# Patient Record
Sex: FEMALE | Race: BLACK OR AFRICAN AMERICAN | NOT HISPANIC OR LATINO | Employment: OTHER | URBAN - METROPOLITAN AREA
[De-identification: names, ages, dates, MRNs, and addresses within clinical notes are randomized per-mention and may not be internally consistent; named-entity substitution may affect disease eponyms.]

---

## 2020-02-04 ENCOUNTER — LAB VISIT (OUTPATIENT)
Dept: LAB | Facility: HOSPITAL | Age: 65
End: 2020-02-04
Attending: FAMILY MEDICINE
Payer: MEDICARE

## 2020-02-04 ENCOUNTER — OFFICE VISIT (OUTPATIENT)
Dept: FAMILY MEDICINE | Facility: CLINIC | Age: 65
End: 2020-02-04
Payer: MEDICARE

## 2020-02-04 VITALS
OXYGEN SATURATION: 95 % | SYSTOLIC BLOOD PRESSURE: 110 MMHG | HEIGHT: 56 IN | HEART RATE: 80 BPM | DIASTOLIC BLOOD PRESSURE: 66 MMHG | RESPIRATION RATE: 20 BRPM | BODY MASS INDEX: 27.47 KG/M2 | WEIGHT: 122.13 LBS | TEMPERATURE: 98 F

## 2020-02-04 DIAGNOSIS — I10 ESSENTIAL HYPERTENSION: Primary | ICD-10-CM

## 2020-02-04 DIAGNOSIS — Z13.220 ENCOUNTER FOR LIPID SCREENING FOR CARDIOVASCULAR DISEASE: ICD-10-CM

## 2020-02-04 DIAGNOSIS — Z76.89 ESTABLISHING CARE WITH NEW DOCTOR, ENCOUNTER FOR: ICD-10-CM

## 2020-02-04 DIAGNOSIS — Z12.4 PAP SMEAR FOR CERVICAL CANCER SCREENING: ICD-10-CM

## 2020-02-04 DIAGNOSIS — Z12.11 COLON CANCER SCREENING: ICD-10-CM

## 2020-02-04 DIAGNOSIS — Z11.59 NEED FOR HEPATITIS C SCREENING TEST: ICD-10-CM

## 2020-02-04 DIAGNOSIS — Z13.820 OSTEOPOROSIS SCREENING: ICD-10-CM

## 2020-02-04 DIAGNOSIS — Z13.6 ENCOUNTER FOR LIPID SCREENING FOR CARDIOVASCULAR DISEASE: ICD-10-CM

## 2020-02-04 DIAGNOSIS — N95.9 UNSPECIFIED MENOPAUSAL AND PERIMENOPAUSAL DISORDER: ICD-10-CM

## 2020-02-04 DIAGNOSIS — I10 ESSENTIAL HYPERTENSION: ICD-10-CM

## 2020-02-04 DIAGNOSIS — Z12.31 ENCOUNTER FOR SCREENING MAMMOGRAM FOR BREAST CANCER: ICD-10-CM

## 2020-02-04 DIAGNOSIS — J44.9 CHRONIC OBSTRUCTIVE PULMONARY DISEASE, UNSPECIFIED COPD TYPE: ICD-10-CM

## 2020-02-04 LAB
ALBUMIN SERPL BCP-MCNC: 4 G/DL (ref 3.5–5.2)
ALP SERPL-CCNC: 58 U/L (ref 55–135)
ALT SERPL W/O P-5'-P-CCNC: 15 U/L (ref 10–44)
ANION GAP SERPL CALC-SCNC: 7 MMOL/L (ref 8–16)
AST SERPL-CCNC: 18 U/L (ref 10–40)
BASOPHILS # BLD AUTO: 0.03 K/UL (ref 0–0.2)
BASOPHILS NFR BLD: 0.8 % (ref 0–1.9)
BILIRUB SERPL-MCNC: 0.3 MG/DL (ref 0.1–1)
BUN SERPL-MCNC: 9 MG/DL (ref 8–23)
CALCIUM SERPL-MCNC: 10.3 MG/DL (ref 8.7–10.5)
CHLORIDE SERPL-SCNC: 101 MMOL/L (ref 95–110)
CHOLEST SERPL-MCNC: 224 MG/DL (ref 120–199)
CHOLEST/HDLC SERPL: 3.9 {RATIO} (ref 2–5)
CO2 SERPL-SCNC: 33 MMOL/L (ref 23–29)
CREAT SERPL-MCNC: 1 MG/DL (ref 0.5–1.4)
DIFFERENTIAL METHOD: ABNORMAL
EOSINOPHIL # BLD AUTO: 0 K/UL (ref 0–0.5)
EOSINOPHIL NFR BLD: 0.8 % (ref 0–8)
ERYTHROCYTE [DISTWIDTH] IN BLOOD BY AUTOMATED COUNT: 13.1 % (ref 11.5–14.5)
EST. GFR  (AFRICAN AMERICAN): >60 ML/MIN/1.73 M^2
EST. GFR  (NON AFRICAN AMERICAN): 59.3 ML/MIN/1.73 M^2
GLUCOSE SERPL-MCNC: 76 MG/DL (ref 70–110)
HCT VFR BLD AUTO: 45.5 % (ref 37–48.5)
HDLC SERPL-MCNC: 58 MG/DL (ref 40–75)
HDLC SERPL: 25.9 % (ref 20–50)
HGB BLD-MCNC: 14.4 G/DL (ref 12–16)
IMM GRANULOCYTES # BLD AUTO: 0.02 K/UL (ref 0–0.04)
IMM GRANULOCYTES NFR BLD AUTO: 0.5 % (ref 0–0.5)
LDLC SERPL CALC-MCNC: 151.6 MG/DL (ref 63–159)
LYMPHOCYTES # BLD AUTO: 1.6 K/UL (ref 1–4.8)
LYMPHOCYTES NFR BLD: 42.7 % (ref 18–48)
MCH RBC QN AUTO: 30.2 PG (ref 27–31)
MCHC RBC AUTO-ENTMCNC: 31.6 G/DL (ref 32–36)
MCV RBC AUTO: 95 FL (ref 82–98)
MONOCYTES # BLD AUTO: 0.6 K/UL (ref 0.3–1)
MONOCYTES NFR BLD: 15.6 % (ref 4–15)
NEUTROPHILS # BLD AUTO: 1.4 K/UL (ref 1.8–7.7)
NEUTROPHILS NFR BLD: 39.6 % (ref 38–73)
NONHDLC SERPL-MCNC: 166 MG/DL
NRBC BLD-RTO: 0 /100 WBC
PLATELET # BLD AUTO: 268 K/UL (ref 150–350)
PMV BLD AUTO: 11.1 FL (ref 9.2–12.9)
POTASSIUM SERPL-SCNC: 4.3 MMOL/L (ref 3.5–5.1)
PROT SERPL-MCNC: 7.4 G/DL (ref 6–8.4)
RBC # BLD AUTO: 4.77 M/UL (ref 4–5.4)
SODIUM SERPL-SCNC: 141 MMOL/L (ref 136–145)
TRIGL SERPL-MCNC: 72 MG/DL (ref 30–150)
TSH SERPL DL<=0.005 MIU/L-ACNC: 1.44 UIU/ML (ref 0.4–4)
WBC # BLD AUTO: 3.65 K/UL (ref 3.9–12.7)

## 2020-02-04 PROCEDURE — 99387 INIT PM E/M NEW PAT 65+ YRS: CPT | Mod: S$GLB,,, | Performed by: FAMILY MEDICINE

## 2020-02-04 PROCEDURE — 3074F SYST BP LT 130 MM HG: CPT | Mod: CPTII,S$GLB,, | Performed by: FAMILY MEDICINE

## 2020-02-04 PROCEDURE — 3074F PR MOST RECENT SYSTOLIC BLOOD PRESSURE < 130 MM HG: ICD-10-PCS | Mod: CPTII,S$GLB,, | Performed by: FAMILY MEDICINE

## 2020-02-04 PROCEDURE — 99999 PR PBB SHADOW E&M-NEW PATIENT-LVL III: CPT | Mod: PBBFAC,,, | Performed by: FAMILY MEDICINE

## 2020-02-04 PROCEDURE — 80061 LIPID PANEL: CPT

## 2020-02-04 PROCEDURE — 86803 HEPATITIS C AB TEST: CPT

## 2020-02-04 PROCEDURE — 85025 COMPLETE CBC W/AUTO DIFF WBC: CPT

## 2020-02-04 PROCEDURE — 99387 PR PREVENTIVE VISIT,NEW,65 & OVER: ICD-10-PCS | Mod: S$GLB,,, | Performed by: FAMILY MEDICINE

## 2020-02-04 PROCEDURE — 3078F DIAST BP <80 MM HG: CPT | Mod: CPTII,S$GLB,, | Performed by: FAMILY MEDICINE

## 2020-02-04 PROCEDURE — 99999 PR PBB SHADOW E&M-NEW PATIENT-LVL III: ICD-10-PCS | Mod: PBBFAC,,, | Performed by: FAMILY MEDICINE

## 2020-02-04 PROCEDURE — 99499 UNLISTED E&M SERVICE: CPT | Mod: S$GLB,,, | Performed by: FAMILY MEDICINE

## 2020-02-04 PROCEDURE — 36415 COLL VENOUS BLD VENIPUNCTURE: CPT | Mod: PO

## 2020-02-04 PROCEDURE — 84443 ASSAY THYROID STIM HORMONE: CPT

## 2020-02-04 PROCEDURE — 3078F PR MOST RECENT DIASTOLIC BLOOD PRESSURE < 80 MM HG: ICD-10-PCS | Mod: CPTII,S$GLB,, | Performed by: FAMILY MEDICINE

## 2020-02-04 PROCEDURE — 80053 COMPREHEN METABOLIC PANEL: CPT

## 2020-02-04 PROCEDURE — 99499 RISK ADDL DX/OHS AUDIT: ICD-10-PCS | Mod: S$GLB,,, | Performed by: FAMILY MEDICINE

## 2020-02-04 RX ORDER — ALBUTEROL SULFATE 90 UG/1
AEROSOL, METERED RESPIRATORY (INHALATION)
COMMUNITY
Start: 2020-01-27 | End: 2020-03-04

## 2020-02-04 RX ORDER — POTASSIUM CHLORIDE 600 MG/1
TABLET, FILM COATED, EXTENDED RELEASE ORAL
COMMUNITY
Start: 2019-12-16

## 2020-02-04 RX ORDER — CYCLOBENZAPRINE HCL 10 MG
TABLET ORAL
COMMUNITY
Start: 2020-01-05 | End: 2020-08-03 | Stop reason: SDUPTHER

## 2020-02-04 RX ORDER — FLUTICASONE PROPIONATE AND SALMETEROL 250; 50 UG/1; UG/1
1 POWDER RESPIRATORY (INHALATION)
COMMUNITY
Start: 2014-10-17 | End: 2020-11-24

## 2020-02-04 RX ORDER — ALBUTEROL SULFATE 0.83 MG/ML
SOLUTION RESPIRATORY (INHALATION)
COMMUNITY
Start: 2020-01-26 | End: 2020-08-03 | Stop reason: SDUPTHER

## 2020-02-04 RX ORDER — FLUTICASONE PROPIONATE 220 UG/1
AEROSOL, METERED RESPIRATORY (INHALATION)
COMMUNITY
Start: 2019-12-27 | End: 2020-09-24 | Stop reason: SDUPTHER

## 2020-02-04 RX ORDER — PREDNISONE 20 MG/1
TABLET ORAL
Status: ON HOLD | COMMUNITY
Start: 2020-01-25 | End: 2020-11-17 | Stop reason: HOSPADM

## 2020-02-04 RX ORDER — LISINOPRIL 5 MG/1
TABLET ORAL
COMMUNITY
Start: 2019-11-18 | End: 2020-08-03 | Stop reason: SDUPTHER

## 2020-02-04 RX ORDER — LORATADINE 10 MG/1
10 TABLET ORAL
COMMUNITY
End: 2020-08-03 | Stop reason: SDUPTHER

## 2020-02-04 RX ORDER — ALBUTEROL SULFATE 90 UG/1
2 AEROSOL, METERED RESPIRATORY (INHALATION)
COMMUNITY
Start: 2014-10-17 | End: 2020-03-04

## 2020-02-05 LAB — HCV AB SERPL QL IA: NEGATIVE

## 2020-02-05 NOTE — PROGRESS NOTES
Subjective:       Patient ID: Hollie Ponce is a 64 y.o. female.    Chief Complaint: Annual Exam and Establish Care      HPI  64-year-old female presents to establish care.  Patient states she changed her insurance recently and her previous PCP does not take people's Health.  Patient states she is doing well overall.  Denies any issues at this time.  Denies any need for any refills.  Endorses smoking daily but states she has cut down.      Review of Systems   Constitutional: Negative.    HENT: Negative.    Respiratory: Negative.    Cardiovascular: Negative.    Gastrointestinal: Negative.    Endocrine: Negative.    Genitourinary: Negative.    Musculoskeletal: Negative.    Neurological: Negative.    Psychiatric/Behavioral: Negative.           Past Medical History:   Diagnosis Date    Asthma     COPD (chronic obstructive pulmonary disease)     Hypertension      Past Surgical History:   Procedure Laterality Date     SECTION      HERNIA REPAIR       Family History   Problem Relation Age of Onset    Cancer Mother         Bladder    Liver disease Father      Social History     Socioeconomic History    Marital status: Single     Spouse name: Not on file    Number of children: Not on file    Years of education: Not on file    Highest education level: Not on file   Occupational History    Not on file   Social Needs    Financial resource strain: Not on file    Food insecurity:     Worry: Not on file     Inability: Not on file    Transportation needs:     Medical: Not on file     Non-medical: Not on file   Tobacco Use    Smoking status: Current Every Day Smoker     Packs/day: 0.50     Years: 50.00     Pack years: 25.00     Types: Cigarettes    Smokeless tobacco: Never Used   Substance and Sexual Activity    Alcohol use: Never     Frequency: Never    Drug use: Never    Sexual activity: Not Currently   Lifestyle    Physical activity:     Days per week: Not on file     Minutes per session: Not on  "file    Stress: Not on file   Relationships    Social connections:     Talks on phone: Not on file     Gets together: Not on file     Attends Mosque service: Not on file     Active member of club or organization: Not on file     Attends meetings of clubs or organizations: Not on file     Relationship status: Not on file   Other Topics Concern    Not on file   Social History Narrative    Not on file       Current Outpatient Medications:     albuterol (PROVENTIL) 2.5 mg /3 mL (0.083 %) nebulizer solution, VVN Q 4 TO 6 H FOR SOB OR WHEEZING, Disp: , Rfl:     albuterol (PROVENTIL/VENTOLIN HFA) 90 mcg/actuation inhaler, INHALE 2 PUFFS INTO THE LUNGS EVERY 6 HOURS AS NEEDED FOR WHEEZING, Disp: , Rfl:     cyclobenzaprine (FLEXERIL) 10 MG tablet, , Disp: , Rfl:     FLOVENT  mcg/actuation inhaler, INHALE 1 PUFF PO ITL BID, Disp: , Rfl:     fluticasone-salmeterol diskus inhaler 250-50 mcg, Inhale 1 puff into the lungs., Disp: , Rfl:     lisinopril (PRINIVIL,ZESTRIL) 5 MG tablet, TK 1 T PO D, Disp: , Rfl:     potassium chloride (KLOR-CON) 8 MEQ TbSR, TK 1 T PO D, Disp: , Rfl:     predniSONE (DELTASONE) 20 MG tablet, TAKE 2 TABLETS BY MOUTH DAILY FOR COPD EXCERBATION AND REPEAT IF RECCURENT EXCERBATION, Disp: , Rfl:     albuterol (PROVENTIL/VENTOLIN HFA) 90 mcg/actuation inhaler, Inhale 2 puffs into the lungs., Disp: , Rfl:     hydrochlorothiazide (HYDRODIURIL) 25 MG tablet, Take 1 tablet (25 mg total) by mouth once daily., Disp: 30 tablet, Rfl: 6    loratadine (CLARITIN) 10 mg tablet, Take 10 mg by mouth., Disp: , Rfl:    Objective:      Vitals:    02/04/20 1027   BP: 110/66   BP Location: Right arm   Patient Position: Sitting   BP Method: Medium (Manual)   Pulse: 80   Resp: 20   Temp: 98.1 °F (36.7 °C)   TempSrc: Oral   SpO2: 95%   Weight: 55.4 kg (122 lb 2.2 oz)   Height: 4' 8" (1.422 m)       Physical Exam   Constitutional: She is oriented to person, place, and time. No distress.   HENT:   Head: " Normocephalic and atraumatic.   Eyes: Conjunctivae are normal.   Neck: Neck supple.   Cardiovascular: Normal rate, regular rhythm and normal heart sounds. Exam reveals no gallop and no friction rub.   No murmur heard.  Pulmonary/Chest: Effort normal and breath sounds normal. She has no wheezes. She has no rales.   Neurological: She is alert and oriented to person, place, and time.   Skin: Skin is warm and dry.   Psychiatric: She has a normal mood and affect. Her behavior is normal. Judgment and thought content normal.          Assessment:       1. Essential hypertension    2. Colon cancer screening    3. Encounter for screening mammogram for breast cancer    4. Need for hepatitis C screening test    5. Encounter for lipid screening for cardiovascular disease    6. Establishing care with new doctor, encounter for    7. Chronic obstructive pulmonary disease, unspecified COPD type    8. Osteoporosis screening    9. Unspecified menopausal and perimenopausal disorder     10. Pap smear for cervical cancer screening        Plan:       Essential hypertension  -     CBC auto differential; Future; Expected date: 02/04/2020  -     Comprehensive metabolic panel; Future; Expected date: 02/04/2020  -     TSH; Future; Expected date: 02/04/2020    Colon cancer screening  -     Fecal Immunochemical Test (iFOBT); Future; Expected date: 02/04/2020    Encounter for screening mammogram for breast cancer  -     Mammo Digital Screening Bilat w/ Bebeto; Future; Expected date: 02/04/2020    Need for hepatitis C screening test  -     Hepatitis C antibody; Future; Expected date: 02/04/2020    Encounter for lipid screening for cardiovascular disease  -     Lipid panel; Future; Expected date: 02/04/2020    Chronic obstructive pulmonary disease, unspecified COPD type  Cont meds    Osteoporosis screening  -     DXA Bone Density Spine And Hip; Future; Expected date: 02/04/2020    Pap smear for cervical cancer screening  -     Ambulatory  referral/consult to Gynecology; Future; Expected date: 02/11/2020      Follow up in about 6 months (around 8/4/2020).            Navdeep Marquez MD      Patient note was created using mobiliThink.  Any errors in syntax or even information may not have been identified and edited on initial review prior to signing this note.

## 2020-02-10 ENCOUNTER — TELEPHONE (OUTPATIENT)
Dept: FAMILY MEDICINE | Facility: CLINIC | Age: 65
End: 2020-02-10

## 2020-02-28 ENCOUNTER — OFFICE VISIT (OUTPATIENT)
Dept: OBSTETRICS AND GYNECOLOGY | Facility: CLINIC | Age: 65
End: 2020-02-28
Payer: MEDICARE

## 2020-02-28 VITALS
SYSTOLIC BLOOD PRESSURE: 137 MMHG | DIASTOLIC BLOOD PRESSURE: 73 MMHG | WEIGHT: 125.31 LBS | BODY MASS INDEX: 28.19 KG/M2 | HEIGHT: 56 IN

## 2020-02-28 DIAGNOSIS — Z01.419 WELL WOMAN EXAM WITH ROUTINE GYNECOLOGICAL EXAM: Primary | ICD-10-CM

## 2020-02-28 PROCEDURE — G0101 PR CA SCREEN;PELVIC/BREAST EXAM: ICD-10-PCS | Mod: S$GLB,,, | Performed by: OBSTETRICS & GYNECOLOGY

## 2020-02-28 PROCEDURE — 88175 CYTOPATH C/V AUTO FLUID REDO: CPT

## 2020-02-28 PROCEDURE — 99999 PR PBB SHADOW E&M-EST. PATIENT-LVL III: ICD-10-PCS | Mod: PBBFAC,,, | Performed by: OBSTETRICS & GYNECOLOGY

## 2020-02-28 PROCEDURE — 99999 PR PBB SHADOW E&M-EST. PATIENT-LVL III: CPT | Mod: PBBFAC,,, | Performed by: OBSTETRICS & GYNECOLOGY

## 2020-02-28 PROCEDURE — 87624 HPV HI-RISK TYP POOLED RSLT: CPT

## 2020-02-28 PROCEDURE — G0101 CA SCREEN;PELVIC/BREAST EXAM: HCPCS | Mod: S$GLB,,, | Performed by: OBSTETRICS & GYNECOLOGY

## 2020-02-28 RX ORDER — EZETIMIBE 10 MG/1
10 TABLET ORAL
COMMUNITY
Start: 2019-07-08 | End: 2020-08-03 | Stop reason: SDUPTHER

## 2020-02-28 NOTE — PATIENT INSTRUCTIONS
Prevention Guidelines, Women Ages 65 and Older  Screening tests and vaccines are an important part of managing your health. Health counseling is essential, too. Below are guidelines for these, for women ages 65 and older. Talk with your healthcare provider to make sure youre up to date on what you need.  Screening Who needs it How often   Type 2 diabetes or prediabetes All adults beginning at age 45 and adults without symptoms at any age who are overweight or obese and have 1 or more additional risk factors for diabetes At least every 3 years   Alcohol misuse All women in this age group At routine exams   Blood pressure All women in this age group Every 2 years if your blood pressure is less than 120/80 mm Hg; yearly if your systolic blood pressure is 120 to 139 mm Hg, or your diastolic blood pressure reading is 80 to 89 mm Hg   Breast cancer All women in this age group Yearly mammogram and clinical breast exam1   Cervical cancer Only women who had abnormal screening results before age 65 Talk with your healthcare provider   Chlamydia Women at increased risk for infection At routine exams   Colorectal cancer All women in this age group1 Flexible sigmoidoscopy every 5 years, or colonoscopy every 10 years, or double-contrast barium enema every 5 years; yearly fecal occult blood test or fecal immunochemical test; or a stool DNA test as often as your healthcare provider advises; talk with your healthcare provider about which tests are best for you   Depression All women in this age group At routine exams   Gonorrhea Sexually active women at increased risk for infection At routine exams   Hepatitis C Anyone at increased risk; 1 time for those born between 1945 and 1965 At routine exams   High cholesterol or triglycerides All women in this age group who are at risk for coronary artery disease At least every 5 years   HIV Women at increased risk for infection - talk with your healthcare provider At routine exams   Lung  cancer Adults age 55 to 80 who have smoked Yearly screening in smokers with 30 pack-year history of smoking or who quit within 15 years   Obesity All women in this age group At routine exams   Osteoporosis All women in this age group Bone density test at age 65, then follow-up as advised by your healthcare provider   Syphilis Women at increased risk for infection - talk with your healthcare provider At routine exams   Thyroid-Stimulating Hormone (TSH) All women in this age group Every 5 years   Tuberculosis Women at increased risk for infection - talk with your healthcare provider Ask your healthcare provider   Vision All women in this age group Every 1 to 2 years; if you have a chronic health condition, ask your healthcare provider if you need exams more often   Vaccine Who needs it How often   Chickenpox (varicella) All women in this age group who have no record of this infection or vaccine 2 doses; second dose should be given at least 4 weeks after the first dose   Hepatitis A Women at increased risk for infection - talk with your healthcare provider 2 doses given 6 months apart   Hepatitis B Women at increased risk for infection - talk with your healthcare provider 3 doses over 6 months; second dose should be given 1 month after the first dose; the third dose should be given at least 2 months after the second dose and at least 4 months after the first dose   Haemophilus influenza Type B (HIB) Women at increased risk for infection - talk with your healthcare provider 1 to 3 doses   Influenza (flu) All women in this age group Once a year   Pneumococcal conjugate vaccine (PCV13) and pneumococcal polysaccharide vaccine (PPSV23) All women in this age group 1 dose of each vaccine   Tetanus/diphtheria/pertussis (Td/Tdap) booster All women in this age group Td every 10 years, or a one-time dose of Tdap instead of a Td booster after age 18, then Td every 10 years   Zoster All women in this age group 1 dose   Counseling  Who needs it How often   Diet and exercise Women who are overweight or obese When diagnosed, and then at routine exams   Fall prevention (exercise and vitamin D supplements) All women in this age group At routine exams   Sexually transmitted infection prevention Women at increased risk for infection - talk with your healthcare provider At routine exams   Use of daily aspirin Women ages 55 and up in this age group who are at risk for cardiovascular health problems such as stroke When your risk is known   Use of tobacco and the health effects it can cause All women in this age group Every exam   1[employer]  Date Last Reviewed: 8/9/2015  © 4310-4259 [employer]. [address] [place] [address], [place] [address]. All rights reserved. This information is not intended as a substitute for professional medical care. Always follow your healthcare professional's instructions.

## 2020-02-28 NOTE — LETTER
February 28, 2020      Navdeep Marquez MD  [address] [place] [address]           [facility] - OB/ GYN  [address]  [facility] [address]  Phone: [phone]          Patient: Hollie Ponce   MR Number: [id number]   YOB: 1955   Date of Visit: 2/28/2020       Dear Dr. Navdeep Marquez:    Thank you for referring Hollie Ponce to me for evaluation. Attached you will find relevant portions of my assessment and plan of care.    If you have questions, please do not hesitate to call me. I look forward to following Hollie Ponce along with you.    Sincerely,    Scott Cruz MD    Enclosure  CC:  No Recipients    If you would like to receive this communication electronically, please contact [email or web address] or [phone] to request more information on [employer] access.    For providers and/or their staff who would like to refer a patient to [facility], please contact us through our one-stop-shop provider referral line, [facility], at [phone].    If you feel you have received this communication in error or would no longer like to receive these types of communications, please e-mail [email or web address]

## 2020-02-28 NOTE — PROGRESS NOTES
History & Physical  Gynecology      SUBJECTIVE:     Chief Complaint: Well Woman       History of Present Illness:  Annual Exam-Postmenopausal  Ms. Ponce is a 63 y/o female who presents for annual exam. The patient has no complaints today. The patient is not sexually active. GYN screening history: last pap: patient does not recall when last pap was and last mammogram: approximate date  and was normal. The patient is not taking hormone replacement therapy. Patient denies post-menopausal vaginal bleeding. The patient wears seatbelts: yes. The patient participates in regular exercise: no. Has the patient ever been transfused or tattooed?: yes. The patient reports that there is not domestic violence in her life.      Review of patient's allergies indicates:   Allergen Reactions    Flagyl [metronidazole hcl]      Hives      Sulfamethoxazole-trimethoprim Itching    Aspirin Nausea Only    Lipitor [atorvastatin]      Myalgia        Past Medical History:   Diagnosis Date    Asthma     COPD (chronic obstructive pulmonary disease)     Hypertension      Past Surgical History:   Procedure Laterality Date     SECTION      HERNIA REPAIR       OB History    None       Family History   Problem Relation Age of Onset    Cancer Mother         Bladder    Liver disease Father      Social History     Tobacco Use    Smoking status: Current Every Day Smoker     Packs/day: 0.50     Years: 50.00     Pack years: 25.00     Types: Cigarettes    Smokeless tobacco: Never Used   Substance Use Topics    Alcohol use: Never     Frequency: Never    Drug use: Never       Current Outpatient Medications   Medication Sig    albuterol (PROVENTIL) 2.5 mg /3 mL (0.083 %) nebulizer solution VVN Q 4 TO 6 H FOR SOB OR WHEEZING    albuterol (PROVENTIL/VENTOLIN HFA) 90 mcg/actuation inhaler INHALE 2 PUFFS INTO THE LUNGS EVERY 6 HOURS AS NEEDED FOR WHEEZING    albuterol (PROVENTIL/VENTOLIN HFA) 90 mcg/actuation inhaler Inhale 2 puffs  into the lungs.    cyclobenzaprine (FLEXERIL) 10 MG tablet     ezetimibe (ZETIA) 10 mg tablet Take 10 mg by mouth.    FLOVENT  mcg/actuation inhaler INHALE 1 PUFF PO ITL BID    fluticasone-salmeterol diskus inhaler 250-50 mcg Inhale 1 puff into the lungs.    hydrochlorothiazide (HYDRODIURIL) 25 MG tablet Take 1 tablet (25 mg total) by mouth once daily.    lisinopril (PRINIVIL,ZESTRIL) 5 MG tablet TK 1 T PO D    loratadine (CLARITIN) 10 mg tablet Take 10 mg by mouth.    miscellaneous medical supply (470V TWO WAY VALVE) Misc Disp nebulizer and tubing ,medicine reservoir,and mask  So as to use  Every 4-6 hrs for sob/wheeze    potassium chloride (KLOR-CON) 8 MEQ TbSR TK 1 T PO D    predniSONE (DELTASONE) 20 MG tablet TAKE 2 TABLETS BY MOUTH DAILY FOR COPD EXCERBATION AND REPEAT IF RECCURENT EXCERBATION     No current facility-administered medications for this visit.          Review of Systems:  Review of Systems   Constitutional: Negative for chills and fever.   Respiratory: Negative for cough and wheezing.    Cardiovascular: Negative for chest pain and palpitations.   Gastrointestinal: Negative for abdominal pain, nausea and vomiting.   Genitourinary: Negative for dysuria, frequency, hematuria, pelvic pain, vaginal bleeding, vaginal discharge and vaginal pain.        OBJECTIVE:     Physical Exam:  Physical Exam   Constitutional: She is oriented to person, place, and time. She appears well-developed and well-nourished.   Cardiovascular: Normal rate.   Pulmonary/Chest: Effort normal. No respiratory distress. No breast swelling, tenderness, discharge or bleeding. Breasts are symmetrical.   Abdominal: Soft. She exhibits no distension. There is no tenderness.   Genitourinary: Uterus normal. No breast swelling, tenderness, discharge or bleeding.   Genitourinary Comments: Vaginal atrophy present. Vaginal dryness   Neurological: She is alert and oriented to person, place, and time.   Skin: Skin is warm and  dry.   Psychiatric: She has a normal mood and affect.   Nursing note and vitals reviewed.    ASSESSMENT:       ICD-10-CM ICD-9-CM    1. Well woman exam with routine gynecological exam Z01.419 V72.31 Ambulatory referral/consult to Gynecology      Liquid-Based Pap Smear, Screening      HPV High Risk Genotypes, PCR      Plan:      Hollie was seen today for well woman.    Diagnoses and all orders for this visit:    Well woman exam with routine gynecological exam  -     Ambulatory referral/consult to Gynecology  -     Liquid-Based Pap Smear, Screening  -     HPV High Risk Genotypes, PCR  - Mammogram and DXA ordered; number given  - FOBT pending collection      Orders Placed This Encounter   Procedures    HPV High Risk Genotypes, PCR       Follow up in about 1 year (around 2/28/2021) for WWE.    Counseling time: 15 minutes    Scott Cruz

## 2020-03-01 ENCOUNTER — LAB VISIT (OUTPATIENT)
Dept: LAB | Facility: HOSPITAL | Age: 65
End: 2020-03-01
Attending: FAMILY MEDICINE
Payer: MEDICARE

## 2020-03-01 DIAGNOSIS — Z12.11 COLON CANCER SCREENING: ICD-10-CM

## 2020-03-01 PROCEDURE — 82274 ASSAY TEST FOR BLOOD FECAL: CPT

## 2020-03-03 NOTE — TELEPHONE ENCOUNTER
Pharmacy is asking if you can replace the meds with PROAIR INHALER, that is what her insurance covers    Last Office Visit Info:   The patient's last visit with Navdeep Marquez MD was on 2/4/2020.    The patient's last visit in current department was on 2/4/2020.        Last CBC Results:   Lab Results   Component Value Date    WBC 3.65 (L) 02/04/2020    HGB 14.4 02/04/2020    HCT 45.5 02/04/2020     02/04/2020       Last CMP Results  Lab Results   Component Value Date     02/04/2020    K 4.3 02/04/2020     02/04/2020    CO2 33 (H) 02/04/2020    BUN 9 02/04/2020    CREATININE 1.0 02/04/2020    CALCIUM 10.3 02/04/2020    ALBUMIN 4.0 02/04/2020    AST 18 02/04/2020    ALT 15 02/04/2020       Last Lipids  Lab Results   Component Value Date    CHOL 224 (H) 02/04/2020    TRIG 72 02/04/2020    HDL 58 02/04/2020    LDLCALC 151.6 02/04/2020       Last A1C  No results found for: HGBA1C    Last TSH  Lab Results   Component Value Date    TSH 1.442 02/04/2020         Current Med Refills  Medication List with Changes/Refills   Current Medications    ALBUTEROL (PROVENTIL) 2.5 MG /3 ML (0.083 %) NEBULIZER SOLUTION    VVN Q 4 TO 6 H FOR SOB OR WHEEZING       Start Date: 1/26/2020 End Date: --    ALBUTEROL (PROVENTIL/VENTOLIN HFA) 90 MCG/ACTUATION INHALER    INHALE 2 PUFFS INTO THE LUNGS EVERY 6 HOURS AS NEEDED FOR WHEEZING       Start Date: 1/27/2020 End Date: --    ALBUTEROL (PROVENTIL/VENTOLIN HFA) 90 MCG/ACTUATION INHALER    Inhale 2 puffs into the lungs.       Start Date: 10/17/2014End Date: --    CYCLOBENZAPRINE (FLEXERIL) 10 MG TABLET           Start Date: 1/5/2020  End Date: --    EZETIMIBE (ZETIA) 10 MG TABLET    Take 10 mg by mouth.       Start Date: 7/8/2019  End Date: --    FLOVENT  MCG/ACTUATION INHALER    INHALE 1 PUFF PO ITL BID       Start Date: 12/27/2019End Date: --    FLUTICASONE-SALMETEROL DISKUS INHALER 250-50 MCG    Inhale 1 puff into the lungs.       Start Date: 10/17/2014End Date:  --    HYDROCHLOROTHIAZIDE (HYDRODIURIL) 25 MG TABLET    Take 1 tablet (25 mg total) by mouth once daily.       Start Date: 12/14/2012End Date: --    LISINOPRIL (PRINIVIL,ZESTRIL) 5 MG TABLET    TK 1 T PO D       Start Date: 11/18/2019End Date: --    LORATADINE (CLARITIN) 10 MG TABLET    Take 10 mg by mouth.       Start Date: --        End Date: --    MISCELLANEOUS MEDICAL SUPPLY (470V TWO WAY VALVE) MISC    Disp nebulizer and tubing ,medicine reservoir,and mask  So as to use  Every 4-6 hrs for sob/wheeze       Start Date: 10/23/2017End Date: --    POTASSIUM CHLORIDE (KLOR-CON) 8 MEQ TBSR    TK 1 T PO D       Start Date: 12/16/2019End Date: --    PREDNISONE (DELTASONE) 20 MG TABLET    TAKE 2 TABLETS BY MOUTH DAILY FOR COPD EXCERBATION AND REPEAT IF RECCURENT EXCERBATION       Start Date: 1/25/2020 End Date: --       Order(s) placed per written order guidelines:     Please advise.

## 2020-03-04 RX ORDER — ALBUTEROL SULFATE 90 UG/1
AEROSOL, METERED RESPIRATORY (INHALATION)
Status: CANCELLED | OUTPATIENT
Start: 2020-03-04

## 2020-03-04 RX ORDER — ALBUTEROL SULFATE 90 UG/1
2 AEROSOL, METERED RESPIRATORY (INHALATION) EVERY 6 HOURS PRN
Qty: 18 G | Refills: 2 | Status: SHIPPED | OUTPATIENT
Start: 2020-03-04 | End: 2020-08-03 | Stop reason: SDUPTHER

## 2020-03-06 LAB
HPV HR 12 DNA SPEC QL NAA+PROBE: NEGATIVE
HPV16 AG SPEC QL: NEGATIVE
HPV18 DNA SPEC QL NAA+PROBE: NEGATIVE

## 2020-03-11 LAB — HEMOCCULT STL QL IA: NEGATIVE

## 2020-03-27 LAB
FINAL PATHOLOGIC DIAGNOSIS: NORMAL
Lab: NORMAL

## 2020-08-03 DIAGNOSIS — I10 HTN (HYPERTENSION): ICD-10-CM

## 2020-08-03 NOTE — TELEPHONE ENCOUNTER
----- Message from Tomy Perdomo sent at 8/3/2020  2:23 PM CDT -----  Regarding: refill  Type: RX Refill Request    Who Called: Hollie     Refill or New Rx:refill     RX Name and Strength:albuterol (PROVENTIL) 2.5 mg /3 mL (0.083 %) nebulizer solution, lisinopril (PRINIVIL,ZESTRIL) 5 MG tablet, hydrochlorothiazide (HYDRODIURIL) 25 MG tablet, cyclobenzaprine (FLEXERIL) 10 MG tablet, albuterol (PROAIR HFA) 90 mcg/actuation inhaler,FLOVENT  mcg/actuation inhaler, loratadine (CLARITIN) 10 mg tablet      Is this a 30 day or 90 day Rx:30 day     Preferred Pharmacy with phone number:[id number] - [place] - [address] [facility] AT [address]      Would the patient rather a call back or a response via [facility]? Call back    Best Call Back Number:[phone]

## 2020-08-06 RX ORDER — LORATADINE 10 MG/1
10 TABLET ORAL DAILY PRN
Qty: 90 TABLET | Refills: 1 | Status: SHIPPED | OUTPATIENT
Start: 2020-08-06 | End: 2021-08-06

## 2020-08-06 RX ORDER — EZETIMIBE 10 MG/1
10 TABLET ORAL DAILY
Qty: 90 TABLET | Refills: 1 | Status: SHIPPED | OUTPATIENT
Start: 2020-08-06 | End: 2021-08-04

## 2020-08-06 RX ORDER — ALBUTEROL SULFATE 90 UG/1
2 AEROSOL, METERED RESPIRATORY (INHALATION) EVERY 6 HOURS PRN
Qty: 18 G | Refills: 2 | Status: SHIPPED | OUTPATIENT
Start: 2020-08-06 | End: 2020-09-24 | Stop reason: SDUPTHER

## 2020-08-06 RX ORDER — HYDROCHLOROTHIAZIDE 25 MG/1
25 TABLET ORAL DAILY
Qty: 90 TABLET | Refills: 1 | Status: SHIPPED | OUTPATIENT
Start: 2020-08-06 | End: 2021-02-22

## 2020-08-06 RX ORDER — ALBUTEROL SULFATE 0.83 MG/ML
SOLUTION RESPIRATORY (INHALATION)
Qty: 1 BOX | Refills: 2 | Status: SHIPPED | OUTPATIENT
Start: 2020-08-06 | End: 2020-12-30

## 2020-08-06 RX ORDER — LISINOPRIL 5 MG/1
TABLET ORAL
Qty: 90 TABLET | Refills: 1 | Status: SHIPPED | OUTPATIENT
Start: 2020-08-06 | End: 2021-02-22

## 2020-08-06 RX ORDER — CYCLOBENZAPRINE HCL 10 MG
10 TABLET ORAL NIGHTLY PRN
Qty: 90 TABLET | Refills: 1 | Status: SHIPPED | OUTPATIENT
Start: 2020-08-06 | End: 2021-11-01

## 2020-09-24 RX ORDER — FLUTICASONE PROPIONATE 220 UG/1
AEROSOL, METERED RESPIRATORY (INHALATION)
Qty: 12 G | Refills: 0 | Status: SHIPPED | OUTPATIENT
Start: 2020-09-24 | End: 2020-11-20 | Stop reason: SDUPTHER

## 2020-09-24 RX ORDER — ALBUTEROL SULFATE 90 UG/1
2 AEROSOL, METERED RESPIRATORY (INHALATION) EVERY 6 HOURS PRN
Qty: 18 G | Refills: 2 | Status: SHIPPED | OUTPATIENT
Start: 2020-09-24 | End: 2020-11-24 | Stop reason: SDUPTHER

## 2020-09-24 NOTE — TELEPHONE ENCOUNTER
----- Message from Marietta Jordan sent at 9/23/2020  3:31 PM CDT -----  Regarding: Refill Request  Please refill the medication(s) listed below :    Medication # 1 :FLOVENT  mcg/actuation inhaler    Medication # 2 :albuterol (PROAIR HFA) 90 mcg/actuation inhaler    Please call the patient when the prescription is sent to pharmacy :[phone]    Can the clinic reply in [facility] :No     Preferred Pharmacy : [id number] - [place] - [address] [facility] [address] [phone] (Phone)  [phone] (Fax)

## 2020-10-05 ENCOUNTER — PATIENT MESSAGE (OUTPATIENT)
Dept: ADMINISTRATIVE | Facility: HOSPITAL | Age: 65
End: 2020-10-05

## 2020-11-16 ENCOUNTER — HOSPITAL ENCOUNTER (INPATIENT)
Facility: HOSPITAL | Age: 65
LOS: 1 days | Discharge: HOME | DRG: 281 | End: 2020-11-17
Attending: EMERGENCY MEDICINE | Admitting: INTERNAL MEDICINE
Payer: MEDICARE

## 2020-11-16 DIAGNOSIS — R07.9 CHEST PAIN: ICD-10-CM

## 2020-11-16 DIAGNOSIS — R79.89 ELEVATED TROPONIN: ICD-10-CM

## 2020-11-16 DIAGNOSIS — I21.4 NSTEMI (NON-ST ELEVATED MYOCARDIAL INFARCTION): ICD-10-CM

## 2020-11-16 DIAGNOSIS — R06.2 WHEEZING: ICD-10-CM

## 2020-11-16 DIAGNOSIS — R05.9 COUGH: ICD-10-CM

## 2020-11-16 DIAGNOSIS — R09.02 HYPOXIA: Primary | ICD-10-CM

## 2020-11-16 DIAGNOSIS — R06.02 SHORTNESS OF BREATH: ICD-10-CM

## 2020-11-16 DIAGNOSIS — J44.1 COPD EXACERBATION: ICD-10-CM

## 2020-11-16 PROBLEM — F17.210 CIGARETTE SMOKER ONE HALF PACK A DAY OR LESS: Status: ACTIVE | Noted: 2020-11-16

## 2020-11-16 LAB
ALBUMIN SERPL BCP-MCNC: 4 G/DL (ref 3.5–5.2)
ALP SERPL-CCNC: 60 U/L (ref 55–135)
ALT SERPL W/O P-5'-P-CCNC: 15 U/L (ref 10–44)
ANION GAP SERPL CALC-SCNC: 11 MMOL/L (ref 8–16)
AORTIC ROOT ANNULUS: 2.37 CM
AORTIC VALVE CUSP SEPERATION: 1.55 CM
AST SERPL-CCNC: 18 U/L (ref 10–40)
AV INDEX (PROSTH): 0.86
AV MEAN GRADIENT: 4 MMHG
AV PEAK GRADIENT: 7 MMHG
AV VALVE AREA: 2.06 CM2
AV VELOCITY RATIO: 0.89
B-HCG UR QL: NEGATIVE
BASOPHILS # BLD AUTO: 0.03 K/UL (ref 0–0.2)
BASOPHILS NFR BLD: 0.4 % (ref 0–1.9)
BILIRUB SERPL-MCNC: 0.2 MG/DL (ref 0.1–1)
BNP SERPL-MCNC: 42 PG/ML (ref 0–99)
BSA FOR ECHO PROCEDURE: 1.53 M2
BUN SERPL-MCNC: 13 MG/DL (ref 8–23)
CALCIUM SERPL-MCNC: 8.9 MG/DL (ref 8.7–10.5)
CHLORIDE SERPL-SCNC: 100 MMOL/L (ref 95–110)
CO2 SERPL-SCNC: 27 MMOL/L (ref 23–29)
CREAT SERPL-MCNC: 1.1 MG/DL (ref 0.5–1.4)
CTP QC/QA: YES
CV ECHO LV RWT: 0.66 CM
D DIMER PPP IA.FEU-MCNC: 0.42 MG/L FEU
DIFFERENTIAL METHOD: ABNORMAL
DOP CALC AO PEAK VEL: 1.3 M/S
DOP CALC AO VTI: 29.32 CM
DOP CALC LVOT AREA: 2.4 CM2
DOP CALC LVOT DIAMETER: 1.75 CM
DOP CALC LVOT PEAK VEL: 1.16 M/S
DOP CALC LVOT STROKE VOLUME: 60.34 CM3
DOP CALCLVOT PEAK VEL VTI: 25.1 CM
E WAVE DECELERATION TIME: 205.4 MSEC
E/A RATIO: 1.44
ECHO LV POSTERIOR WALL: 1.2 CM (ref 0.6–1.1)
EOSINOPHIL # BLD AUTO: 0.1 K/UL (ref 0–0.5)
EOSINOPHIL NFR BLD: 0.8 % (ref 0–8)
ERYTHROCYTE [DISTWIDTH] IN BLOOD BY AUTOMATED COUNT: 13.2 % (ref 11.5–14.5)
EST. GFR  (AFRICAN AMERICAN): >60 ML/MIN/1.73 M^2
EST. GFR  (NON AFRICAN AMERICAN): 52 ML/MIN/1.73 M^2
FRACTIONAL SHORTENING: 29 % (ref 28–44)
GLUCOSE SERPL-MCNC: 126 MG/DL (ref 70–110)
HCT VFR BLD AUTO: 39.7 % (ref 37–48.5)
HGB BLD-MCNC: 13.2 G/DL (ref 12–16)
IMM GRANULOCYTES # BLD AUTO: 0.04 K/UL (ref 0–0.04)
IMM GRANULOCYTES NFR BLD AUTO: 0.5 % (ref 0–0.5)
INTERVENTRICULAR SEPTUM: 1.19 CM (ref 0.6–1.1)
IVRT: 128.45 MSEC
LA MAJOR: 4.51 CM
LA MINOR: 3.99 CM
LA WIDTH: 3.65 CM
LEFT ATRIUM SIZE: 2.67 CM
LEFT ATRIUM VOLUME INDEX: 23.7 ML/M2
LEFT ATRIUM VOLUME: 35.07 CM3
LEFT INTERNAL DIMENSION IN SYSTOLE: 2.57 CM (ref 2.1–4)
LEFT VENTRICLE DIASTOLIC VOLUME INDEX: 37.46 ML/M2
LEFT VENTRICLE DIASTOLIC VOLUME: 55.55 ML
LEFT VENTRICLE MASS INDEX: 96 G/M2
LEFT VENTRICLE SYSTOLIC VOLUME INDEX: 16.2 ML/M2
LEFT VENTRICLE SYSTOLIC VOLUME: 23.97 ML
LEFT VENTRICULAR INTERNAL DIMENSION IN DIASTOLE: 3.63 CM (ref 3.5–6)
LEFT VENTRICULAR MASS: 142.32 G
LYMPHOCYTES # BLD AUTO: 4.2 K/UL (ref 1–4.8)
LYMPHOCYTES NFR BLD: 55 % (ref 18–48)
MCH RBC QN AUTO: 29.9 PG (ref 27–31)
MCHC RBC AUTO-ENTMCNC: 33.2 G/DL (ref 32–36)
MCV RBC AUTO: 90 FL (ref 82–98)
MONOCYTES # BLD AUTO: 0.8 K/UL (ref 0.3–1)
MONOCYTES NFR BLD: 11 % (ref 4–15)
MV PEAK A VEL: 0.59 M/S
MV PEAK E VEL: 0.85 M/S
MV STENOSIS PRESSURE HALF TIME: 59.57 MS
MV VALVE AREA P 1/2 METHOD: 3.69 CM2
NEUTROPHILS # BLD AUTO: 2.5 K/UL (ref 1.8–7.7)
NEUTROPHILS NFR BLD: 32.3 % (ref 38–73)
NRBC BLD-RTO: 0 /100 WBC
PISA TR MAX VEL: 3.39 M/S
PLATELET # BLD AUTO: 263 K/UL (ref 150–350)
PMV BLD AUTO: 10.7 FL (ref 9.2–12.9)
POCT GLUCOSE: 154 MG/DL (ref 70–110)
POTASSIUM SERPL-SCNC: 3.6 MMOL/L (ref 3.5–5.1)
PROT SERPL-MCNC: 6.8 G/DL (ref 6–8.4)
PULM VEIN S/D RATIO: 1.22
PV PEAK D VEL: 0.64 M/S
PV PEAK S VEL: 0.78 M/S
PV PEAK VELOCITY: 0.86 CM/S
RA MAJOR: 4.5 CM
RA PRESSURE: 3 MMHG
RA WIDTH: 3.86 CM
RBC # BLD AUTO: 4.41 M/UL (ref 4–5.4)
RIGHT VENTRICULAR END-DIASTOLIC DIMENSION: 3.34 CM
SARS-COV-2 RDRP RESP QL NAA+PROBE: NEGATIVE
SODIUM SERPL-SCNC: 138 MMOL/L (ref 136–145)
STJ: 2.5 CM
TR MAX PG: 46 MMHG
TRICUSPID ANNULAR PLANE SYSTOLIC EXCURSION: 1.97 CM
TROPONIN I SERPL DL<=0.01 NG/ML-MCNC: 0.03 NG/ML (ref 0–0.03)
TROPONIN I SERPL DL<=0.01 NG/ML-MCNC: 0.74 NG/ML (ref 0–0.03)
TROPONIN I SERPL DL<=0.01 NG/ML-MCNC: 1.09 NG/ML (ref 0–0.03)
TROPONIN I SERPL DL<=0.01 NG/ML-MCNC: 1.75 NG/ML (ref 0–0.03)
TV REST PULMONARY ARTERY PRESSURE: 49 MMHG
WBC # BLD AUTO: 7.67 K/UL (ref 3.9–12.7)

## 2020-11-16 PROCEDURE — U0002 COVID-19 LAB TEST NON-CDC: HCPCS | Performed by: EMERGENCY MEDICINE

## 2020-11-16 PROCEDURE — 96374 THER/PROPH/DIAG INJ IV PUSH: CPT

## 2020-11-16 PROCEDURE — 96372 THER/PROPH/DIAG INJ SC/IM: CPT | Mod: 59

## 2020-11-16 PROCEDURE — 94640 AIRWAY INHALATION TREATMENT: CPT

## 2020-11-16 PROCEDURE — 25000003 PHARM REV CODE 250: Performed by: INTERNAL MEDICINE

## 2020-11-16 PROCEDURE — 93458 L HRT ARTERY/VENTRICLE ANGIO: CPT | Performed by: INTERNAL MEDICINE

## 2020-11-16 PROCEDURE — 93458 L HRT ARTERY/VENTRICLE ANGIO: CPT | Mod: 26,,, | Performed by: INTERNAL MEDICINE

## 2020-11-16 PROCEDURE — 27000221 HC OXYGEN, UP TO 24 HOURS

## 2020-11-16 PROCEDURE — 63600175 PHARM REV CODE 636 W HCPCS: Performed by: INTERNAL MEDICINE

## 2020-11-16 PROCEDURE — 85025 COMPLETE CBC W/AUTO DIFF WBC: CPT

## 2020-11-16 PROCEDURE — 25000003 PHARM REV CODE 250: Performed by: EMERGENCY MEDICINE

## 2020-11-16 PROCEDURE — 83880 ASSAY OF NATRIURETIC PEPTIDE: CPT

## 2020-11-16 PROCEDURE — 99152 MOD SED SAME PHYS/QHP 5/>YRS: CPT | Performed by: INTERNAL MEDICINE

## 2020-11-16 PROCEDURE — C1894 INTRO/SHEATH, NON-LASER: HCPCS | Performed by: INTERNAL MEDICINE

## 2020-11-16 PROCEDURE — 93010 EKG 12-LEAD: ICD-10-PCS | Mod: ,,, | Performed by: INTERNAL MEDICINE

## 2020-11-16 PROCEDURE — 36415 COLL VENOUS BLD VENIPUNCTURE: CPT

## 2020-11-16 PROCEDURE — 96375 TX/PRO/DX INJ NEW DRUG ADDON: CPT

## 2020-11-16 PROCEDURE — 11000001 HC ACUTE MED/SURG PRIVATE ROOM

## 2020-11-16 PROCEDURE — 93458 PR CATH PLACE/CORON ANGIO, IMG SUPER/INTERP,W LEFT HEART VENTRICULOGRAPHY: ICD-10-PCS | Mod: 26,,, | Performed by: INTERNAL MEDICINE

## 2020-11-16 PROCEDURE — 99223 PR INITIAL HOSPITAL CARE,LEVL III: ICD-10-PCS | Mod: 25,,, | Performed by: INTERNAL MEDICINE

## 2020-11-16 PROCEDURE — 81025 URINE PREGNANCY TEST: CPT

## 2020-11-16 PROCEDURE — 80053 COMPREHEN METABOLIC PANEL: CPT

## 2020-11-16 PROCEDURE — 85379 FIBRIN DEGRADATION QUANT: CPT

## 2020-11-16 PROCEDURE — 99285 EMERGENCY DEPT VISIT HI MDM: CPT | Mod: 25

## 2020-11-16 PROCEDURE — 93010 ELECTROCARDIOGRAM REPORT: CPT | Mod: ,,, | Performed by: INTERNAL MEDICINE

## 2020-11-16 PROCEDURE — 84484 ASSAY OF TROPONIN QUANT: CPT

## 2020-11-16 PROCEDURE — C1887 CATHETER, GUIDING: HCPCS | Performed by: INTERNAL MEDICINE

## 2020-11-16 PROCEDURE — 93005 ELECTROCARDIOGRAM TRACING: CPT

## 2020-11-16 PROCEDURE — 84484 ASSAY OF TROPONIN QUANT: CPT | Mod: 91

## 2020-11-16 PROCEDURE — 99152 PR MOD CONSCIOUS SEDATION, SAME PHYS, 5+ YRS, FIRST 15 MIN: ICD-10-PCS | Mod: ,,, | Performed by: INTERNAL MEDICINE

## 2020-11-16 PROCEDURE — 99152 MOD SED SAME PHYS/QHP 5/>YRS: CPT | Mod: ,,, | Performed by: INTERNAL MEDICINE

## 2020-11-16 PROCEDURE — 99223 1ST HOSP IP/OBS HIGH 75: CPT | Mod: 25,,, | Performed by: INTERNAL MEDICINE

## 2020-11-16 PROCEDURE — C1769 GUIDE WIRE: HCPCS | Performed by: INTERNAL MEDICINE

## 2020-11-16 PROCEDURE — 25500020 PHARM REV CODE 255: Performed by: INTERNAL MEDICINE

## 2020-11-16 PROCEDURE — 25000242 PHARM REV CODE 250 ALT 637 W/ HCPCS: Performed by: EMERGENCY MEDICINE

## 2020-11-16 PROCEDURE — 63600175 PHARM REV CODE 636 W HCPCS: Performed by: EMERGENCY MEDICINE

## 2020-11-16 PROCEDURE — 25000242 PHARM REV CODE 250 ALT 637 W/ HCPCS: Performed by: INTERNAL MEDICINE

## 2020-11-16 PROCEDURE — 94644 CONT INHLJ TX 1ST HOUR: CPT

## 2020-11-16 RX ORDER — CLOPIDOGREL 300 MG/1
300 TABLET, FILM COATED ORAL
Status: COMPLETED | OUTPATIENT
Start: 2020-11-16 | End: 2020-11-16

## 2020-11-16 RX ORDER — CYCLOBENZAPRINE HCL 10 MG
10 TABLET ORAL NIGHTLY PRN
Status: DISCONTINUED | OUTPATIENT
Start: 2020-11-16 | End: 2020-11-17 | Stop reason: HOSPADM

## 2020-11-16 RX ORDER — OXYCODONE HYDROCHLORIDE 5 MG/1
5 TABLET ORAL EVERY 4 HOURS PRN
Status: DISCONTINUED | OUTPATIENT
Start: 2020-11-16 | End: 2020-11-17 | Stop reason: HOSPADM

## 2020-11-16 RX ORDER — KETOROLAC TROMETHAMINE 30 MG/ML
15 INJECTION, SOLUTION INTRAMUSCULAR; INTRAVENOUS
Status: COMPLETED | OUTPATIENT
Start: 2020-11-16 | End: 2020-11-16

## 2020-11-16 RX ORDER — FENTANYL CITRATE 50 UG/ML
INJECTION, SOLUTION INTRAMUSCULAR; INTRAVENOUS
Status: DISCONTINUED | OUTPATIENT
Start: 2020-11-16 | End: 2020-11-16 | Stop reason: HOSPADM

## 2020-11-16 RX ORDER — IBUPROFEN 200 MG
24 TABLET ORAL
Status: DISCONTINUED | OUTPATIENT
Start: 2020-11-16 | End: 2020-11-17 | Stop reason: HOSPADM

## 2020-11-16 RX ORDER — IPRATROPIUM BROMIDE 0.5 MG/2.5ML
1.5 SOLUTION RESPIRATORY (INHALATION)
Status: COMPLETED | OUTPATIENT
Start: 2020-11-16 | End: 2020-11-16

## 2020-11-16 RX ORDER — ACETAMINOPHEN 325 MG/1
650 TABLET ORAL EVERY 4 HOURS PRN
Status: DISCONTINUED | OUTPATIENT
Start: 2020-11-16 | End: 2020-11-17 | Stop reason: HOSPADM

## 2020-11-16 RX ORDER — EZETIMIBE 10 MG/1
10 TABLET ORAL DAILY
Status: DISCONTINUED | OUTPATIENT
Start: 2020-11-16 | End: 2020-11-17 | Stop reason: HOSPADM

## 2020-11-16 RX ORDER — TALC
6 POWDER (GRAM) TOPICAL NIGHTLY PRN
Status: DISCONTINUED | OUTPATIENT
Start: 2020-11-16 | End: 2020-11-17 | Stop reason: HOSPADM

## 2020-11-16 RX ORDER — MIDAZOLAM HYDROCHLORIDE 1 MG/ML
INJECTION, SOLUTION INTRAMUSCULAR; INTRAVENOUS
Status: DISCONTINUED | OUTPATIENT
Start: 2020-11-16 | End: 2020-11-16 | Stop reason: HOSPADM

## 2020-11-16 RX ORDER — LIDOCAINE HYDROCHLORIDE 10 MG/ML
INJECTION, SOLUTION EPIDURAL; INFILTRATION; INTRACAUDAL; PERINEURAL
Status: DISCONTINUED | OUTPATIENT
Start: 2020-11-16 | End: 2020-11-16 | Stop reason: HOSPADM

## 2020-11-16 RX ORDER — NITROGLYCERIN 20 MG/100ML
INJECTION INTRAVENOUS
Status: DISCONTINUED | OUTPATIENT
Start: 2020-11-16 | End: 2020-11-16 | Stop reason: HOSPADM

## 2020-11-16 RX ORDER — MORPHINE SULFATE 4 MG/ML
4 INJECTION, SOLUTION INTRAMUSCULAR; INTRAVENOUS EVERY 4 HOURS PRN
Status: DISCONTINUED | OUTPATIENT
Start: 2020-11-16 | End: 2020-11-17 | Stop reason: HOSPADM

## 2020-11-16 RX ORDER — CLOPIDOGREL BISULFATE 75 MG/1
75 TABLET ORAL DAILY
Status: DISCONTINUED | OUTPATIENT
Start: 2020-11-17 | End: 2020-11-16

## 2020-11-16 RX ORDER — IPRATROPIUM BROMIDE AND ALBUTEROL SULFATE 2.5; .5 MG/3ML; MG/3ML
3 SOLUTION RESPIRATORY (INHALATION) EVERY 4 HOURS PRN
Status: DISCONTINUED | OUTPATIENT
Start: 2020-11-16 | End: 2020-11-17 | Stop reason: HOSPADM

## 2020-11-16 RX ORDER — IPRATROPIUM BROMIDE AND ALBUTEROL SULFATE 2.5; .5 MG/3ML; MG/3ML
3 SOLUTION RESPIRATORY (INHALATION)
Status: DISCONTINUED | OUTPATIENT
Start: 2020-11-16 | End: 2020-11-16

## 2020-11-16 RX ORDER — AMOXICILLIN 250 MG
1 CAPSULE ORAL 2 TIMES DAILY PRN
Status: DISCONTINUED | OUTPATIENT
Start: 2020-11-16 | End: 2020-11-17 | Stop reason: HOSPADM

## 2020-11-16 RX ORDER — FLUTICASONE FUROATE AND VILANTEROL 100; 25 UG/1; UG/1
1 POWDER RESPIRATORY (INHALATION) DAILY
Status: DISCONTINUED | OUTPATIENT
Start: 2020-11-16 | End: 2020-11-17 | Stop reason: HOSPADM

## 2020-11-16 RX ORDER — ACETAMINOPHEN 325 MG/1
650 TABLET ORAL EVERY 4 HOURS PRN
Status: DISCONTINUED | OUTPATIENT
Start: 2020-11-16 | End: 2020-11-16

## 2020-11-16 RX ORDER — VERAPAMIL HYDROCHLORIDE 2.5 MG/ML
INJECTION, SOLUTION INTRAVENOUS
Status: DISCONTINUED | OUTPATIENT
Start: 2020-11-16 | End: 2020-11-16 | Stop reason: HOSPADM

## 2020-11-16 RX ORDER — ONDANSETRON 2 MG/ML
4 INJECTION INTRAMUSCULAR; INTRAVENOUS EVERY 8 HOURS PRN
Status: DISCONTINUED | OUTPATIENT
Start: 2020-11-16 | End: 2020-11-17 | Stop reason: HOSPADM

## 2020-11-16 RX ORDER — GLUCAGON 1 MG
1 KIT INJECTION
Status: DISCONTINUED | OUTPATIENT
Start: 2020-11-16 | End: 2020-11-17 | Stop reason: HOSPADM

## 2020-11-16 RX ORDER — HYDROCHLOROTHIAZIDE 25 MG/1
25 TABLET ORAL DAILY
Status: DISCONTINUED | OUTPATIENT
Start: 2020-11-16 | End: 2020-11-16

## 2020-11-16 RX ORDER — IPRATROPIUM BROMIDE AND ALBUTEROL SULFATE 2.5; .5 MG/3ML; MG/3ML
3 SOLUTION RESPIRATORY (INHALATION)
Status: COMPLETED | OUTPATIENT
Start: 2020-11-16 | End: 2020-11-16

## 2020-11-16 RX ORDER — PROMETHAZINE HYDROCHLORIDE AND CODEINE PHOSPHATE 6.25; 1 MG/5ML; MG/5ML
10 SOLUTION ORAL
Status: COMPLETED | OUTPATIENT
Start: 2020-11-16 | End: 2020-11-16

## 2020-11-16 RX ORDER — ENOXAPARIN SODIUM 100 MG/ML
1 INJECTION SUBCUTANEOUS
Status: COMPLETED | OUTPATIENT
Start: 2020-11-16 | End: 2020-11-16

## 2020-11-16 RX ORDER — PREDNISONE 20 MG/1
40 TABLET ORAL DAILY
Status: DISCONTINUED | OUTPATIENT
Start: 2020-11-16 | End: 2020-11-17 | Stop reason: HOSPADM

## 2020-11-16 RX ORDER — ALBUTEROL SULFATE 2.5 MG/.5ML
10 SOLUTION RESPIRATORY (INHALATION)
Status: COMPLETED | OUTPATIENT
Start: 2020-11-16 | End: 2020-11-16

## 2020-11-16 RX ORDER — OXYCODONE HYDROCHLORIDE 5 MG/1
5 TABLET ORAL EVERY 6 HOURS PRN
Status: DISCONTINUED | OUTPATIENT
Start: 2020-11-16 | End: 2020-11-17 | Stop reason: HOSPADM

## 2020-11-16 RX ORDER — SODIUM CHLORIDE 0.9 % (FLUSH) 0.9 %
10 SYRINGE (ML) INJECTION
Status: DISCONTINUED | OUTPATIENT
Start: 2020-11-16 | End: 2020-11-17 | Stop reason: HOSPADM

## 2020-11-16 RX ORDER — IPRATROPIUM BROMIDE AND ALBUTEROL SULFATE 2.5; .5 MG/3ML; MG/3ML
3 SOLUTION RESPIRATORY (INHALATION) EVERY 6 HOURS
Status: DISCONTINUED | OUTPATIENT
Start: 2020-11-16 | End: 2020-11-17 | Stop reason: HOSPADM

## 2020-11-16 RX ORDER — ONDANSETRON 8 MG/1
8 TABLET, ORALLY DISINTEGRATING ORAL EVERY 8 HOURS PRN
Status: DISCONTINUED | OUTPATIENT
Start: 2020-11-16 | End: 2020-11-17 | Stop reason: HOSPADM

## 2020-11-16 RX ORDER — HEPARIN SODIUM 1000 [USP'U]/ML
INJECTION, SOLUTION INTRAVENOUS; SUBCUTANEOUS
Status: DISCONTINUED | OUTPATIENT
Start: 2020-11-16 | End: 2020-11-16 | Stop reason: HOSPADM

## 2020-11-16 RX ORDER — GUAIFENESIN 100 MG/5ML
200 SOLUTION ORAL EVERY 4 HOURS PRN
Status: DISCONTINUED | OUTPATIENT
Start: 2020-11-16 | End: 2020-11-17 | Stop reason: HOSPADM

## 2020-11-16 RX ORDER — ENOXAPARIN SODIUM 100 MG/ML
1 INJECTION SUBCUTANEOUS
Status: DISCONTINUED | OUTPATIENT
Start: 2020-11-16 | End: 2020-11-16

## 2020-11-16 RX ORDER — CLOPIDOGREL BISULFATE 75 MG/1
75 TABLET ORAL DAILY
Status: DISCONTINUED | OUTPATIENT
Start: 2020-11-16 | End: 2020-11-17 | Stop reason: HOSPADM

## 2020-11-16 RX ORDER — METHYLPREDNISOLONE SOD SUCC 125 MG
125 VIAL (EA) INJECTION
Status: COMPLETED | OUTPATIENT
Start: 2020-11-16 | End: 2020-11-16

## 2020-11-16 RX ORDER — CETIRIZINE HYDROCHLORIDE 10 MG/1
10 TABLET ORAL NIGHTLY
Status: DISCONTINUED | OUTPATIENT
Start: 2020-11-16 | End: 2020-11-17 | Stop reason: HOSPADM

## 2020-11-16 RX ORDER — IBUPROFEN 200 MG
16 TABLET ORAL
Status: DISCONTINUED | OUTPATIENT
Start: 2020-11-16 | End: 2020-11-17 | Stop reason: HOSPADM

## 2020-11-16 RX ORDER — NAPROXEN SODIUM 220 MG/1
81 TABLET, FILM COATED ORAL DAILY
Status: DISCONTINUED | OUTPATIENT
Start: 2020-11-16 | End: 2020-11-16

## 2020-11-16 RX ORDER — LISINOPRIL 5 MG/1
5 TABLET ORAL DAILY
Status: DISCONTINUED | OUTPATIENT
Start: 2020-11-16 | End: 2020-11-16

## 2020-11-16 RX ADMIN — AZITHROMYCIN 500 MG: 500 INJECTION, POWDER, LYOPHILIZED, FOR SOLUTION INTRAVENOUS at 06:11

## 2020-11-16 RX ADMIN — METHYLPREDNISOLONE SODIUM SUCCINATE 125 MG: 125 INJECTION, POWDER, FOR SOLUTION INTRAMUSCULAR; INTRAVENOUS at 12:11

## 2020-11-16 RX ADMIN — ASPIRIN 81 MG: 81 TABLET, CHEWABLE ORAL at 10:11

## 2020-11-16 RX ADMIN — CETIRIZINE HYDROCHLORIDE 10 MG: 10 TABLET, FILM COATED ORAL at 09:11

## 2020-11-16 RX ADMIN — CLOPIDOGREL 75 MG: 75 TABLET, FILM COATED ORAL at 10:11

## 2020-11-16 RX ADMIN — IPRATROPIUM BROMIDE AND ALBUTEROL SULFATE 3 ML: .5; 3 SOLUTION RESPIRATORY (INHALATION) at 08:11

## 2020-11-16 RX ADMIN — KETOROLAC TROMETHAMINE 15 MG: 30 INJECTION, SOLUTION INTRAMUSCULAR; INTRAVENOUS at 01:11

## 2020-11-16 RX ADMIN — FLUTICASONE FUROATE AND VILANTEROL TRIFENATATE 1 PUFF: 100; 25 POWDER RESPIRATORY (INHALATION) at 12:11

## 2020-11-16 RX ADMIN — ALBUTEROL SULFATE 10 MG: 2.5 SOLUTION RESPIRATORY (INHALATION) at 02:11

## 2020-11-16 RX ADMIN — CLOPIDOGREL BISULFATE 300 MG: 300 TABLET, FILM COATED ORAL at 03:11

## 2020-11-16 RX ADMIN — IPRATROPIUM BROMIDE AND ALBUTEROL SULFATE 3 ML: .5; 3 SOLUTION RESPIRATORY (INHALATION) at 12:11

## 2020-11-16 RX ADMIN — IPRATROPIUM BROMIDE AND ALBUTEROL SULFATE 3 ML: .5; 3 SOLUTION RESPIRATORY (INHALATION) at 07:11

## 2020-11-16 RX ADMIN — ENOXAPARIN SODIUM 60 MG: 60 INJECTION SUBCUTANEOUS at 03:11

## 2020-11-16 RX ADMIN — GUAIFENESIN 200 MG: 200 SOLUTION ORAL at 08:11

## 2020-11-16 RX ADMIN — IPRATROPIUM BROMIDE 1.5 MG: 0.5 SOLUTION RESPIRATORY (INHALATION) at 02:11

## 2020-11-16 RX ADMIN — PROMETHAZINE HYDROCHLORIDE AND CODEINE PHOSPHATE 10 ML: 10; 6.25 SOLUTION ORAL at 12:11

## 2020-11-16 RX ADMIN — GUAIFENESIN 200 MG: 200 SOLUTION ORAL at 01:11

## 2020-11-16 RX ADMIN — SODIUM CHLORIDE 500 ML: 0.9 INJECTION, SOLUTION INTRAVENOUS at 05:11

## 2020-11-16 NOTE — ASSESSMENT & PLAN NOTE
Her BP is actually running low currently SBP  range  Holding her home HCTZ and Lisinopril pending possible dye load from [facility]

## 2020-11-16 NOTE — ASSESSMENT & PLAN NOTE
Duonebs q6 hours and q4 prn  Solumedrol 125mg iv x 1 in the ED  Started on Prednisone 40mg po qday   Breo inhaler  Clinically she is much improved now, and reports feeling significantly better

## 2020-11-16 NOTE — ED PROVIDER NOTES
"Encounter Date: 2020       History     Chief Complaint   Patient presents with    Shortness of Breath     Pt arrived via EMS with reports of SOB/Coughing spell after "Cough syrup went down wrong pipe."  Hx of COPD"     66 yo female with COPD, chronic bronchitis, asthma, HTN, presents via [facility] with shortness of breath and cough. Patient has felt chest "congestion" and shortness of breath for the last 2-3 days. She has been using Albuterol nebulizer PRN (last earlier today), Albuterol MDI PRN, and Flovent MDI BID without relief. Tonight she took some Cheritussin and "it went down the wrong pipe" causing coughing fit and severe shortness of breath, so patient called EMS. EMS found patient with pulse ox 85% and started her on a duoneb tx which improved sat to 100%. No chest pain, fevers, sweats. No COVID-19 exposures.     PMH: HTN, COPD/asthma/chronic bronchitis  PSH: csection, hernia repair         Review of patient's allergies indicates:   Allergen Reactions    Flagyl [metronidazole hcl]      Hives      Sulfamethoxazole-trimethoprim Itching    Aspirin Nausea Only    Lipitor [atorvastatin]      Myalgia      Past Medical History:   Diagnosis Date    Asthma     COPD (chronic obstructive pulmonary disease)     Hypertension      Past Surgical History:   Procedure Laterality Date     SECTION      HERNIA REPAIR       Family History   Problem Relation Age of Onset    Cancer Mother         Bladder    Liver disease Father      Social History     Tobacco Use    Smoking status: Current Every Day Smoker     Packs/day: 0.50     Years: 50.00     Pack years: 25.00     Types: Cigarettes    Smokeless tobacco: Never Used   Substance Use Topics    Alcohol use: Never     Frequency: Never    Drug use: Never     Review of Systems   Constitutional: Negative for appetite change and fever.   HENT: Negative for rhinorrhea and sore throat.    Eyes: Negative for visual disturbance.   Respiratory: Positive " for cough and shortness of breath.    Cardiovascular: Negative for chest pain and leg swelling.   Gastrointestinal: Negative for abdominal pain and nausea.   Genitourinary: Negative for dysuria.   Musculoskeletal: Negative for back pain and neck stiffness.   Skin: Negative for rash.   Neurological: Negative for syncope.       Physical Exam     Initial Vitals [11/16/20 0006]   BP Pulse Resp Temp SpO2   (!) 158/87 107 20 99.1 °F (37.3 °C) 100 %      MAP       --         Physical Exam    Nursing note and vitals reviewed.  Constitutional: She appears well-developed and well-nourished. She is not diaphoretic.   Awake, alert female who appears stated age. Frequent dry cough.   HENT:   Head: Normocephalic and atraumatic.   Eyes: EOM are normal. Pupils are equal, round, and reactive to light.   Neck: Normal range of motion. Neck supple.   Cardiovascular: Normal rate, regular rhythm and intact distal pulses.   No murmur heard.  Pulmonary/Chest: She is in respiratory distress (mild). She has wheezes (with coughing). She has no rhonchi. She has no rales.   Abdominal: Soft. There is no abdominal tenderness.   Musculoskeletal: Normal range of motion. No tenderness or edema.   Neurological: She is alert and oriented to person, place, and time. She has normal strength.   Moving all extremities.   Skin: Skin is warm and dry. No erythema. No pallor.   Psychiatric: Her behavior is normal.         ED Course   Procedures  Labs Reviewed   COMPREHENSIVE METABOLIC PANEL - Abnormal; Notable for the following components:       Result Value    Glucose 126 (*)     eGFR if non  52 (*)     All other components within normal limits   CBC W/ AUTO DIFFERENTIAL - Abnormal; Notable for the following components:    Gran % 32.3 (*)     Lymph % 55.0 (*)     All other components within normal limits   TROPONIN I - Abnormal; Notable for the following components:    Troponin I 0.031 (*)     All other components within normal limits    TROPONIN I - Abnormal; Notable for the following components:    Troponin I 0.740 (*)     All other components within normal limits   B-TYPE NATRIURETIC PEPTIDE   D DIMER, QUANTITATIVE   TROPONIN I   SARS-COV-2 RDRP GENE     EKG Readings: (Independently Interpreted)   00:14: Sinus tach, . Normal axis. No ectopy. No STEMI.        Imaging Results          X-Ray Chest AP Portable (Final result)  Result time 11/16/20 01:02:18    Final result by Maricarmen Carrion MD (11/16/20 01:02:18)                 Impression:      No acute intrathoracic abnormality detected.      Electronically signed by: Maricarmen Carrion  Date:    11/16/2020  Time:    01:02             Narrative:    EXAMINATION:  AP PORTABLE CHEST    CLINICAL HISTORY:  Cough    TECHNIQUE:  AP portable chest radiograph was submitted.    COMPARISON:  03/09/2012    FINDINGS:  AP portable chest radiograph demonstrates a cardiac silhouette within normal limits.  There is no focal consolidation, pneumothorax, or pleural effusion.                              X-Rays:   Independently Interpreted Readings:   Other Readings:  CXR NAD    Medical Decision Making:   History:   Old Medical Records: I decided to obtain old medical records.  Old Records Summarized: records from previous admission(s).  Initial Assessment:   65 y.o. female with COPD here with shortness of breath and cough.  Differential Diagnosis:   Ddx includes COPD exacerbation, aspiration, ACS, CHF, COVID-19, PNA, other.  Independently Interpreted Test(s):   I have ordered and independently interpreted X-rays - see prior notes.  I have ordered and independently interpreted EKG Reading(s) - see prior notes  Clinical Tests:   Lab Tests: Ordered and Reviewed  Radiological Study: Ordered and Reviewed  Medical Tests: Ordered and Reviewed  ED Management:  EKG no STEMI.     CXR NAD.    COVID-19 negative.    Patient had duoneb PTA. Here she had solumedrol 125mg IV x 1, duonebs x 3, and phenergan/codeine 10mL PO.  She continued to wheeze and have hypoxia (sats 89%) so she then had continuous albuterol 10mg / ipratropium 1.5mg inhaled over an hour.    Labs: CBC, CMP reassuring. COVID-19 negative. BNP normal.     Troponin 0.031, increased to 0.740 on repeat. Patient has never had angiogram. She reports stress test or echocardiogram ?4 years ago which she thinks was normal. Due to increase in troponin with shortness of breath and hypoxia, I have tx'ed patient as NSTEMI with plavix 300mg PO, lovenox 1mg/kg SC. Patient reports allergy to aspirin so this was deferred.    I did check ddimer due to hypoxia, tachycardia, elevated troponin, and this was negative, reassuring for no PE.     Due to NSTEMI and hypoxia/COPD exacerbation, patient requires admission for telemetry monitoring, serial troponins, echocardiogram, cardiology evaluation, and repeat steroid and neb treatments as warranted. I discussed this with patient and daughter and they understand diagnosis and plan. I discussed patient with Dr. Emanuel Vick, nocturnist for hospital medicine, who has written admission orders.  Other:   I have discussed this case with another health care provider.                             Clinical Impression:       ICD-10-CM ICD-9-CM   1. Hypoxia  R09.02 799.02   2. Shortness of breath  R06.02 786.05   3. Cough  R05 786.2   4. COPD exacerbation  J44.1 491.21   5. NSTEMI (non-ST elevated myocardial infarction)  I21.4 410.70   6. Elevated troponin  R77.8 790.6   7. Chest pain  R07.9 786.50                          ED Disposition Condition    Admit                             No Bhatt MD  11/16/20 0551
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
## 2020-11-16 NOTE — PLAN OF CARE
Problem: Adult Inpatient Plan of Care  Goal: Plan of Care Review  Outcome: Ongoing, Progressing  Goal: Optimal Comfort and Wellbeing  Outcome: Ongoing, Progressing     Problem: Adult Inpatient Plan of Care  Goal: Optimal Comfort and Wellbeing  Outcome: Ongoing, Progressing     Problem: Infection  Goal: Infection Symptom Resolution  Outcome: Met   pt aaox4 able to walk to bathroom independently. Right radial band removed. VS w/i normal limits. Gauze applied, small amount of blood noted on gauze reinforced with coban. Oxygen 2L intermittently. Prn cough med given once. Call light w/i reach.

## 2020-11-16 NOTE — ASSESSMENT & PLAN NOTE
Patient intolerant to aspirin 325, however can take aspirin 81 mg daily as per the patient.  Aspirin, Plavix.  Discussed various options with the patient's.  After detailed discussion decided to proceed with coronary angiogram.  Patient understands the importance of dual antiplatelet therapy after PCI.    Risks, benefits and alternatives of the catheterization procedure were discussed with the patient.The risks of coronary angiography include but are not limited to: bleeding, infection, death, heart attack, arrhythmia, kidney injury or failure, potential need for dialysis, allergic reactions, stroke, need for emergency surgery, hematoma, pseudoaneurysm etc.  Should stenting be indicated, the patient has agreed to dual anti-platelet therapy for 1-consecutive year with a drug-eluting stent and a minimum of 1-month with the use of a bare metal stent. Additionally, pt is aware that non-compliance is likely to result in stent clotting with heart attack, heart failure, and/or death  The risks of moderate sedation include hypotension, respiratory depression, arrhythmias, bronchospasm, and death. Informed consent was obtained and the  patient is agreeable to proceed with the procedure. Consent was placed on the chart.
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
## 2020-11-16 NOTE — SUBJECTIVE & OBJECTIVE
Past Medical History:   Diagnosis Date    Asthma     COPD (chronic obstructive pulmonary disease)     Hypertension        Past Surgical History:   Procedure Laterality Date     SECTION      HERNIA REPAIR         Review of patient's allergies indicates:   Allergen Reactions    Flagyl [metronidazole hcl]      Hives      Sulfamethoxazole-trimethoprim Itching    Aspirin Nausea Only    Lipitor [atorvastatin]      Myalgia        No current facility-administered medications on file prior to encounter.      Current Outpatient Medications on File Prior to Encounter   Medication Sig    albuterol (PROAIR HFA) 90 mcg/actuation inhaler Inhale 2 puffs into the lungs every 6 (six) hours as needed for Wheezing. Rescue    albuterol (PROVENTIL) 2.5 mg /3 mL (0.083 %) nebulizer solution VVN Q 4 TO 6 H FOR SOB OR WHEEZING    cyclobenzaprine (FLEXERIL) 10 MG tablet Take 1 tablet (10 mg total) by mouth nightly as needed for Muscle spasms.    ezetimibe (ZETIA) 10 mg tablet Take 1 tablet (10 mg total) by mouth once daily.    FLOVENT  mcg/actuation inhaler 0INHALE 1 PUFF PO ITL BID    fluticasone-salmeterol diskus inhaler 250-50 mcg Inhale 1 puff into the lungs.    hydroCHLOROthiazide (HYDRODIURIL) 25 MG tablet Take 1 tablet (25 mg total) by mouth once daily.    lisinopriL (PRINIVIL,ZESTRIL) 5 MG tablet TK 1 T PO D    loratadine (CLARITIN) 10 mg tablet Take 1 tablet (10 mg total) by mouth daily as needed for Allergies.    miscellaneous medical supply (470V TWO WAY VALVE) Misc Disp nebulizer and tubing ,medicine reservoir,and mask  So as to use  Every 4-6 hrs for sob/wheeze    potassium chloride (KLOR-CON) 8 MEQ TbSR TK 1 T PO D    predniSONE (DELTASONE) 20 MG tablet TAKE 2 TABLETS BY MOUTH DAILY FOR COPD EXCERBATION AND REPEAT IF RECCURENT EXCERBATION     Family History     Problem Relation (Age of Onset)    Cancer Mother    Liver disease Father        Tobacco Use    Smoking status: Current Every Day  Smoker     Packs/day: 0.50     Years: 50.00     Pack years: 25.00     Types: Cigarettes    Smokeless tobacco: Never Used   Substance and Sexual Activity    Alcohol use: Never     Frequency: Never    Drug use: Never    Sexual activity: Not Currently     Review of Systems   Constitution: Negative.   HENT: Negative.    Eyes: Negative.    Cardiovascular: Positive for dyspnea on exertion.   Respiratory: Positive for shortness of breath.    Endocrine: Negative.    Hematologic/Lymphatic: Negative.    Skin: Negative.    Musculoskeletal: Negative.    Gastrointestinal: Negative.    Genitourinary: Negative.    Neurological: Negative.    Psychiatric/Behavioral: Negative.    Allergic/Immunologic: Negative.      Objective:     Vital Signs (Most Recent):  Temp: 98 °F (36.7 °C) (11/16/20 0720)  Pulse: 91 (11/16/20 0757)  Resp: 18 (11/16/20 0757)  BP: (!) 113/55 (11/16/20 0720)  SpO2: 96 % (11/16/20 0757) Vital Signs (24h Range):  Temp:  [98 °F (36.7 °C)-99.1 °F (37.3 °C)] 98 °F (36.7 °C)  Pulse:  [] 91  Resp:  [15-46] 18  SpO2:  [88 %-100 %] 96 %  BP: ()/(54-87) 113/55     Weight: 56.7 kg (125 lb)  Body mass index is 28.02 kg/m².    SpO2: 96 %  O2 Device (Oxygen Therapy): room air      Intake/Output Summary (Last 24 hours) at 11/16/2020 0938  Last data filed at 11/16/2020 0602  Gross per 24 hour   Intake 500 ml   Output --   Net 500 ml       Lines/Drains/Airways     Peripheral Intravenous Line                 Peripheral IV - Single Lumen 11/16/20 0000 20 G Left Antecubital less than 1 day                Physical Exam   Constitutional: She is oriented to person, place, and time. She appears well-developed and well-nourished.   HENT:   Head: Normocephalic.   Eyes: Pupils are equal, round, and reactive to light.   Neck: Normal range of motion. Neck supple.   Cardiovascular: Normal rate and regular rhythm.   Pulmonary/Chest: Effort normal and breath sounds normal.   Abdominal: Soft. Normal appearance and bowel sounds  are normal. There is no abdominal tenderness.   Musculoskeletal: Normal range of motion.   Neurological: She is alert and oriented to person, place, and time.   Skin: Skin is warm.   Psychiatric: She has a normal mood and affect.       Significant Labs:   BMP:   Recent Labs   Lab 11/16/20  0014   *      K 3.6      CO2 27   BUN 13   CREATININE 1.1   CALCIUM 8.9   , CMP   Recent Labs   Lab 11/16/20  0014      K 3.6      CO2 27   *   BUN 13   CREATININE 1.1   CALCIUM 8.9   PROT 6.8   ALBUMIN 4.0   BILITOT 0.2   ALKPHOS 60   AST 18   ALT 15   ANIONGAP 11   ESTGFRAFRICA >60   EGFRNONAA 52*   , CBC   Recent Labs   Lab 11/16/20  0014   WBC 7.67   HGB 13.2   HCT 39.7      , INR No results for input(s): INR, PROTIME in the last 48 hours., Lipid Panel No results for input(s): CHOL, HDL, LDLCALC, TRIG, CHOLHDL in the last 48 hours., Troponin   Recent Labs   Lab 11/16/20  0014 11/16/20  0210 11/16/20  0603   TROPONINI 0.031* 0.740* 1.086*    and All pertinent lab results from the last 24 hours have been reviewed.    Significant Imaging: Echocardiogram: Transthoracic echo (TTE) complete (Cupid Only): No results found for this or any previous visit.
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
## 2020-11-16 NOTE — CARE UPDATE
Reviewed H and P by my colleague and agree with plan. Patient presenting with SOB. Found to have both COPD exacerbation and non-stemi.  Cards consulted. Full dose lovenox.

## 2020-11-16 NOTE — Clinical Note
22 ml injected throughout the case. 40 mL total wasted during the case. 62 mL total used in the case.

## 2020-11-16 NOTE — Clinical Note
The catheter is repositioned ostium   right coronary artery. Angiography performed of the right coronary arteries in multiple views.

## 2020-11-16 NOTE — ASSESSMENT & PLAN NOTE
Trop is elevated (see below)   -May be demand ischemia from COPD exacerbation    -but her risk is high with HTN, cigarettes and HLD  Allergic to ASA, so not administered   Given Plavix 300mg po x 1   -On Plavix 75mg po qday thereafter  Lovenox 1mg/Kg q12, first dose in ED   -Lovenox 60mg sq q12  Consult Cardiology  SL NTG prn CP  No BB currently due to low BB and COPD issues  Allergies to statins - so using Zetia alone  Serial CE's, next at 6am  Check Echo with CFD

## 2020-11-16 NOTE — Clinical Note
The catheter is inserted and hemodynamics were recorded in the left ventricle. Hemodynamics performed.

## 2020-11-16 NOTE — PLAN OF CARE
"LUIS FELIPE spoke with patient's daughter, Ramesh to complete discharge planning assessment. Prior to beginning the discharge planning assessment, Ramesh verified name and date of birth. LUIS FELIPE educated patient on the discharge process and explained that discharge planning begins at admission. LUIS FELIPE reviewed , "Help at home", "Managing your health", and "Your preferences".  Ramesh stated that patient lives with her and she is her help at home. Ramesh stated that patient needs another nebulizer. Ramesh stated that patient purchased her own and it's not working properly. Ramesh stated that patient was given a prescription to get one but it was taking too long so she bought her own.     Navdeep Marquez MD      [id number] - [place], [place] - [address] AT [facility] [address]  [address]  [place] [address]  Phone: [phone] Fax: [phone]    Extended Emergency Contact Information  Primary Emergency Contact: NELA RAMESH  Mobile Phone: [phone]  Relation: Daughter   needed? No       11/16/20 1539   Discharge Assessment   Assessment Type Discharge Planning Assessment   Assessment information obtained from? Other  (Ramesh (Daughter))   Prior to hospitilization cognitive status: Alert/Oriented   Prior to hospitalization functional status: Independent   Current cognitive status: Alert/Oriented   Current Functional Status: Independent   Facility Arrived From: Home   Lives With child(ciera), adult  (Ramesh (Daughter))   Able to Return to Prior Arrangements yes   Is patient able to care for self after discharge? Yes   Patient's perception of discharge disposition home or selfcare   Readmission Within the Last 30 Days no previous admission in last 30 days   Patient currently being followed by outpatient case management? No   Equipment Currently Used at Home nebulizer  (Needs a new one)   Do you have any problems affording any of your prescribed medications? No   Is the " patient taking medications as prescribed? yes   Does the patient have transportation home? Yes   Transportation Anticipated family or friend will provide   Dialysis Name and Scheduled days N/A   Does the patient receive services at the [facility]mad[facility] Clinic? No   Discharge Plan A Home   Discharge Plan B Home   DME Needed Upon Discharge  nebulizer   Patient/Family in Agreement with Plan yes

## 2020-11-16 NOTE — SUBJECTIVE & OBJECTIVE
Past Medical History:   Diagnosis Date    Asthma     COPD (chronic obstructive pulmonary disease)     Hypertension        Past Surgical History:   Procedure Laterality Date     SECTION      HERNIA REPAIR         Review of patient's allergies indicates:   Allergen Reactions    Flagyl [metronidazole hcl]      Hives      Sulfamethoxazole-trimethoprim Itching    Aspirin Nausea Only    Lipitor [atorvastatin]      Myalgia        No current facility-administered medications on file prior to encounter.      Current Outpatient Medications on File Prior to Encounter   Medication Sig    albuterol (PROAIR HFA) 90 mcg/actuation inhaler Inhale 2 puffs into the lungs every 6 (six) hours as needed for Wheezing. Rescue    albuterol (PROVENTIL) 2.5 mg /3 mL (0.083 %) nebulizer solution VVN Q 4 TO 6 H FOR SOB OR WHEEZING    cyclobenzaprine (FLEXERIL) 10 MG tablet Take 1 tablet (10 mg total) by mouth nightly as needed for Muscle spasms.    ezetimibe (ZETIA) 10 mg tablet Take 1 tablet (10 mg total) by mouth once daily.    FLOVENT  mcg/actuation inhaler 0INHALE 1 PUFF PO ITL BID    fluticasone-salmeterol diskus inhaler 250-50 mcg Inhale 1 puff into the lungs.    hydroCHLOROthiazide (HYDRODIURIL) 25 MG tablet Take 1 tablet (25 mg total) by mouth once daily.    lisinopriL (PRINIVIL,ZESTRIL) 5 MG tablet TK 1 T PO D    loratadine (CLARITIN) 10 mg tablet Take 1 tablet (10 mg total) by mouth daily as needed for Allergies.    miscellaneous medical supply (470V TWO WAY VALVE) Misc Disp nebulizer and tubing ,medicine reservoir,and mask  So as to use  Every 4-6 hrs for sob/wheeze    potassium chloride (KLOR-CON) 8 MEQ TbSR TK 1 T PO D    predniSONE (DELTASONE) 20 MG tablet TAKE 2 TABLETS BY MOUTH DAILY FOR COPD EXCERBATION AND REPEAT IF RECCURENT EXCERBATION     Family History     Problem Relation (Age of Onset)    Cancer Mother    Liver disease Father        Tobacco Use    Smoking status: Current Every Day  Smoker     Packs/day: 0.50     Years: 50.00     Pack years: 25.00     Types: Cigarettes    Smokeless tobacco: Never Used   Substance and Sexual Activity    Alcohol use: Never     Frequency: Never    Drug use: Never    Sexual activity: Not Currently     Review of Systems   Constitutional: Positive for activity change, appetite change and fatigue. Negative for chills and fever.   HENT: Negative for congestion.    Eyes: Negative for photophobia.   Respiratory: Positive for cough, chest tightness and shortness of breath.    Cardiovascular: Positive for chest pain.   Gastrointestinal: Negative for abdominal pain, constipation, diarrhea, nausea and vomiting.   Endocrine: Negative for heat intolerance.   Genitourinary: Negative for dysuria.   Musculoskeletal: Negative for gait problem.   Allergic/Immunologic: Negative for immunocompromised state.   Neurological: Negative for dizziness and weakness.   Hematological: Does not bruise/bleed easily.   Psychiatric/Behavioral: Negative for confusion. The patient is not nervous/anxious.      Objective:     Vital Signs (Most Recent):  Temp: 99.1 °F (37.3 °C) (11/16/20 0006)  Pulse: 94 (11/16/20 0332)  Resp: 20 (11/16/20 0332)  BP: (!) 100/59 (11/16/20 0332)  SpO2: (!) 92 % (11/16/20 0332) Vital Signs (24h Range):  Temp:  [99.1 °F (37.3 °C)] 99.1 °F (37.3 °C)  Pulse:  [] 94  Resp:  [15-46] 20  SpO2:  [88 %-100 %] 92 %  BP: ()/(54-87) 100/59     Weight: 56.7 kg (125 lb)  Body mass index is 28.02 kg/m².    Physical Exam  Vitals signs and nursing note reviewed.   Constitutional:       General: She is not in acute distress.     Appearance: She is well-developed. She is not ill-appearing, toxic-appearing or diaphoretic.   HENT:      Head: Normocephalic and atraumatic.      Mouth/Throat:      Pharynx: No oropharyngeal exudate.   Eyes:      General: No scleral icterus.        Right eye: No discharge.         Left eye: No discharge.      Conjunctiva/sclera: Conjunctivae  normal.      Pupils: Pupils are equal, round, and reactive to light.   Neck:      Musculoskeletal: Normal range of motion and neck supple.      Thyroid: No thyromegaly.      Vascular: No JVD.      Trachea: No tracheal deviation.   Cardiovascular:      Rate and Rhythm: Normal rate and regular rhythm.      Heart sounds: Normal heart sounds. No murmur. No friction rub. No gallop.    Pulmonary:      Effort: Pulmonary effort is normal. No respiratory distress.      Breath sounds: Normal breath sounds. No stridor. No decreased breath sounds, wheezing, rhonchi or rales.      Comments: She is speaking in full sentences with no accessory muscle use.  She has a moment of wheezing with cough.  Chest:      Chest wall: No tenderness.   Abdominal:      General: Bowel sounds are normal. There is no distension.      Palpations: Abdomen is soft. There is no mass.      Tenderness: There is no abdominal tenderness. There is no guarding or rebound.   Genitourinary:     Comments: No harmon in place  Musculoskeletal: Normal range of motion.         General: No tenderness.   Lymphadenopathy:      Cervical: No cervical adenopathy.      Comments: No lower extremity edema   Skin:     General: Skin is warm and dry.      Coloration: Skin is not pale.      Findings: No erythema or rash.   Neurological:      Mental Status: She is alert and oriented to person, place, and time.      GCS: GCS eye subscore is 4. GCS verbal subscore is 5. GCS motor subscore is 6.      Cranial Nerves: No cranial nerve deficit.      Motor: No abnormal muscle tone.      Coordination: Coordination normal.   Psychiatric:         Attention and Perception: Attention and perception normal.         Behavior: Behavior normal.         Thought Content: Thought content normal.         Cognition and Memory: Cognition and memory normal.         Judgment: Judgment normal.           CRANIAL NERVES     CN III, IV, VI   Pupils are equal, round, and reactive to light.       Significant  Labs:   Recent Results (from the past 24 hour(s))   Comprehensive metabolic panel    Collection Time: 11/16/20 12:14 AM   Result Value Ref Range    Sodium 138 136 - 145 mmol/L    Potassium 3.6 3.5 - 5.1 mmol/L    Chloride 100 95 - 110 mmol/L    CO2 27 23 - 29 mmol/L    Glucose 126 (H) 70 - 110 mg/dL    BUN 13 8 - 23 mg/dL    Creatinine 1.1 0.5 - 1.4 mg/dL    Calcium 8.9 8.7 - 10.5 mg/dL    Total Protein 6.8 6.0 - 8.4 g/dL    Albumin 4.0 3.5 - 5.2 g/dL    Total Bilirubin 0.2 0.1 - 1.0 mg/dL    Alkaline Phosphatase 60 55 - 135 U/L    AST 18 10 - 40 U/L    ALT 15 10 - 44 U/L    Anion Gap 11 8 - 16 mmol/L    eGFR if African American >60 >60 mL/min/1.73 m^2    eGFR if non African American 52 (A) >60 mL/min/1.73 m^2   CBC auto differential    Collection Time: 11/16/20 12:14 AM   Result Value Ref Range    WBC 7.67 3.90 - 12.70 K/uL    RBC 4.41 4.00 - 5.40 M/uL    Hemoglobin 13.2 12.0 - 16.0 g/dL    Hematocrit 39.7 37.0 - 48.5 %    MCV 90 82 - 98 fL    MCH 29.9 27.0 - 31.0 pg    MCHC 33.2 32.0 - 36.0 g/dL    RDW 13.2 11.5 - 14.5 %    Platelets 263 150 - 350 K/uL    MPV 10.7 9.2 - 12.9 fL    Immature Granulocytes 0.5 0.0 - 0.5 %    Gran # (ANC) 2.5 1.8 - 7.7 K/uL    Immature Grans (Abs) 0.04 0.00 - 0.04 K/uL    Lymph # 4.2 1.0 - 4.8 K/uL    Mono # 0.8 0.3 - 1.0 K/uL    Eos # 0.1 0.0 - 0.5 K/uL    Baso # 0.03 0.00 - 0.20 K/uL    nRBC 0 0 /100 WBC    Gran % 32.3 (L) 38.0 - 73.0 %    Lymph % 55.0 (H) 18.0 - 48.0 %    Mono % 11.0 4.0 - 15.0 %    Eosinophil % 0.8 0.0 - 8.0 %    Basophil % 0.4 0.0 - 1.9 %    Differential Method Automated    Brain natriuretic peptide    Collection Time: 11/16/20 12:14 AM   Result Value Ref Range    BNP 42 0 - 99 pg/mL   Troponin I    Collection Time: 11/16/20 12:14 AM   Result Value Ref Range    Troponin I 0.031 (H) 0.000 - 0.026 ng/mL   POCT COVID-19 Rapid Screening    Collection Time: 11/16/20  1:03 AM   Result Value Ref Range    POC Rapid COVID Negative Negative     Acceptable Yes     Troponin I    Collection Time: 11/16/20  2:10 AM   Result Value Ref Range    Troponin I 0.740 (H) 0.000 - 0.026 ng/mL   D dimer, quantitative    Collection Time: 11/16/20  3:25 AM   Result Value Ref Range    D-Dimer 0.42 <0.50 mg/L FEU     Significant Imaging:   AP PORTABLE CHEST  FINDINGS:  AP portable chest radiograph demonstrates a cardiac silhouette within normal limits.  There is no focal consolidation, pneumothorax, or pleural effusion.     Impression:   No acute intrathoracic abnormality detected.      Electronically signed by: Maricarmen Carrion  Date:                                            11/16/2020  Time:                                           01:02 16-NOV-2020 00:14:46 EKG    Sinus tachycardia  Vent. rate 107 BPM  MT interval 118 ms  QRS duration 78 ms  QT/QTc 324/432 ms  No acute ST ischemic changes  Cannot rule out Anterior infarct ,age undetermined  Abnormal ECG  When compared with ECG of 04-JAN-2011 10:15,  Significant changes have occurred

## 2020-11-16 NOTE — Clinical Note
The catheter is inserted ostium   left main. Angiography performed of the left coronary arteries in multiple views.

## 2020-11-17 VITALS
SYSTOLIC BLOOD PRESSURE: 136 MMHG | DIASTOLIC BLOOD PRESSURE: 79 MMHG | RESPIRATION RATE: 18 BRPM | WEIGHT: 131 LBS | TEMPERATURE: 98 F | BODY MASS INDEX: 29.47 KG/M2 | OXYGEN SATURATION: 98 % | HEIGHT: 56 IN | HEART RATE: 77 BPM

## 2020-11-17 LAB
ALBUMIN SERPL BCP-MCNC: 3.5 G/DL (ref 3.5–5.2)
ALP SERPL-CCNC: 42 U/L (ref 55–135)
ALT SERPL W/O P-5'-P-CCNC: 20 U/L (ref 10–44)
ANION GAP SERPL CALC-SCNC: 8 MMOL/L (ref 8–16)
AST SERPL-CCNC: 47 U/L (ref 10–40)
BASOPHILS # BLD AUTO: 0.02 K/UL (ref 0–0.2)
BASOPHILS NFR BLD: 0.2 % (ref 0–1.9)
BILIRUB SERPL-MCNC: 0.3 MG/DL (ref 0.1–1)
BUN SERPL-MCNC: 11 MG/DL (ref 8–23)
CALCIUM SERPL-MCNC: 8.7 MG/DL (ref 8.7–10.5)
CHLORIDE SERPL-SCNC: 103 MMOL/L (ref 95–110)
CO2 SERPL-SCNC: 28 MMOL/L (ref 23–29)
CREAT SERPL-MCNC: 0.8 MG/DL (ref 0.5–1.4)
DIFFERENTIAL METHOD: ABNORMAL
EOSINOPHIL # BLD AUTO: 0 K/UL (ref 0–0.5)
EOSINOPHIL NFR BLD: 0 % (ref 0–8)
ERYTHROCYTE [DISTWIDTH] IN BLOOD BY AUTOMATED COUNT: 13.3 % (ref 11.5–14.5)
EST. GFR  (AFRICAN AMERICAN): >60 ML/MIN/1.73 M^2
EST. GFR  (NON AFRICAN AMERICAN): >60 ML/MIN/1.73 M^2
GLUCOSE SERPL-MCNC: 106 MG/DL (ref 70–110)
HCT VFR BLD AUTO: 34.6 % (ref 37–48.5)
HGB BLD-MCNC: 11.5 G/DL (ref 12–16)
IMM GRANULOCYTES # BLD AUTO: 0.05 K/UL (ref 0–0.04)
IMM GRANULOCYTES NFR BLD AUTO: 0.5 % (ref 0–0.5)
LYMPHOCYTES # BLD AUTO: 2.1 K/UL (ref 1–4.8)
LYMPHOCYTES NFR BLD: 19 % (ref 18–48)
MAGNESIUM SERPL-MCNC: 1.8 MG/DL (ref 1.6–2.6)
MCH RBC QN AUTO: 29.5 PG (ref 27–31)
MCHC RBC AUTO-ENTMCNC: 33.2 G/DL (ref 32–36)
MCV RBC AUTO: 89 FL (ref 82–98)
MONOCYTES # BLD AUTO: 1.1 K/UL (ref 0.3–1)
MONOCYTES NFR BLD: 9.6 % (ref 4–15)
NEUTROPHILS # BLD AUTO: 7.7 K/UL (ref 1.8–7.7)
NEUTROPHILS NFR BLD: 70.7 % (ref 38–73)
NRBC BLD-RTO: 0 /100 WBC
PHOSPHATE SERPL-MCNC: 2.3 MG/DL (ref 2.7–4.5)
PLATELET # BLD AUTO: 243 K/UL (ref 150–350)
PMV BLD AUTO: 10.7 FL (ref 9.2–12.9)
POCT GLUCOSE: 140 MG/DL (ref 70–110)
POTASSIUM SERPL-SCNC: 3.5 MMOL/L (ref 3.5–5.1)
PROT SERPL-MCNC: 5.9 G/DL (ref 6–8.4)
RBC # BLD AUTO: 3.9 M/UL (ref 4–5.4)
SODIUM SERPL-SCNC: 139 MMOL/L (ref 136–145)
WBC # BLD AUTO: 10.93 K/UL (ref 3.9–12.7)

## 2020-11-17 PROCEDURE — 84100 ASSAY OF PHOSPHORUS: CPT

## 2020-11-17 PROCEDURE — 36415 COLL VENOUS BLD VENIPUNCTURE: CPT

## 2020-11-17 PROCEDURE — 94640 AIRWAY INHALATION TREATMENT: CPT

## 2020-11-17 PROCEDURE — 94761 N-INVAS EAR/PLS OXIMETRY MLT: CPT

## 2020-11-17 PROCEDURE — 83735 ASSAY OF MAGNESIUM: CPT

## 2020-11-17 PROCEDURE — 99233 PR SUBSEQUENT HOSPITAL CARE,LEVL III: ICD-10-PCS | Mod: ,,, | Performed by: INTERNAL MEDICINE

## 2020-11-17 PROCEDURE — 85025 COMPLETE CBC W/AUTO DIFF WBC: CPT

## 2020-11-17 PROCEDURE — 25000003 PHARM REV CODE 250: Performed by: INTERNAL MEDICINE

## 2020-11-17 PROCEDURE — 80053 COMPREHEN METABOLIC PANEL: CPT

## 2020-11-17 PROCEDURE — 99233 SBSQ HOSP IP/OBS HIGH 50: CPT | Mod: ,,, | Performed by: INTERNAL MEDICINE

## 2020-11-17 PROCEDURE — 25000242 PHARM REV CODE 250 ALT 637 W/ HCPCS: Performed by: INTERNAL MEDICINE

## 2020-11-17 PROCEDURE — 63600175 PHARM REV CODE 636 W HCPCS: Performed by: INTERNAL MEDICINE

## 2020-11-17 RX ORDER — CLOPIDOGREL BISULFATE 75 MG/1
75 TABLET ORAL DAILY
Qty: 30 TABLET | Refills: 5 | Status: SHIPPED | OUTPATIENT
Start: 2020-11-17 | End: 2021-05-17 | Stop reason: SDUPTHER

## 2020-11-17 RX ORDER — SODIUM CHLORIDE 9 MG/ML
INJECTION, SOLUTION INTRAVENOUS CONTINUOUS
Status: DISCONTINUED | OUTPATIENT
Start: 2020-11-17 | End: 2020-11-17 | Stop reason: HOSPADM

## 2020-11-17 RX ORDER — AZITHROMYCIN 250 MG/1
250 TABLET, FILM COATED ORAL DAILY
Qty: 3 TABLET | Refills: 0 | Status: SHIPPED | OUTPATIENT
Start: 2020-11-17 | End: 2020-11-20

## 2020-11-17 RX ORDER — DIPHENHYDRAMINE HCL 50 MG
50 CAPSULE ORAL ONCE
Status: DISCONTINUED | OUTPATIENT
Start: 2020-11-17 | End: 2020-11-17 | Stop reason: HOSPADM

## 2020-11-17 RX ORDER — PREDNISONE 20 MG/1
TABLET ORAL
Qty: 9 TABLET | Refills: 0 | Status: SHIPPED | OUTPATIENT
Start: 2020-11-17 | End: 2021-04-12

## 2020-11-17 RX ORDER — CARVEDILOL 3.12 MG/1
3.12 TABLET ORAL 2 TIMES DAILY WITH MEALS
Qty: 60 TABLET | Refills: 5 | Status: SHIPPED | OUTPATIENT
Start: 2020-11-17 | End: 2021-05-17 | Stop reason: SDUPTHER

## 2020-11-17 RX ADMIN — IPRATROPIUM BROMIDE AND ALBUTEROL SULFATE 3 ML: .5; 3 SOLUTION RESPIRATORY (INHALATION) at 07:11

## 2020-11-17 RX ADMIN — FLUTICASONE FUROATE AND VILANTEROL TRIFENATATE 1 PUFF: 100; 25 POWDER RESPIRATORY (INHALATION) at 08:11

## 2020-11-17 RX ADMIN — EZETIMIBE 10 MG: 10 TABLET ORAL at 07:11

## 2020-11-17 RX ADMIN — CLOPIDOGREL 75 MG: 75 TABLET, FILM COATED ORAL at 07:11

## 2020-11-17 RX ADMIN — IPRATROPIUM BROMIDE AND ALBUTEROL SULFATE 3 ML: .5; 3 SOLUTION RESPIRATORY (INHALATION) at 12:11

## 2020-11-17 RX ADMIN — AZITHROMYCIN 500 MG: 500 INJECTION, POWDER, LYOPHILIZED, FOR SOLUTION INTRAVENOUS at 06:11

## 2020-11-17 RX ADMIN — PREDNISONE 40 MG: 20 TABLET ORAL at 07:11

## 2020-11-17 NOTE — PROGRESS NOTES
AVS given and discussed with pt, including new prescriptions, home medication and f/u appts. Discussed cath insertion site home care, including keep it clean dry and intact. Pt verbalized all understanding. IV x 2 removed, tips intact. Awaiting family .

## 2020-11-17 NOTE — HOSPITAL COURSE
Patient admitted to the hospital on 11/16/20 with a CC of SOB. She likely was having a COPD exacerbation.  She was found to have a non-stemi and cards was consulted. Patient went for LHC on 11/16 that showed non-obstructive CAD. Med management only. Patient clinically improved and will be discharged to home today. Activity as tolerated. Diet- low NA. Follow up with PCP and Dr. Vernon in one week.

## 2020-11-17 NOTE — PROGRESS NOTES
[facility]  Cardiology  Progress Note    Patient Name: Hollie Ponce  MRN: [id number]  Admission Date: 11/16/2020  Hospital Length of Stay: 1 days  Code Status: Prior   Attending Physician: No att. providers found   Primary Care Physician: Navdeep Marquez MD  Expected Discharge Date: 11/17/2020  Principal Problem:NSTEMI (non-ST elevated myocardial infarction)    Subjective:       Interval History:  Doing fine.  No complications from cardiac catheterization.  Smoking cessation advised.      Review of Systems   Constitution: Negative.   HENT: Negative.    Eyes: Negative.    Cardiovascular: Negative.    Respiratory: Negative.    Endocrine: Negative.    Hematologic/Lymphatic: Negative.    Skin: Negative.    Musculoskeletal: Negative.    Gastrointestinal: Negative.    Genitourinary: Negative.    Neurological: Negative.    Psychiatric/Behavioral: Negative.    Allergic/Immunologic: Negative.      Objective:     Vital Signs (Most Recent):  Temp: 97.6 °F (36.4 °C) (11/17/20 0729)  Pulse: 77 (11/17/20 0805)  Resp: 18 (11/17/20 0805)  BP: 136/79 (11/17/20 0729)  SpO2: 98 % (11/17/20 0805) Vital Signs (24h Range):  Temp:  [97.6 °F (36.4 °C)-98.1 °F (36.7 °C)] 97.6 °F (36.4 °C)  Pulse:  [72-87] 77  Resp:  [16-18] 18  SpO2:  [97 %-100 %] 98 %  BP: ()/(53-79) 136/79     Weight: 59.4 kg (131 lb)  Body mass index is 29.39 kg/m².     SpO2: 98 %  O2 Device (Oxygen Therapy): room air      Intake/Output Summary (Last 24 hours) at 11/17/2020 1442  Last data filed at 11/17/2020 0829  Gross per 24 hour   Intake 480 ml   Output --   Net 480 ml       Lines/Drains/Airways     Peripheral Intravenous Line                 Peripheral IV - Single Lumen 11/16/20 0000 20 G Left Antecubital 1 day         Peripheral IV - Single Lumen 11/16/20 1056 20 G Anterior;Left Hand 1 day                Physical Exam   Constitutional: She is oriented to person, place, and time. She appears well-developed and well-nourished.   HENT:    Head: Normocephalic.   Eyes: Pupils are equal, round, and reactive to light.   Neck: Normal range of motion. Neck supple.   Cardiovascular: Normal rate and regular rhythm.   Pulmonary/Chest: Effort normal and breath sounds normal.   Abdominal: Soft. Normal appearance and bowel sounds are normal. There is no abdominal tenderness.   Musculoskeletal: Normal range of motion.   Neurological: She is alert and oriented to person, place, and time.   Skin: Skin is warm.   Psychiatric: She has a normal mood and affect.       Significant Labs:   BMP:   Recent Labs   Lab 11/16/20 0014 11/17/20  0431   * 106    139   K 3.6 3.5    103   CO2 27 28   BUN 13 11   CREATININE 1.1 0.8   CALCIUM 8.9 8.7   MG  --  1.8   , CMP   Recent Labs   Lab 11/16/20 0014 11/17/20  0431    139   K 3.6 3.5    103   CO2 27 28   * 106   BUN 13 11   CREATININE 1.1 0.8   CALCIUM 8.9 8.7   PROT 6.8 5.9*   ALBUMIN 4.0 3.5   BILITOT 0.2 0.3   ALKPHOS 60 42*   AST 18 47*   ALT 15 20   ANIONGAP 11 8   ESTGFRAFRICA >60 >60   EGFRNONAA 52* >60   , CBC   Recent Labs   Lab 11/16/20 0014 11/17/20  0431   WBC 7.67 10.93   HGB 13.2 11.5*   HCT 39.7 34.6*    243   , INR No results for input(s): INR, PROTIME in the last 48 hours., Lipid Panel No results for input(s): CHOL, HDL, LDLCALC, TRIG, CHOLHDL in the last 48 hours., Troponin   Recent Labs   Lab 11/16/20  0210 11/16/20  0603 11/16/20  1312   TROPONINI 0.740* 1.086* 1.746*    and All pertinent lab results from the last 24 hours have been reviewed.    Significant Imaging: Echocardiogram:   Transthoracic echo (TTE) complete (Cupid Only):   Results for orders placed or performed during the hospital encounter of 11/16/20   Echo Color Flow Doppler? Yes; Bubble Contrast? No   Result Value Ref Range    BSA 1.53 m2    LA WIDTH 3.65 cm    AORTIC VALVE CUSP SEPERATION 1.55 cm    PV PEAK VELOCITY 0.86 cm/s    LVIDd 3.63 3.5 - 6.0 cm    IVS 1.19 (A) 0.6 - 1.1 cm    Posterior  Wall 1.20 (A) 0.6 - 1.1 cm    Ao root annulus 2.37 cm    LVIDs 2.57 2.1 - 4.0 cm    FS 29 28 - 44 %    LA volume 35.07 cm3    STJ 2.50 cm    LV mass 142.32 g    LA size 2.67 cm    RVDD 3.34 cm    TAPSE 1.97 cm    Left Ventricle Relative Wall Thickness 0.66 cm    AV mean gradient 4 mmHg    AV valve area 2.06 cm2    AV Velocity Ratio 0.89     AV index (prosthetic) 0.86     MV valve area p 1/2 method 3.69 cm2    E/A ratio 1.44     E wave decelartion time 205.40 msec    IVRT 128.45 msec    Pulm vein S/D ratio 1.22     LVOT diameter 1.75 cm    LVOT area 2.4 cm2    LVOT peak filemon 1.16 m/s    LVOT peak VTI 25.10 cm    Ao peak filemon 1.30 m/s    Ao VTI 29.32 cm    LVOT stroke volume 60.34 cm3    AV peak gradient 7 mmHg    MV Peak E Filemon 0.85 m/s    TR Max Filemon 3.39 m/s    MV stenosis pressure 1/2 time 59.57 ms    MV Peak A Filemon 0.59 m/s    PV Peak S Filemon 0.78 m/s    PV Peak D Filemon 0.64 m/s    LV Systolic Volume 23.97 mL    LV Systolic Volume Index 16.2 mL/m2    LV Diastolic Volume 55.55 mL    LV Diastolic Volume Index 37.46 mL/m2    LA Volume Index 23.7 mL/m2    LV Mass Index 96 g/m2    RA Major Axis 4.50 cm    Left Atrium Minor Axis 3.99 cm    Left Atrium Major Axis 4.51 cm    Triscuspid Valve Regurgitation Peak Gradient 46 mmHg    RA Width 3.86 cm    Right Atrial Pressure (from IVC) 3 mmHg    TV rest pulmonary artery pressure 49 mmHg    Narrative    · The left ventricle is normal in size with normal systolic function. The   estimated ejection fraction is 55%.  · There is mild left ventricular concentric hypertrophy.  · Normal left ventricular diastolic function.  · Normal right ventricular size with normal right ventricular systolic   function.  · Mild left atrial enlargement.  · Normal central venous pressure (3 mmHg).  · The estimated PA systolic pressure is 49 mmHg.  · There is pulmonary hypertension.        Assessment and Plan:     Brief HPI:     * NSTEMI (non-ST elevated myocardial infarction)  Patient intolerant to aspirin  325, however can take aspirin 81 mg daily as per the patient.  Aspirin, Plavix.  Discussed various options with the patient's.  After detailed discussion decided to proceed with coronary angiogram.  Patient understands the importance of dual antiplatelet therapy after PCI.    Risks, benefits and alternatives of the catheterization procedure were discussed with the patient.The risks of coronary angiography include but are not limited to: bleeding, infection, death, heart attack, arrhythmia, kidney injury or failure, potential need for dialysis, allergic reactions, stroke, need for emergency surgery, hematoma, pseudoaneurysm etc.  Should stenting be indicated, the patient has agreed to dual anti-platelet therapy for 1-consecutive year with a drug-eluting stent and a minimum of 1-month with the use of a bare metal stent. Additionally, pt is aware that non-compliance is likely to result in stent clotting with heart attack, heart failure, and/or death  The risks of moderate sedation include hypotension, respiratory depression, arrhythmias, bronchospasm, and death. Informed consent was obtained and the  patient is agreeable to proceed with the procedure. Consent was placed on the chart.      11/17:  Nonobstructive coronary artery disease on angiogram yesterday.  No significant culprit lesion found.  Smoking cessation advised.  Risk factor modification.  Follow-up in Cardiology Clinic.      Cigarette smoker one half pack a day or less  Smoking cessation highly emphasized.    COPD exacerbation  Management per primary    Essential hypertension  Will titrate antihypertensives as needed for appropriate blood pressure control        VTE Risk Mitigation (From admission, onward)    None          Shabnam Vernon MD  Cardiology  [facility]

## 2020-11-17 NOTE — NURSING
pt awake, alert, and oriented x4.   able to make needs known. pt is ambulatory.   cardiac monitor #[id number] applied to pt. melissa   checks performed q4 hour. remained free   from falls and injuries this shift. no c/o   pain/distress/sob this shift. hourly rounds   made throughout shift. will continue to   monitor.

## 2020-11-17 NOTE — SUBJECTIVE & OBJECTIVE
Interval History:  Doing fine.  No complications from cardiac catheterization.  Smoking cessation advised.      Review of Systems   Constitution: Negative.   HENT: Negative.    Eyes: Negative.    Cardiovascular: Negative.    Respiratory: Negative.    Endocrine: Negative.    Hematologic/Lymphatic: Negative.    Skin: Negative.    Musculoskeletal: Negative.    Gastrointestinal: Negative.    Genitourinary: Negative.    Neurological: Negative.    Psychiatric/Behavioral: Negative.    Allergic/Immunologic: Negative.      Objective:     Vital Signs (Most Recent):  Temp: 97.6 °F (36.4 °C) (11/17/20 0729)  Pulse: 77 (11/17/20 0805)  Resp: 18 (11/17/20 0805)  BP: 136/79 (11/17/20 0729)  SpO2: 98 % (11/17/20 0805) Vital Signs (24h Range):  Temp:  [97.6 °F (36.4 °C)-98.1 °F (36.7 °C)] 97.6 °F (36.4 °C)  Pulse:  [72-87] 77  Resp:  [16-18] 18  SpO2:  [97 %-100 %] 98 %  BP: ()/(53-79) 136/79     Weight: 59.4 kg (131 lb)  Body mass index is 29.39 kg/m².     SpO2: 98 %  O2 Device (Oxygen Therapy): room air      Intake/Output Summary (Last 24 hours) at 11/17/2020 1442  Last data filed at 11/17/2020 0829  Gross per 24 hour   Intake 480 ml   Output --   Net 480 ml       Lines/Drains/Airways     Peripheral Intravenous Line                 Peripheral IV - Single Lumen 11/16/20 0000 20 G Left Antecubital 1 day         Peripheral IV - Single Lumen 11/16/20 1056 20 G Anterior;Left Hand 1 day                Physical Exam   Constitutional: She is oriented to person, place, and time. She appears well-developed and well-nourished.   HENT:   Head: Normocephalic.   Eyes: Pupils are equal, round, and reactive to light.   Neck: Normal range of motion. Neck supple.   Cardiovascular: Normal rate and regular rhythm.   Pulmonary/Chest: Effort normal and breath sounds normal.   Abdominal: Soft. Normal appearance and bowel sounds are normal. There is no abdominal tenderness.   Musculoskeletal: Normal range of motion.   Neurological: She is alert and  oriented to person, place, and time.   Skin: Skin is warm.   Psychiatric: She has a normal mood and affect.       Significant Labs:   BMP:   Recent Labs   Lab 11/16/20  0014 11/17/20  0431   * 106    139   K 3.6 3.5    103   CO2 27 28   BUN 13 11   CREATININE 1.1 0.8   CALCIUM 8.9 8.7   MG  --  1.8   , CMP   Recent Labs   Lab 11/16/20  0014 11/17/20  0431    139   K 3.6 3.5    103   CO2 27 28   * 106   BUN 13 11   CREATININE 1.1 0.8   CALCIUM 8.9 8.7   PROT 6.8 5.9*   ALBUMIN 4.0 3.5   BILITOT 0.2 0.3   ALKPHOS 60 42*   AST 18 47*   ALT 15 20   ANIONGAP 11 8   ESTGFRAFRICA >60 >60   EGFRNONAA 52* >60   , CBC   Recent Labs   Lab 11/16/20  0014 11/17/20  0431   WBC 7.67 10.93   HGB 13.2 11.5*   HCT 39.7 34.6*    243   , INR No results for input(s): INR, PROTIME in the last 48 hours., Lipid Panel No results for input(s): CHOL, HDL, LDLCALC, TRIG, CHOLHDL in the last 48 hours., Troponin   Recent Labs   Lab 11/16/20  0210 11/16/20  0603 11/16/20  1312   TROPONINI 0.740* 1.086* 1.746*    and All pertinent lab results from the last 24 hours have been reviewed.    Significant Imaging: Echocardiogram:   Transthoracic echo (TTE) complete (Cupid Only):   Results for orders placed or performed during the hospital encounter of 11/16/20   Echo Color Flow Doppler? Yes; Bubble Contrast? No   Result Value Ref Range    BSA 1.53 m2    LA WIDTH 3.65 cm    AORTIC VALVE CUSP SEPERATION 1.55 cm    PV PEAK VELOCITY 0.86 cm/s    LVIDd 3.63 3.5 - 6.0 cm    IVS 1.19 (A) 0.6 - 1.1 cm    Posterior Wall 1.20 (A) 0.6 - 1.1 cm    Ao root annulus 2.37 cm    LVIDs 2.57 2.1 - 4.0 cm    FS 29 28 - 44 %    LA volume 35.07 cm3    STJ 2.50 cm    LV mass 142.32 g    LA size 2.67 cm    RVDD 3.34 cm    TAPSE 1.97 cm    Left Ventricle Relative Wall Thickness 0.66 cm    AV mean gradient 4 mmHg    AV valve area 2.06 cm2    AV Velocity Ratio 0.89     AV index (prosthetic) 0.86     MV valve area p 1/2 method 3.69 cm2     E/A ratio 1.44     E wave decelartion time 205.40 msec    IVRT 128.45 msec    Pulm vein S/D ratio 1.22     LVOT diameter 1.75 cm    LVOT area 2.4 cm2    LVOT peak filemon 1.16 m/s    LVOT peak VTI 25.10 cm    Ao peak filemon 1.30 m/s    Ao VTI 29.32 cm    LVOT stroke volume 60.34 cm3    AV peak gradient 7 mmHg    MV Peak E Filemon 0.85 m/s    TR Max Filemon 3.39 m/s    MV stenosis pressure 1/2 time 59.57 ms    MV Peak A Filemon 0.59 m/s    PV Peak S Filemon 0.78 m/s    PV Peak D Filemon 0.64 m/s    LV Systolic Volume 23.97 mL    LV Systolic Volume Index 16.2 mL/m2    LV Diastolic Volume 55.55 mL    LV Diastolic Volume Index 37.46 mL/m2    LA Volume Index 23.7 mL/m2    LV Mass Index 96 g/m2    RA Major Axis 4.50 cm    Left Atrium Minor Axis 3.99 cm    Left Atrium Major Axis 4.51 cm    Triscuspid Valve Regurgitation Peak Gradient 46 mmHg    RA Width 3.86 cm    Right Atrial Pressure (from IVC) 3 mmHg    TV rest pulmonary artery pressure 49 mmHg    Narrative    · The left ventricle is normal in size with normal systolic function. The   estimated ejection fraction is 55%.  · There is mild left ventricular concentric hypertrophy.  · Normal left ventricular diastolic function.  · Normal right ventricular size with normal right ventricular systolic   function.  · Mild left atrial enlargement.  · Normal central venous pressure (3 mmHg).  · The estimated PA systolic pressure is 49 mmHg.  · There is pulmonary hypertension.

## 2020-11-17 NOTE — ASSESSMENT & PLAN NOTE
Her BP is actually running low currently SBP  range  Holding her home HCTZ and Lisinopril pending possible dye load from [facility]

## 2020-11-17 NOTE — ASSESSMENT & PLAN NOTE
Trop is elevated (see below)   -May be demand ischemia from COPD exacerbation    -but her risk is high with HTN, cigarettes and HLD  Allergic to ASA, so not administered   Given Plavix 300mg po x 1   -On Plavix 75mg po qday thereafter  Lovenox 1mg/Kg q12, first dose in ED   -Lovenox 60mg sq q12  Consult Cardiology  SL NTG prn CP  No BB currently due to low BB and COPD issues  Allergies to statins - so using Zetia alone  Serial CE's, next at 6am  Check Echo with CFD  Discussed with cards. Non-obstructing CAD. Med management

## 2020-11-17 NOTE — PLAN OF CARE
"   11/17/20 0948   Final Note   Assessment Type Final Discharge Note   Anticipated Discharge Disposition Home   What phone number can be called within the next 1-3 days to see how you are doing after discharge? [phone]   Hospital Follow Up  Appt(s) scheduled? Yes   Discharge plans and expectations educations in teach back method with documentation complete? Yes   Right Care Referral Info   Post Acute Recommendation No Care   Post-Acute Status   Post-Acute Authorization Other  (Home)   Other Status No Post-Acute Service Needs   Discharge Delays None known at this time     EDUCATION:  Pt provided with educational information on Heart Attack. I  Information included:  signs and symptoms to look for at discharge. SW instructed pt to call the doctor if experiencing symptoms that may indicate a medical emergency: CALL 911 if signs and symptoms worsen. SW asked pt to provide SW with two signs and symptoms recently discussed.  Pt stated, "chest pain and SOB". Reminded pt things she will be responsible for to manage her healthcare at home: getting Rx filled, attending follow up appointments, and taking medication as prescribed were discussed.   Teach back method used.  All questions answered.  Patient verbalized understanding of all information.       SW discussed appointments. Pt verbalized understanding.     Via secure chart, LUIS FELIPE, notified pt's nurse, Alicia, all CM needs have been met.   "

## 2020-11-17 NOTE — NURSING
Pre op orders were released. Pt not scheduled for procedure. Procedure already performed today. Will follow up with cardiology in the morning.

## 2020-11-17 NOTE — ASSESSMENT & PLAN NOTE
Duonebs q6 hours and q4 prn  Solumedrol 125mg iv x 1 in the ED  Started on Prednisone 40mg po qday   Breo inhaler  Clinically she is much improved now, and reports feeling significantly better    Steroid taper. Doxy for home. Will check any 02 requirements

## 2020-11-17 NOTE — ASSESSMENT & PLAN NOTE
Patient intolerant to aspirin 325, however can take aspirin 81 mg daily as per the patient.  Aspirin, Plavix.  Discussed various options with the patient's.  After detailed discussion decided to proceed with coronary angiogram.  Patient understands the importance of dual antiplatelet therapy after PCI.    Risks, benefits and alternatives of the catheterization procedure were discussed with the patient.The risks of coronary angiography include but are not limited to: bleeding, infection, death, heart attack, arrhythmia, kidney injury or failure, potential need for dialysis, allergic reactions, stroke, need for emergency surgery, hematoma, pseudoaneurysm etc.  Should stenting be indicated, the patient has agreed to dual anti-platelet therapy for 1-consecutive year with a drug-eluting stent and a minimum of 1-month with the use of a bare metal stent. Additionally, pt is aware that non-compliance is likely to result in stent clotting with heart attack, heart failure, and/or death  The risks of moderate sedation include hypotension, respiratory depression, arrhythmias, bronchospasm, and death. Informed consent was obtained and the  patient is agreeable to proceed with the procedure. Consent was placed on the chart.      11/17:  Nonobstructive coronary artery disease on angiogram yesterday.  No significant culprit lesion found.  Smoking cessation advised.  Risk factor modification.  Follow-up in Cardiology Clinic.

## 2020-11-17 NOTE — PROGRESS NOTES
WRITTEN HEALTHCARE DISCHARGE INFORMATION     Things that YOU are RESPONSIBLE for to Manage Your Care At Home:    1. Getting your prescriptions filled.  2. Taking you medications as directed. DO NOT MISS ANY DOSES!  3. Going to your follow-up doctor appointments. This is important because it allows the doctor to monitor your progress and to determine if any changes need to be made to your treatment plan.    If you are unable to make your follow up appointments, please call the number listed and reschedule this appointment.     ____________HELP AT HOME____________________    Experiencing any SIGNS or SYMPTOMS: YOU CAN    Schedule a same day appopintment with your Primary Care Doctor or  you can call [facility] On Call Nurse Care Line for 24/7 assistance at [phone]    If you are experience any signs or symptoms that have become severe, Call 911 and come to your nearest Emergency Room.    Thank you for choosing [facility] and allowing us to care for you.   From your care management team: Leslye GUERRERO [facility] [phone]    You should receive a call from [facility] Discharge Department within 48-72 hours to help manage your care after discharge. Please try to make sure that you answer your phone for this important phone call.  Follow-up Information     Navdeep Marquez MD On 11/24/2020.    Specialty: Family Medicine  Why: Outpatient Services, PCP, follow-up appointment @ 1:20pm.   Contact information:  [address] [place] [address]  [phone]             Shabnam Vernon MD On 11/30/2020.    Specialties: Cardiology, INTERVENTIONAL CARDIOLOGY  Why: Outpatient Services, Cardiology, follow-up appointment @ 3:00pm.   Contact information:  [address]  [address]  [facility] [address]  [phone]
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
## 2020-11-17 NOTE — PROGRESS NOTES
TN taught Symptoms and Problems for Coronary Angiogram home care with pt and  with teach back:  1. Chest Pain, 2.SOB . TN placed education sheet in [employer] Packet..     Help at home will be from daughter, Ramesh, assisting in pt's recovery..     Patient's preference [facility] on [address].    TN taught patient about things HE is responsible for when discharged TO HELP WITH HIS RECOVERY:  Particularly on how to Manage HIS Care At Home:  1. Getting his prescriptions filled.  2. Taking his medications as directed. DO NOT MISS ANY DOSES!  3. Going to his follow-up doctor appointments.   .  Carey Conner RN, BSN, STN CCM

## 2020-11-17 NOTE — SUBJECTIVE & OBJECTIVE
Interval History: doing well. No SOB     Review of Systems   Constitutional: Negative for activity change and appetite change.   HENT: Negative for congestion.    Respiratory: Negative for chest tightness and shortness of breath.    Cardiovascular: Negative for chest pain.   Gastrointestinal: Negative for abdominal pain.   Genitourinary: Negative for difficulty urinating.   Musculoskeletal: Negative for arthralgias.   Neurological: Negative for dizziness.   Psychiatric/Behavioral: Negative for agitation and behavioral problems.     Objective:     Vital Signs (Most Recent):  Temp: 97.6 °F (36.4 °C) (11/17/20 0729)  Pulse: 77 (11/17/20 0805)  Resp: 18 (11/17/20 0805)  BP: 136/79 (11/17/20 0729)  SpO2: 98 % (11/17/20 0805) Vital Signs (24h Range):  Temp:  [97.6 °F (36.4 °C)-98.2 °F (36.8 °C)] 97.6 °F (36.4 °C)  Pulse:  [72-89] 77  Resp:  [16-18] 18  SpO2:  [97 %-100 %] 98 %  BP: ()/(53-79) 136/79     Weight: 59.4 kg (131 lb)  Body mass index is 29.39 kg/m².    Intake/Output Summary (Last 24 hours) at 11/17/2020 0820  Last data filed at 11/16/2020 1712  Gross per 24 hour   Intake 480 ml   Output --   Net 480 ml      Physical Exam  Vitals signs and nursing note reviewed.   Constitutional:       General: She is not in acute distress.     Appearance: Normal appearance. She is normal weight. She is not ill-appearing or toxic-appearing.   HENT:      Head: Normocephalic and atraumatic.   Pulmonary:      Effort: Pulmonary effort is normal. No respiratory distress.   Neurological:      Mental Status: She is alert and oriented to person, place, and time.   Psychiatric:         Mood and Affect: Mood normal.         Behavior: Behavior normal.         Thought Content: Thought content normal.         Significant Labs:   BMP:   Recent Labs   Lab 11/17/20 0431         K 3.5      CO2 28   BUN 11   CREATININE 0.8   CALCIUM 8.7   MG 1.8     CBC:   Recent Labs   Lab 11/16/20  0014 11/17/20 0431   WBC 7.67 10.93    HGB 13.2 11.5*   HCT 39.7 34.6*    243       Significant Imaging:

## 2020-11-18 ENCOUNTER — PATIENT OUTREACH (OUTPATIENT)
Dept: ADMINISTRATIVE | Facility: CLINIC | Age: 65
End: 2020-11-18

## 2020-11-18 ENCOUNTER — TELEPHONE (OUTPATIENT)
Dept: FAMILY MEDICINE | Facility: CLINIC | Age: 65
End: 2020-11-18

## 2020-11-18 DIAGNOSIS — J44.9 CHRONIC OBSTRUCTIVE PULMONARY DISEASE, UNSPECIFIED COPD TYPE: Primary | ICD-10-CM

## 2020-11-18 NOTE — TELEPHONE ENCOUNTER
Please forward this important TCC information to your provider in order to maximize the post discharge care delivery of this patient.     C3 nurse spoke with Hollie Ponce for a TCC post hospital discharge follow up call. The patient has a scheduled HOSFU appointment with Navdeep Marquez MD on 11/24 @ 120pm.     Daughter and patient have questions regarding blood pressure parameters for blood pressure medications.   Also, requesting order for nebulizer machine.     Respectfully,   Consuelo Tse RN   [facility]       Need orders for nebulizer sent to N and need parameters for blood pressure medications

## 2020-11-18 NOTE — PATIENT INSTRUCTIONS
Discharge Instructions for Cardiac Catheterization  Cardiac catheterization is a procedure to look for blocked areas in the blood vessels that send blood to the heart. A thin, flexible tube (catheter) is put in a blood vessel in your groin or arm. The healthcare provider injects contrast fluid into your blood, which then flows to your heart. X-rays pictures are taken of your heart. Your provider will review the results with you. Be sure to ask any questions you have before you leave. This sheet will help you take care of yourself at home.  Home care  · Only do light and easy activities for the next 2 to 3 days. Ask for help with chores and errands while you recover. Have someone drive you to your appointments.  · Don't lift anything heavy for a while. Your healthcare team will tell you when it's safe to lift again.  · Ask your healthcare team when you can expect to return to work. Unless your job involves lifting, you may be able to return to your normal activities within a couple of days.  · Take your medicines as directed. Don't skip doses.  · Drink 6 to 8 glasses of water a day. This is to help flush the contrast dye out of your body. Call your healthcare team if your urine has any change in color.  · Take your temperature each day for 7 days. If you feel cold and clammy or start sweating, take your temperature right away and call your healthcare team.  · Check your incisions every day for signs of infection. These include redness, swelling, and drainage. It is normal to have a small bruise or bump where the catheter was inserted. A bruise that is getting larger is not normal and should be reported to your healthcare team. If you see blood forming in the incision, call your healthcare team. Go to the emergency department if you have uncontrolled bleeding from the artery site. This is especially true if you take medicines that make it difficult for your blood to clot. Examples are aspirin, clopidogrel, and  warfarin.  · Eat a healthy diet. Make sure it is low in fat, salt, and cholesterol. Ask your healthcare team for diet information.  · Stop smoking. Enroll in a stop-smoking program or ask your healthcare team for help. Stop-smoking programs can be life saving.  · Exercise as your healthcare team tells you to. Your healthcare team may recommend you start a cardiac rehabilitation program. Cardiac rehab is an exercise program in which trained healthcare staff watch your progress and stress on your heart while you exercise. Ask your team how to enroll.  · Don't swim or take baths until your healthcare team says its OK. You can shower the day after the procedure. Keep the site clean and dry. This keeps the incision from getting wet and infected until the skin and artery can heal.  Follow-up care  · Make a follow-up appointment as advised by our staff. It's common to have a follow-up appointment 2 to 4 weeks after an angioplasty or coronary stent procedure.  · Make a yearly appointment, too. This is to make sure you are still doing well and not having any new symptoms.  · Don't wait for a follow-up appointment if your medicines aren't working or you are having heart-related symptoms.  When to seek medical care  Call your healthcare provider right away if you have any of the following:  · Chest pain  · Constant or increasing pain or numbness in your leg  · Fever of 100.4°F (38.0°C) or higher, or as directed by your healthcare provider  · Symptoms of infection. These include redness, swelling, drainage, or warmth at the incision site.  · Shortness of breath  · A leg that feels cold or appears blue  · Bleeding, bruising, or a lot of swelling where the catheter was inserted  · Blood in your urine  · Black or tarry stools  · Any unusual bleeding   Date Last Reviewed: 10/1/2016  © 6875-5238 [employer]. [address] [place] [address], [place] [address]. All rights reserved. This information is not intended as a  substitute for professional medical care. Always follow your healthcare professional's instructions.

## 2020-11-18 NOTE — TELEPHONE ENCOUNTER
Asking for nebulizer order sent to [facility].   Blood pressure parameters for blood pressure medications.   Also, should she be taking Aspirin 81 mg daily along with Plavix.   Contact info given for Cardiology, Dr Vernon for aspirin question.   Message sent to PCP regarding nebulizer order and Blood pressure medication advice.

## 2020-11-19 NOTE — TELEPHONE ENCOUNTER
----- Message from Halina Rosario sent at 11/19/2020 11:01 AM CST -----  Regarding: Daughter [phone]  .Type: Patient Call Back    Who called: Daughter     What is the request in detail:Daughter called back in regards to pt blood pressure parameters for blood pressure medications and a follow up on the order for nebulizer machine.  Also daughter would like to know is there a certain level of pressure they don't have to give pt the medication     Can the clinic reply by [facility]? Call back     Would the patient rather a call back or a response via [facility]? Call back     Best call back number: [phone]

## 2020-11-20 ENCOUNTER — TELEPHONE (OUTPATIENT)
Dept: CARDIOLOGY | Facility: CLINIC | Age: 65
End: 2020-11-20

## 2020-11-20 RX ORDER — FLUTICASONE PROPIONATE 220 UG/1
AEROSOL, METERED RESPIRATORY (INHALATION)
Qty: 12 G | Refills: 0 | Status: SHIPPED | OUTPATIENT
Start: 2020-11-20 | End: 2021-04-12

## 2020-11-20 NOTE — TELEPHONE ENCOUNTER
----- Message from Violeta Martinez sent at 11/20/2020  2:00 PM CST -----  Regarding: pt  Type: Patient Call Back    Who called:pt's daughter soila [phone]    What is the request in detail:requesting to discuss with nurse medications for blood pressure and baby aspirin. Second call. No one called her back yet. Please call     Can the clinic reply by [facility]?    Would the patient rather a call back or a response via [facility]?call    Best call back number:[phone] (home) [phone] (work)      Additional Information:

## 2020-11-20 NOTE — TELEPHONE ENCOUNTER
Last Office Visit Info:   The patient's last visit with Navdeep Marquez MD was on 2/4/2020.    The patient's last visit in current department was on Visit date not found.        Last CBC Results:   Lab Results   Component Value Date    WBC 10.93 11/17/2020    HGB 11.5 (L) 11/17/2020    HCT 34.6 (L) 11/17/2020     11/17/2020       Last CMP Results  Lab Results   Component Value Date     11/17/2020    K 3.5 11/17/2020     11/17/2020    CO2 28 11/17/2020    BUN 11 11/17/2020    CREATININE 0.8 11/17/2020    CALCIUM 8.7 11/17/2020    ALBUMIN 3.5 11/17/2020    AST 47 (H) 11/17/2020    ALT 20 11/17/2020       Last Lipids  Lab Results   Component Value Date    CHOL 224 (H) 02/04/2020    TRIG 72 02/04/2020    HDL 58 02/04/2020    LDLCALC 151.6 02/04/2020       Last A1C  No results found for: HGBA1C    Last TSH  Lab Results   Component Value Date    TSH 1.442 02/04/2020         Current Med Refills  Medication List with Changes/Refills   Current Medications    ALBUTEROL (PROAIR HFA) 90 MCG/ACTUATION INHALER    Inhale 2 puffs into the lungs every 6 (six) hours as needed for Wheezing. Rescue       Start Date: 9/24/2020 End Date: --    ALBUTEROL (PROVENTIL) 2.5 MG /3 ML (0.083 %) NEBULIZER SOLUTION    VVN Q 4 TO 6 H FOR SOB OR WHEEZING       Start Date: 8/6/2020  End Date: --    AZITHROMYCIN (Z-MEENA) 250 MG TABLET    Take 1 tablet (250 mg total) by mouth once daily. for 3 days       Start Date: 11/17/2020End Date: 11/20/2020    CARVEDILOL (COREG) 3.125 MG TABLET    Take 1 tablet (3.125 mg total) by mouth 2 (two) times daily with meals.       Start Date: 11/17/2020End Date: 11/17/2021    CLOPIDOGREL (PLAVIX) 75 MG TABLET    Take 1 tablet (75 mg total) by mouth once daily.       Start Date: 11/17/2020End Date: 11/17/2021    CYCLOBENZAPRINE (FLEXERIL) 10 MG TABLET    Take 1 tablet (10 mg total) by mouth nightly as needed for Muscle spasms.       Start Date: 8/6/2020  End Date: --    EZETIMIBE (ZETIA) 10 MG TABLET     Take 1 tablet (10 mg total) by mouth once daily.       Start Date: 8/6/2020  End Date: --    FLOVENT  MCG/ACTUATION INHALER    0INHALE 1 PUFF PO ITL BID       Start Date: 9/24/2020 End Date: --    FLUTICASONE-SALMETEROL DISKUS INHALER 250-50 MCG    Inhale 1 puff into the lungs.       Start Date: 10/17/2014End Date: --    HYDROCHLOROTHIAZIDE (HYDRODIURIL) 25 MG TABLET    Take 1 tablet (25 mg total) by mouth once daily.       Start Date: 8/6/2020  End Date: --    LISINOPRIL (PRINIVIL,ZESTRIL) 5 MG TABLET    TK 1 T PO D       Start Date: 8/6/2020  End Date: --    LORATADINE (CLARITIN) 10 MG TABLET    Take 1 tablet (10 mg total) by mouth daily as needed for Allergies.       Start Date: 8/6/2020  End Date: --    MISCELLANEOUS MEDICAL SUPPLY (470V TWO WAY VALVE) MISC    Disp nebulizer and tubing ,medicine reservoir,and mask  So as to use  Every 4-6 hrs for sob/wheeze       Start Date: 10/23/2017End Date: --    POTASSIUM CHLORIDE (KLOR-CON) 8 MEQ TBSR    TK 1 T PO D       Start Date: 12/16/2019End Date: --    PREDNISONE (DELTASONE) 20 MG TABLET    2 tablet po daily for 3 days   1 tablet po daily for 3 days       Start Date: 11/17/2020End Date: --       Order(s) placed per written order guidelines:     Please advise.

## 2020-11-20 NOTE — TELEPHONE ENCOUNTER
Patient daughter stated that she was not sure if she should be giving her mother her BP medication. Her BP while on the phone was 140/84. Explained would send Dr. Vernon a message. She also wanted to know if she should be on a 81 mg ASA and explained that in his note it said she should be. Also asked if her mother got the stent or not. Explained would add that to the message to Dr. Vernon. She stated understanding.

## 2020-11-20 NOTE — TELEPHONE ENCOUNTER
Informed pt's daughter that orders were signed and sent to [facility]. Verbalizes understanding.

## 2020-11-24 ENCOUNTER — OFFICE VISIT (OUTPATIENT)
Dept: FAMILY MEDICINE | Facility: CLINIC | Age: 65
End: 2020-11-24
Payer: MEDICARE

## 2020-11-24 VITALS
BODY MASS INDEX: 28.93 KG/M2 | DIASTOLIC BLOOD PRESSURE: 70 MMHG | SYSTOLIC BLOOD PRESSURE: 138 MMHG | OXYGEN SATURATION: 97 % | WEIGHT: 129 LBS | HEART RATE: 77 BPM | TEMPERATURE: 98 F

## 2020-11-24 DIAGNOSIS — Z71.6 ENCOUNTER FOR SMOKING CESSATION COUNSELING: ICD-10-CM

## 2020-11-24 DIAGNOSIS — J44.1 COPD EXACERBATION: Primary | ICD-10-CM

## 2020-11-24 DIAGNOSIS — I10 ESSENTIAL HYPERTENSION: ICD-10-CM

## 2020-11-24 DIAGNOSIS — I21.4 NSTEMI (NON-ST ELEVATED MYOCARDIAL INFARCTION): ICD-10-CM

## 2020-11-24 PROCEDURE — 1101F PT FALLS ASSESS-DOCD LE1/YR: CPT | Mod: CPTII,S$GLB,, | Performed by: FAMILY MEDICINE

## 2020-11-24 PROCEDURE — 99999 PR PBB SHADOW E&M-EST. PATIENT-LVL IV: CPT | Mod: PBBFAC,,, | Performed by: FAMILY MEDICINE

## 2020-11-24 PROCEDURE — 3288F FALL RISK ASSESSMENT DOCD: CPT | Mod: CPTII,S$GLB,, | Performed by: FAMILY MEDICINE

## 2020-11-24 PROCEDURE — 99495 TRANSJ CARE MGMT MOD F2F 14D: CPT | Mod: S$GLB,,, | Performed by: FAMILY MEDICINE

## 2020-11-24 PROCEDURE — 1126F AMNT PAIN NOTED NONE PRSNT: CPT | Mod: S$GLB,,, | Performed by: FAMILY MEDICINE

## 2020-11-24 PROCEDURE — 3008F BODY MASS INDEX DOCD: CPT | Mod: CPTII,S$GLB,, | Performed by: FAMILY MEDICINE

## 2020-11-24 PROCEDURE — 3288F PR FALLS RISK ASSESSMENT DOCUMENTED: ICD-10-PCS | Mod: CPTII,S$GLB,, | Performed by: FAMILY MEDICINE

## 2020-11-24 PROCEDURE — 3008F PR BODY MASS INDEX (BMI) DOCUMENTED: ICD-10-PCS | Mod: CPTII,S$GLB,, | Performed by: FAMILY MEDICINE

## 2020-11-24 PROCEDURE — 99495 TCM SERVICES (MODERATE COMPLEXITY): ICD-10-PCS | Mod: S$GLB,,, | Performed by: FAMILY MEDICINE

## 2020-11-24 PROCEDURE — 99999 PR PBB SHADOW E&M-EST. PATIENT-LVL IV: ICD-10-PCS | Mod: PBBFAC,,, | Performed by: FAMILY MEDICINE

## 2020-11-24 PROCEDURE — 1126F PR PAIN SEVERITY QUANTIFIED, NO PAIN PRESENT: ICD-10-PCS | Mod: S$GLB,,, | Performed by: FAMILY MEDICINE

## 2020-11-24 PROCEDURE — 1101F PR PT FALLS ASSESS DOC 0-1 FALLS W/OUT INJ PAST YR: ICD-10-PCS | Mod: CPTII,S$GLB,, | Performed by: FAMILY MEDICINE

## 2020-11-24 RX ORDER — FLUTICASONE PROPIONATE AND SALMETEROL XINAFOATE 230; 21 UG/1; UG/1
2 AEROSOL, METERED RESPIRATORY (INHALATION) 2 TIMES DAILY
Qty: 12 G | Refills: 11 | Status: SHIPPED | OUTPATIENT
Start: 2020-11-24 | End: 2021-11-09 | Stop reason: SDUPTHER

## 2020-11-24 RX ORDER — MONTELUKAST SODIUM 10 MG/1
10 TABLET ORAL NIGHTLY
Qty: 90 TABLET | Refills: 0 | Status: SHIPPED | OUTPATIENT
Start: 2020-11-24 | End: 2021-02-18

## 2020-11-24 RX ORDER — ALBUTEROL SULFATE 90 UG/1
2 AEROSOL, METERED RESPIRATORY (INHALATION) EVERY 6 HOURS PRN
Qty: 18 G | Refills: 2 | Status: SHIPPED | OUTPATIENT
Start: 2020-11-24 | End: 2021-02-18

## 2020-11-24 RX ORDER — PRAVASTATIN SODIUM 20 MG/1
20 TABLET ORAL NIGHTLY
Qty: 90 TABLET | Refills: 3 | Status: SHIPPED | OUTPATIENT
Start: 2020-11-24 | End: 2021-04-21

## 2020-11-25 ENCOUNTER — TELEPHONE (OUTPATIENT)
Dept: CARDIOLOGY | Facility: CLINIC | Age: 65
End: 2020-11-25

## 2020-11-25 ENCOUNTER — PATIENT OUTREACH (OUTPATIENT)
Dept: ADMINISTRATIVE | Facility: HOSPITAL | Age: 65
End: 2020-11-25

## 2020-11-25 NOTE — TELEPHONE ENCOUNTER
----- Message from Shabnam Vernon MD sent at 11/24/2020  4:18 PM CST -----  Regarding: RE: BP meds  No stent was placed.  Please take BP meds as prescribed unless bp < 100 mm systolic. Will titrate further in clinic.  Thanks  ----- Message -----  From: Twila Canela RN  Sent: 11/20/2020   2:47 PM CST  To: Shabnam Vernon MD  Subject: BP meds                                          This patients daughter called and stated they were not giving her mom her BP meds in the hospital because it would go to low sometimes. She is wanting to know if there is a range for her to give the BP med. Her BP today was 140/84. Also she wanted to know if a stent was placed in her mom during the heart cath. Please advise. Thanks.   Twila

## 2020-11-28 ENCOUNTER — PATIENT OUTREACH (OUTPATIENT)
Dept: ADMINISTRATIVE | Facility: OTHER | Age: 65
End: 2020-11-28

## 2020-11-29 NOTE — PROGRESS NOTES
Health Maintenance Due   Topic Date Due    TETANUS VACCINE  05/26/1972    High Dose Statin  05/26/1975    DEXA SCAN  05/26/1994    Shingles Vaccine (1 of 2) 05/26/2004    Mammogram  03/09/2014    Pneumococcal Vaccine (65+ Low/Medium Risk) (1 of 2 - PCV13) 05/26/2019    Influenza Vaccine (1) 08/01/2020     Updates were requested from care everywhere.  Chart was reviewed for overdue Proactive [facility] Encounters ([facility]) topics (CRS, Breast Cancer Screening, Eye exam)  Health Maintenance has been updated.  LINKS immunization registry triggered.  Immunizations were reconciled.

## 2020-11-30 ENCOUNTER — OFFICE VISIT (OUTPATIENT)
Dept: CARDIOLOGY | Facility: CLINIC | Age: 65
End: 2020-11-30
Payer: MEDICARE

## 2020-11-30 VITALS
BODY MASS INDEX: 30.61 KG/M2 | DIASTOLIC BLOOD PRESSURE: 100 MMHG | OXYGEN SATURATION: 97 % | WEIGHT: 132.25 LBS | HEIGHT: 55 IN | HEART RATE: 71 BPM | SYSTOLIC BLOOD PRESSURE: 184 MMHG

## 2020-11-30 DIAGNOSIS — F17.210 CIGARETTE SMOKER ONE HALF PACK A DAY OR LESS: ICD-10-CM

## 2020-11-30 DIAGNOSIS — I10 HYPERTENSION, UNSPECIFIED TYPE: ICD-10-CM

## 2020-11-30 DIAGNOSIS — J44.1 COPD EXACERBATION: ICD-10-CM

## 2020-11-30 DIAGNOSIS — R09.89 CAROTID BRUIT, UNSPECIFIED LATERALITY: ICD-10-CM

## 2020-11-30 DIAGNOSIS — F17.200 SMOKING: ICD-10-CM

## 2020-11-30 DIAGNOSIS — I10 ESSENTIAL HYPERTENSION: ICD-10-CM

## 2020-11-30 DIAGNOSIS — I21.4 NSTEMI (NON-ST ELEVATED MYOCARDIAL INFARCTION): ICD-10-CM

## 2020-11-30 DIAGNOSIS — I73.9 CLAUDICATION: Primary | ICD-10-CM

## 2020-11-30 DIAGNOSIS — I27.20 PULMONARY HTN: ICD-10-CM

## 2020-11-30 DIAGNOSIS — R06.09 DYSPNEA ON EXERTION: ICD-10-CM

## 2020-11-30 PROCEDURE — 1126F AMNT PAIN NOTED NONE PRSNT: CPT | Mod: S$GLB,,, | Performed by: INTERNAL MEDICINE

## 2020-11-30 PROCEDURE — 3080F PR MOST RECENT DIASTOLIC BLOOD PRESSURE >= 90 MM HG: ICD-10-PCS | Mod: CPTII,S$GLB,, | Performed by: INTERNAL MEDICINE

## 2020-11-30 PROCEDURE — 99999 PR PBB SHADOW E&M-EST. PATIENT-LVL V: ICD-10-PCS | Mod: PBBFAC,,, | Performed by: INTERNAL MEDICINE

## 2020-11-30 PROCEDURE — 1159F PR MEDICATION LIST DOCUMENTED IN MEDICAL RECORD: ICD-10-PCS | Mod: S$GLB,,, | Performed by: INTERNAL MEDICINE

## 2020-11-30 PROCEDURE — 3008F BODY MASS INDEX DOCD: CPT | Mod: CPTII,S$GLB,, | Performed by: INTERNAL MEDICINE

## 2020-11-30 PROCEDURE — 3008F PR BODY MASS INDEX (BMI) DOCUMENTED: ICD-10-PCS | Mod: CPTII,S$GLB,, | Performed by: INTERNAL MEDICINE

## 2020-11-30 PROCEDURE — 1159F MED LIST DOCD IN RCRD: CPT | Mod: S$GLB,,, | Performed by: INTERNAL MEDICINE

## 2020-11-30 PROCEDURE — 3288F FALL RISK ASSESSMENT DOCD: CPT | Mod: CPTII,S$GLB,, | Performed by: INTERNAL MEDICINE

## 2020-11-30 PROCEDURE — 99215 PR OFFICE/OUTPT VISIT, EST, LEVL V, 40-54 MIN: ICD-10-PCS | Mod: S$GLB,,, | Performed by: INTERNAL MEDICINE

## 2020-11-30 PROCEDURE — 3077F PR MOST RECENT SYSTOLIC BLOOD PRESSURE >= 140 MM HG: ICD-10-PCS | Mod: CPTII,S$GLB,, | Performed by: INTERNAL MEDICINE

## 2020-11-30 PROCEDURE — 1126F PR PAIN SEVERITY QUANTIFIED, NO PAIN PRESENT: ICD-10-PCS | Mod: S$GLB,,, | Performed by: INTERNAL MEDICINE

## 2020-11-30 PROCEDURE — 99999 PR PBB SHADOW E&M-EST. PATIENT-LVL V: CPT | Mod: PBBFAC,,, | Performed by: INTERNAL MEDICINE

## 2020-11-30 PROCEDURE — 3080F DIAST BP >= 90 MM HG: CPT | Mod: CPTII,S$GLB,, | Performed by: INTERNAL MEDICINE

## 2020-11-30 PROCEDURE — 99215 OFFICE O/P EST HI 40 MIN: CPT | Mod: S$GLB,,, | Performed by: INTERNAL MEDICINE

## 2020-11-30 PROCEDURE — 3077F SYST BP >= 140 MM HG: CPT | Mod: CPTII,S$GLB,, | Performed by: INTERNAL MEDICINE

## 2020-11-30 PROCEDURE — 1101F PR PT FALLS ASSESS DOC 0-1 FALLS W/OUT INJ PAST YR: ICD-10-PCS | Mod: CPTII,S$GLB,, | Performed by: INTERNAL MEDICINE

## 2020-11-30 PROCEDURE — 3288F PR FALLS RISK ASSESSMENT DOCUMENTED: ICD-10-PCS | Mod: CPTII,S$GLB,, | Performed by: INTERNAL MEDICINE

## 2020-11-30 PROCEDURE — 1101F PT FALLS ASSESS-DOCD LE1/YR: CPT | Mod: CPTII,S$GLB,, | Performed by: INTERNAL MEDICINE

## 2020-11-30 NOTE — PROGRESS NOTES
CARDIOVASCULAR CONSULTATION    REASON FOR CONSULT:   Hollie Ponce is a 65 y.o. female who presents for follow-up after recent hospitalization.      HISTORY OF PRESENT ILLNESS:     Patient is a pleasant 65-year-old lady with a past medical history significant for hypertension, COPD.  Here for follow-up after recent hospitalization for non-STEMI.  Coronary angiogram had revealed nonobstructive coronary artery disease.  Patient states that since her heart attack she has stopped smoking.  Complains of right leg claudication.  Dyspnea on exertion.  Blood pressure elevated in clinic today, but has not been taking hydrochlorothiazide.  States at home the blood pressure stays around 130-140 mm of mercury.    · The left ventricle is normal in size with normal systolic function. The estimated ejection fraction is 55%.  · There is mild left ventricular concentric hypertrophy.  · Normal left ventricular diastolic function.  · Normal right ventricular size with normal right ventricular systolic function.  · Mild left atrial enlargement.  · Normal central venous pressure (3 mmHg).  · The estimated PA systolic pressure is 49 mmHg.  · There is pulmonary hypertension.      · The pre-procedure left ventricular end diastolic pressure was 11.  · The estimated blood loss was <50 mL.  · There was non-obstructive coronary artery disease..  · No significant culprit lesion detected to explain her non-STEMI.     Left main:  No significant stenosis     Lad:  Luminal irregularities.  10-20% midstenosis     Circumflex:  OM 30-40% stenosis.  Luminal irregularities     RCA:  Mid 30 to 40% stenosis.     Access:  Right radial artery access     Assessment plan     Maximize medical management     Risk factor reductions         PAST MEDICAL HISTORY:     Past Medical History:   Diagnosis Date    Asthma     COPD (chronic obstructive pulmonary disease)     Hypertension        PAST SURGICAL HISTORY:     Past Surgical History:   Procedure  Laterality Date     SECTION      HERNIA REPAIR      LEFT HEART CATHETERIZATION Left 2020    Procedure: Left heart cath;  Surgeon: Shabnam Vernon MD;  Location: [facility] CATH LAB;  Service: Cardiology;  Laterality: Left;       ALLERGIES AND MEDICATION:     Review of patient's allergies indicates:   Allergen Reactions    Flagyl [metronidazole hcl]      Hives      Sulfamethoxazole-trimethoprim Itching    Aspirin Nausea Only    Lipitor [atorvastatin]      Myalgia         Medication List          Accurate as of 2020  3:11 PM. If you have any questions, ask your nurse or doctor.            CONTINUE taking these medications    470V TWO WAY VALVE Misc  Generic drug: miscellaneous medical supply     * albuterol 2.5 mg /3 mL (0.083 %) nebulizer solution  Commonly known as: PROVENTIL  VVN Q 4 TO 6 H FOR SOB OR WHEEZING     * albuterol 90 mcg/actuation inhaler  Commonly known as: PROAIR HFA  Inhale 2 puffs into the lungs every 6 (six) hours as needed for Wheezing. Rescue     carvediloL 3.125 MG tablet  Commonly known as: COREG  Take 1 tablet (3.125 mg total) by mouth 2 (two) times daily with meals.     clopidogreL 75 mg tablet  Commonly known as: PLAVIX  Take 1 tablet (75 mg total) by mouth once daily.     cyclobenzaprine 10 MG tablet  Commonly known as: FLEXERIL  Take 1 tablet (10 mg total) by mouth nightly as needed for Muscle spasms.     ezetimibe 10 mg tablet  Commonly known as: ZETIA  Take 1 tablet (10 mg total) by mouth once daily.     FLOVENT  mcg/actuation inhaler  Generic drug: fluticasone propionate  0INHALE 1 PUFF PO ITL BID     fluticasone-salmeterol 230-21 mcg/dose 230-21 mcg/actuation Hfaa inhaler  Commonly known as: ADVAIR HFA  Inhale 2 puffs into the lungs 2 (two) times daily. Controller     hydroCHLOROthiazide 25 MG tablet  Commonly known as: HYDRODIURIL  Take 1 tablet (25 mg total) by mouth once daily.     lisinopriL 5 MG tablet  Commonly known as: PRINIVIL,ZESTRIL  TK 1 T  PO D     loratadine 10 mg tablet  Commonly known as: CLARITIN  Take 1 tablet (10 mg total) by mouth daily as needed for Allergies.     montelukast 10 mg tablet  Commonly known as: SINGULAIR  Take 1 tablet (10 mg total) by mouth every evening.     potassium chloride 8 MEQ Tbsr  Commonly known as: KLOR-CON     pravastatin 20 MG tablet  Commonly known as: PRAVACHOL  Take 1 tablet (20 mg total) by mouth every evening.     predniSONE 20 MG tablet  Commonly known as: DELTASONE  2 tablet po daily for 3 days   1 tablet po daily for 3 days         * This list has 2 medication(s) that are the same as other medications prescribed for you. Read the directions carefully, and ask your doctor or other care provider to review them with you.                SOCIAL HISTORY:     Social History     Socioeconomic History    Marital status: Single     Spouse name: Not on file    Number of children: Not on file    Years of education: Not on file    Highest education level: Not on file   Occupational History    Not on file   Social Needs    Financial resource strain: Not on file    Food insecurity     Worry: Not on file     Inability: Not on file    Transportation needs     Medical: Not on file     Non-medical: Not on file   Tobacco Use    Smoking status: Current Every Day Smoker     Packs/day: 0.50     Years: 50.00     Pack years: 25.00     Types: Cigarettes    Smokeless tobacco: Never Used   Substance and Sexual Activity    Alcohol use: Never     Frequency: Never    Drug use: Never    Sexual activity: Not Currently   Lifestyle    Physical activity     Days per week: Not on file     Minutes per session: Not on file    Stress: Not on file   Relationships    Social connections     Talks on phone: Not on file     Gets together: Not on file     Attends Gnosticist service: Not on file     Active member of club or organization: Not on file     Attends meetings of clubs or organizations: Not on file     Relationship status: Not on  "file   Other Topics Concern    Not on file   Social History Narrative    Not on file       FAMILY HISTORY:     Family History   Problem Relation Age of Onset    Cancer Mother         Bladder    Liver disease Father        REVIEW OF SYSTEMS:   Review of Systems   Constitution: Negative.   HENT: Negative.    Eyes: Negative.    Cardiovascular: Negative.    Respiratory: Negative.    Endocrine: Negative.    Hematologic/Lymphatic: Negative.    Skin: Negative.    Musculoskeletal: Negative.    Gastrointestinal: Negative.    Genitourinary: Negative.    Neurological: Negative.    Psychiatric/Behavioral: Negative.    Allergic/Immunologic: Negative.        A 10 point review of systems was performed and all the pertinent positives have been mentioned. Rest of review of systems was negative.        PHYSICAL EXAM:     Vitals:    11/30/20 1506   BP: (!) 184/100   Pulse: 71    Body mass index is 30.74 kg/m².  Weight: 60 kg (132 lb 4.4 oz)   Height: 4' 7" (139.7 cm)     Physical Exam   Constitutional: She is oriented to person, place, and time. She appears well-developed and well-nourished.   HENT:   Head: Normocephalic.   Eyes: Pupils are equal, round, and reactive to light.   Neck: Normal range of motion. Neck supple. Carotid bruit is present.   Cardiovascular: Normal rate and regular rhythm.   Pulmonary/Chest: Effort normal and breath sounds normal.   Abdominal: Soft. Normal appearance and bowel sounds are normal. There is no abdominal tenderness.   Musculoskeletal: Normal range of motion.   Neurological: She is alert and oriented to person, place, and time.   Skin: Skin is warm.   Psychiatric: She has a normal mood and affect.         DATA:     Laboratory:  CBC:  Recent Labs   Lab 02/04/20  1116 11/16/20  0014 11/17/20  0431   WBC 3.65 L 7.67 10.93   Hemoglobin 14.4 13.2 11.5 L   Hematocrit 45.5 39.7 34.6 L   Platelets 268 263 243       CHEMISTRIES:  Recent Labs   Lab 02/04/20  1116 11/16/20  0014 11/17/20  0431   Glucose 76 " 126 H 106   Sodium 141 138 139   Potassium 4.3 3.6 3.5   BUN 9 13 11   Creatinine 1.0 1.1 0.8   eGFR if African American >60.0 >60 >60   eGFR if non  59.3 A 52 A >60   Calcium 10.3 8.9 8.7   Magnesium  --   --  1.8       CARDIAC BIOMARKERS:  Recent Labs   Lab 11/16/20  0210 11/16/20  0603 11/16/20  1312   Troponin I 0.740 H 1.086 H 1.746 H       COAGS:        LIPIDS/LFTS:  Recent Labs   Lab 02/04/20  1116 11/16/20  0014 11/17/20  0431   Cholesterol 224 H  --   --    Triglycerides 72  --   --    HDL 58  --   --    LDL Cholesterol 151.6  --   --    Non-HDL Cholesterol 166  --   --    AST 18 18 47 H   ALT 15 15 20       No results found for: HGBA1C    TSH  Recent Labs   Lab 02/04/20  1116   TSH 1.442       The ASCVD Risk score (Ryann SMITH Jr., et al., 2013) failed to calculate for the following reasons:    The patient has a prior MI or stroke diagnosis             ASSESSMENT AND PLAN     Patient Active Problem List   Diagnosis    Essential hypertension    COPD exacerbation    NSTEMI (non-ST elevated myocardial infarction)    Cigarette smoker one half pack a day or less       Patient here for follow-up after recent hospitalization.  Has quit smoking.  Angina free currently.  Continue aggressive risk factor therapy for nonobstructive CAD.  Is tolerating the pravastatin.  Continue aspirin    COPD:  Management per primary    Pulmonary hypertension:  Likely secondary to COPD.    Claudication:  Check ABIs, vascular ultrasound, carotid, screening abdominal ultrasound.    Elevated blood pressure in clinic today.  Has not been taking hydrochlorothiazide.  States that home is well controlled.  Also states that she has worked up today and anxious.  I have asked her to maintain a blood pressure log at home and bring it with her next time she comes sees me in the clinic.          Thank you very much for involving me in the care of your patient.  Please do not hesitate to contact me if there are any  questions.      Shabnam Vernon MD, FAC, [facility]  Interventional Cardiologist, [facility].           This note was dictated with the help of speech recognition software.  There might be un-intended errors and/or substitutions.

## 2020-12-04 NOTE — PROGRESS NOTES
Family and/or Caretaker present at visit?  No.  Diagnostic tests reviewed/disposition: I have reviewed all completed as well as pending diagnostic tests at the time of discharge.  Disease/illness education: At Discharge  Home health/community services discussion/referrals: Patient does not have home health established from hospital visit.  They do not need home health.  If needed, we will set up home health for the patient.   Establishment or re-establishment of referral orders for community resources: No other necessary community resources.   Discussion with other health care providers: No discussion with other health care providers necessary.     Subjective:       Patient ID: Hollie Ponce is a 65 y.o. female.    Chief Complaint: Hospital Follow Up      HPI  65-year-old female presents for hospital follow-up.  Patient was admitted for shortness of breath.  Patient had elevated troponins.  Patient then had a left heart catheterization which did not show blockage.  CTA showed emphysema.  Patient has is history of smoking and was diagnosed with COPD.  States she has not followed up with pulmonology.      Review of Systems   Constitutional: Negative.    HENT: Negative.    Respiratory: Positive for cough and shortness of breath.    Cardiovascular: Negative.    Gastrointestinal: Negative.    Endocrine: Negative.    Genitourinary: Negative.    Musculoskeletal: Negative.    Neurological: Negative.    Psychiatric/Behavioral: Negative.           Past Medical History:   Diagnosis Date    Asthma     COPD (chronic obstructive pulmonary disease)     Hypertension      Past Surgical History:   Procedure Laterality Date     SECTION      HERNIA REPAIR      LEFT HEART CATHETERIZATION Left 2020    Procedure: Left heart cath;  Surgeon: Shabnam Vernon MD;  Location[facility] CATH LAB;  Service: Cardiology;  Laterality: Left;     Family History   Problem Relation Age of Onset    Cancer Mother         Bladder     Liver disease Father      Social History     Socioeconomic History    Marital status: Single     Spouse name: Not on file    Number of children: Not on file    Years of education: Not on file    Highest education level: Not on file   Occupational History    Not on file   Social Needs    Financial resource strain: Not on file    Food insecurity     Worry: Not on file     Inability: Not on file    Transportation needs     Medical: Not on file     Non-medical: Not on file   Tobacco Use    Smoking status: Current Every Day Smoker     Packs/day: 0.50     Years: 50.00     Pack years: 25.00     Types: Cigarettes    Smokeless tobacco: Never Used   Substance and Sexual Activity    Alcohol use: Never     Frequency: Never    Drug use: Never    Sexual activity: Not Currently   Lifestyle    Physical activity     Days per week: Not on file     Minutes per session: Not on file    Stress: Not on file   Relationships    Social connections     Talks on phone: Not on file     Gets together: Not on file     Attends Druze service: Not on file     Active member of club or organization: Not on file     Attends meetings of clubs or organizations: Not on file     Relationship status: Not on file   Other Topics Concern    Not on file   Social History Narrative    Not on file       Current Outpatient Medications:     albuterol (PROAIR HFA) 90 mcg/actuation inhaler, Inhale 2 puffs into the lungs every 6 (six) hours as needed for Wheezing. Rescue, Disp: 18 g, Rfl: 2    albuterol (PROVENTIL) 2.5 mg /3 mL (0.083 %) nebulizer solution, VVN Q 4 TO 6 H FOR SOB OR WHEEZING, Disp: 1 Box, Rfl: 2    carvediloL (COREG) 3.125 MG tablet, Take 1 tablet (3.125 mg total) by mouth 2 (two) times daily with meals., Disp: 60 tablet, Rfl: 5    clopidogreL (PLAVIX) 75 mg tablet, Take 1 tablet (75 mg total) by mouth once daily., Disp: 30 tablet, Rfl: 5    cyclobenzaprine (FLEXERIL) 10 MG tablet, Take 1 tablet (10 mg total) by mouth nightly  as needed for Muscle spasms., Disp: 90 tablet, Rfl: 1    ezetimibe (ZETIA) 10 mg tablet, Take 1 tablet (10 mg total) by mouth once daily., Disp: 90 tablet, Rfl: 1    FLOVENT  mcg/actuation inhaler, 0INHALE 1 PUFF PO ITL BID, Disp: 12 g, Rfl: 0    hydroCHLOROthiazide (HYDRODIURIL) 25 MG tablet, Take 1 tablet (25 mg total) by mouth once daily., Disp: 90 tablet, Rfl: 1    lisinopriL (PRINIVIL,ZESTRIL) 5 MG tablet, TK 1 T PO D, Disp: 90 tablet, Rfl: 1    loratadine (CLARITIN) 10 mg tablet, Take 1 tablet (10 mg total) by mouth daily as needed for Allergies., Disp: 90 tablet, Rfl: 1    miscellaneous medical supply (470V TWO WAY VALVE) Misc, Disp nebulizer and tubing ,medicine reservoir,and mask  So as to use  Every 4-6 hrs for sob/wheeze, Disp: , Rfl:     potassium chloride (KLOR-CON) 8 MEQ TbSR, TK 1 T PO D, Disp: , Rfl:     predniSONE (DELTASONE) 20 MG tablet, 2 tablet po daily for 3 days  1 tablet po daily for 3 days, Disp: 9 tablet, Rfl: 0    fluticasone-salmeterol 230-21 mcg/dose (ADVAIR HFA) 230-21 mcg/actuation HFAA inhaler, Inhale 2 puffs into the lungs 2 (two) times daily. Controller, Disp: 12 g, Rfl: 11    montelukast (SINGULAIR) 10 mg tablet, Take 1 tablet (10 mg total) by mouth every evening., Disp: 90 tablet, Rfl: 0    pravastatin (PRAVACHOL) 20 MG tablet, Take 1 tablet (20 mg total) by mouth every evening., Disp: 90 tablet, Rfl: 3   Objective:      Vitals:    11/24/20 1342   BP: 138/70   BP Location: Right arm   Patient Position: Sitting   BP Method: Medium (Manual)   Pulse: 77   Temp: 97.9 °F (36.6 °C)   TempSrc: Temporal   SpO2: 97%   Weight: 58.5 kg (128 lb 15.5 oz)       Physical Exam  Constitutional:       General: She is not in acute distress.  HENT:      Head: Normocephalic and atraumatic.   Eyes:      Conjunctiva/sclera: Conjunctivae normal.   Neck:      Musculoskeletal: Neck supple.   Cardiovascular:      Rate and Rhythm: Normal rate and regular rhythm.      Heart sounds: Normal  heart sounds. No murmur. No friction rub. No gallop.    Pulmonary:      Effort: Pulmonary effort is normal.      Breath sounds: Examination of the right-middle field reveals decreased breath sounds. Examination of the left-middle field reveals decreased breath sounds. Examination of the right-lower field reveals decreased breath sounds. Examination of the left-lower field reveals decreased breath sounds. Decreased breath sounds present. No wheezing or rales.   Skin:     General: Skin is warm and dry.   Neurological:      Mental Status: She is alert and oriented to person, place, and time.   Psychiatric:         Behavior: Behavior normal.         Thought Content: Thought content normal.         Judgment: Judgment normal.            Assessment:       1. COPD exacerbation    2. NSTEMI (non-ST elevated myocardial infarction)    3. Essential hypertension    4. Encounter for smoking cessation counseling        Plan:       COPD exacerbation  - Referred to pulm. Will try advair. Given singulair as well. Advised pt to f/u w/ pulm for additional recs.   -     fluticasone-salmeterol 230-21 mcg/dose (ADVAIR HFA) 230-21 mcg/actuation HFAA inhaler; Inhale 2 puffs into the lungs 2 (two) times daily. Controller  Dispense: 12 g; Refill: 11  -     Ambulatory referral/consult to Pulmonology; Future; Expected date: 12/01/2020  -     albuterol (PROAIR HFA) 90 mcg/actuation inhaler; Inhale 2 puffs into the lungs every 6 (six) hours as needed for Wheezing. Rescue  Dispense: 18 g; Refill: 2  -     montelukast (SINGULAIR) 10 mg tablet; Take 1 tablet (10 mg total) by mouth every evening.  Dispense: 90 tablet; Refill: 0    NSTEMI (non-ST elevated myocardial infarction)  - Has a f/u w/ cards.  -     pravastatin (PRAVACHOL) 20 MG tablet; Take 1 tablet (20 mg total) by mouth every evening.  Dispense: 90 tablet; Refill: 3    Essential hypertension  -     pravastatin (PRAVACHOL) 20 MG tablet; Take 1 tablet (20 mg total) by mouth every evening.   Dispense: 90 tablet; Refill: 3    Encounter for smoking cessation counseling  -     Ambulatory referral/consult to Smoking Cessation Program; Future; Expected date: 12/01/2020                Patient note was created using [employer].  Any errors in syntax or even information may not have been identified and edited on initial review prior to signing this note.

## 2020-12-08 ENCOUNTER — OFFICE VISIT (OUTPATIENT)
Dept: FAMILY MEDICINE | Facility: CLINIC | Age: 65
End: 2020-12-08
Payer: MEDICARE

## 2020-12-08 VITALS
HEIGHT: 55 IN | HEART RATE: 72 BPM | WEIGHT: 130.31 LBS | DIASTOLIC BLOOD PRESSURE: 60 MMHG | RESPIRATION RATE: 16 BRPM | OXYGEN SATURATION: 97 % | TEMPERATURE: 98 F | SYSTOLIC BLOOD PRESSURE: 100 MMHG | BODY MASS INDEX: 30.16 KG/M2

## 2020-12-08 DIAGNOSIS — Z23 NEED FOR PNEUMOCOCCAL VACCINATION: ICD-10-CM

## 2020-12-08 DIAGNOSIS — J44.9 CHRONIC OBSTRUCTIVE PULMONARY DISEASE, UNSPECIFIED COPD TYPE: Primary | ICD-10-CM

## 2020-12-08 PROBLEM — I25.2 HISTORY OF NON-ST ELEVATION MYOCARDIAL INFARCTION (NSTEMI): Status: ACTIVE | Noted: 2020-11-16

## 2020-12-08 PROCEDURE — 3008F BODY MASS INDEX DOCD: CPT | Mod: CPTII,S$GLB,, | Performed by: PHYSICIAN ASSISTANT

## 2020-12-08 PROCEDURE — 3078F DIAST BP <80 MM HG: CPT | Mod: CPTII,S$GLB,, | Performed by: PHYSICIAN ASSISTANT

## 2020-12-08 PROCEDURE — 99999 PR PBB SHADOW E&M-EST. PATIENT-LVL V: CPT | Mod: PBBFAC,,, | Performed by: PHYSICIAN ASSISTANT

## 2020-12-08 PROCEDURE — 1101F PT FALLS ASSESS-DOCD LE1/YR: CPT | Mod: CPTII,S$GLB,, | Performed by: PHYSICIAN ASSISTANT

## 2020-12-08 PROCEDURE — 99213 OFFICE O/P EST LOW 20 MIN: CPT | Mod: 25,S$GLB,, | Performed by: PHYSICIAN ASSISTANT

## 2020-12-08 PROCEDURE — 99213 PR OFFICE/OUTPT VISIT, EST, LEVL III, 20-29 MIN: ICD-10-PCS | Mod: 25,S$GLB,, | Performed by: PHYSICIAN ASSISTANT

## 2020-12-08 PROCEDURE — 3074F PR MOST RECENT SYSTOLIC BLOOD PRESSURE < 130 MM HG: ICD-10-PCS | Mod: CPTII,S$GLB,, | Performed by: PHYSICIAN ASSISTANT

## 2020-12-08 PROCEDURE — G0009 PNEUMOCOCCAL CONJUGATE VACCINE 13-VALENT LESS THAN 5YO & GREATER THAN: ICD-10-PCS | Mod: S$GLB,,, | Performed by: PHYSICIAN ASSISTANT

## 2020-12-08 PROCEDURE — 1101F PR PT FALLS ASSESS DOC 0-1 FALLS W/OUT INJ PAST YR: ICD-10-PCS | Mod: CPTII,S$GLB,, | Performed by: PHYSICIAN ASSISTANT

## 2020-12-08 PROCEDURE — 1126F PR PAIN SEVERITY QUANTIFIED, NO PAIN PRESENT: ICD-10-PCS | Mod: S$GLB,,, | Performed by: PHYSICIAN ASSISTANT

## 2020-12-08 PROCEDURE — 3074F SYST BP LT 130 MM HG: CPT | Mod: CPTII,S$GLB,, | Performed by: PHYSICIAN ASSISTANT

## 2020-12-08 PROCEDURE — 90670 PNEUMOCOCCAL CONJUGATE VACCINE 13-VALENT LESS THAN 5YO & GREATER THAN: ICD-10-PCS | Mod: S$GLB,,, | Performed by: PHYSICIAN ASSISTANT

## 2020-12-08 PROCEDURE — G0009 ADMIN PNEUMOCOCCAL VACCINE: HCPCS | Mod: S$GLB,,, | Performed by: PHYSICIAN ASSISTANT

## 2020-12-08 PROCEDURE — 3288F PR FALLS RISK ASSESSMENT DOCUMENTED: ICD-10-PCS | Mod: CPTII,S$GLB,, | Performed by: PHYSICIAN ASSISTANT

## 2020-12-08 PROCEDURE — 3078F PR MOST RECENT DIASTOLIC BLOOD PRESSURE < 80 MM HG: ICD-10-PCS | Mod: CPTII,S$GLB,, | Performed by: PHYSICIAN ASSISTANT

## 2020-12-08 PROCEDURE — 90670 PCV13 VACCINE IM: CPT | Mod: S$GLB,,, | Performed by: PHYSICIAN ASSISTANT

## 2020-12-08 PROCEDURE — 3288F FALL RISK ASSESSMENT DOCD: CPT | Mod: CPTII,S$GLB,, | Performed by: PHYSICIAN ASSISTANT

## 2020-12-08 PROCEDURE — 1159F MED LIST DOCD IN RCRD: CPT | Mod: S$GLB,,, | Performed by: PHYSICIAN ASSISTANT

## 2020-12-08 PROCEDURE — 3008F PR BODY MASS INDEX (BMI) DOCUMENTED: ICD-10-PCS | Mod: CPTII,S$GLB,, | Performed by: PHYSICIAN ASSISTANT

## 2020-12-08 PROCEDURE — 1159F PR MEDICATION LIST DOCUMENTED IN MEDICAL RECORD: ICD-10-PCS | Mod: S$GLB,,, | Performed by: PHYSICIAN ASSISTANT

## 2020-12-08 PROCEDURE — 1126F AMNT PAIN NOTED NONE PRSNT: CPT | Mod: S$GLB,,, | Performed by: PHYSICIAN ASSISTANT

## 2020-12-08 PROCEDURE — 99999 PR PBB SHADOW E&M-EST. PATIENT-LVL V: ICD-10-PCS | Mod: PBBFAC,,, | Performed by: PHYSICIAN ASSISTANT

## 2020-12-08 NOTE — PROGRESS NOTES
Subjective:       Patient ID: Hollie Ponce is a 65 y.o. female.    Chief Complaint: COPD (follow up )    HPI: 66 yo female presents for COPD follow-up.  She has been taking Advair twice daily as directed.  She states her breathing is stable.  No side effects with the medication.  She does complain of a postnasal drip and hoarse voice.  She states that she has been resting her mouth out after using the Advair.  She states she stopped her Claritin and thinks this could be the cause of her symptoms.  She does take singular.  She has follow-up with pulmonology scheduled for December 16th.    Review of Systems   Constitutional: Negative for fever.   Respiratory: Negative for cough and shortness of breath.    Cardiovascular: Negative for chest pain.   Neurological: Negative for weakness.         Objective:      Physical Exam  Constitutional:       Appearance: Normal appearance.   Cardiovascular:      Rate and Rhythm: Normal rate and regular rhythm.   Pulmonary:      Effort: Pulmonary effort is normal. No respiratory distress.      Breath sounds: Normal breath sounds. No wheezing.   Neurological:      General: No focal deficit present.      Mental Status: She is alert and oriented to person, place, and time.   Psychiatric:         Mood and Affect: Mood normal.         Behavior: Behavior normal.         Assessment:       1. Chronic obstructive pulmonary disease, unspecified COPD type    2. Need for pneumococcal vaccination        Plan:         Hollie was seen today for copd.    Diagnoses and all orders for this visit:    Chronic obstructive pulmonary disease, unspecified COPD type  Continue medications, follow-up with  Pulmonology next week    Need for pneumococcal vaccination  -     Pneumococcal Conjugate Vaccine (13 Valent) (IM)

## 2020-12-08 NOTE — PROGRESS NOTES
Pt received pneumococcal vaccine. Tolerated well. Pt instructed to wait 15mins to be assessed for adverse reactions. verbalized understanding.

## 2020-12-14 ENCOUNTER — CLINICAL SUPPORT (OUTPATIENT)
Dept: SMOKING CESSATION | Facility: CLINIC | Age: 65
End: 2020-12-14
Payer: COMMERCIAL

## 2020-12-14 DIAGNOSIS — F17.200 NICOTINE DEPENDENCE: Primary | ICD-10-CM

## 2020-12-14 PROCEDURE — 99999 PR PBB SHADOW E&M-EST. PATIENT-LVL I: CPT | Mod: PBBFAC,,,

## 2020-12-14 PROCEDURE — 99999 PR PBB SHADOW E&M-EST. PATIENT-LVL I: ICD-10-PCS | Mod: PBBFAC,,,

## 2020-12-14 PROCEDURE — 99404 PREV MED CNSL INDIV APPRX 60: CPT | Mod: S$GLB,,,

## 2020-12-14 PROCEDURE — 99404 PR PREVENT COUNSEL,INDIV,60 MIN: ICD-10-PCS | Mod: S$GLB,,,

## 2020-12-14 RX ORDER — DM/P-EPHED/ACETAMINOPH/DOXYLAM 30-7.5/3
2 LIQUID (ML) ORAL
Qty: 108 LOZENGE | Refills: 0 | Status: SHIPPED | OUTPATIENT
Start: 2020-12-14 | End: 2021-11-09 | Stop reason: SDUPTHER

## 2020-12-14 NOTE — PROGRESS NOTES
Patient will be participating in biweekly tobacco cessation meetings . She currently smokes <1 cigarettes per day.  Pt started on rate reduction and wait time of 15 min prior to smoking. Pt's exhaled carbon monoxide level was 2  ppm as per Smokerlyzer. (non- smoker = 0-5 ppm.) Patient maintains that she has quit she just take 2 puffs off a cigarette on some days when she is stress and then throws the cigarette away.  We discussed using nicotine lozenge and she is to use them when she would give in to a crave and take two puffs off a cigarette. .

## 2020-12-16 ENCOUNTER — OFFICE VISIT (OUTPATIENT)
Dept: PULMONOLOGY | Facility: CLINIC | Age: 65
End: 2020-12-16
Payer: MEDICARE

## 2020-12-16 VITALS
SYSTOLIC BLOOD PRESSURE: 140 MMHG | HEIGHT: 55 IN | HEART RATE: 76 BPM | DIASTOLIC BLOOD PRESSURE: 78 MMHG | OXYGEN SATURATION: 91 % | WEIGHT: 131.63 LBS | BODY MASS INDEX: 30.46 KG/M2

## 2020-12-16 DIAGNOSIS — F17.210 CIGARETTE SMOKER ONE HALF PACK A DAY OR LESS: ICD-10-CM

## 2020-12-16 DIAGNOSIS — Z12.2 ENCOUNTER FOR SCREENING FOR MALIGNANT NEOPLASM OF RESPIRATORY ORGANS: ICD-10-CM

## 2020-12-16 DIAGNOSIS — R06.09 DYSPNEA ON EXERTION: Primary | ICD-10-CM

## 2020-12-16 DIAGNOSIS — J44.1 COPD EXACERBATION: ICD-10-CM

## 2020-12-16 DIAGNOSIS — R06.02 SHORTNESS OF BREATH: ICD-10-CM

## 2020-12-16 DIAGNOSIS — Z87.891 PERSONAL HISTORY OF NICOTINE DEPENDENCE: ICD-10-CM

## 2020-12-16 PROCEDURE — 1159F PR MEDICATION LIST DOCUMENTED IN MEDICAL RECORD: ICD-10-PCS | Mod: S$GLB,,, | Performed by: NURSE PRACTITIONER

## 2020-12-16 PROCEDURE — 3008F BODY MASS INDEX DOCD: CPT | Mod: CPTII,S$GLB,, | Performed by: NURSE PRACTITIONER

## 2020-12-16 PROCEDURE — 3288F PR FALLS RISK ASSESSMENT DOCUMENTED: ICD-10-PCS | Mod: CPTII,S$GLB,, | Performed by: NURSE PRACTITIONER

## 2020-12-16 PROCEDURE — 3077F PR MOST RECENT SYSTOLIC BLOOD PRESSURE >= 140 MM HG: ICD-10-PCS | Mod: CPTII,S$GLB,, | Performed by: NURSE PRACTITIONER

## 2020-12-16 PROCEDURE — 3288F FALL RISK ASSESSMENT DOCD: CPT | Mod: CPTII,S$GLB,, | Performed by: NURSE PRACTITIONER

## 2020-12-16 PROCEDURE — 1126F AMNT PAIN NOTED NONE PRSNT: CPT | Mod: S$GLB,,, | Performed by: NURSE PRACTITIONER

## 2020-12-16 PROCEDURE — 99999 PR PBB SHADOW E&M-EST. PATIENT-LVL V: ICD-10-PCS | Mod: PBBFAC,,, | Performed by: NURSE PRACTITIONER

## 2020-12-16 PROCEDURE — 99204 PR OFFICE/OUTPT VISIT, NEW, LEVL IV, 45-59 MIN: ICD-10-PCS | Mod: S$GLB,,, | Performed by: NURSE PRACTITIONER

## 2020-12-16 PROCEDURE — 3078F PR MOST RECENT DIASTOLIC BLOOD PRESSURE < 80 MM HG: ICD-10-PCS | Mod: CPTII,S$GLB,, | Performed by: NURSE PRACTITIONER

## 2020-12-16 PROCEDURE — 99999 PR PBB SHADOW E&M-EST. PATIENT-LVL V: CPT | Mod: PBBFAC,,, | Performed by: NURSE PRACTITIONER

## 2020-12-16 PROCEDURE — 1159F MED LIST DOCD IN RCRD: CPT | Mod: S$GLB,,, | Performed by: NURSE PRACTITIONER

## 2020-12-16 PROCEDURE — 3008F PR BODY MASS INDEX (BMI) DOCUMENTED: ICD-10-PCS | Mod: CPTII,S$GLB,, | Performed by: NURSE PRACTITIONER

## 2020-12-16 PROCEDURE — 1126F PR PAIN SEVERITY QUANTIFIED, NO PAIN PRESENT: ICD-10-PCS | Mod: S$GLB,,, | Performed by: NURSE PRACTITIONER

## 2020-12-16 PROCEDURE — 3078F DIAST BP <80 MM HG: CPT | Mod: CPTII,S$GLB,, | Performed by: NURSE PRACTITIONER

## 2020-12-16 PROCEDURE — 99204 OFFICE O/P NEW MOD 45 MIN: CPT | Mod: S$GLB,,, | Performed by: NURSE PRACTITIONER

## 2020-12-16 PROCEDURE — 1101F PR PT FALLS ASSESS DOC 0-1 FALLS W/OUT INJ PAST YR: ICD-10-PCS | Mod: CPTII,S$GLB,, | Performed by: NURSE PRACTITIONER

## 2020-12-16 PROCEDURE — 1101F PT FALLS ASSESS-DOCD LE1/YR: CPT | Mod: CPTII,S$GLB,, | Performed by: NURSE PRACTITIONER

## 2020-12-16 PROCEDURE — 3077F SYST BP >= 140 MM HG: CPT | Mod: CPTII,S$GLB,, | Performed by: NURSE PRACTITIONER

## 2020-12-16 NOTE — PROGRESS NOTES
Subjective:       Patient ID: Hollie Ponce is a 65 y.o. female.    Chief Complaint:  SOB and cough, NSTEMI   HPI:   Hollie Ponce is a 65 y.o. female who presents with evaluation for a hospital follow up.  She was admitted with cough and shortness of breath and was also found to have a NSTEMI. Had been smoking half a pack daily for many years.  Has been hoarse for the last week and a half.   Albuterol use is down since she was in the hospital.   Uses nebs PRN once weekly or so  Had minimal admissions for pulm complaints.  Had a severe case of flu in 2017.   Bronchitis as a child, had whooping cough in the past.     Review of Systems   Constitutional: Negative for chills, activity change, fatigue and night sweats.   HENT: Negative for postnasal drip, rhinorrhea, trouble swallowing and congestion.    Eyes: Negative for itching.   Respiratory: Positive for cough (improved) and dyspnea on extertion. Negative for hemoptysis, sputum production, choking, chest tightness, shortness of breath, wheezing and use of rescue inhaler.    Cardiovascular: Negative for chest pain and palpitations.   Genitourinary: Negative for difficulty urinating.   Endocrine: Negative for cold intolerance and heat intolerance.    Musculoskeletal: Negative for arthralgias.   Skin: Negative for rash.   Gastrointestinal: Negative for nausea, vomiting and acid reflux.   Neurological: Negative for dizziness and light-headedness.   Hematological: Negative for adenopathy.   Psychiatric/Behavioral: Negative for sleep disturbance.         Social History     Tobacco Use    Smoking status: Former Smoker     Packs/day: 0.50     Years: 50.00     Pack years: 25.00     Types: Cigarettes     Quit date: 2020     Years since quittin.0    Smokeless tobacco: Never Used   Substance Use Topics    Alcohol use: Never     Frequency: Never       Review of patient's allergies indicates:   Allergen Reactions    Flagyl [metronidazole hcl]      Hives       Sulfamethoxazole-trimethoprim Itching    Aspirin Nausea Only    Lipitor [atorvastatin]      Myalgia      Past Medical History:   Diagnosis Date    Asthma     COPD (chronic obstructive pulmonary disease)     Hypertension      Past Surgical History:   Procedure Laterality Date     SECTION      HERNIA REPAIR      LEFT HEART CATHETERIZATION Left 2020    Procedure: Left heart cath;  Surgeon: Shabnam Vernon MD;  Location: [facility] CATH LAB;  Service: Cardiology;  Laterality: Left;     Current Outpatient Medications on File Prior to Visit   Medication Sig    albuterol (PROAIR HFA) 90 mcg/actuation inhaler Inhale 2 puffs into the lungs every 6 (six) hours as needed for Wheezing. Rescue    albuterol (PROVENTIL) 2.5 mg /3 mL (0.083 %) nebulizer solution VVN Q 4 TO 6 H FOR SOB OR WHEEZING    carvediloL (COREG) 3.125 MG tablet Take 1 tablet (3.125 mg total) by mouth 2 (two) times daily with meals.    clopidogreL (PLAVIX) 75 mg tablet Take 1 tablet (75 mg total) by mouth once daily.    cyclobenzaprine (FLEXERIL) 10 MG tablet Take 1 tablet (10 mg total) by mouth nightly as needed for Muscle spasms.    ezetimibe (ZETIA) 10 mg tablet Take 1 tablet (10 mg total) by mouth once daily.    fluticasone-salmeterol 230-21 mcg/dose (ADVAIR HFA) 230-21 mcg/actuation HFAA inhaler Inhale 2 puffs into the lungs 2 (two) times daily. Controller    hydroCHLOROthiazide (HYDRODIURIL) 25 MG tablet Take 1 tablet (25 mg total) by mouth once daily.    lisinopriL (PRINIVIL,ZESTRIL) 5 MG tablet TK 1 T PO D    loratadine (CLARITIN) 10 mg tablet Take 1 tablet (10 mg total) by mouth daily as needed for Allergies.    miscellaneous medical supply (470V TWO WAY VALVE) Misc Disp nebulizer and tubing ,medicine reservoir,and mask  So as to use  Every 4-6 hrs for sob/wheeze    montelukast (SINGULAIR) 10 mg tablet Take 1 tablet (10 mg total) by mouth every evening.    nicotine polacrilex 2 MG Lozg Take 1 lozenge (2 mg total) by mouth  as needed. Use 1-2 per hour in place of a cigarette. Limit to 6 a day.    potassium chloride (KLOR-CON) 8 MEQ TbSR TK 1 T PO D    pravastatin (PRAVACHOL) 20 MG tablet Take 1 tablet (20 mg total) by mouth every evening.    predniSONE (DELTASONE) 20 MG tablet 2 tablet po daily for 3 days   1 tablet po daily for 3 days    FLOVENT  mcg/actuation inhaler 0INHALE 1 PUFF PO ITL BID (Patient not taking: Reported on 12/16/2020)     No current facility-administered medications on file prior to visit.        Objective:      Vitals:    12/16/20 1313   BP: (!) 140/78   Pulse: 76     Physical Exam   Constitutional: She is oriented to person, place, and time. She appears well-developed and well-nourished. No distress.   HENT:   Head: Normocephalic.   Neck: Normal range of motion. Neck supple.   Cardiovascular: Normal rate and regular rhythm.   No murmur heard.  Pulmonary/Chest: Normal expansion, symmetric chest wall expansion and effort normal. No respiratory distress. She has decreased breath sounds. She has no wheezes. She has no rhonchi. She has no rales.   Abdominal: Soft. She exhibits no distension. There is no hepatosplenomegaly. There is no abdominal tenderness.   Musculoskeletal: Normal range of motion.         General: No edema.   Lymphadenopathy:     She has no cervical adenopathy.   Neurological: She is alert and oriented to person, place, and time. Gait normal.   Skin: Skin is warm and dry. No cyanosis. Nails show no clubbing.   Psychiatric: She has a normal mood and affect.   Vitals reviewed.    Personal Diagnostic Review    Imaging personally reviewed with patient CXR 11/16/20            Assessment:     Problem List Items Addressed This Visit        Pulmonary    COPD exacerbation    Overview     Continue Advair  Continue PRN GEN  Await PFTs and walk  Discussed LDCT at length,  shared decision making occurred.           Current Assessment & Plan     Monitor.  Await studies            Other    Cigarette  smoker one half pack a day or less    Overview     Long discussion regarding need for smoking cessation  Continue working with smoking cessation program         Current Assessment & Plan     Monitor.           Other Visit Diagnoses     Dyspnea on exertion    -  Primary    Relevant Orders    Spirometry with/without bronchodilator    Lung Volumes & DLCO    Six Minute Walk Test to qualify for Home Oxygen    Encounter for screening for malignant neoplasm of respiratory organs        Relevant Orders    CT Chest Lung Screening Low Dose    Personal history of nicotine dependence         Relevant Orders    CT Chest Lung Screening Low Dose    Shortness of breath        Relevant Orders    COVID-19 Routine Screening

## 2020-12-16 NOTE — LETTER
December 22, 2020      Navdeep Marquez MD  [address] [place] [address]           [facility]  [address]  [facility] [address]  Phone: [phone]          Patient: Hollie Ponce   MR Number: [id number]   YOB: 1955   Date of Visit: 12/16/2020       Dear Dr. Navdeep Marquez:    Thank you for referring Hollie Ponce to me for evaluation. Attached you will find relevant portions of my assessment and plan of care.    If you have questions, please do not hesitate to call me. I look forward to following Hollie Ponce along with you.    Sincerely,    Louann Mcqueen, DNP    Enclosure  CC:  No Recipients    If you would like to receive this communication electronically, please contact [email or web address] or [phone] to request more information on [employer] access.    For providers and/or their staff who would like to refer a patient to [facility], please contact us through our one-stop-shop provider referral line, [facility] , at [phone].    If you feel you have received this communication in error or would no longer like to receive these types of communications, please e-mail [email or web address]

## 2020-12-16 NOTE — PATIENT INSTRUCTIONS
Keep using your Advair as you have, brush teeth and gargle after each use    Use you albuterol as needed for cough, wheezing or shortenss of breath    Keep taking Singulair    We will get breathing tests on you, I will contact you with the results    We will get a baseline screening CT scan of your lungs

## 2020-12-30 RX ORDER — ALBUTEROL SULFATE 0.83 MG/ML
SOLUTION RESPIRATORY (INHALATION)
Qty: 75 ML | Refills: 0 | Status: SHIPPED | OUTPATIENT
Start: 2020-12-30 | End: 2021-02-11

## 2021-01-04 ENCOUNTER — PATIENT MESSAGE (OUTPATIENT)
Dept: ADMINISTRATIVE | Facility: HOSPITAL | Age: 66
End: 2021-01-04

## 2021-01-05 ENCOUNTER — CLINICAL SUPPORT (OUTPATIENT)
Dept: SMOKING CESSATION | Facility: CLINIC | Age: 66
End: 2021-01-05
Payer: COMMERCIAL

## 2021-01-05 DIAGNOSIS — F17.200 NICOTINE DEPENDENCE: Primary | ICD-10-CM

## 2021-01-05 PROCEDURE — 99402 PR PREVENT COUNSEL,INDIV,30 MIN: ICD-10-PCS | Mod: S$GLB,,,

## 2021-01-05 PROCEDURE — 99402 PREV MED CNSL INDIV APPRX 30: CPT | Mod: S$GLB,,,

## 2021-01-05 PROCEDURE — 99999 PR PBB SHADOW E&M-EST. PATIENT-LVL I: ICD-10-PCS | Mod: PBBFAC,,,

## 2021-01-05 PROCEDURE — 99999 PR PBB SHADOW E&M-EST. PATIENT-LVL I: CPT | Mod: PBBFAC,,,

## 2021-01-06 ENCOUNTER — HOSPITAL ENCOUNTER (OUTPATIENT)
Dept: CARDIOLOGY | Facility: HOSPITAL | Age: 66
Discharge: HOME | End: 2021-01-06
Attending: INTERNAL MEDICINE
Payer: MEDICARE

## 2021-01-06 DIAGNOSIS — R09.89 CAROTID BRUIT, UNSPECIFIED LATERALITY: ICD-10-CM

## 2021-01-06 DIAGNOSIS — F17.200 SMOKING: ICD-10-CM

## 2021-01-06 DIAGNOSIS — I73.9 CLAUDICATION: ICD-10-CM

## 2021-01-06 LAB
ABDOMINAL IMA AP: 1.6 CM
ABDOMINAL IMA ED VEL: 0 CM/S
ABDOMINAL IMA PS VEL: 51 CM/S
ABDOMINAL IMA TRANS: 1.7 CM
ABDOMINAL INFRARENAL AORTA AP: 1.9 CM
ABDOMINAL INFRARENAL AORTA ED VEL: 0 CM/S
ABDOMINAL INFRARENAL AORTA PS VEL: 41 CM/S
ABDOMINAL INFRARENAL AORTA TRANS: 1.7 CM
ABDOMINAL JUXTARENAL AORTA AP: 1.9 CM
ABDOMINAL JUXTARENAL AORTA ED VEL: 0 CM/S
ABDOMINAL JUXTARENAL AORTA PS VEL: 46 CM/S
ABDOMINAL JUXTARENAL AORTA TRANS: 1.7 CM
ABDOMINAL LT COM ILIAC AP: 0.8 CM
ABDOMINAL LT COM ILIAC TRANS: 0.9 CM
ABDOMINAL LT COM ILIAC VEL: 122 CM/S
ABDOMINAL LT COM ILLIAC ED VEL: 0 CM/S
ABDOMINAL RT COM ILIAC AP: 0.7 CM
ABDOMINAL RT COM ILIAC TRANS: 0.8 CM
ABDOMINAL RT COM ILIAC VEL: 111 CM/S
ABDOMINAL RT COM ILLIAC ED VEL: 0 CM/S
ABDOMINAL SUPRARENAL AORTA AP: 2.2 CM
ABDOMINAL SUPRARENAL AORTA ED VEL: 17 CM/S
ABDOMINAL SUPRARENAL AORTA PS VEL: 84 CM/S
ABDOMINAL SUPRARENAL AORTA TRANS: 2.2 CM
IMMEDIATE ARM BP: 131 MMHG
IMMEDIATE LEFT ABI: 0.59
IMMEDIATE LEFT TIBIAL: 77 MMHG
IMMEDIATE RIGHT ABI: 0.53
IMMEDIATE RIGHT TIBIAL: 69 MMHG
LEFT ABI: 0.93
LEFT ANT TIBIAL SYS PSV: 52 CM/S
LEFT ARM BP: 127 MMHG
LEFT CBA DIAS: 30 CM/S
LEFT CBA SYS: 85 CM/S
LEFT CCA DIST DIAS: 30 CM/S
LEFT CCA DIST SYS: 93 CM/S
LEFT CCA MID DIAS: 30 CM/S
LEFT CCA MID SYS: 89 CM/S
LEFT CCA PROX DIAS: 35 CM/S
LEFT CCA PROX SYS: 110 CM/S
LEFT CFA PSV: 166 CM/S
LEFT DORSALIS PEDIS: 122 MMHG
LEFT ECA DIAS: 11 CM/S
LEFT ECA SYS: 71 CM/S
LEFT EXTERNAL ILIAC PSV: 125 CM/S
LEFT ICA DIST DIAS: 18 CM/S
LEFT ICA DIST SYS: 61 CM/S
LEFT ICA MID DIAS: 47 CM/S
LEFT ICA MID SYS: 122 CM/S
LEFT ICA PROX DIAS: 23 CM/S
LEFT ICA PROX SYS: 78 CM/S
LEFT PERONEAL SYS PSV: 43 CM/S
LEFT POPLITEAL PSV: 73 CM/S
LEFT POST TIBIAL SYS PSV: 23 CM/S
LEFT POSTERIOR TIBIAL: 119 MMHG
LEFT PROFUNDA SYS PSV: 107 CM/S
LEFT SUPER FEMORAL DIST SYS PSV: 90 CM/S
LEFT SUPER FEMORAL MID SYS PSV: 99 CM/S
LEFT SUPER FEMORAL OSTIAL SYS PSV: 286 CM/S
LEFT SUPER FEMORAL PROX SYS PSV: 188 CM/S
LEFT TBI: 0.47
LEFT TIB/PER TRUNK SYS PSV: 80 CM/S
LEFT TOE PRESSURE: 61 MMHG
LEFT VERTEBRAL DIAS: 16 CM/S
LEFT VERTEBRAL SYS: 56 CM/S
OHS CV CAROTID RIGHT ICA EDV HIGHEST: 41
OHS CV CAROTID ULTRASOUND LEFT ICA/CCA RATIO: 1.31
OHS CV CAROTID ULTRASOUND RIGHT ICA/CCA RATIO: 0.96
OHS CV LEFT COMMON ILIAC ARTERY PSV: 122 CM/S
OHS CV LEFT LOWER EXTREMITY ABI (NO CALC): 0.93
OHS CV PV CAROTID LEFT HIGHEST CCA: 110
OHS CV PV CAROTID LEFT HIGHEST ICA: 122
OHS CV PV CAROTID RIGHT HIGHEST CCA: 117
OHS CV PV CAROTID RIGHT HIGHEST ICA: 112
OHS CV RIGHT ABI LOWER EXTREMITY (NO CALC): 0.92
OHS CV US CAROTID LEFT HIGHEST EDV: 47
OHS CV US RIGHT COMMON ILIAC PSV: 111 CM/S
RIGHT ABI: 0.92
RIGHT ANT TIBIAL SYS PSV: 65 CM/S
RIGHT ARM BP: 131 MMHG
RIGHT CBA DIAS: 26 CM/S
RIGHT CBA SYS: 94 CM/S
RIGHT CCA DIST DIAS: 35 CM/S
RIGHT CCA DIST SYS: 117 CM/S
RIGHT CCA MID DIAS: 25 CM/S
RIGHT CCA MID SYS: 111 CM/S
RIGHT CCA PROX DIAS: 25 CM/S
RIGHT CCA PROX SYS: 108 CM/S
RIGHT CFA PSV: 187 CM/S
RIGHT DORSALIS PEDIS: 120 MMHG
RIGHT ECA DIAS: 21 CM/S
RIGHT ECA SYS: 112 CM/S
RIGHT EXTERNAL ILLIAC PSV: 158 CM/S
RIGHT ICA DIST DIAS: 36 CM/S
RIGHT ICA DIST SYS: 99 CM/S
RIGHT ICA MID DIAS: 41 CM/S
RIGHT ICA MID SYS: 112 CM/S
RIGHT ICA PROX DIAS: 19 CM/S
RIGHT ICA PROX SYS: 78 CM/S
RIGHT PERONEAL SYS PSV: 47 CM/S
RIGHT POPLITEAL PSV: 83 CM/S
RIGHT POST TIBIAL SYS PSV: 25 CM/S
RIGHT POSTERIOR TIBIAL: 110 MMHG
RIGHT PROFUNDA SYS PSV: 109 CM/S
RIGHT SUPER FEMORAL DIST SYS PSV: 168 CM/S
RIGHT SUPER FEMORAL MID SYS PSV: 247 CM/S
RIGHT SUPER FEMORAL OSTIAL SYS PSV: 138 CM/S
RIGHT SUPER FEMORAL PROX SYS PSV: 161 CM/S
RIGHT TBI: 0.43
RIGHT TIB/PER TRUNK SYS PSV: 82 CM/S
RIGHT TOE PRESSURE: 56 MMHG
RIGHT VERTEBRAL DIAS: 30 CM/S
RIGHT VERTEBRAL SYS: 112 CM/S
TOE RAISES: 75

## 2021-01-06 PROCEDURE — 93978 VASCULAR STUDY: CPT | Mod: 26,,, | Performed by: INTERNAL MEDICINE

## 2021-01-06 PROCEDURE — 93978 CV US ABDOMINAL AORTA EVALUATION (CUPID ONLY): ICD-10-PCS | Mod: 26,,, | Performed by: INTERNAL MEDICINE

## 2021-01-06 PROCEDURE — 93880 EXTRACRANIAL BILAT STUDY: CPT

## 2021-01-06 PROCEDURE — 93924 LWR XTR VASC STDY BILAT: CPT | Mod: 26,,, | Performed by: INTERNAL MEDICINE

## 2021-01-06 PROCEDURE — 93924 ANKLE BRACHIAL INDICES (ABI): ICD-10-PCS | Mod: 26,,, | Performed by: INTERNAL MEDICINE

## 2021-01-06 PROCEDURE — 93925 LOWER EXTREMITY STUDY: CPT | Mod: 26,,, | Performed by: INTERNAL MEDICINE

## 2021-01-06 PROCEDURE — 93880 EXTRACRANIAL BILAT STUDY: CPT | Mod: 26,,, | Performed by: INTERNAL MEDICINE

## 2021-01-06 PROCEDURE — 93978 VASCULAR STUDY: CPT

## 2021-01-06 PROCEDURE — 93880 CV US DOPPLER CAROTID (CUPID ONLY): ICD-10-PCS | Mod: 26,,, | Performed by: INTERNAL MEDICINE

## 2021-01-06 PROCEDURE — 93925 LOWER EXTREMITY STUDY: CPT

## 2021-01-06 PROCEDURE — 93925 CV US DOPPLER ARTERIAL LEGS BILATERAL (CUPID ONLY): ICD-10-PCS | Mod: 26,,, | Performed by: INTERNAL MEDICINE

## 2021-01-06 PROCEDURE — 93924 LWR XTR VASC STDY BILAT: CPT

## 2021-01-22 ENCOUNTER — PATIENT OUTREACH (OUTPATIENT)
Dept: ADMINISTRATIVE | Facility: OTHER | Age: 66
End: 2021-01-22

## 2021-01-25 ENCOUNTER — OFFICE VISIT (OUTPATIENT)
Dept: CARDIOLOGY | Facility: CLINIC | Age: 66
End: 2021-01-25
Payer: MEDICARE

## 2021-01-25 VITALS
HEIGHT: 55 IN | SYSTOLIC BLOOD PRESSURE: 148 MMHG | HEART RATE: 77 BPM | OXYGEN SATURATION: 98 % | WEIGHT: 133.63 LBS | BODY MASS INDEX: 30.92 KG/M2 | DIASTOLIC BLOOD PRESSURE: 82 MMHG

## 2021-01-25 DIAGNOSIS — I73.9 PAD (PERIPHERAL ARTERY DISEASE): ICD-10-CM

## 2021-01-25 DIAGNOSIS — I10 ESSENTIAL HYPERTENSION: Primary | ICD-10-CM

## 2021-01-25 DIAGNOSIS — E78.5 DYSLIPIDEMIA: ICD-10-CM

## 2021-01-25 DIAGNOSIS — I25.2 HISTORY OF NON-ST ELEVATION MYOCARDIAL INFARCTION (NSTEMI): ICD-10-CM

## 2021-01-25 DIAGNOSIS — J44.1 COPD EXACERBATION: ICD-10-CM

## 2021-01-25 PROCEDURE — 3288F PR FALLS RISK ASSESSMENT DOCUMENTED: ICD-10-PCS | Mod: CPTII,S$GLB,, | Performed by: INTERNAL MEDICINE

## 2021-01-25 PROCEDURE — 1159F MED LIST DOCD IN RCRD: CPT | Mod: S$GLB,,, | Performed by: INTERNAL MEDICINE

## 2021-01-25 PROCEDURE — 1126F PR PAIN SEVERITY QUANTIFIED, NO PAIN PRESENT: ICD-10-PCS | Mod: S$GLB,,, | Performed by: INTERNAL MEDICINE

## 2021-01-25 PROCEDURE — 99214 PR OFFICE/OUTPT VISIT, EST, LEVL IV, 30-39 MIN: ICD-10-PCS | Mod: S$GLB,,, | Performed by: INTERNAL MEDICINE

## 2021-01-25 PROCEDURE — 99499 RISK ADDL DX/OHS AUDIT: ICD-10-PCS | Mod: S$PBB,,, | Performed by: INTERNAL MEDICINE

## 2021-01-25 PROCEDURE — 1126F AMNT PAIN NOTED NONE PRSNT: CPT | Mod: S$GLB,,, | Performed by: INTERNAL MEDICINE

## 2021-01-25 PROCEDURE — 3077F PR MOST RECENT SYSTOLIC BLOOD PRESSURE >= 140 MM HG: ICD-10-PCS | Mod: CPTII,S$GLB,, | Performed by: INTERNAL MEDICINE

## 2021-01-25 PROCEDURE — 3008F BODY MASS INDEX DOCD: CPT | Mod: CPTII,S$GLB,, | Performed by: INTERNAL MEDICINE

## 2021-01-25 PROCEDURE — 99999 PR PBB SHADOW E&M-EST. PATIENT-LVL IV: ICD-10-PCS | Mod: PBBFAC,,, | Performed by: INTERNAL MEDICINE

## 2021-01-25 PROCEDURE — 3077F SYST BP >= 140 MM HG: CPT | Mod: CPTII,S$GLB,, | Performed by: INTERNAL MEDICINE

## 2021-01-25 PROCEDURE — 1159F PR MEDICATION LIST DOCUMENTED IN MEDICAL RECORD: ICD-10-PCS | Mod: S$GLB,,, | Performed by: INTERNAL MEDICINE

## 2021-01-25 PROCEDURE — 99499 UNLISTED E&M SERVICE: CPT | Mod: S$PBB,,, | Performed by: INTERNAL MEDICINE

## 2021-01-25 PROCEDURE — 3079F PR MOST RECENT DIASTOLIC BLOOD PRESSURE 80-89 MM HG: ICD-10-PCS | Mod: CPTII,S$GLB,, | Performed by: INTERNAL MEDICINE

## 2021-01-25 PROCEDURE — 3008F PR BODY MASS INDEX (BMI) DOCUMENTED: ICD-10-PCS | Mod: CPTII,S$GLB,, | Performed by: INTERNAL MEDICINE

## 2021-01-25 PROCEDURE — 1101F PR PT FALLS ASSESS DOC 0-1 FALLS W/OUT INJ PAST YR: ICD-10-PCS | Mod: CPTII,S$GLB,, | Performed by: INTERNAL MEDICINE

## 2021-01-25 PROCEDURE — 3079F DIAST BP 80-89 MM HG: CPT | Mod: CPTII,S$GLB,, | Performed by: INTERNAL MEDICINE

## 2021-01-25 PROCEDURE — 1101F PT FALLS ASSESS-DOCD LE1/YR: CPT | Mod: CPTII,S$GLB,, | Performed by: INTERNAL MEDICINE

## 2021-01-25 PROCEDURE — 99999 PR PBB SHADOW E&M-EST. PATIENT-LVL IV: CPT | Mod: PBBFAC,,, | Performed by: INTERNAL MEDICINE

## 2021-01-25 PROCEDURE — 3288F FALL RISK ASSESSMENT DOCD: CPT | Mod: CPTII,S$GLB,, | Performed by: INTERNAL MEDICINE

## 2021-01-25 PROCEDURE — 99214 OFFICE O/P EST MOD 30 MIN: CPT | Mod: S$GLB,,, | Performed by: INTERNAL MEDICINE

## 2021-01-25 RX ORDER — CILOSTAZOL 50 MG/1
50 TABLET ORAL 2 TIMES DAILY
Qty: 60 TABLET | Refills: 11 | Status: SHIPPED | OUTPATIENT
Start: 2021-01-25 | End: 2021-11-12

## 2021-01-29 ENCOUNTER — PATIENT MESSAGE (OUTPATIENT)
Dept: ADMINISTRATIVE | Facility: HOSPITAL | Age: 66
End: 2021-01-29

## 2021-02-18 DIAGNOSIS — J44.1 COPD EXACERBATION: ICD-10-CM

## 2021-02-18 RX ORDER — MONTELUKAST SODIUM 10 MG/1
TABLET ORAL
Qty: 90 TABLET | Refills: 0 | Status: SHIPPED | OUTPATIENT
Start: 2021-02-18 | End: 2021-05-19

## 2021-02-18 RX ORDER — ALBUTEROL SULFATE 90 UG/1
AEROSOL, METERED RESPIRATORY (INHALATION)
Qty: 8.5 G | Refills: 2 | Status: SHIPPED | OUTPATIENT
Start: 2021-02-18 | End: 2021-04-23

## 2021-02-26 ENCOUNTER — IMMUNIZATION (OUTPATIENT)
Dept: PHARMACY | Facility: CLINIC | Age: 66
End: 2021-02-26
Payer: MEDICARE

## 2021-02-26 DIAGNOSIS — Z23 NEED FOR VACCINATION: Primary | ICD-10-CM

## 2021-03-18 ENCOUNTER — PATIENT OUTREACH (OUTPATIENT)
Dept: ADMINISTRATIVE | Facility: OTHER | Age: 66
End: 2021-03-18

## 2021-03-18 DIAGNOSIS — Z12.11 COLON CANCER SCREENING: Primary | ICD-10-CM

## 2021-03-25 ENCOUNTER — OFFICE VISIT (OUTPATIENT)
Dept: CARDIOLOGY | Facility: CLINIC | Age: 66
End: 2021-03-25
Payer: MEDICARE

## 2021-03-25 VITALS
BODY MASS INDEX: 30.97 KG/M2 | HEART RATE: 77 BPM | OXYGEN SATURATION: 98 % | WEIGHT: 133.81 LBS | SYSTOLIC BLOOD PRESSURE: 146 MMHG | HEIGHT: 55 IN | DIASTOLIC BLOOD PRESSURE: 74 MMHG

## 2021-03-25 DIAGNOSIS — I25.2 HISTORY OF NON-ST ELEVATION MYOCARDIAL INFARCTION (NSTEMI): ICD-10-CM

## 2021-03-25 DIAGNOSIS — J44.1 COPD EXACERBATION: ICD-10-CM

## 2021-03-25 DIAGNOSIS — I10 ESSENTIAL HYPERTENSION: Primary | ICD-10-CM

## 2021-03-25 DIAGNOSIS — F17.210 CIGARETTE SMOKER ONE HALF PACK A DAY OR LESS: ICD-10-CM

## 2021-03-25 DIAGNOSIS — I73.9 PAD (PERIPHERAL ARTERY DISEASE): ICD-10-CM

## 2021-03-25 PROCEDURE — 99214 PR OFFICE/OUTPT VISIT, EST, LEVL IV, 30-39 MIN: ICD-10-PCS | Mod: S$GLB,,, | Performed by: INTERNAL MEDICINE

## 2021-03-25 PROCEDURE — 1101F PT FALLS ASSESS-DOCD LE1/YR: CPT | Mod: CPTII,S$GLB,, | Performed by: INTERNAL MEDICINE

## 2021-03-25 PROCEDURE — 3078F DIAST BP <80 MM HG: CPT | Mod: CPTII,S$GLB,, | Performed by: INTERNAL MEDICINE

## 2021-03-25 PROCEDURE — 3078F PR MOST RECENT DIASTOLIC BLOOD PRESSURE < 80 MM HG: ICD-10-PCS | Mod: CPTII,S$GLB,, | Performed by: INTERNAL MEDICINE

## 2021-03-25 PROCEDURE — 3288F PR FALLS RISK ASSESSMENT DOCUMENTED: ICD-10-PCS | Mod: CPTII,S$GLB,, | Performed by: INTERNAL MEDICINE

## 2021-03-25 PROCEDURE — 3077F SYST BP >= 140 MM HG: CPT | Mod: CPTII,S$GLB,, | Performed by: INTERNAL MEDICINE

## 2021-03-25 PROCEDURE — 3288F FALL RISK ASSESSMENT DOCD: CPT | Mod: CPTII,S$GLB,, | Performed by: INTERNAL MEDICINE

## 2021-03-25 PROCEDURE — 1101F PR PT FALLS ASSESS DOC 0-1 FALLS W/OUT INJ PAST YR: ICD-10-PCS | Mod: CPTII,S$GLB,, | Performed by: INTERNAL MEDICINE

## 2021-03-25 PROCEDURE — 3008F BODY MASS INDEX DOCD: CPT | Mod: CPTII,S$GLB,, | Performed by: INTERNAL MEDICINE

## 2021-03-25 PROCEDURE — 99214 OFFICE O/P EST MOD 30 MIN: CPT | Mod: S$GLB,,, | Performed by: INTERNAL MEDICINE

## 2021-03-25 PROCEDURE — 1126F AMNT PAIN NOTED NONE PRSNT: CPT | Mod: S$GLB,,, | Performed by: INTERNAL MEDICINE

## 2021-03-25 PROCEDURE — 1159F PR MEDICATION LIST DOCUMENTED IN MEDICAL RECORD: ICD-10-PCS | Mod: S$GLB,,, | Performed by: INTERNAL MEDICINE

## 2021-03-25 PROCEDURE — 99999 PR PBB SHADOW E&M-EST. PATIENT-LVL IV: ICD-10-PCS | Mod: PBBFAC,,, | Performed by: INTERNAL MEDICINE

## 2021-03-25 PROCEDURE — 1159F MED LIST DOCD IN RCRD: CPT | Mod: S$GLB,,, | Performed by: INTERNAL MEDICINE

## 2021-03-25 PROCEDURE — 1126F PR PAIN SEVERITY QUANTIFIED, NO PAIN PRESENT: ICD-10-PCS | Mod: S$GLB,,, | Performed by: INTERNAL MEDICINE

## 2021-03-25 PROCEDURE — 99999 PR PBB SHADOW E&M-EST. PATIENT-LVL IV: CPT | Mod: PBBFAC,,, | Performed by: INTERNAL MEDICINE

## 2021-03-25 PROCEDURE — 3008F PR BODY MASS INDEX (BMI) DOCUMENTED: ICD-10-PCS | Mod: CPTII,S$GLB,, | Performed by: INTERNAL MEDICINE

## 2021-03-25 PROCEDURE — 3077F PR MOST RECENT SYSTOLIC BLOOD PRESSURE >= 140 MM HG: ICD-10-PCS | Mod: CPTII,S$GLB,, | Performed by: INTERNAL MEDICINE

## 2021-03-26 ENCOUNTER — IMMUNIZATION (OUTPATIENT)
Dept: PHARMACY | Facility: CLINIC | Age: 66
End: 2021-03-26
Payer: MEDICARE

## 2021-03-26 DIAGNOSIS — Z23 NEED FOR VACCINATION: Primary | ICD-10-CM

## 2021-04-01 ENCOUNTER — PATIENT OUTREACH (OUTPATIENT)
Dept: ADMINISTRATIVE | Facility: HOSPITAL | Age: 66
End: 2021-04-01

## 2021-04-01 DIAGNOSIS — Z12.31 ENCOUNTER FOR SCREENING MAMMOGRAM FOR MALIGNANT NEOPLASM OF BREAST: Primary | ICD-10-CM

## 2021-04-05 ENCOUNTER — PATIENT MESSAGE (OUTPATIENT)
Dept: ADMINISTRATIVE | Facility: HOSPITAL | Age: 66
End: 2021-04-05

## 2021-04-07 ENCOUNTER — PATIENT OUTREACH (OUTPATIENT)
Dept: ADMINISTRATIVE | Facility: HOSPITAL | Age: 66
End: 2021-04-07

## 2021-04-08 ENCOUNTER — TELEPHONE (OUTPATIENT)
Dept: PULMONOLOGY | Facility: CLINIC | Age: 66
End: 2021-04-08

## 2021-04-08 DIAGNOSIS — J44.9 CHRONIC OBSTRUCTIVE PULMONARY DISEASE, UNSPECIFIED COPD TYPE: Primary | ICD-10-CM

## 2021-04-09 ENCOUNTER — LAB VISIT (OUTPATIENT)
Dept: INTERNAL MEDICINE | Facility: CLINIC | Age: 66
End: 2021-04-09
Payer: MEDICARE

## 2021-04-09 DIAGNOSIS — R06.02 SHORTNESS OF BREATH: ICD-10-CM

## 2021-04-09 PROCEDURE — U0003 INFECTIOUS AGENT DETECTION BY NUCLEIC ACID (DNA OR RNA); SEVERE ACUTE RESPIRATORY SYNDROME CORONAVIRUS 2 (SARS-COV-2) (CORONAVIRUS DISEASE [COVID-19]), AMPLIFIED PROBE TECHNIQUE, MAKING USE OF HIGH THROUGHPUT TECHNOLOGIES AS DESCRIBED BY CMS-2020-01-R: HCPCS | Performed by: NURSE PRACTITIONER

## 2021-04-09 PROCEDURE — U0005 INFEC AGEN DETEC AMPLI PROBE: HCPCS | Performed by: NURSE PRACTITIONER

## 2021-04-10 LAB — SARS-COV-2 RNA RESP QL NAA+PROBE: NOT DETECTED

## 2021-04-12 ENCOUNTER — HOSPITAL ENCOUNTER (OUTPATIENT)
Dept: PULMONOLOGY | Facility: CLINIC | Age: 66
Discharge: HOME | End: 2021-04-12
Payer: MEDICARE

## 2021-04-12 ENCOUNTER — HOSPITAL ENCOUNTER (OUTPATIENT)
Dept: RADIOLOGY | Facility: HOSPITAL | Age: 66
Discharge: HOME | End: 2021-04-12
Attending: FAMILY MEDICINE
Payer: MEDICARE

## 2021-04-12 ENCOUNTER — OFFICE VISIT (OUTPATIENT)
Dept: PULMONOLOGY | Facility: CLINIC | Age: 66
End: 2021-04-12
Payer: MEDICARE

## 2021-04-12 VITALS
SYSTOLIC BLOOD PRESSURE: 157 MMHG | OXYGEN SATURATION: 98 % | WEIGHT: 132.94 LBS | WEIGHT: 133 LBS | HEART RATE: 71 BPM | DIASTOLIC BLOOD PRESSURE: 77 MMHG | BODY MASS INDEX: 29.9 KG/M2 | HEIGHT: 55 IN | BODY MASS INDEX: 30.78 KG/M2 | HEIGHT: 56 IN

## 2021-04-12 VITALS — BODY MASS INDEX: 29.15 KG/M2 | WEIGHT: 130 LBS

## 2021-04-12 DIAGNOSIS — R06.09 DYSPNEA ON EXERTION: ICD-10-CM

## 2021-04-12 DIAGNOSIS — Z12.31 ENCOUNTER FOR SCREENING MAMMOGRAM FOR MALIGNANT NEOPLASM OF BREAST: ICD-10-CM

## 2021-04-12 DIAGNOSIS — J44.9 CHRONIC OBSTRUCTIVE PULMONARY DISEASE, UNSPECIFIED COPD TYPE: ICD-10-CM

## 2021-04-12 DIAGNOSIS — J43.2 CENTRILOBULAR EMPHYSEMA: ICD-10-CM

## 2021-04-12 DIAGNOSIS — Z12.2 ENCOUNTER FOR SCREENING FOR MALIGNANT NEOPLASM OF RESPIRATORY ORGANS: ICD-10-CM

## 2021-04-12 PROCEDURE — 77067 SCR MAMMO BI INCL CAD: CPT | Mod: 26,,, | Performed by: RADIOLOGY

## 2021-04-12 PROCEDURE — 99213 PR OFFICE/OUTPT VISIT, EST, LEVL III, 20-29 MIN: ICD-10-PCS | Mod: S$GLB,,, | Performed by: NURSE PRACTITIONER

## 2021-04-12 PROCEDURE — 77067 MAMMO DIGITAL SCREENING BILAT WITH TOMO: ICD-10-PCS | Mod: 26,,, | Performed by: RADIOLOGY

## 2021-04-12 PROCEDURE — 94618 PULMONARY STRESS TESTING: ICD-10-PCS | Mod: S$GLB,,, | Performed by: INTERNAL MEDICINE

## 2021-04-12 PROCEDURE — 94060 PR EVAL OF BRONCHOSPASM: ICD-10-PCS | Mod: S$GLB,,, | Performed by: INTERNAL MEDICINE

## 2021-04-12 PROCEDURE — 94727 GAS DIL/WSHOT DETER LNG VOL: CPT | Mod: S$GLB,,, | Performed by: INTERNAL MEDICINE

## 2021-04-12 PROCEDURE — 99999 PR PBB SHADOW E&M-EST. PATIENT-LVL IV: ICD-10-PCS | Mod: PBBFAC,,, | Performed by: NURSE PRACTITIONER

## 2021-04-12 PROCEDURE — 94618 PULMONARY STRESS TESTING: CPT | Mod: S$GLB,,, | Performed by: INTERNAL MEDICINE

## 2021-04-12 PROCEDURE — 99999 PR PBB SHADOW E&M-EST. PATIENT-LVL IV: CPT | Mod: PBBFAC,,, | Performed by: NURSE PRACTITIONER

## 2021-04-12 PROCEDURE — 77063 BREAST TOMOSYNTHESIS BI: CPT | Mod: 26,,, | Performed by: RADIOLOGY

## 2021-04-12 PROCEDURE — 1159F PR MEDICATION LIST DOCUMENTED IN MEDICAL RECORD: ICD-10-PCS | Mod: S$GLB,,, | Performed by: NURSE PRACTITIONER

## 2021-04-12 PROCEDURE — 94727 PR PULM FUNCTION TEST BY GAS: ICD-10-PCS | Mod: S$GLB,,, | Performed by: INTERNAL MEDICINE

## 2021-04-12 PROCEDURE — 3008F BODY MASS INDEX DOCD: CPT | Mod: CPTII,S$GLB,, | Performed by: NURSE PRACTITIONER

## 2021-04-12 PROCEDURE — 99213 OFFICE O/P EST LOW 20 MIN: CPT | Mod: S$GLB,,, | Performed by: NURSE PRACTITIONER

## 2021-04-12 PROCEDURE — 3008F PR BODY MASS INDEX (BMI) DOCUMENTED: ICD-10-PCS | Mod: CPTII,S$GLB,, | Performed by: NURSE PRACTITIONER

## 2021-04-12 PROCEDURE — 3288F FALL RISK ASSESSMENT DOCD: CPT | Mod: CPTII,S$GLB,, | Performed by: NURSE PRACTITIONER

## 2021-04-12 PROCEDURE — 1126F AMNT PAIN NOTED NONE PRSNT: CPT | Mod: S$GLB,,, | Performed by: NURSE PRACTITIONER

## 2021-04-12 PROCEDURE — 94729 PR C02/MEMBANE DIFFUSE CAPACITY: ICD-10-PCS | Mod: S$GLB,,, | Performed by: INTERNAL MEDICINE

## 2021-04-12 PROCEDURE — 1159F MED LIST DOCD IN RCRD: CPT | Mod: S$GLB,,, | Performed by: NURSE PRACTITIONER

## 2021-04-12 PROCEDURE — 3288F PR FALLS RISK ASSESSMENT DOCUMENTED: ICD-10-PCS | Mod: CPTII,S$GLB,, | Performed by: NURSE PRACTITIONER

## 2021-04-12 PROCEDURE — 94060 EVALUATION OF WHEEZING: CPT | Mod: S$GLB,,, | Performed by: INTERNAL MEDICINE

## 2021-04-12 PROCEDURE — 1101F PT FALLS ASSESS-DOCD LE1/YR: CPT | Mod: CPTII,S$GLB,, | Performed by: NURSE PRACTITIONER

## 2021-04-12 PROCEDURE — 94729 DIFFUSING CAPACITY: CPT | Mod: S$GLB,,, | Performed by: INTERNAL MEDICINE

## 2021-04-12 PROCEDURE — 77063 MAMMO DIGITAL SCREENING BILAT WITH TOMO: ICD-10-PCS | Mod: 26,,, | Performed by: RADIOLOGY

## 2021-04-12 PROCEDURE — 1101F PR PT FALLS ASSESS DOC 0-1 FALLS W/OUT INJ PAST YR: ICD-10-PCS | Mod: CPTII,S$GLB,, | Performed by: NURSE PRACTITIONER

## 2021-04-12 PROCEDURE — 77067 SCR MAMMO BI INCL CAD: CPT | Mod: TC

## 2021-04-12 PROCEDURE — 1126F PR PAIN SEVERITY QUANTIFIED, NO PAIN PRESENT: ICD-10-PCS | Mod: S$GLB,,, | Performed by: NURSE PRACTITIONER

## 2021-04-12 RX ORDER — IPRATROPIUM BROMIDE AND ALBUTEROL SULFATE 2.5; .5 MG/3ML; MG/3ML
3 SOLUTION RESPIRATORY (INHALATION) EVERY 6 HOURS PRN
Qty: 1 BOX | Refills: 5 | Status: SHIPPED | OUTPATIENT
Start: 2021-04-12 | End: 2021-12-07 | Stop reason: SDUPTHER

## 2021-04-21 ENCOUNTER — HOSPITAL ENCOUNTER (OUTPATIENT)
Dept: RADIOLOGY | Facility: HOSPITAL | Age: 66
Discharge: HOME | End: 2021-04-21
Attending: NURSE PRACTITIONER
Payer: MEDICARE

## 2021-04-21 ENCOUNTER — OFFICE VISIT (OUTPATIENT)
Dept: FAMILY MEDICINE | Facility: CLINIC | Age: 66
End: 2021-04-21
Payer: MEDICARE

## 2021-04-21 VITALS
HEART RATE: 77 BPM | OXYGEN SATURATION: 96 % | HEIGHT: 56 IN | WEIGHT: 134.25 LBS | SYSTOLIC BLOOD PRESSURE: 138 MMHG | BODY MASS INDEX: 30.2 KG/M2 | DIASTOLIC BLOOD PRESSURE: 82 MMHG

## 2021-04-21 DIAGNOSIS — J44.1 COPD EXACERBATION: ICD-10-CM

## 2021-04-21 DIAGNOSIS — I10 ESSENTIAL HYPERTENSION: ICD-10-CM

## 2021-04-21 DIAGNOSIS — Z87.891 PERSONAL HISTORY OF NICOTINE DEPENDENCE: ICD-10-CM

## 2021-04-21 DIAGNOSIS — Z72.0 TOBACCO USE: ICD-10-CM

## 2021-04-21 DIAGNOSIS — Z12.2 ENCOUNTER FOR SCREENING FOR MALIGNANT NEOPLASM OF RESPIRATORY ORGANS: ICD-10-CM

## 2021-04-21 DIAGNOSIS — Z00.00 ANNUAL PHYSICAL EXAM: Primary | ICD-10-CM

## 2021-04-21 DIAGNOSIS — I73.9 PAD (PERIPHERAL ARTERY DISEASE): ICD-10-CM

## 2021-04-21 DIAGNOSIS — Z12.11 COLON CANCER SCREENING: ICD-10-CM

## 2021-04-21 DIAGNOSIS — I25.2 HISTORY OF NON-ST ELEVATION MYOCARDIAL INFARCTION (NSTEMI): ICD-10-CM

## 2021-04-21 PROCEDURE — 99999 PR PBB SHADOW E&M-EST. PATIENT-LVL III: ICD-10-PCS | Mod: PBBFAC,,, | Performed by: FAMILY MEDICINE

## 2021-04-21 PROCEDURE — 71271 CT CHEST LUNG SCREENING LOW DOSE: ICD-10-PCS | Mod: 26,,, | Performed by: RADIOLOGY

## 2021-04-21 PROCEDURE — 71271 CT THORAX LUNG CANCER SCR C-: CPT | Mod: TC

## 2021-04-21 PROCEDURE — 1101F PR PT FALLS ASSESS DOC 0-1 FALLS W/OUT INJ PAST YR: ICD-10-PCS | Mod: CPTII,S$GLB,, | Performed by: FAMILY MEDICINE

## 2021-04-21 PROCEDURE — 1101F PT FALLS ASSESS-DOCD LE1/YR: CPT | Mod: CPTII,S$GLB,, | Performed by: FAMILY MEDICINE

## 2021-04-21 PROCEDURE — 3288F PR FALLS RISK ASSESSMENT DOCUMENTED: ICD-10-PCS | Mod: CPTII,S$GLB,, | Performed by: FAMILY MEDICINE

## 2021-04-21 PROCEDURE — 3288F FALL RISK ASSESSMENT DOCD: CPT | Mod: CPTII,S$GLB,, | Performed by: FAMILY MEDICINE

## 2021-04-21 PROCEDURE — 99999 PR PBB SHADOW E&M-EST. PATIENT-LVL III: CPT | Mod: PBBFAC,,, | Performed by: FAMILY MEDICINE

## 2021-04-21 PROCEDURE — 99397 PER PM REEVAL EST PAT 65+ YR: CPT | Mod: S$GLB,,, | Performed by: FAMILY MEDICINE

## 2021-04-21 PROCEDURE — 1126F PR PAIN SEVERITY QUANTIFIED, NO PAIN PRESENT: ICD-10-PCS | Mod: S$GLB,,, | Performed by: FAMILY MEDICINE

## 2021-04-21 PROCEDURE — 1126F AMNT PAIN NOTED NONE PRSNT: CPT | Mod: S$GLB,,, | Performed by: FAMILY MEDICINE

## 2021-04-21 PROCEDURE — 3008F BODY MASS INDEX DOCD: CPT | Mod: CPTII,S$GLB,, | Performed by: FAMILY MEDICINE

## 2021-04-21 PROCEDURE — 99397 PR PREVENTIVE VISIT,EST,65 & OVER: ICD-10-PCS | Mod: S$GLB,,, | Performed by: FAMILY MEDICINE

## 2021-04-21 PROCEDURE — 71271 CT THORAX LUNG CANCER SCR C-: CPT | Mod: 26,,, | Performed by: RADIOLOGY

## 2021-04-21 PROCEDURE — 99499 UNLISTED E&M SERVICE: CPT | Mod: S$PBB,,, | Performed by: FAMILY MEDICINE

## 2021-04-21 PROCEDURE — 99499 RISK ADDL DX/OHS AUDIT: ICD-10-PCS | Mod: S$PBB,,, | Performed by: FAMILY MEDICINE

## 2021-04-21 PROCEDURE — 3008F PR BODY MASS INDEX (BMI) DOCUMENTED: ICD-10-PCS | Mod: CPTII,S$GLB,, | Performed by: FAMILY MEDICINE

## 2021-04-21 RX ORDER — ROSUVASTATIN CALCIUM 20 MG/1
20 TABLET, COATED ORAL NIGHTLY
Qty: 90 TABLET | Refills: 3 | Status: SHIPPED | OUTPATIENT
Start: 2021-04-21 | End: 2021-11-09 | Stop reason: SDUPTHER

## 2021-04-23 PROBLEM — J43.2 CENTRILOBULAR EMPHYSEMA: Status: ACTIVE | Noted: 2020-02-04

## 2021-04-23 RX ORDER — TIOTROPIUM BROMIDE 18 UG/1
18 CAPSULE ORAL; RESPIRATORY (INHALATION) DAILY
Qty: 30 CAPSULE | Refills: 11 | Status: SHIPPED | OUTPATIENT
Start: 2021-04-23 | End: 2021-11-09 | Stop reason: SDUPTHER

## 2021-05-03 ENCOUNTER — LAB VISIT (OUTPATIENT)
Dept: LAB | Facility: HOSPITAL | Age: 66
End: 2021-05-03
Attending: FAMILY MEDICINE
Payer: MEDICARE

## 2021-05-03 DIAGNOSIS — Z12.11 COLON CANCER SCREENING: ICD-10-CM

## 2021-05-03 PROCEDURE — 82274 ASSAY TEST FOR BLOOD FECAL: CPT | Performed by: FAMILY MEDICINE

## 2021-05-13 LAB — HEMOCCULT STL QL IA: NEGATIVE

## 2021-05-13 RX ORDER — CLOPIDOGREL BISULFATE 75 MG/1
75 TABLET ORAL DAILY
Qty: 30 TABLET | Refills: 5 | OUTPATIENT
Start: 2021-05-13 | End: 2022-05-13

## 2021-05-13 RX ORDER — CARVEDILOL 3.12 MG/1
3.12 TABLET ORAL 2 TIMES DAILY WITH MEALS
Qty: 60 TABLET | Refills: 5 | OUTPATIENT
Start: 2021-05-13 | End: 2022-05-13

## 2021-05-14 ENCOUNTER — PATIENT MESSAGE (OUTPATIENT)
Dept: FAMILY MEDICINE | Facility: CLINIC | Age: 66
End: 2021-05-14

## 2021-05-17 ENCOUNTER — PATIENT MESSAGE (OUTPATIENT)
Dept: FAMILY MEDICINE | Facility: CLINIC | Age: 66
End: 2021-05-17

## 2021-05-17 DIAGNOSIS — I73.9 PAD (PERIPHERAL ARTERY DISEASE): Primary | ICD-10-CM

## 2021-05-17 DIAGNOSIS — I10 ESSENTIAL HYPERTENSION: ICD-10-CM

## 2021-05-17 RX ORDER — CLOPIDOGREL BISULFATE 75 MG/1
75 TABLET ORAL DAILY
Qty: 90 TABLET | Refills: 1 | Status: CANCELLED | OUTPATIENT
Start: 2021-05-17 | End: 2022-05-17

## 2021-05-17 RX ORDER — CARVEDILOL 3.12 MG/1
3.12 TABLET ORAL 2 TIMES DAILY WITH MEALS
Qty: 180 TABLET | Refills: 1 | Status: CANCELLED | OUTPATIENT
Start: 2021-05-17 | End: 2022-05-17

## 2021-05-18 LAB — NONINV COLON CA DNA+OCC BLD SCRN STL QL: NEGATIVE

## 2021-05-19 ENCOUNTER — TELEPHONE (OUTPATIENT)
Dept: FAMILY MEDICINE | Facility: CLINIC | Age: 66
End: 2021-05-19

## 2021-05-19 RX ORDER — CLOPIDOGREL BISULFATE 75 MG/1
75 TABLET ORAL DAILY
Qty: 30 TABLET | Refills: 5 | Status: SHIPPED | OUTPATIENT
Start: 2021-05-19 | End: 2021-11-09 | Stop reason: SDUPTHER

## 2021-05-19 RX ORDER — CARVEDILOL 3.12 MG/1
3.12 TABLET ORAL 2 TIMES DAILY WITH MEALS
Qty: 60 TABLET | Refills: 5 | Status: SHIPPED | OUTPATIENT
Start: 2021-05-19 | End: 2021-11-09 | Stop reason: SDUPTHER

## 2021-06-17 ENCOUNTER — OFFICE VISIT (OUTPATIENT)
Dept: CARDIOLOGY | Facility: CLINIC | Age: 66
End: 2021-06-17
Payer: MEDICARE

## 2021-06-17 VITALS
HEIGHT: 56 IN | SYSTOLIC BLOOD PRESSURE: 139 MMHG | WEIGHT: 134 LBS | OXYGEN SATURATION: 94 % | DIASTOLIC BLOOD PRESSURE: 67 MMHG | BODY MASS INDEX: 30.14 KG/M2 | HEART RATE: 76 BPM

## 2021-06-17 DIAGNOSIS — F17.210 CIGARETTE SMOKER ONE HALF PACK A DAY OR LESS: ICD-10-CM

## 2021-06-17 DIAGNOSIS — I73.9 PAD (PERIPHERAL ARTERY DISEASE): Primary | ICD-10-CM

## 2021-06-17 DIAGNOSIS — I10 ESSENTIAL HYPERTENSION: ICD-10-CM

## 2021-06-17 PROCEDURE — 99214 OFFICE O/P EST MOD 30 MIN: CPT | Mod: S$GLB,,, | Performed by: INTERNAL MEDICINE

## 2021-06-17 PROCEDURE — 99999 PR PBB SHADOW E&M-EST. PATIENT-LVL IV: CPT | Mod: PBBFAC,,, | Performed by: INTERNAL MEDICINE

## 2021-06-17 PROCEDURE — 3288F FALL RISK ASSESSMENT DOCD: CPT | Mod: CPTII,S$GLB,, | Performed by: INTERNAL MEDICINE

## 2021-06-17 PROCEDURE — 1126F PR PAIN SEVERITY QUANTIFIED, NO PAIN PRESENT: ICD-10-PCS | Mod: S$GLB,,, | Performed by: INTERNAL MEDICINE

## 2021-06-17 PROCEDURE — 99214 PR OFFICE/OUTPT VISIT, EST, LEVL IV, 30-39 MIN: ICD-10-PCS | Mod: S$GLB,,, | Performed by: INTERNAL MEDICINE

## 2021-06-17 PROCEDURE — 1101F PT FALLS ASSESS-DOCD LE1/YR: CPT | Mod: CPTII,S$GLB,, | Performed by: INTERNAL MEDICINE

## 2021-06-17 PROCEDURE — 1126F AMNT PAIN NOTED NONE PRSNT: CPT | Mod: S$GLB,,, | Performed by: INTERNAL MEDICINE

## 2021-06-17 PROCEDURE — 3288F PR FALLS RISK ASSESSMENT DOCUMENTED: ICD-10-PCS | Mod: CPTII,S$GLB,, | Performed by: INTERNAL MEDICINE

## 2021-06-17 PROCEDURE — 1159F PR MEDICATION LIST DOCUMENTED IN MEDICAL RECORD: ICD-10-PCS | Mod: S$GLB,,, | Performed by: INTERNAL MEDICINE

## 2021-06-17 PROCEDURE — 1159F MED LIST DOCD IN RCRD: CPT | Mod: S$GLB,,, | Performed by: INTERNAL MEDICINE

## 2021-06-17 PROCEDURE — 99999 PR PBB SHADOW E&M-EST. PATIENT-LVL IV: ICD-10-PCS | Mod: PBBFAC,,, | Performed by: INTERNAL MEDICINE

## 2021-06-17 PROCEDURE — 3008F BODY MASS INDEX DOCD: CPT | Mod: CPTII,S$GLB,, | Performed by: INTERNAL MEDICINE

## 2021-06-17 PROCEDURE — 1101F PR PT FALLS ASSESS DOC 0-1 FALLS W/OUT INJ PAST YR: ICD-10-PCS | Mod: CPTII,S$GLB,, | Performed by: INTERNAL MEDICINE

## 2021-06-17 PROCEDURE — 3008F PR BODY MASS INDEX (BMI) DOCUMENTED: ICD-10-PCS | Mod: CPTII,S$GLB,, | Performed by: INTERNAL MEDICINE

## 2021-07-20 ENCOUNTER — CLINICAL SUPPORT (OUTPATIENT)
Dept: SMOKING CESSATION | Facility: CLINIC | Age: 66
End: 2021-07-20
Payer: COMMERCIAL

## 2021-07-20 DIAGNOSIS — F17.200 NICOTINE DEPENDENCE: Primary | ICD-10-CM

## 2021-07-20 PROCEDURE — 99407 PR TOBACCO USE CESSATION INTENSIVE >10 MINUTES: ICD-10-PCS | Mod: S$GLB,,,

## 2021-07-20 PROCEDURE — 99407 BEHAV CHNG SMOKING > 10 MIN: CPT | Mod: S$GLB,,,

## 2021-11-09 ENCOUNTER — OFFICE VISIT (OUTPATIENT)
Dept: FAMILY MEDICINE | Facility: CLINIC | Age: 66
End: 2021-11-09
Payer: MEDICARE

## 2021-11-09 VITALS
TEMPERATURE: 98 F | HEIGHT: 56 IN | OXYGEN SATURATION: 98 % | RESPIRATION RATE: 16 BRPM | HEART RATE: 91 BPM | WEIGHT: 130.06 LBS | DIASTOLIC BLOOD PRESSURE: 70 MMHG | BODY MASS INDEX: 29.26 KG/M2 | SYSTOLIC BLOOD PRESSURE: 112 MMHG

## 2021-11-09 DIAGNOSIS — J43.2 CENTRILOBULAR EMPHYSEMA: Primary | ICD-10-CM

## 2021-11-09 DIAGNOSIS — I25.2 HISTORY OF NON-ST ELEVATION MYOCARDIAL INFARCTION (NSTEMI): ICD-10-CM

## 2021-11-09 DIAGNOSIS — R73.03 PREDIABETES: ICD-10-CM

## 2021-11-09 DIAGNOSIS — I73.9 PAD (PERIPHERAL ARTERY DISEASE): ICD-10-CM

## 2021-11-09 DIAGNOSIS — I10 HTN (HYPERTENSION): ICD-10-CM

## 2021-11-09 DIAGNOSIS — I10 ESSENTIAL HYPERTENSION: ICD-10-CM

## 2021-11-09 DIAGNOSIS — M54.2 CERVICAL PAIN: ICD-10-CM

## 2021-11-09 DIAGNOSIS — F17.200 NICOTINE DEPENDENCE: ICD-10-CM

## 2021-11-09 DIAGNOSIS — J44.1 COPD EXACERBATION: ICD-10-CM

## 2021-11-09 DIAGNOSIS — R32 URINARY INCONTINENCE, UNSPECIFIED TYPE: ICD-10-CM

## 2021-11-09 PROCEDURE — 99215 OFFICE O/P EST HI 40 MIN: CPT | Mod: HCNC,S$GLB,, | Performed by: FAMILY MEDICINE

## 2021-11-09 PROCEDURE — 99999 PR PBB SHADOW E&M-EST. PATIENT-LVL IV: CPT | Mod: PBBFAC,HCNC,, | Performed by: FAMILY MEDICINE

## 2021-11-09 PROCEDURE — 4010F ACE/ARB THERAPY RXD/TAKEN: CPT | Mod: HCNC,CPTII,S$GLB, | Performed by: FAMILY MEDICINE

## 2021-11-09 PROCEDURE — 3288F FALL RISK ASSESSMENT DOCD: CPT | Mod: HCNC,CPTII,S$GLB, | Performed by: FAMILY MEDICINE

## 2021-11-09 PROCEDURE — 4010F PR ACE/ARB THEARPY RXD/TAKEN: ICD-10-PCS | Mod: HCNC,CPTII,S$GLB, | Performed by: FAMILY MEDICINE

## 2021-11-09 PROCEDURE — 1126F PR PAIN SEVERITY QUANTIFIED, NO PAIN PRESENT: ICD-10-PCS | Mod: HCNC,CPTII,S$GLB, | Performed by: FAMILY MEDICINE

## 2021-11-09 PROCEDURE — 1101F PR PT FALLS ASSESS DOC 0-1 FALLS W/OUT INJ PAST YR: ICD-10-PCS | Mod: HCNC,CPTII,S$GLB, | Performed by: FAMILY MEDICINE

## 2021-11-09 PROCEDURE — 3008F BODY MASS INDEX DOCD: CPT | Mod: HCNC,CPTII,S$GLB, | Performed by: FAMILY MEDICINE

## 2021-11-09 PROCEDURE — 1159F MED LIST DOCD IN RCRD: CPT | Mod: HCNC,CPTII,S$GLB, | Performed by: FAMILY MEDICINE

## 2021-11-09 PROCEDURE — 3008F PR BODY MASS INDEX (BMI) DOCUMENTED: ICD-10-PCS | Mod: HCNC,CPTII,S$GLB, | Performed by: FAMILY MEDICINE

## 2021-11-09 PROCEDURE — 3078F PR MOST RECENT DIASTOLIC BLOOD PRESSURE < 80 MM HG: ICD-10-PCS | Mod: HCNC,CPTII,S$GLB, | Performed by: FAMILY MEDICINE

## 2021-11-09 PROCEDURE — 3044F PR MOST RECENT HEMOGLOBIN A1C LEVEL <7.0%: ICD-10-PCS | Mod: HCNC,CPTII,S$GLB, | Performed by: FAMILY MEDICINE

## 2021-11-09 PROCEDURE — 3074F PR MOST RECENT SYSTOLIC BLOOD PRESSURE < 130 MM HG: ICD-10-PCS | Mod: HCNC,CPTII,S$GLB, | Performed by: FAMILY MEDICINE

## 2021-11-09 PROCEDURE — 3288F PR FALLS RISK ASSESSMENT DOCUMENTED: ICD-10-PCS | Mod: HCNC,CPTII,S$GLB, | Performed by: FAMILY MEDICINE

## 2021-11-09 PROCEDURE — 1126F AMNT PAIN NOTED NONE PRSNT: CPT | Mod: HCNC,CPTII,S$GLB, | Performed by: FAMILY MEDICINE

## 2021-11-09 PROCEDURE — 1159F PR MEDICATION LIST DOCUMENTED IN MEDICAL RECORD: ICD-10-PCS | Mod: HCNC,CPTII,S$GLB, | Performed by: FAMILY MEDICINE

## 2021-11-09 PROCEDURE — 99999 PR PBB SHADOW E&M-EST. PATIENT-LVL IV: ICD-10-PCS | Mod: PBBFAC,HCNC,, | Performed by: FAMILY MEDICINE

## 2021-11-09 PROCEDURE — 3044F HG A1C LEVEL LT 7.0%: CPT | Mod: HCNC,CPTII,S$GLB, | Performed by: FAMILY MEDICINE

## 2021-11-09 PROCEDURE — 99215 PR OFFICE/OUTPT VISIT, EST, LEVL V, 40-54 MIN: ICD-10-PCS | Mod: HCNC,S$GLB,, | Performed by: FAMILY MEDICINE

## 2021-11-09 PROCEDURE — 3074F SYST BP LT 130 MM HG: CPT | Mod: HCNC,CPTII,S$GLB, | Performed by: FAMILY MEDICINE

## 2021-11-09 PROCEDURE — 3078F DIAST BP <80 MM HG: CPT | Mod: HCNC,CPTII,S$GLB, | Performed by: FAMILY MEDICINE

## 2021-11-09 PROCEDURE — 1101F PT FALLS ASSESS-DOCD LE1/YR: CPT | Mod: HCNC,CPTII,S$GLB, | Performed by: FAMILY MEDICINE

## 2021-11-09 RX ORDER — CLOPIDOGREL BISULFATE 75 MG/1
75 TABLET ORAL DAILY
Qty: 30 TABLET | Refills: 5 | Status: SHIPPED | OUTPATIENT
Start: 2021-11-09 | End: 2021-11-12

## 2021-11-09 RX ORDER — LORATADINE 10 MG/1
TABLET ORAL
Qty: 90 TABLET | Refills: 1 | Status: SHIPPED | OUTPATIENT
Start: 2021-11-09 | End: 2022-02-09

## 2021-11-09 RX ORDER — POTASSIUM CHLORIDE 600 MG/1
TABLET, FILM COATED, EXTENDED RELEASE ORAL
Status: CANCELLED | OUTPATIENT
Start: 2021-11-09

## 2021-11-09 RX ORDER — CLOPIDOGREL BISULFATE 75 MG/1
75 TABLET ORAL DAILY
Qty: 30 TABLET | Refills: 5 | Status: CANCELLED | OUTPATIENT
Start: 2021-11-09 | End: 2022-11-09

## 2021-11-09 RX ORDER — ALBUTEROL SULFATE 90 UG/1
AEROSOL, METERED RESPIRATORY (INHALATION)
Qty: 8.5 G | Refills: 2 | Status: SHIPPED | OUTPATIENT
Start: 2021-11-09 | End: 2021-12-07

## 2021-11-09 RX ORDER — DM/P-EPHED/ACETAMINOPH/DOXYLAM 30-7.5/3
2 LIQUID (ML) ORAL
Qty: 108 LOZENGE | Refills: 0 | Status: SHIPPED | OUTPATIENT
Start: 2021-11-09

## 2021-11-09 RX ORDER — LISINOPRIL 5 MG/1
5 TABLET ORAL DAILY
Qty: 90 TABLET | Refills: 1 | Status: SHIPPED | OUTPATIENT
Start: 2021-11-09 | End: 2022-04-25

## 2021-11-09 RX ORDER — ROSUVASTATIN CALCIUM 20 MG/1
20 TABLET, COATED ORAL NIGHTLY
Qty: 90 TABLET | Refills: 3 | Status: SHIPPED | OUTPATIENT
Start: 2021-11-09 | End: 2022-04-19

## 2021-11-09 RX ORDER — METHYLPREDNISOLONE 4 MG/1
TABLET ORAL
Qty: 1 EACH | Refills: 0 | Status: SHIPPED | OUTPATIENT
Start: 2021-11-09 | End: 2021-11-30

## 2021-11-09 RX ORDER — AZITHROMYCIN 250 MG/1
TABLET, FILM COATED ORAL
Qty: 6 TABLET | Refills: 0 | Status: SHIPPED | OUTPATIENT
Start: 2021-11-09 | End: 2021-11-14

## 2021-11-09 RX ORDER — MONTELUKAST SODIUM 10 MG/1
10 TABLET ORAL NIGHTLY
Qty: 90 TABLET | Refills: 1 | Status: SHIPPED | OUTPATIENT
Start: 2021-11-09 | End: 2021-11-12

## 2021-11-09 RX ORDER — CILOSTAZOL 50 MG/1
50 TABLET ORAL 2 TIMES DAILY
Qty: 60 TABLET | Refills: 11 | Status: CANCELLED | OUTPATIENT
Start: 2021-11-09 | End: 2022-11-09

## 2021-11-09 RX ORDER — HYDROCHLOROTHIAZIDE 25 MG/1
25 TABLET ORAL DAILY
Qty: 90 TABLET | Refills: 1 | Status: SHIPPED | OUTPATIENT
Start: 2021-11-09 | End: 2022-05-04

## 2021-11-09 RX ORDER — CARVEDILOL 3.12 MG/1
3.12 TABLET ORAL 2 TIMES DAILY WITH MEALS
Qty: 180 TABLET | Refills: 1 | Status: SHIPPED | OUTPATIENT
Start: 2021-11-09 | End: 2021-11-12

## 2021-11-09 RX ORDER — FLUTICASONE PROPIONATE AND SALMETEROL XINAFOATE 230; 21 UG/1; UG/1
2 AEROSOL, METERED RESPIRATORY (INHALATION) 2 TIMES DAILY
Qty: 12 G | Refills: 11 | Status: SHIPPED | OUTPATIENT
Start: 2021-11-09 | End: 2021-11-16

## 2021-11-09 RX ORDER — CYCLOBENZAPRINE HCL 10 MG
TABLET ORAL
Qty: 90 TABLET | Refills: 1 | Status: SHIPPED | OUTPATIENT
Start: 2021-11-09 | End: 2022-11-23

## 2021-11-09 RX ORDER — EZETIMIBE 10 MG/1
10 TABLET ORAL DAILY
Qty: 90 TABLET | Refills: 1 | Status: SHIPPED | OUTPATIENT
Start: 2021-11-09 | End: 2022-05-17

## 2021-11-09 RX ORDER — TIOTROPIUM BROMIDE 18 UG/1
18 CAPSULE ORAL; RESPIRATORY (INHALATION) DAILY
Qty: 90 CAPSULE | Refills: 3 | Status: SHIPPED | OUTPATIENT
Start: 2021-11-09 | End: 2022-08-12 | Stop reason: SDUPTHER

## 2021-11-11 DIAGNOSIS — I73.9 PAD (PERIPHERAL ARTERY DISEASE): ICD-10-CM

## 2021-11-11 DIAGNOSIS — I10 ESSENTIAL HYPERTENSION: ICD-10-CM

## 2021-11-11 DIAGNOSIS — I25.2 HISTORY OF NON-ST ELEVATION MYOCARDIAL INFARCTION (NSTEMI): ICD-10-CM

## 2021-11-12 RX ORDER — CLOPIDOGREL BISULFATE 75 MG/1
TABLET ORAL
Qty: 30 TABLET | Refills: 5 | Status: SHIPPED | OUTPATIENT
Start: 2021-11-12 | End: 2022-05-17

## 2021-11-12 RX ORDER — CARVEDILOL 3.12 MG/1
TABLET ORAL
Qty: 60 TABLET | Refills: 5 | Status: SHIPPED | OUTPATIENT
Start: 2021-11-12 | End: 2022-05-17

## 2021-11-23 ENCOUNTER — LAB VISIT (OUTPATIENT)
Dept: LAB | Facility: HOSPITAL | Age: 66
End: 2021-11-23
Attending: FAMILY MEDICINE
Payer: MEDICARE

## 2021-11-23 DIAGNOSIS — R73.03 PREDIABETES: ICD-10-CM

## 2021-11-23 DIAGNOSIS — R32 URINARY INCONTINENCE, UNSPECIFIED TYPE: ICD-10-CM

## 2021-11-23 DIAGNOSIS — I73.9 PAD (PERIPHERAL ARTERY DISEASE): ICD-10-CM

## 2021-11-23 DIAGNOSIS — I25.2 HISTORY OF NON-ST ELEVATION MYOCARDIAL INFARCTION (NSTEMI): ICD-10-CM

## 2021-11-23 LAB
ALBUMIN SERPL BCP-MCNC: 3.8 G/DL (ref 3.5–5.2)
ALP SERPL-CCNC: 50 U/L (ref 55–135)
ALT SERPL W/O P-5'-P-CCNC: 22 U/L (ref 10–44)
ANION GAP SERPL CALC-SCNC: 7 MMOL/L (ref 8–16)
AST SERPL-CCNC: 18 U/L (ref 10–40)
BILIRUB SERPL-MCNC: 0.4 MG/DL (ref 0.1–1)
BUN SERPL-MCNC: 11 MG/DL (ref 8–23)
CALCIUM SERPL-MCNC: 10.1 MG/DL (ref 8.7–10.5)
CHLORIDE SERPL-SCNC: 101 MMOL/L (ref 95–110)
CHOLEST SERPL-MCNC: 122 MG/DL (ref 120–199)
CHOLEST/HDLC SERPL: 2.3 {RATIO} (ref 2–5)
CO2 SERPL-SCNC: 32 MMOL/L (ref 23–29)
CREAT SERPL-MCNC: 1 MG/DL (ref 0.5–1.4)
EST. GFR  (AFRICAN AMERICAN): >60 ML/MIN/1.73 M^2
EST. GFR  (NON AFRICAN AMERICAN): 58.4 ML/MIN/1.73 M^2
ESTIMATED AVG GLUCOSE: 126 MG/DL (ref 68–131)
GLUCOSE SERPL-MCNC: 96 MG/DL (ref 70–110)
HBA1C MFR BLD: 6 % (ref 4–5.6)
HDLC SERPL-MCNC: 54 MG/DL (ref 40–75)
HDLC SERPL: 44.3 % (ref 20–50)
LDLC SERPL CALC-MCNC: 58.8 MG/DL (ref 63–159)
NONHDLC SERPL-MCNC: 68 MG/DL
POTASSIUM SERPL-SCNC: 4.3 MMOL/L (ref 3.5–5.1)
PROT SERPL-MCNC: 6.3 G/DL (ref 6–8.4)
SODIUM SERPL-SCNC: 140 MMOL/L (ref 136–145)
TRIGL SERPL-MCNC: 46 MG/DL (ref 30–150)

## 2021-11-23 PROCEDURE — 80061 LIPID PANEL: CPT | Mod: HCNC | Performed by: FAMILY MEDICINE

## 2021-11-23 PROCEDURE — 83036 HEMOGLOBIN GLYCOSYLATED A1C: CPT | Mod: HCNC | Performed by: FAMILY MEDICINE

## 2021-11-23 PROCEDURE — 36415 COLL VENOUS BLD VENIPUNCTURE: CPT | Mod: HCNC,PO | Performed by: FAMILY MEDICINE

## 2021-11-23 PROCEDURE — 80053 COMPREHEN METABOLIC PANEL: CPT | Mod: HCNC | Performed by: FAMILY MEDICINE

## 2021-12-07 ENCOUNTER — OFFICE VISIT (OUTPATIENT)
Dept: UROLOGY | Facility: CLINIC | Age: 66
End: 2021-12-07
Payer: MEDICARE

## 2021-12-07 VITALS — BODY MASS INDEX: 29.26 KG/M2 | HEIGHT: 56 IN | WEIGHT: 130.06 LBS

## 2021-12-07 DIAGNOSIS — R32 URINARY INCONTINENCE, UNSPECIFIED TYPE: Primary | ICD-10-CM

## 2021-12-07 DIAGNOSIS — R31.29 MICROSCOPIC HEMATURIA: ICD-10-CM

## 2021-12-07 DIAGNOSIS — R39.15 URINARY URGENCY: ICD-10-CM

## 2021-12-07 LAB
BACTERIA #/AREA URNS HPF: NORMAL /HPF
BILIRUB SERPL-MCNC: NEGATIVE MG/DL
BLOOD URINE, POC: 50
COLOR, POC UA: YELLOW
GLUCOSE UR QL STRIP: NORMAL
KETONES UR QL STRIP: NEGATIVE
LEUKOCYTE ESTERASE URINE, POC: NEGATIVE
MICROSCOPIC COMMENT: NORMAL
NITRITE, POC UA: NEGATIVE
PH, POC UA: 5
POC RESIDUAL URINE VOLUME: 49 ML (ref 0–100)
PROTEIN, POC: NEGATIVE
RBC #/AREA URNS HPF: 0 /HPF (ref 0–4)
SPECIFIC GRAVITY, POC UA: 1
UROBILINOGEN, POC UA: NORMAL
WBC #/AREA URNS HPF: 2 /HPF (ref 0–5)

## 2021-12-07 PROCEDURE — 99214 OFFICE O/P EST MOD 30 MIN: CPT | Mod: PBBFAC | Performed by: NURSE PRACTITIONER

## 2021-12-07 PROCEDURE — 51798 PR MEAS,POST-VOID RES,US,NON-IMAGING: ICD-10-PCS | Mod: HCNC,S$GLB,, | Performed by: NURSE PRACTITIONER

## 2021-12-07 PROCEDURE — 51798 US URINE CAPACITY MEASURE: CPT | Mod: HCNC,S$GLB,, | Performed by: NURSE PRACTITIONER

## 2021-12-07 PROCEDURE — 99204 PR OFFICE/OUTPT VISIT, NEW, LEVL IV, 45-59 MIN: ICD-10-PCS | Mod: HCNC,S$GLB,, | Performed by: NURSE PRACTITIONER

## 2021-12-07 PROCEDURE — 4010F PR ACE/ARB THEARPY RXD/TAKEN: ICD-10-PCS | Mod: HCNC,CPTII,S$GLB, | Performed by: NURSE PRACTITIONER

## 2021-12-07 PROCEDURE — 99204 OFFICE O/P NEW MOD 45 MIN: CPT | Mod: HCNC,S$GLB,, | Performed by: NURSE PRACTITIONER

## 2021-12-07 PROCEDURE — 99999 PR PBB SHADOW E&M-EST. PATIENT-LVL IV: ICD-10-PCS | Mod: PBBFAC,,, | Performed by: NURSE PRACTITIONER

## 2021-12-07 PROCEDURE — 81000 URINALYSIS NONAUTO W/SCOPE: CPT | Performed by: NURSE PRACTITIONER

## 2021-12-07 PROCEDURE — 99999 PR PBB SHADOW E&M-EST. PATIENT-LVL IV: CPT | Mod: PBBFAC,,, | Performed by: NURSE PRACTITIONER

## 2021-12-07 PROCEDURE — 99499 UNLISTED E&M SERVICE: CPT | Mod: HCNC,S$GLB,, | Performed by: NURSE PRACTITIONER

## 2021-12-07 PROCEDURE — 87086 URINE CULTURE/COLONY COUNT: CPT | Performed by: NURSE PRACTITIONER

## 2021-12-07 PROCEDURE — 99499 RISK ADDL DX/OHS AUDIT: ICD-10-PCS | Mod: HCNC,S$GLB,, | Performed by: NURSE PRACTITIONER

## 2021-12-07 PROCEDURE — 81001 PR  URINALYSIS, AUTO, W/SCOPE: ICD-10-PCS | Mod: HCNC,S$GLB,, | Performed by: NURSE PRACTITIONER

## 2021-12-07 PROCEDURE — 81001 URINALYSIS AUTO W/SCOPE: CPT | Mod: HCNC,S$GLB,, | Performed by: NURSE PRACTITIONER

## 2021-12-07 PROCEDURE — 4010F ACE/ARB THERAPY RXD/TAKEN: CPT | Mod: HCNC,CPTII,S$GLB, | Performed by: NURSE PRACTITIONER

## 2021-12-08 RX ORDER — IPRATROPIUM BROMIDE AND ALBUTEROL SULFATE 2.5; .5 MG/3ML; MG/3ML
3 SOLUTION RESPIRATORY (INHALATION) EVERY 6 HOURS PRN
Qty: 1 EACH | Refills: 5 | Status: SHIPPED | OUTPATIENT
Start: 2021-12-08 | End: 2023-01-25 | Stop reason: SDUPTHER

## 2021-12-09 LAB — BACTERIA UR CULT: NO GROWTH

## 2021-12-14 ENCOUNTER — OFFICE VISIT (OUTPATIENT)
Dept: PULMONOLOGY | Facility: CLINIC | Age: 66
End: 2021-12-14
Payer: MEDICARE

## 2021-12-14 VITALS
WEIGHT: 129.19 LBS | TEMPERATURE: 97 F | BODY MASS INDEX: 29.06 KG/M2 | HEIGHT: 56 IN | DIASTOLIC BLOOD PRESSURE: 73 MMHG | SYSTOLIC BLOOD PRESSURE: 141 MMHG | HEART RATE: 78 BPM | OXYGEN SATURATION: 95 %

## 2021-12-14 DIAGNOSIS — F17.210 CIGARETTE SMOKER ONE HALF PACK A DAY OR LESS: ICD-10-CM

## 2021-12-14 DIAGNOSIS — Z71.89 GOALS OF CARE, COUNSELING/DISCUSSION: ICD-10-CM

## 2021-12-14 DIAGNOSIS — J43.2 CENTRILOBULAR EMPHYSEMA: ICD-10-CM

## 2021-12-14 DIAGNOSIS — I27.20 PULMONARY HTN: ICD-10-CM

## 2021-12-14 PROCEDURE — 99214 OFFICE O/P EST MOD 30 MIN: CPT | Mod: HCNC,S$GLB,, | Performed by: INTERNAL MEDICINE

## 2021-12-14 PROCEDURE — 99999 PR PBB SHADOW E&M-EST. PATIENT-LVL IV: ICD-10-PCS | Mod: PBBFAC,,, | Performed by: INTERNAL MEDICINE

## 2021-12-14 PROCEDURE — 4010F PR ACE/ARB THEARPY RXD/TAKEN: ICD-10-PCS | Mod: HCNC,CPTII,S$GLB, | Performed by: INTERNAL MEDICINE

## 2021-12-14 PROCEDURE — 99999 PR PBB SHADOW E&M-EST. PATIENT-LVL IV: CPT | Mod: PBBFAC,,, | Performed by: INTERNAL MEDICINE

## 2021-12-14 PROCEDURE — 4010F ACE/ARB THERAPY RXD/TAKEN: CPT | Mod: HCNC,CPTII,S$GLB, | Performed by: INTERNAL MEDICINE

## 2021-12-14 PROCEDURE — 99214 PR OFFICE/OUTPT VISIT, EST, LEVL IV, 30-39 MIN: ICD-10-PCS | Mod: HCNC,S$GLB,, | Performed by: INTERNAL MEDICINE

## 2021-12-14 PROCEDURE — 99214 OFFICE O/P EST MOD 30 MIN: CPT | Mod: PBBFAC | Performed by: INTERNAL MEDICINE

## 2022-01-03 ENCOUNTER — CLINICAL SUPPORT (OUTPATIENT)
Dept: SMOKING CESSATION | Facility: CLINIC | Age: 67
End: 2022-01-03
Payer: COMMERCIAL

## 2022-01-03 DIAGNOSIS — F17.200 NICOTINE DEPENDENCE: Primary | ICD-10-CM

## 2022-01-03 PROCEDURE — 99407 BEHAV CHNG SMOKING > 10 MIN: CPT | Mod: S$GLB,,,

## 2022-01-03 PROCEDURE — 99407 PR TOBACCO USE CESSATION INTENSIVE >10 MINUTES: ICD-10-PCS | Mod: S$GLB,,,

## 2022-01-03 NOTE — PROGRESS NOTES
Spoke with patient today in regard to smoking cessation progress for 12 month telephone follow up, she states not tobacco free. Patient states not interested in returning to the program at this time.  Patient states she will contact us at a later time to schedule an appointment. Informed patient of benefit period, future follow up, and contact information if any further help or support is needed. Will complete smart form for 12 month follow up and resolve Quit attempt #1.

## 2022-01-30 DIAGNOSIS — J43.2 CENTRILOBULAR EMPHYSEMA: ICD-10-CM

## 2022-01-30 NOTE — TELEPHONE ENCOUNTER
No new care gaps identified.  Powered by [employer]. Reference number: [id number].   1/30/2022 3:11:21 AM CST

## 2022-02-09 RX ORDER — LORATADINE 10 MG/1
TABLET ORAL
Qty: 90 TABLET | Refills: 2 | Status: SHIPPED | OUTPATIENT
Start: 2022-02-09 | End: 2022-11-11

## 2022-02-09 NOTE — TELEPHONE ENCOUNTER
Refill Authorization Note   Hollie Ponce  is requesting a refill authorization.  Brief Assessment and Rationale for Refill:  Approve     Medication Therapy Plan:       Medication Reconciliation Completed: No   Comments:   --->Care Gap information included below if applicable.   Orders Placed This Encounter    loratadine (CLARITIN) 10 mg tablet      Requested Prescriptions   Signed Prescriptions Disp Refills    loratadine (CLARITIN) 10 mg tablet 90 tablet 2     Sig: TAKE 1 TABLET(10 MG) BY MOUTH DAILY AS NEEDED FOR ALLERGIES       Ear, Nose, and Throat:  Antihistamines Passed - 1/30/2022  3:10 AM        Passed - Patient is at least 18 years old        Passed - Valid encounter within last 15 months     Recent Visits  Date Type Provider Dept   11/09/21 Office Visit Navdeep Marquez MD [facility] Family Medicine   04/21/21 Office Visit Navdeep Marquez MD [facility] Medicine   11/24/20 Office Visit Navdeep Marquez MD [facility] Medicine   Showing recent visits within past 720 days and meeting all other requirements  Future Appointments  No visits were found meeting these conditions.  Showing future appointments within next 150 days and meeting all other requirements                    Appointments  past 12m or future 3m with PCP    Date Provider   Last Visit   11/9/2021 Navdeep Marquez MD   Next Visit   Visit date not found Navdeep Marquez MD   ED visits in past 90 days: 0     Note composed:2:55 PM 02/09/2022

## 2022-02-11 ENCOUNTER — PATIENT MESSAGE (OUTPATIENT)
Dept: PULMONOLOGY | Facility: CLINIC | Age: 67
End: 2022-02-11
Payer: MEDICARE

## 2022-02-11 DIAGNOSIS — J44.1 COPD EXACERBATION: ICD-10-CM

## 2022-02-11 NOTE — TELEPHONE ENCOUNTER
Message sent to Dr. Carlos.    Sasha, [facility]  Pulm/Sleep [facility]  [phone]

## 2022-02-12 RX ORDER — FLUTICASONE PROPIONATE AND SALMETEROL XINAFOATE 230; 21 UG/1; UG/1
2 AEROSOL, METERED RESPIRATORY (INHALATION) 2 TIMES DAILY
Qty: 12 G | Refills: 11 | Status: SHIPPED | OUTPATIENT
Start: 2022-02-12 | End: 2022-11-15 | Stop reason: SDUPTHER

## 2022-02-16 DIAGNOSIS — J43.2 CENTRILOBULAR EMPHYSEMA: ICD-10-CM

## 2022-02-16 RX ORDER — ALBUTEROL SULFATE 90 UG/1
AEROSOL, METERED RESPIRATORY (INHALATION)
Qty: 8.5 G | Refills: 2 | Status: SHIPPED | OUTPATIENT
Start: 2022-02-16 | End: 2022-02-21

## 2022-02-16 NOTE — TELEPHONE ENCOUNTER
No new care gaps identified.  Powered by [employer]. Reference number: [id number].   2/16/2022 12:25:14 PM CST

## 2022-02-16 NOTE — TELEPHONE ENCOUNTER
----- Message from Tomy Perdomo sent at 2/16/2022 12:06 PM CST -----  Regarding: refill  Type: RX Refill Request    Who Called: Hollie     Refill or New Rx:refill     RX Name and Strength:albuterol (PROVENTIL/VENTOLIN HFA) 90 mcg/actuation inhaler    Is this a 30 day or 90 day RX:30 day     Preferred Pharmacy with phone number:[id number]  [place] [address] [address] [facility] [address]    Would the patient rather a call back or a response via [facility]? Call back     Best Call Back Number: [phone]

## 2022-04-18 ENCOUNTER — PATIENT MESSAGE (OUTPATIENT)
Dept: ADMINISTRATIVE | Facility: HOSPITAL | Age: 67
End: 2022-04-18
Payer: MEDICARE

## 2022-04-19 DIAGNOSIS — I73.9 PAD (PERIPHERAL ARTERY DISEASE): ICD-10-CM

## 2022-04-19 DIAGNOSIS — I25.2 HISTORY OF NON-ST ELEVATION MYOCARDIAL INFARCTION (NSTEMI): ICD-10-CM

## 2022-04-19 RX ORDER — ROSUVASTATIN CALCIUM 20 MG/1
TABLET, COATED ORAL
Qty: 90 TABLET | Refills: 3 | Status: SHIPPED | OUTPATIENT
Start: 2022-04-19 | End: 2023-04-13 | Stop reason: SDUPTHER

## 2022-04-19 NOTE — TELEPHONE ENCOUNTER
No new care gaps identified.  Powered by [employer]. Reference number: [id number].   4/19/2022 3:11:22 AM CDT

## 2022-04-19 NOTE — TELEPHONE ENCOUNTER
Refill Routing Note   Medication(s) are not appropriate for processing by [facility] for the following reason(s):      - Indication is outside of scope for [facility]    [facility] action(s):  Defer       Medication Therapy Plan: Indication outside of protocol  Medication reconciliation completed: No     Appointments  past 12m or future 3m with PCP    Date Provider   Last Visit   11/9/2021 Navdeep Marquez MD   Next Visit   Visit date not found Navdeep Marquez MD   ED visits in past 90 days: 0        Note composed:12:27 PM 04/19/2022

## 2022-05-03 DIAGNOSIS — I10 ESSENTIAL HYPERTENSION: ICD-10-CM

## 2022-05-04 RX ORDER — HYDROCHLOROTHIAZIDE 25 MG/1
TABLET ORAL
Qty: 90 TABLET | Refills: 1 | Status: SHIPPED | OUTPATIENT
Start: 2022-05-04 | End: 2022-08-12 | Stop reason: SDUPTHER

## 2022-05-15 DIAGNOSIS — I10 ESSENTIAL HYPERTENSION: ICD-10-CM

## 2022-05-15 DIAGNOSIS — I25.2 HISTORY OF NON-ST ELEVATION MYOCARDIAL INFARCTION (NSTEMI): ICD-10-CM

## 2022-05-15 DIAGNOSIS — I73.9 PAD (PERIPHERAL ARTERY DISEASE): ICD-10-CM

## 2022-05-17 RX ORDER — CARVEDILOL 3.12 MG/1
TABLET ORAL
Qty: 180 TABLET | Refills: 1 | Status: SHIPPED | OUTPATIENT
Start: 2022-05-17 | End: 2022-08-12 | Stop reason: SDUPTHER

## 2022-05-17 RX ORDER — EZETIMIBE 10 MG/1
TABLET ORAL
Qty: 90 TABLET | Refills: 1 | Status: SHIPPED | OUTPATIENT
Start: 2022-05-17 | End: 2022-08-12 | Stop reason: SDUPTHER

## 2022-05-17 RX ORDER — CLOPIDOGREL BISULFATE 75 MG/1
TABLET ORAL
Qty: 90 TABLET | Refills: 1 | Status: SHIPPED | OUTPATIENT
Start: 2022-05-17 | End: 2022-10-07

## 2022-06-06 ENCOUNTER — TELEPHONE (OUTPATIENT)
Dept: FAMILY MEDICINE | Facility: CLINIC | Age: 67
End: 2022-06-06
Payer: MEDICARE

## 2022-06-06 DIAGNOSIS — U07.1 COVID: ICD-10-CM

## 2022-06-06 DIAGNOSIS — J44.1 COPD EXACERBATION: Primary | ICD-10-CM

## 2022-06-06 NOTE — TELEPHONE ENCOUNTER
----- Message from Digna Macdonald sent at 6/6/2022  7:37 AM CDT -----  Regarding: Self/   [phone]  Type: Patient Call Back    Who called:  Patient    What is the request in detail:  Patient took home covid test and it came back positive.  She's wanting to know if something can be called in for her due to her having COPD and asthma.  She's asking for a call back from staff.  Thank you    Would the patient rather a call back or a response via [facility]?   Call back    Best call back number:  [phone] (home)             Thank you

## 2022-06-06 NOTE — TELEPHONE ENCOUNTER
Pt currently taking mucinex and  Tylenol for her symptoms; she is asking for other suggestions for her cough or if RX can be sent in; tested positive on saturday

## 2022-06-06 NOTE — TELEPHONE ENCOUNTER
----- Message from Polina Carroll sent at 6/6/2022  9:11 AM CDT -----  Regarding: self  .Type: Patient Call Back    Who called: self     What is the request in detail: states she tested positive for covid on Saturday - nasal congestion, cough and fever - please advise.     Can the clinic reply by [facility]? No     Would the patient rather a call back or a response via [facility]? Call     Best call back number: .[phone]      Additional Information: .  [id number] - [place], [place] - [facility] AT [address]  [facility] [address]  Phone: [phone] Fax: [phone]

## 2022-06-07 RX ORDER — AZITHROMYCIN 250 MG/1
TABLET, FILM COATED ORAL
Qty: 6 TABLET | Refills: 0 | Status: SHIPPED | OUTPATIENT
Start: 2022-06-07 | End: 2023-04-13 | Stop reason: ALTCHOICE

## 2022-06-07 RX ORDER — BENZONATATE 100 MG/1
100 CAPSULE ORAL 3 TIMES DAILY PRN
Qty: 30 CAPSULE | Refills: 0 | Status: SHIPPED | OUTPATIENT
Start: 2022-06-07 | End: 2022-06-17

## 2022-06-07 NOTE — TELEPHONE ENCOUNTER
Would pt be interested in antibody infusion if she qualifies? I do not recommend paxlovid as she is on plavix.  Otherwise I can send cough syrup in, she should stay hydrated and go to ER if difficulty breathing occurs

## 2022-06-07 NOTE — TELEPHONE ENCOUNTER
Pt informed of below; she would like tessalon sent in for cough since it helped in the past; also asking for zpak to help with ear pain and congestion

## 2022-06-19 DIAGNOSIS — J43.2 CENTRILOBULAR EMPHYSEMA: ICD-10-CM

## 2022-06-19 NOTE — TELEPHONE ENCOUNTER
No new care gaps identified.  [facility] Embedded Care Gaps. Reference number: [id number]. 6/19/2022   3:11:22 AM CDT   pt awake and alert, vital signs stable, leg placed in cast, denying any pain

## 2022-06-20 RX ORDER — MONTELUKAST SODIUM 10 MG/1
TABLET ORAL
Qty: 90 TABLET | Refills: 1 | Status: SHIPPED | OUTPATIENT
Start: 2022-06-20 | End: 2022-08-12 | Stop reason: SDUPTHER

## 2022-06-21 NOTE — TELEPHONE ENCOUNTER
Refill Decision Note   Hollie Ponce  is requesting a refill authorization.  Brief Assessment and Rationale for Refill:  Approve     Medication Therapy Plan:       Medication Reconciliation Completed: No   Comments:     No Care Gaps recommended.     Note composed:9:02 PM 06/20/2022

## 2022-07-06 ENCOUNTER — PATIENT OUTREACH (OUTPATIENT)
Dept: ADMINISTRATIVE | Facility: HOSPITAL | Age: 67
End: 2022-07-06
Payer: MEDICARE

## 2022-07-06 NOTE — PROGRESS NOTES
Called patient to follow up on blood pressure  Not currently taking blood pressure  Annual physical scheduled 8-12-22

## 2022-07-18 ENCOUNTER — OFFICE VISIT (OUTPATIENT)
Dept: FAMILY MEDICINE | Facility: CLINIC | Age: 67
End: 2022-07-18
Payer: MEDICARE

## 2022-07-18 VITALS
TEMPERATURE: 99 F | HEIGHT: 56 IN | SYSTOLIC BLOOD PRESSURE: 152 MMHG | BODY MASS INDEX: 28.77 KG/M2 | HEART RATE: 80 BPM | WEIGHT: 127.88 LBS | OXYGEN SATURATION: 96 % | DIASTOLIC BLOOD PRESSURE: 84 MMHG | RESPIRATION RATE: 18 BRPM

## 2022-07-18 DIAGNOSIS — I27.20 PULMONARY HTN: ICD-10-CM

## 2022-07-18 DIAGNOSIS — J43.2 CENTRILOBULAR EMPHYSEMA: ICD-10-CM

## 2022-07-18 DIAGNOSIS — R09.82 POST-NASAL DRIP: Primary | ICD-10-CM

## 2022-07-18 DIAGNOSIS — I10 ESSENTIAL HYPERTENSION: ICD-10-CM

## 2022-07-18 DIAGNOSIS — I73.9 PAD (PERIPHERAL ARTERY DISEASE): ICD-10-CM

## 2022-07-18 DIAGNOSIS — Z72.0 CURRENT NICOTINE USE: ICD-10-CM

## 2022-07-18 DIAGNOSIS — H73.91 ABNORMAL TYMPANIC MEMBRANE OF RIGHT EAR: ICD-10-CM

## 2022-07-18 PROCEDURE — 3079F DIAST BP 80-89 MM HG: CPT | Mod: CPTII,S$GLB,, | Performed by: NURSE PRACTITIONER

## 2022-07-18 PROCEDURE — 1101F PT FALLS ASSESS-DOCD LE1/YR: CPT | Mod: CPTII,S$GLB,, | Performed by: NURSE PRACTITIONER

## 2022-07-18 PROCEDURE — 3008F PR BODY MASS INDEX (BMI) DOCUMENTED: ICD-10-PCS | Mod: CPTII,S$GLB,, | Performed by: NURSE PRACTITIONER

## 2022-07-18 PROCEDURE — 99999 PR PBB SHADOW E&M-EST. PATIENT-LVL V: ICD-10-PCS | Mod: PBBFAC,,, | Performed by: NURSE PRACTITIONER

## 2022-07-18 PROCEDURE — 99406 PR TOBACCO USE CESSATION INTERMEDIATE 3-10 MINUTES: ICD-10-PCS | Mod: S$GLB,,, | Performed by: NURSE PRACTITIONER

## 2022-07-18 PROCEDURE — 1126F AMNT PAIN NOTED NONE PRSNT: CPT | Mod: CPTII,S$GLB,, | Performed by: NURSE PRACTITIONER

## 2022-07-18 PROCEDURE — 4010F ACE/ARB THERAPY RXD/TAKEN: CPT | Mod: CPTII,S$GLB,, | Performed by: NURSE PRACTITIONER

## 2022-07-18 PROCEDURE — 3077F SYST BP >= 140 MM HG: CPT | Mod: CPTII,S$GLB,, | Performed by: NURSE PRACTITIONER

## 2022-07-18 PROCEDURE — 1160F RVW MEDS BY RX/DR IN RCRD: CPT | Mod: CPTII,S$GLB,, | Performed by: NURSE PRACTITIONER

## 2022-07-18 PROCEDURE — 1126F PR PAIN SEVERITY QUANTIFIED, NO PAIN PRESENT: ICD-10-PCS | Mod: CPTII,S$GLB,, | Performed by: NURSE PRACTITIONER

## 2022-07-18 PROCEDURE — 1159F MED LIST DOCD IN RCRD: CPT | Mod: CPTII,S$GLB,, | Performed by: NURSE PRACTITIONER

## 2022-07-18 PROCEDURE — 1159F PR MEDICATION LIST DOCUMENTED IN MEDICAL RECORD: ICD-10-PCS | Mod: CPTII,S$GLB,, | Performed by: NURSE PRACTITIONER

## 2022-07-18 PROCEDURE — 3288F PR FALLS RISK ASSESSMENT DOCUMENTED: ICD-10-PCS | Mod: CPTII,S$GLB,, | Performed by: NURSE PRACTITIONER

## 2022-07-18 PROCEDURE — 1101F PR PT FALLS ASSESS DOC 0-1 FALLS W/OUT INJ PAST YR: ICD-10-PCS | Mod: CPTII,S$GLB,, | Performed by: NURSE PRACTITIONER

## 2022-07-18 PROCEDURE — 1160F PR REVIEW ALL MEDS BY PRESCRIBER/CLIN PHARMACIST DOCUMENTED: ICD-10-PCS | Mod: CPTII,S$GLB,, | Performed by: NURSE PRACTITIONER

## 2022-07-18 PROCEDURE — 3077F PR MOST RECENT SYSTOLIC BLOOD PRESSURE >= 140 MM HG: ICD-10-PCS | Mod: CPTII,S$GLB,, | Performed by: NURSE PRACTITIONER

## 2022-07-18 PROCEDURE — 3288F FALL RISK ASSESSMENT DOCD: CPT | Mod: CPTII,S$GLB,, | Performed by: NURSE PRACTITIONER

## 2022-07-18 PROCEDURE — 99999 PR PBB SHADOW E&M-EST. PATIENT-LVL V: CPT | Mod: PBBFAC,,, | Performed by: NURSE PRACTITIONER

## 2022-07-18 PROCEDURE — 4010F PR ACE/ARB THEARPY RXD/TAKEN: ICD-10-PCS | Mod: CPTII,S$GLB,, | Performed by: NURSE PRACTITIONER

## 2022-07-18 PROCEDURE — 99214 PR OFFICE/OUTPT VISIT, EST, LEVL IV, 30-39 MIN: ICD-10-PCS | Mod: 25,S$GLB,, | Performed by: NURSE PRACTITIONER

## 2022-07-18 PROCEDURE — 99499 RISK ADDL DX/OHS AUDIT: ICD-10-PCS | Mod: S$GLB,,, | Performed by: NURSE PRACTITIONER

## 2022-07-18 PROCEDURE — 99214 OFFICE O/P EST MOD 30 MIN: CPT | Mod: 25,S$GLB,, | Performed by: NURSE PRACTITIONER

## 2022-07-18 PROCEDURE — 3079F PR MOST RECENT DIASTOLIC BLOOD PRESSURE 80-89 MM HG: ICD-10-PCS | Mod: CPTII,S$GLB,, | Performed by: NURSE PRACTITIONER

## 2022-07-18 PROCEDURE — 99406 BEHAV CHNG SMOKING 3-10 MIN: CPT | Mod: S$GLB,,, | Performed by: NURSE PRACTITIONER

## 2022-07-18 PROCEDURE — 3008F BODY MASS INDEX DOCD: CPT | Mod: CPTII,S$GLB,, | Performed by: NURSE PRACTITIONER

## 2022-07-18 PROCEDURE — 99499 UNLISTED E&M SERVICE: CPT | Mod: S$GLB,,, | Performed by: NURSE PRACTITIONER

## 2022-07-18 RX ORDER — FLUTICASONE PROPIONATE 50 MCG
2 SPRAY, SUSPENSION (ML) NASAL DAILY
Qty: 15.8 ML | Refills: 0 | Status: SHIPPED | OUTPATIENT
Start: 2022-07-18 | End: 2022-08-24 | Stop reason: SDUPTHER

## 2022-07-18 NOTE — PROGRESS NOTES
Routine Office Visit    Patient Name: Hollie Ponce    : 1955  MRN: [id number]    Chief Complaint:  Congestion, ear symptoms    Subjective:  Hollie is a 67 y.o. female who presents today for:    1. Nasal congestion, ear symptoms - patient who is new to me with history of COPD, hypertension, PAD reports today for evaluation.  Two days ago she developed a sensation of her right ear being plugged as well as some ear pain and right-sided nasal congestion.  The ear pain has resolved but she still has a muffled feeling in the right ear and she also denies any discharge from the ear.  No fevers or chills.  +postnasal drip.  She did test positive for COVID last month treated with Tessalon and azithromycin.  She does not use any Q-tips in the ear, however she does use Андрей pins occasionally to clean her ears.  She denies any cough and states that her breathing is stable today.  She is still smoking 3 cigarettes a day.    Past Medical History  Past Medical History:   Diagnosis Date    Asthma     COPD (chronic obstructive pulmonary disease)     Hypertension        Past Surgical History  Past Surgical History:   Procedure Laterality Date     SECTION      HERNIA REPAIR      LEFT HEART CATHETERIZATION Left 2020    Procedure: Left heart cath;  Surgeon: Shabnam Vernon MD;  [facility] CATH LAB;  Service: Cardiology;  Laterality: Left;       Family History  Family History   Problem Relation Age of Onset    Cancer Mother         Bladder    Liver disease Father        Social History  Social History     Socioeconomic History    Marital status: Single   Tobacco Use    Smoking status: Former Smoker     Packs/day: 0.50     Years: 50.00     Pack years: 25.00     Types: Cigarettes     Quit date: 2020     Years since quittin.5    Smokeless tobacco: Never Used   Substance and Sexual Activity    Alcohol use: Never    Drug use: Never    Sexual activity: Not Currently       Current  Medications  Current Outpatient Medications on File Prior to Visit   Medication Sig Dispense Refill    albuterol (PROVENTIL/VENTOLIN HFA) 90 mcg/actuation inhaler INHALE 2 PUFFS INTO THE LUNGS EVERY 6 HOURS AS NEEDED FOR WHEEZING OR RESCUE 8.5 g 2    albuterol-ipratropium (DUO-NEB) 2.5 mg-0.5 mg/3 mL nebulizer solution Take 3 mLs by nebulization every 6 (six) hours as needed for Wheezing or Shortness of Breath (cough). Rescue 1 each 5    azithromycin (Z-MEENA) 250 MG tablet Take 2 pills day 1, then 1 pill day 2-5 6 tablet 0    carvediloL (COREG) 3.125 MG tablet TAKE 1 TABLET(3.125 MG) BY MOUTH TWICE DAILY WITH MEALS 180 tablet 1    clopidogreL (PLAVIX) 75 mg tablet TAKE 1 TABLET(75 MG) BY MOUTH EVERY DAY 90 tablet 1    cyclobenzaprine (FLEXERIL) 10 MG tablet TAKE 1 TABLET(10 MG) BY MOUTH EVERY NIGHT AS NEEDED FOR MUSCLE SPASMS 90 tablet 1    ezetimibe (ZETIA) 10 mg tablet TAKE 1 TABLET(10 MG) BY MOUTH EVERY DAY 90 tablet 1    fluticasone-salmeterol 230-21 mcg/dose (ADVAIR HFA) 230-21 mcg/actuation HFAA inhaler Inhale 2 puffs into the lungs 2 (two) times daily. Controller 12 g 11    hydroCHLOROthiazide (HYDRODIURIL) 25 MG tablet TAKE 1 TABLET(25 MG) BY MOUTH EVERY DAY 90 tablet 1    lisinopriL (PRINIVIL,ZESTRIL) 5 MG tablet TAKE 1 TABLET BY MOUTH DAILY 90 tablet 0    loratadine (CLARITIN) 10 mg tablet TAKE 1 TABLET(10 MG) BY MOUTH DAILY AS NEEDED FOR ALLERGIES 90 tablet 2    miscellaneous medical supply (470V TWO WAY VALVE) Misc Disp nebulizer and tubing ,medicine reservoir,and mask  So as to use  Every 4-6 hrs for sob/wheeze      montelukast (SINGULAIR) 10 mg tablet TAKE 1 TABLET(10 MG) BY MOUTH EVERY EVENING 90 tablet 1    nicotine polacrilex 2 MG Lozg Take 1 lozenge (2 mg total) by mouth as needed. Use 1-2 per hour in place of a cigarette. Limit to 6 a day. 108 lozenge 0    potassium chloride (KLOR-CON) 8 MEQ TbSR TK 1 T PO D      rosuvastatin (CRESTOR) 20 MG tablet TAKE 1 TABLET(20 MG) BY MOUTH  "EVERY EVENING 90 tablet 3    tiotropium (SPIRIVA WITH HANDIHALER) 18 mcg inhalation capsule Inhale 1 capsule (18 mcg total) into the lungs once daily. Controller 90 capsule 3     No current facility-administered medications on file prior to visit.       Allergies   Review of patient's allergies indicates:   Allergen Reactions    Flagyl [metronidazole hcl]      Hives      Sulfamethoxazole-trimethoprim Itching    Aspirin Nausea Only    Lipitor [atorvastatin]      Myalgia        Review of Systems (Pertinent positives)  Review of Systems   Constitutional: Negative.  Negative for chills and fever.   HENT: Positive for congestion and hearing loss ("muffled" righr ear). Negative for ear discharge, ear pain, nosebleeds, sinus pain, sore throat and tinnitus.    Eyes: Negative.    Respiratory: Negative for cough, hemoptysis, sputum production, shortness of breath, wheezing and stridor.    Cardiovascular: Negative.    Gastrointestinal: Negative.    Genitourinary: Negative.    Musculoskeletal: Negative.    Skin: Negative.    Neurological: Negative.    Endo/Heme/Allergies: Negative.    Psychiatric/Behavioral: Negative.        BP (!) 152/84   Pulse 80   Temp 98.5 °F (36.9 °C) (Oral)   Resp 18   Ht 4' 8" (1.422 m)   Wt 58 kg (127 lb 13.9 oz)   SpO2 96%   BMI 28.67 kg/m²     Physical Exam  Vitals reviewed.   Constitutional:       General: She is not in acute distress.     Appearance: Normal appearance. She is not ill-appearing, toxic-appearing or diaphoretic.   HENT:      Head: Normocephalic and atraumatic.      Right Ear: Ear canal and external ear normal. No drainage, swelling or tenderness. No middle ear effusion. There is no impacted cerumen. Tympanic membrane is not injected, scarred, perforated, erythematous, retracted or bulging.      Left Ear: Tympanic membrane, ear canal and external ear normal. No drainage, swelling or tenderness.  No middle ear effusion. There is no impacted cerumen. Tympanic membrane is not " injected, scarred, perforated, erythematous, retracted or bulging.      Ears:        Nose: Nose normal.      Mouth/Throat:      Mouth: Mucous membranes are moist.      Pharynx: Uvula midline.      Tonsils: No tonsillar exudate or tonsillar abscesses.     Cardiovascular:      Rate and Rhythm: Normal rate and regular rhythm.      Pulses: Normal pulses.      Heart sounds: Normal heart sounds.   Pulmonary:      Effort: Pulmonary effort is normal.      Breath sounds: Normal breath sounds. No wheezing or rales.   Abdominal:      General: Bowel sounds are normal. There is no distension.      Palpations: Abdomen is soft.      Tenderness: There is no abdominal tenderness.   Skin:     General: Skin is warm and dry.      Capillary Refill: Capillary refill takes less than 2 seconds.   Neurological:      General: No focal deficit present.      Mental Status: She is alert and oriented to person, place, and time.   Psychiatric:         Mood and Affect: Mood normal.         Behavior: Behavior normal.          Assessment/Plan:  Hollie Ponce is a 67 y.o. female who presents today for :    Diagnoses and all orders for this visit:    Post-nasal drip  -     fluticasone propionate (FLONASE) 50 mcg/actuation nasal spray; 2 sprays (100 mcg total) by Each Nostril route once daily.  -     Ambulatory referral/consult to ENT; Future    No bacterial nidus on examination.  Symptoms likely due to eustachian tube dysfunction.  She has tried antihistamine and generic Singulair for the symptoms without significant benefit.  Will add Flonase.  Given bruising on tympanic membrane will refer to ENT.  Would recommend no instrumentation into ears.    Abnormal tympanic membrane of right ear  -     Ambulatory referral/consult to ENT; Future    As above.    Essential hypertension    BP uncontrolled today.  Patient has not taken her blood pressure medication yet.  Recommended follow-up with PCP as already scheduled next month.    Current nicotine  use    Importance of smoking cessation discussed with patient. 3 minutes spent counseling patient on risks of smoking, benefits of stopping and ways of stopping. Patient is trying to quit.    Pulmonary HTN    No acute concerns.    PAD (peripheral artery disease)    No acute concerns.    Centrilobular emphysema    No acute concerns.        This office note has been dictated.  This dictation has been generated using M-Modal Fluency Direct dictation; some phonetic errors may occur.   My collaborating physician is Dr. Allen Shankar.

## 2022-08-12 ENCOUNTER — OFFICE VISIT (OUTPATIENT)
Dept: FAMILY MEDICINE | Facility: CLINIC | Age: 67
End: 2022-08-12
Payer: MEDICARE

## 2022-08-12 ENCOUNTER — LAB VISIT (OUTPATIENT)
Dept: LAB | Facility: HOSPITAL | Age: 67
End: 2022-08-12
Attending: FAMILY MEDICINE
Payer: MEDICARE

## 2022-08-12 VITALS
RESPIRATION RATE: 18 BRPM | HEART RATE: 85 BPM | DIASTOLIC BLOOD PRESSURE: 60 MMHG | OXYGEN SATURATION: 99 % | WEIGHT: 129.19 LBS | TEMPERATURE: 98 F | HEIGHT: 56 IN | SYSTOLIC BLOOD PRESSURE: 140 MMHG | BODY MASS INDEX: 29.06 KG/M2

## 2022-08-12 DIAGNOSIS — N18.31 CHRONIC KIDNEY DISEASE, STAGE 3A: ICD-10-CM

## 2022-08-12 DIAGNOSIS — I10 ESSENTIAL HYPERTENSION: ICD-10-CM

## 2022-08-12 DIAGNOSIS — Z00.00 ANNUAL PHYSICAL EXAM: Primary | ICD-10-CM

## 2022-08-12 DIAGNOSIS — Z78.0 POST-MENOPAUSAL: ICD-10-CM

## 2022-08-12 DIAGNOSIS — I73.9 PAD (PERIPHERAL ARTERY DISEASE): ICD-10-CM

## 2022-08-12 DIAGNOSIS — R73.03 PREDIABETES: ICD-10-CM

## 2022-08-12 DIAGNOSIS — Z00.00 ANNUAL PHYSICAL EXAM: ICD-10-CM

## 2022-08-12 DIAGNOSIS — J43.2 CENTRILOBULAR EMPHYSEMA: ICD-10-CM

## 2022-08-12 DIAGNOSIS — I25.2 HISTORY OF NON-ST ELEVATION MYOCARDIAL INFARCTION (NSTEMI): ICD-10-CM

## 2022-08-12 DIAGNOSIS — Z12.31 ENCOUNTER FOR SCREENING MAMMOGRAM FOR BREAST CANCER: ICD-10-CM

## 2022-08-12 PROCEDURE — 1159F MED LIST DOCD IN RCRD: CPT | Mod: CPTII,S$GLB,, | Performed by: FAMILY MEDICINE

## 2022-08-12 PROCEDURE — 3288F FALL RISK ASSESSMENT DOCD: CPT | Mod: CPTII,S$GLB,, | Performed by: FAMILY MEDICINE

## 2022-08-12 PROCEDURE — 99397 PER PM REEVAL EST PAT 65+ YR: CPT | Mod: S$GLB,,, | Performed by: FAMILY MEDICINE

## 2022-08-12 PROCEDURE — 3078F DIAST BP <80 MM HG: CPT | Mod: CPTII,S$GLB,, | Performed by: FAMILY MEDICINE

## 2022-08-12 PROCEDURE — 99499 RISK ADDL DX/OHS AUDIT: ICD-10-PCS | Mod: S$GLB,,, | Performed by: FAMILY MEDICINE

## 2022-08-12 PROCEDURE — 99397 PR PREVENTIVE VISIT,EST,65 & OVER: ICD-10-PCS | Mod: S$GLB,,, | Performed by: FAMILY MEDICINE

## 2022-08-12 PROCEDURE — 1101F PR PT FALLS ASSESS DOC 0-1 FALLS W/OUT INJ PAST YR: ICD-10-PCS | Mod: CPTII,S$GLB,, | Performed by: FAMILY MEDICINE

## 2022-08-12 PROCEDURE — 3077F PR MOST RECENT SYSTOLIC BLOOD PRESSURE >= 140 MM HG: ICD-10-PCS | Mod: CPTII,S$GLB,, | Performed by: FAMILY MEDICINE

## 2022-08-12 PROCEDURE — 84443 ASSAY THYROID STIM HORMONE: CPT | Performed by: FAMILY MEDICINE

## 2022-08-12 PROCEDURE — 4010F PR ACE/ARB THEARPY RXD/TAKEN: ICD-10-PCS | Mod: CPTII,S$GLB,, | Performed by: FAMILY MEDICINE

## 2022-08-12 PROCEDURE — 36415 COLL VENOUS BLD VENIPUNCTURE: CPT | Mod: PO | Performed by: FAMILY MEDICINE

## 2022-08-12 PROCEDURE — 80061 LIPID PANEL: CPT | Performed by: FAMILY MEDICINE

## 2022-08-12 PROCEDURE — 3008F BODY MASS INDEX DOCD: CPT | Mod: CPTII,S$GLB,, | Performed by: FAMILY MEDICINE

## 2022-08-12 PROCEDURE — 1126F PR PAIN SEVERITY QUANTIFIED, NO PAIN PRESENT: ICD-10-PCS | Mod: CPTII,S$GLB,, | Performed by: FAMILY MEDICINE

## 2022-08-12 PROCEDURE — 99999 PR PBB SHADOW E&M-EST. PATIENT-LVL IV: CPT | Mod: PBBFAC,,, | Performed by: FAMILY MEDICINE

## 2022-08-12 PROCEDURE — 3008F PR BODY MASS INDEX (BMI) DOCUMENTED: ICD-10-PCS | Mod: CPTII,S$GLB,, | Performed by: FAMILY MEDICINE

## 2022-08-12 PROCEDURE — 4010F ACE/ARB THERAPY RXD/TAKEN: CPT | Mod: CPTII,S$GLB,, | Performed by: FAMILY MEDICINE

## 2022-08-12 PROCEDURE — 3044F PR MOST RECENT HEMOGLOBIN A1C LEVEL <7.0%: ICD-10-PCS | Mod: CPTII,S$GLB,, | Performed by: FAMILY MEDICINE

## 2022-08-12 PROCEDURE — 3044F HG A1C LEVEL LT 7.0%: CPT | Mod: CPTII,S$GLB,, | Performed by: FAMILY MEDICINE

## 2022-08-12 PROCEDURE — 83036 HEMOGLOBIN GLYCOSYLATED A1C: CPT | Performed by: FAMILY MEDICINE

## 2022-08-12 PROCEDURE — 3288F PR FALLS RISK ASSESSMENT DOCUMENTED: ICD-10-PCS | Mod: CPTII,S$GLB,, | Performed by: FAMILY MEDICINE

## 2022-08-12 PROCEDURE — 80053 COMPREHEN METABOLIC PANEL: CPT | Performed by: FAMILY MEDICINE

## 2022-08-12 PROCEDURE — 85025 COMPLETE CBC W/AUTO DIFF WBC: CPT | Performed by: FAMILY MEDICINE

## 2022-08-12 PROCEDURE — 1159F PR MEDICATION LIST DOCUMENTED IN MEDICAL RECORD: ICD-10-PCS | Mod: CPTII,S$GLB,, | Performed by: FAMILY MEDICINE

## 2022-08-12 PROCEDURE — 3078F PR MOST RECENT DIASTOLIC BLOOD PRESSURE < 80 MM HG: ICD-10-PCS | Mod: CPTII,S$GLB,, | Performed by: FAMILY MEDICINE

## 2022-08-12 PROCEDURE — 1126F AMNT PAIN NOTED NONE PRSNT: CPT | Mod: CPTII,S$GLB,, | Performed by: FAMILY MEDICINE

## 2022-08-12 PROCEDURE — 1101F PT FALLS ASSESS-DOCD LE1/YR: CPT | Mod: CPTII,S$GLB,, | Performed by: FAMILY MEDICINE

## 2022-08-12 PROCEDURE — 99999 PR PBB SHADOW E&M-EST. PATIENT-LVL IV: ICD-10-PCS | Mod: PBBFAC,,, | Performed by: FAMILY MEDICINE

## 2022-08-12 PROCEDURE — 99499 UNLISTED E&M SERVICE: CPT | Mod: S$GLB,,, | Performed by: FAMILY MEDICINE

## 2022-08-12 PROCEDURE — 3077F SYST BP >= 140 MM HG: CPT | Mod: CPTII,S$GLB,, | Performed by: FAMILY MEDICINE

## 2022-08-12 RX ORDER — ALBUTEROL SULFATE 90 UG/1
AEROSOL, METERED RESPIRATORY (INHALATION)
Qty: 8.5 G | Refills: 2 | Status: SHIPPED | OUTPATIENT
Start: 2022-08-12 | End: 2022-08-17 | Stop reason: SDUPTHER

## 2022-08-12 RX ORDER — EZETIMIBE 10 MG/1
10 TABLET ORAL DAILY
Qty: 90 TABLET | Refills: 1 | Status: SHIPPED | OUTPATIENT
Start: 2022-08-12 | End: 2022-08-12

## 2022-08-12 RX ORDER — TIOTROPIUM BROMIDE 18 UG/1
18 CAPSULE ORAL; RESPIRATORY (INHALATION) DAILY
Qty: 90 CAPSULE | Refills: 3 | Status: SHIPPED | OUTPATIENT
Start: 2022-08-12 | End: 2023-01-28

## 2022-08-12 RX ORDER — CARVEDILOL 3.12 MG/1
TABLET ORAL
Qty: 180 TABLET | Refills: 1 | Status: SHIPPED | OUTPATIENT
Start: 2022-08-12 | End: 2022-10-07

## 2022-08-12 RX ORDER — HYDROCHLOROTHIAZIDE 12.5 MG/1
12.5 TABLET ORAL DAILY
Qty: 90 TABLET | Refills: 1 | Status: SHIPPED | OUTPATIENT
Start: 2022-08-12 | End: 2023-04-13

## 2022-08-12 RX ORDER — MONTELUKAST SODIUM 10 MG/1
10 TABLET ORAL NIGHTLY
Qty: 90 TABLET | Refills: 1 | Status: SHIPPED | OUTPATIENT
Start: 2022-08-12 | End: 2023-04-13 | Stop reason: SDUPTHER

## 2022-08-12 RX ORDER — HYDROCHLOROTHIAZIDE 25 MG/1
25 TABLET ORAL DAILY
Qty: 90 TABLET | Refills: 1 | Status: SHIPPED | OUTPATIENT
Start: 2022-08-12 | End: 2022-08-12 | Stop reason: SDUPTHER

## 2022-08-12 RX ORDER — LISINOPRIL 5 MG/1
5 TABLET ORAL DAILY
Qty: 90 TABLET | Refills: 1 | Status: SHIPPED | OUTPATIENT
Start: 2022-08-12 | End: 2023-04-13 | Stop reason: SDUPTHER

## 2022-08-12 NOTE — PROGRESS NOTES
Health Maintenance Due   Topic     TETANUS VACCINE      DEXA Scan  Pending order    Shingles Vaccine (1 of 2)  hx chicken pox. Notified pt can get vaccine at pharmacy    Pneumococcal Vaccines (Age 65+) (3 - PPSV23 or PCV20)     COVID-19 Vaccine (4 - Booster for Moderna series)     Mammogram  Pending order    LDCT Lung Screen  Consult pcp

## 2022-08-12 NOTE — TELEPHONE ENCOUNTER
No new care gaps identified.  [facility] Embedded Care Gaps. Reference number: [id number]. 8/12/2022   6:11:25 AM CDT

## 2022-08-12 NOTE — TELEPHONE ENCOUNTER
Refill Routing Note   Medication(s) are not appropriate for processing by [facility] for the following reason(s):      - Required vitals are abnormal    [facility] action(s):  Defer          Medication reconciliation completed: No     Appointments  past 12m or future 3m with PCP    Date Provider   Last Visit   11/9/2021 Navdeep Marquez MD   Next Visit   8/12/2022 Navdeep Marquez MD   ED visits in past 90 days: 0        Note composed:10:06 AM 08/12/2022

## 2022-08-13 LAB
ALBUMIN SERPL BCP-MCNC: 4.2 G/DL (ref 3.5–5.2)
ALP SERPL-CCNC: 57 U/L (ref 55–135)
ALT SERPL W/O P-5'-P-CCNC: 21 U/L (ref 10–44)
ANION GAP SERPL CALC-SCNC: 7 MMOL/L (ref 8–16)
AST SERPL-CCNC: 20 U/L (ref 10–40)
BASOPHILS # BLD AUTO: 0.02 K/UL (ref 0–0.2)
BASOPHILS NFR BLD: 0.4 % (ref 0–1.9)
BILIRUB SERPL-MCNC: 0.3 MG/DL (ref 0.1–1)
BUN SERPL-MCNC: 12 MG/DL (ref 8–23)
CALCIUM SERPL-MCNC: 9.7 MG/DL (ref 8.7–10.5)
CHLORIDE SERPL-SCNC: 102 MMOL/L (ref 95–110)
CHOLEST SERPL-MCNC: 122 MG/DL (ref 120–199)
CHOLEST/HDLC SERPL: 2.1 {RATIO} (ref 2–5)
CO2 SERPL-SCNC: 28 MMOL/L (ref 23–29)
CREAT SERPL-MCNC: 1.1 MG/DL (ref 0.5–1.4)
DIFFERENTIAL METHOD: ABNORMAL
EOSINOPHIL # BLD AUTO: 0 K/UL (ref 0–0.5)
EOSINOPHIL NFR BLD: 0.4 % (ref 0–8)
ERYTHROCYTE [DISTWIDTH] IN BLOOD BY AUTOMATED COUNT: 14.1 % (ref 11.5–14.5)
EST. GFR  (NO RACE VARIABLE): 54.7 ML/MIN/1.73 M^2
ESTIMATED AVG GLUCOSE: 117 MG/DL (ref 68–131)
GLUCOSE SERPL-MCNC: 103 MG/DL (ref 70–110)
HBA1C MFR BLD: 5.7 % (ref 4–5.6)
HCT VFR BLD AUTO: 42.3 % (ref 37–48.5)
HDLC SERPL-MCNC: 59 MG/DL (ref 40–75)
HDLC SERPL: 48.4 % (ref 20–50)
HGB BLD-MCNC: 13.4 G/DL (ref 12–16)
IMM GRANULOCYTES # BLD AUTO: 0.01 K/UL (ref 0–0.04)
IMM GRANULOCYTES NFR BLD AUTO: 0.2 % (ref 0–0.5)
LDLC SERPL CALC-MCNC: 55.4 MG/DL (ref 63–159)
LYMPHOCYTES # BLD AUTO: 1.8 K/UL (ref 1–4.8)
LYMPHOCYTES NFR BLD: 33.5 % (ref 18–48)
MCH RBC QN AUTO: 29.8 PG (ref 27–31)
MCHC RBC AUTO-ENTMCNC: 31.7 G/DL (ref 32–36)
MCV RBC AUTO: 94 FL (ref 82–98)
MONOCYTES # BLD AUTO: 0.5 K/UL (ref 0.3–1)
MONOCYTES NFR BLD: 8.3 % (ref 4–15)
NEUTROPHILS # BLD AUTO: 3.1 K/UL (ref 1.8–7.7)
NEUTROPHILS NFR BLD: 57.2 % (ref 38–73)
NONHDLC SERPL-MCNC: 63 MG/DL
NRBC BLD-RTO: 0 /100 WBC
PLATELET # BLD AUTO: 245 K/UL (ref 150–450)
PMV BLD AUTO: 10.6 FL (ref 9.2–12.9)
POTASSIUM SERPL-SCNC: 3.8 MMOL/L (ref 3.5–5.1)
PROT SERPL-MCNC: 6.9 G/DL (ref 6–8.4)
RBC # BLD AUTO: 4.49 M/UL (ref 4–5.4)
SODIUM SERPL-SCNC: 137 MMOL/L (ref 136–145)
TRIGL SERPL-MCNC: 38 MG/DL (ref 30–150)
TSH SERPL DL<=0.005 MIU/L-ACNC: 0.93 UIU/ML (ref 0.4–4)
WBC # BLD AUTO: 5.43 K/UL (ref 3.9–12.7)

## 2022-08-15 RX ORDER — HYDROCHLOROTHIAZIDE 25 MG/1
TABLET ORAL
Qty: 90 TABLET | Refills: 0 | OUTPATIENT
Start: 2022-08-15

## 2022-08-17 DIAGNOSIS — J43.2 CENTRILOBULAR EMPHYSEMA: ICD-10-CM

## 2022-08-17 NOTE — TELEPHONE ENCOUNTER
No new care gaps identified.  [facility] Embedded Care Gaps. Reference number: [id number]. 8/17/2022   1:43:26 PM CDT

## 2022-08-17 NOTE — TELEPHONE ENCOUNTER
----- Message from Tomy Perdomo sent at 8/17/2022  1:23 PM CDT -----  Regarding: clarification needed on directions of inhaler  Type:  Pharmacy Calling to Clarify an RX    Name of Caller: Avelina     Pharmacy Name:  [facility]     Prescription Name: albuterol (PROVENTIL/VENTOLIN HFA) 90 mcg/actuation inhaler    What do they need to clarify? Avelina from [facility] called to get clarification  on the directions of the inhaler :albuterol (PROVENTIL/VENTOLIN HFA) 90 mcg/actuation inhaler. She stated that it needs more, the directions are too short. Please return call at earliest convenience.    Can you be contacted via [facility]?no    Best Call Back Number: [phone]

## 2022-08-17 NOTE — TELEPHONE ENCOUNTER
Per Avelina/[facility], albuterol inhaler directions need to include more information.  Please advise.

## 2022-08-19 NOTE — TELEPHONE ENCOUNTER
----- Message from Sherie Carroll sent at 8/19/2022  9:13 AM CDT -----  Regarding: Noé CAMERONOshiboree  .Type: Patient Call Back    Who called: Noé CAMERONOshiboree     What is the request in detail: Requesting directions on albuterol (PROVENTIL/VENTOLIN HFA) 90 mcg/actuation inhaler    Can the clinic reply by [facility]? Call back     Would the patient rather a call back or a response via [facility]? Call back     Best call back number: .  [id number] - [place] - [facility] AT [address] OF [address] [address] "[employer]  [place] [address]  Phone: [phone] Fax: [phone]

## 2022-08-22 ENCOUNTER — PATIENT OUTREACH (OUTPATIENT)
Dept: ADMINISTRATIVE | Facility: HOSPITAL | Age: 67
End: 2022-08-22
Payer: MEDICARE

## 2022-08-22 RX ORDER — EZETIMIBE 10 MG/1
TABLET ORAL
COMMUNITY
Start: 2022-08-12 | End: 2023-04-13

## 2022-08-22 NOTE — PROGRESS NOTES
----- Message from Rachel Vale MD sent at 2/15/2022 10:09 AM CST -----  Please notify the patient that her A1c is at 5.8 prediabetes range, no change compared with previous A1c 5 yrs ago which was 5.8.  No medication at this point just low sugar diet, exercise and weight loss. Follow-up as planned.   Nurse visit scheduled 8-23-22 for blood pressure check

## 2022-08-23 ENCOUNTER — CLINICAL SUPPORT (OUTPATIENT)
Dept: FAMILY MEDICINE | Facility: CLINIC | Age: 67
End: 2022-08-23
Payer: MEDICARE

## 2022-08-23 VITALS — SYSTOLIC BLOOD PRESSURE: 138 MMHG | DIASTOLIC BLOOD PRESSURE: 80 MMHG

## 2022-08-23 DIAGNOSIS — I10 ESSENTIAL HYPERTENSION: Primary | ICD-10-CM

## 2022-08-23 PROCEDURE — 99999 PR PBB SHADOW E&M-EST. PATIENT-LVL I: CPT | Mod: PBBFAC,,,

## 2022-08-23 PROCEDURE — 99999 PR PBB SHADOW E&M-EST. PATIENT-LVL I: ICD-10-PCS | Mod: PBBFAC,,,

## 2022-08-23 NOTE — PROGRESS NOTES
Hollie Ponce 67 y.o. female is here today for Blood Pressure check.   History of HTN yes.    Review of patient's allergies indicates:   Allergen Reactions    Flagyl [metronidazole hcl]      Hives      Sulfamethoxazole-trimethoprim Itching    Aspirin Nausea Only    Lipitor [atorvastatin]      Myalgia      Creatinine   Date Value Ref Range Status   08/12/2022 1.1 0.5 - 1.4 mg/dL Final     Sodium   Date Value Ref Range Status   08/12/2022 137 136 - 145 mmol/L Final     Potassium   Date Value Ref Range Status   08/12/2022 3.8 3.5 - 5.1 mmol/L Final   ]  Patient verifies taking blood pressure medications on a regular basis at the same time of the day.     Current Outpatient Medications:     albuterol (PROVENTIL/VENTOLIN HFA) 90 mcg/actuation inhaler, Rescue, Disp: 8.5 g, Rfl: 2    albuterol-ipratropium (DUO-NEB) 2.5 mg-0.5 mg/3 mL nebulizer solution, Take 3 mLs by nebulization every 6 (six) hours as needed for Wheezing or Shortness of Breath (cough). Rescue, Disp: 1 each, Rfl: 5    azithromycin (Z-MEENA) 250 MG tablet, Take 2 pills day 1, then 1 pill day 2-5, Disp: 6 tablet, Rfl: 0    carvediloL (COREG) 3.125 MG tablet, TAKE 1 TABLET(3.125 MG) BY MOUTH TWICE DAILY WITH MEALS, Disp: 180 tablet, Rfl: 1    clopidogreL (PLAVIX) 75 mg tablet, TAKE 1 TABLET(75 MG) BY MOUTH EVERY DAY, Disp: 90 tablet, Rfl: 1    cyclobenzaprine (FLEXERIL) 10 MG tablet, TAKE 1 TABLET(10 MG) BY MOUTH EVERY NIGHT AS NEEDED FOR MUSCLE SPASMS, Disp: 90 tablet, Rfl: 1    ezetimibe (ZETIA) 10 mg tablet, , Disp: , Rfl:     fluticasone propionate (FLONASE) 50 mcg/actuation nasal spray, 2 sprays (100 mcg total) by Each Nostril route once daily., Disp: 15.8 mL, Rfl: 0    fluticasone-salmeterol 230-21 mcg/dose (ADVAIR HFA) 230-21 mcg/actuation HFAA inhaler, Inhale 2 puffs into the lungs 2 (two) times daily. Controller, Disp: 12 g, Rfl: 11    hydroCHLOROthiazide (HYDRODIURIL) 12.5 MG Tab, Take 1 tablet (12.5 mg total) by mouth once daily.,  Disp: 90 tablet, Rfl: 1    lisinopriL (PRINIVIL,ZESTRIL) 5 MG tablet, Take 1 tablet (5 mg total) by mouth once daily., Disp: 90 tablet, Rfl: 1    loratadine (CLARITIN) 10 mg tablet, TAKE 1 TABLET(10 MG) BY MOUTH DAILY AS NEEDED FOR ALLERGIES, Disp: 90 tablet, Rfl: 2    miscellaneous medical supply (470V TWO WAY VALVE) Misc, Disp nebulizer and tubing ,medicine reservoir,and mask  So as to use  Every 4-6 hrs for sob/wheeze, Disp: , Rfl:     montelukast (SINGULAIR) 10 mg tablet, Take 1 tablet (10 mg total) by mouth every evening., Disp: 90 tablet, Rfl: 1    nicotine polacrilex 2 MG Lozg, Take 1 lozenge (2 mg total) by mouth as needed. Use 1-2 per hour in place of a cigarette. Limit to 6 a day., Disp: 108 lozenge, Rfl: 0    potassium chloride (KLOR-CON) 8 MEQ TbSR, TK 1 T PO D, Disp: , Rfl:     rosuvastatin (CRESTOR) 20 MG tablet, TAKE 1 TABLET(20 MG) BY MOUTH EVERY EVENING, Disp: 90 tablet, Rfl: 3    tiotropium (SPIRIVA WITH HANDIHALER) 18 mcg inhalation capsule, Inhale 1 capsule (18 mcg total) into the lungs once daily. Controller, Disp: 90 capsule, Rfl: 3  Does patient have record of home blood pressure readings no. Readings have been averaging not done.   Last dose of blood pressure medication was taken at this morning.  Patient is asymptomatic.   Complains of no complaints.    Vitals:    08/23/22 1049 08/23/22 1106   BP: (!) 146/80 138/80   BP Location: Right arm Right arm   Patient Position: Sitting Sitting   BP Method: Medium (Manual) Medium (Manual)         Dr. Marquez informed of nurse visit.

## 2022-08-24 DIAGNOSIS — J43.2 CENTRILOBULAR EMPHYSEMA: ICD-10-CM

## 2022-08-24 DIAGNOSIS — R09.82 POST-NASAL DRIP: ICD-10-CM

## 2022-08-24 RX ORDER — ALBUTEROL SULFATE 90 UG/1
2 AEROSOL, METERED RESPIRATORY (INHALATION) EVERY 6 HOURS PRN
Qty: 8.5 G | Refills: 2 | Status: SHIPPED | OUTPATIENT
Start: 2022-08-24 | End: 2023-04-13 | Stop reason: SDUPTHER

## 2022-08-24 RX ORDER — ALBUTEROL SULFATE 90 UG/1
AEROSOL, METERED RESPIRATORY (INHALATION)
Qty: 8.5 G | Refills: 2 | Status: SHIPPED | OUTPATIENT
Start: 2022-08-24 | End: 2022-08-24 | Stop reason: SDUPTHER

## 2022-08-24 NOTE — TELEPHONE ENCOUNTER
----- Message from Elise Esquivel sent at 8/24/2022  8:34 AM CDT -----  Regarding: [facility]  Type: Patient Call Back    Who called:   [id number] - [place] - [facility] AT [email or web address]  [facility] [address]  [place] [address]  Phone: [phone] Fax: [phone]      What is the request in detail: needs specific directions for albuterol (PROVENTIL/VENTOLIN HFA) 90 mcg/actuation inhaler

## 2022-08-24 NOTE — TELEPHONE ENCOUNTER
No new care gaps identified.  [facility] Embedded Care Gaps. Reference number: [id number]. 8/24/2022   10:37:02 AM LU[place]

## 2022-08-25 RX ORDER — FLUTICASONE PROPIONATE 50 MCG
2 SPRAY, SUSPENSION (ML) NASAL DAILY
Qty: 15.8 ML | Refills: 3 | Status: SHIPPED | OUTPATIENT
Start: 2022-08-25 | End: 2022-11-11

## 2022-08-30 ENCOUNTER — TELEPHONE (OUTPATIENT)
Dept: FAMILY MEDICINE | Facility: CLINIC | Age: 67
End: 2022-08-30
Payer: MEDICARE

## 2022-08-30 NOTE — PROGRESS NOTES
Subjective:       Patient ID: Hollie Ponce is a 67 y.o. female.    Chief Complaint: Annual Exam      HPI  66 yo female presents for annual exam.    Review of Systems   Constitutional: Negative.    HENT: Negative.     Respiratory: Negative.     Cardiovascular: Negative.    Gastrointestinal: Negative.    Endocrine: Negative.    Genitourinary: Negative.    Musculoskeletal: Negative.    Neurological: Negative.    Psychiatric/Behavioral: Negative.          Past Medical History:   Diagnosis Date    Asthma     COPD (chronic obstructive pulmonary disease)     Hypertension      Past Surgical History:   Procedure Laterality Date     SECTION      HERNIA REPAIR      LEFT HEART CATHETERIZATION Left 2020    Procedure: Left heart cath;  Surgeon: Shabnam Vernon MD;  Location: [facility] CATH LAB;  Service: Cardiology;  Laterality: Left;     Family History   Problem Relation Age of Onset    Cancer Mother         Bladder    Liver disease Father      Social History     Socioeconomic History    Marital status: Single   Tobacco Use    Smoking status: Former     Packs/day: 0.50     Years: 50.00     Pack years: 25.00     Types: Cigarettes     Quit date: 2020     Years since quittin.7    Smokeless tobacco: Never   Substance and Sexual Activity    Alcohol use: Never    Drug use: Never    Sexual activity: Not Currently       Current Outpatient Medications:     albuterol-ipratropium (DUO-NEB) 2.5 mg-0.5 mg/3 mL nebulizer solution, Take 3 mLs by nebulization every 6 (six) hours as needed for Wheezing or Shortness of Breath (cough). Rescue, Disp: 1 each, Rfl: 5    clopidogreL (PLAVIX) 75 mg tablet, TAKE 1 TABLET(75 MG) BY MOUTH EVERY DAY, Disp: 90 tablet, Rfl: 1    cyclobenzaprine (FLEXERIL) 10 MG tablet, TAKE 1 TABLET(10 MG) BY MOUTH EVERY NIGHT AS NEEDED FOR MUSCLE SPASMS, Disp: 90 tablet, Rfl: 1    fluticasone-salmeterol 230-21 mcg/dose (ADVAIR HFA) 230-21 mcg/actuation HFAA inhaler, Inhale 2 puffs into the lungs 2  (two) times daily. Controller, Disp: 12 g, Rfl: 11    loratadine (CLARITIN) 10 mg tablet, TAKE 1 TABLET(10 MG) BY MOUTH DAILY AS NEEDED FOR ALLERGIES, Disp: 90 tablet, Rfl: 2    miscellaneous medical supply (470V TWO WAY VALVE) Misc, Disp nebulizer and tubing ,medicine reservoir,and mask  So as to use  Every 4-6 hrs for sob/wheeze, Disp: , Rfl:     nicotine polacrilex 2 MG Lozg, Take 1 lozenge (2 mg total) by mouth as needed. Use 1-2 per hour in place of a cigarette. Limit to 6 a day., Disp: 108 lozenge, Rfl: 0    rosuvastatin (CRESTOR) 20 MG tablet, TAKE 1 TABLET(20 MG) BY MOUTH EVERY EVENING, Disp: 90 tablet, Rfl: 3    albuterol (PROVENTIL/VENTOLIN HFA) 90 mcg/actuation inhaler, Inhale 2 puffs into the lungs every 6 (six) hours as needed for Wheezing or Shortness of Breath. Rescue, Disp: 8.5 g, Rfl: 2    azithromycin (Z-MEENA) 250 MG tablet, Take 2 pills day 1, then 1 pill day 2-5, Disp: 6 tablet, Rfl: 0    carvediloL (COREG) 3.125 MG tablet, TAKE 1 TABLET(3.125 MG) BY MOUTH TWICE DAILY WITH MEALS, Disp: 180 tablet, Rfl: 1    ezetimibe (ZETIA) 10 mg tablet, , Disp: , Rfl:     fluticasone propionate (FLONASE) 50 mcg/actuation nasal spray, 2 sprays (100 mcg total) by Each Nostril route once daily., Disp: 15.8 mL, Rfl: 3    hydroCHLOROthiazide (HYDRODIURIL) 12.5 MG Tab, Take 1 tablet (12.5 mg total) by mouth once daily., Disp: 90 tablet, Rfl: 1    lisinopriL (PRINIVIL,ZESTRIL) 5 MG tablet, Take 1 tablet (5 mg total) by mouth once daily., Disp: 90 tablet, Rfl: 1    montelukast (SINGULAIR) 10 mg tablet, Take 1 tablet (10 mg total) by mouth every evening., Disp: 90 tablet, Rfl: 1    potassium chloride (KLOR-CON) 8 MEQ TbSR, TK 1 T PO D, Disp: , Rfl:     tiotropium (SPIRIVA WITH HANDIHALER) 18 mcg inhalation capsule, Inhale 1 capsule (18 mcg total) into the lungs once daily. Controller, Disp: 90 capsule, Rfl: 3   Objective:      Vitals:    08/12/22 1334   BP: (!) 140/60   BP Location: Left arm   Patient Position: Sitting  "  BP Method: Small (Manual)   Pulse: 85   Resp: 18   Temp: 98 °F (36.7 °C)   TempSrc: Oral   SpO2: 99%   Weight: 58.6 kg (129 lb 3 oz)   Height: 4' 8" (1.422 m)       Physical Exam  Constitutional:       General: She is not in acute distress.  HENT:      Head: Normocephalic and atraumatic.   Eyes:      Conjunctiva/sclera: Conjunctivae normal.   Cardiovascular:      Rate and Rhythm: Normal rate and regular rhythm.      Heart sounds: Normal heart sounds. No murmur heard.    No friction rub. No gallop.   Pulmonary:      Effort: Pulmonary effort is normal.      Breath sounds: Normal breath sounds. No wheezing or rales.   Musculoskeletal:      Cervical back: Neck supple.   Skin:     General: Skin is warm and dry.   Neurological:      Mental Status: She is alert and oriented to person, place, and time.   Psychiatric:         Behavior: Behavior normal.         Thought Content: Thought content normal.         Judgment: Judgment normal.          Assessment:       1. Annual physical exam    2. Encounter for screening mammogram for breast cancer    3. Post-menopausal    4. Prediabetes    5. Essential hypertension    6. Chronic kidney disease, stage 3a    7. History of non-ST elevation myocardial infarction (NSTEMI)    8. Centrilobular emphysema    9. PAD (peripheral artery disease)          Plan:       Annual physical exam  -     CBC Auto Differential; Future; Expected date: 08/12/2022  -     Comprehensive Metabolic Panel; Future; Expected date: 08/12/2022  -     Hemoglobin A1C; Future; Expected date: 08/12/2022  -     TSH; Future; Expected date: 08/12/2022  -     Lipid Panel; Future; Expected date: 08/12/2022    Encounter for screening mammogram for breast cancer  -     Mammo Digital Screening Bilat; Future; Expected date: 08/12/2022    Post-menopausal  -     DXA Bone Density Spine And Hip; Future; Expected date: 08/12/2022    Prediabetes  -     CBC Auto Differential; Future; Expected date: 08/12/2022  -     Comprehensive " Metabolic Panel; Future; Expected date: 08/12/2022  -     Hemoglobin A1C; Future; Expected date: 08/12/2022  -     TSH; Future; Expected date: 08/12/2022  -     Lipid Panel; Future; Expected date: 08/12/2022    Essential hypertension  -     Lipid Panel; Future; Expected date: 08/12/2022  -     carvediloL (COREG) 3.125 MG tablet; TAKE 1 TABLET(3.125 MG) BY MOUTH TWICE DAILY WITH MEALS  Dispense: 180 tablet; Refill: 1  -     Discontinue: hydroCHLOROthiazide (HYDRODIURIL) 25 MG tablet; Take 1 tablet (25 mg total) by mouth once daily.  Dispense: 90 tablet; Refill: 1  -     hydroCHLOROthiazide (HYDRODIURIL) 12.5 MG Tab; Take 1 tablet (12.5 mg total) by mouth once daily.  Dispense: 90 tablet; Refill: 1    Chronic kidney disease, stage 3a    History of non-ST elevation myocardial infarction (NSTEMI)  -     lisinopriL (PRINIVIL,ZESTRIL) 5 MG tablet; Take 1 tablet (5 mg total) by mouth once daily.  Dispense: 90 tablet; Refill: 1  -     carvediloL (COREG) 3.125 MG tablet; TAKE 1 TABLET(3.125 MG) BY MOUTH TWICE DAILY WITH MEALS  Dispense: 180 tablet; Refill: 1  -     Discontinue: ezetimibe (ZETIA) 10 mg tablet; Take 1 tablet (10 mg total) by mouth once daily.  Dispense: 90 tablet; Refill: 1    Centrilobular emphysema  -     Discontinue: albuterol (PROVENTIL/VENTOLIN HFA) 90 mcg/actuation inhaler; Rescue  Dispense: 8.5 g; Refill: 2  -     montelukast (SINGULAIR) 10 mg tablet; Take 1 tablet (10 mg total) by mouth every evening.  Dispense: 90 tablet; Refill: 1  -     tiotropium (SPIRIVA WITH HANDIHALER) 18 mcg inhalation capsule; Inhale 1 capsule (18 mcg total) into the lungs once daily. Controller  Dispense: 90 capsule; Refill: 3    PAD (peripheral artery disease)      Cont meds. F/u labs.            Patient note was created using [employer].  Any errors in syntax or even information may not have been identified and edited on initial review prior to signing this note.

## 2022-08-30 NOTE — TELEPHONE ENCOUNTER
----- Message from Navdeep Marquez MD sent at 8/29/2022 10:34 PM CDT -----  Labs are good. A1c improved to 5.7. cont meds

## 2022-08-31 ENCOUNTER — HOSPITAL ENCOUNTER (OUTPATIENT)
Dept: RADIOLOGY | Facility: HOSPITAL | Age: 67
Discharge: HOME | End: 2022-08-31
Attending: FAMILY MEDICINE
Payer: MEDICARE

## 2022-08-31 ENCOUNTER — HOSPITAL ENCOUNTER (OUTPATIENT)
Dept: RADIOLOGY | Facility: CLINIC | Age: 67
Discharge: HOME | End: 2022-08-31
Attending: FAMILY MEDICINE
Payer: MEDICARE

## 2022-08-31 DIAGNOSIS — Z78.0 POST-MENOPAUSAL: ICD-10-CM

## 2022-08-31 DIAGNOSIS — Z12.31 ENCOUNTER FOR SCREENING MAMMOGRAM FOR BREAST CANCER: ICD-10-CM

## 2022-08-31 PROCEDURE — 77063 BREAST TOMOSYNTHESIS BI: CPT | Mod: TC,PO

## 2022-08-31 PROCEDURE — 77063 BREAST TOMOSYNTHESIS BI: CPT | Mod: 26,,, | Performed by: RADIOLOGY

## 2022-08-31 PROCEDURE — 77080 DEXA BONE DENSITY SPINE HIP: ICD-10-PCS | Mod: 26,,, | Performed by: INTERNAL MEDICINE

## 2022-08-31 PROCEDURE — 77067 MAMMO DIGITAL SCREENING BILAT WITH TOMO: ICD-10-PCS | Mod: 26,,, | Performed by: RADIOLOGY

## 2022-08-31 PROCEDURE — 77063 MAMMO DIGITAL SCREENING BILAT WITH TOMO: ICD-10-PCS | Mod: 26,,, | Performed by: RADIOLOGY

## 2022-08-31 PROCEDURE — 77080 DXA BONE DENSITY AXIAL: CPT | Mod: 26,,, | Performed by: INTERNAL MEDICINE

## 2022-08-31 PROCEDURE — 77067 SCR MAMMO BI INCL CAD: CPT | Mod: 26,,, | Performed by: RADIOLOGY

## 2022-08-31 PROCEDURE — 77080 DXA BONE DENSITY AXIAL: CPT | Mod: TC,PO

## 2022-10-07 DIAGNOSIS — I25.2 HISTORY OF NON-ST ELEVATION MYOCARDIAL INFARCTION (NSTEMI): ICD-10-CM

## 2022-10-07 DIAGNOSIS — I10 ESSENTIAL HYPERTENSION: ICD-10-CM

## 2022-10-07 DIAGNOSIS — I73.9 PAD (PERIPHERAL ARTERY DISEASE): ICD-10-CM

## 2022-10-07 RX ORDER — CLOPIDOGREL BISULFATE 75 MG/1
TABLET ORAL
Qty: 90 TABLET | Refills: 3 | Status: SHIPPED | OUTPATIENT
Start: 2022-10-07 | End: 2023-09-15

## 2022-10-07 RX ORDER — CARVEDILOL 3.12 MG/1
TABLET ORAL
Qty: 180 TABLET | Refills: 3 | Status: SHIPPED | OUTPATIENT
Start: 2022-10-07 | End: 2023-05-30 | Stop reason: SDUPTHER

## 2022-10-07 NOTE — TELEPHONE ENCOUNTER
Refill Decision Note   Hollie Ponce  is requesting a refill authorization.  Brief Assessment and Rationale for Refill:  Approve     Medication Therapy Plan:       Medication Reconciliation Completed: No   Comments:     No Care Gaps recommended.     Note composed:12:12 PM 10/07/2022

## 2022-10-07 NOTE — TELEPHONE ENCOUNTER
No new care gaps identified.  Health system Embedded Care Gaps. Reference number: [id number]. 10/07/2022   6:07:37 AM [place]

## 2022-10-19 ENCOUNTER — OFFICE VISIT (OUTPATIENT)
Dept: OTOLARYNGOLOGY | Facility: CLINIC | Age: 67
End: 2022-10-19
Payer: MEDICARE

## 2022-10-19 VITALS — DIASTOLIC BLOOD PRESSURE: 87 MMHG | HEART RATE: 76 BPM | SYSTOLIC BLOOD PRESSURE: 156 MMHG

## 2022-10-19 DIAGNOSIS — H92.01 EAR PAIN, RIGHT: Primary | ICD-10-CM

## 2022-10-19 DIAGNOSIS — R09.82 POST-NASAL DRIP: ICD-10-CM

## 2022-10-19 DIAGNOSIS — J30.1 SEASONAL ALLERGIC RHINITIS DUE TO POLLEN: ICD-10-CM

## 2022-10-19 PROCEDURE — 4010F PR ACE/ARB THEARPY RXD/TAKEN: ICD-10-PCS | Mod: CPTII,S$GLB,, | Performed by: OTOLARYNGOLOGY

## 2022-10-19 PROCEDURE — 1160F PR REVIEW ALL MEDS BY PRESCRIBER/CLIN PHARMACIST DOCUMENTED: ICD-10-PCS | Mod: CPTII,S$GLB,, | Performed by: OTOLARYNGOLOGY

## 2022-10-19 PROCEDURE — 1101F PT FALLS ASSESS-DOCD LE1/YR: CPT | Mod: CPTII,S$GLB,, | Performed by: OTOLARYNGOLOGY

## 2022-10-19 PROCEDURE — 3079F PR MOST RECENT DIASTOLIC BLOOD PRESSURE 80-89 MM HG: ICD-10-PCS | Mod: CPTII,S$GLB,, | Performed by: OTOLARYNGOLOGY

## 2022-10-19 PROCEDURE — 1159F MED LIST DOCD IN RCRD: CPT | Mod: CPTII,S$GLB,, | Performed by: OTOLARYNGOLOGY

## 2022-10-19 PROCEDURE — 1126F PR PAIN SEVERITY QUANTIFIED, NO PAIN PRESENT: ICD-10-PCS | Mod: CPTII,S$GLB,, | Performed by: OTOLARYNGOLOGY

## 2022-10-19 PROCEDURE — 1126F AMNT PAIN NOTED NONE PRSNT: CPT | Mod: CPTII,S$GLB,, | Performed by: OTOLARYNGOLOGY

## 2022-10-19 PROCEDURE — 99999 PR PBB SHADOW E&M-EST. PATIENT-LVL III: ICD-10-PCS | Mod: PBBFAC,,, | Performed by: OTOLARYNGOLOGY

## 2022-10-19 PROCEDURE — 1160F RVW MEDS BY RX/DR IN RCRD: CPT | Mod: CPTII,S$GLB,, | Performed by: OTOLARYNGOLOGY

## 2022-10-19 PROCEDURE — 3077F SYST BP >= 140 MM HG: CPT | Mod: CPTII,S$GLB,, | Performed by: OTOLARYNGOLOGY

## 2022-10-19 PROCEDURE — 3079F DIAST BP 80-89 MM HG: CPT | Mod: CPTII,S$GLB,, | Performed by: OTOLARYNGOLOGY

## 2022-10-19 PROCEDURE — 3288F PR FALLS RISK ASSESSMENT DOCUMENTED: ICD-10-PCS | Mod: CPTII,S$GLB,, | Performed by: OTOLARYNGOLOGY

## 2022-10-19 PROCEDURE — 1101F PR PT FALLS ASSESS DOC 0-1 FALLS W/OUT INJ PAST YR: ICD-10-PCS | Mod: CPTII,S$GLB,, | Performed by: OTOLARYNGOLOGY

## 2022-10-19 PROCEDURE — 3044F PR MOST RECENT HEMOGLOBIN A1C LEVEL <7.0%: ICD-10-PCS | Mod: CPTII,S$GLB,, | Performed by: OTOLARYNGOLOGY

## 2022-10-19 PROCEDURE — 1159F PR MEDICATION LIST DOCUMENTED IN MEDICAL RECORD: ICD-10-PCS | Mod: CPTII,S$GLB,, | Performed by: OTOLARYNGOLOGY

## 2022-10-19 PROCEDURE — 3288F FALL RISK ASSESSMENT DOCD: CPT | Mod: CPTII,S$GLB,, | Performed by: OTOLARYNGOLOGY

## 2022-10-19 PROCEDURE — 3044F HG A1C LEVEL LT 7.0%: CPT | Mod: CPTII,S$GLB,, | Performed by: OTOLARYNGOLOGY

## 2022-10-19 PROCEDURE — 99203 OFFICE O/P NEW LOW 30 MIN: CPT | Mod: S$GLB,,, | Performed by: OTOLARYNGOLOGY

## 2022-10-19 PROCEDURE — 99999 PR PBB SHADOW E&M-EST. PATIENT-LVL III: CPT | Mod: PBBFAC,,, | Performed by: OTOLARYNGOLOGY

## 2022-10-19 PROCEDURE — 3077F PR MOST RECENT SYSTOLIC BLOOD PRESSURE >= 140 MM HG: ICD-10-PCS | Mod: CPTII,S$GLB,, | Performed by: OTOLARYNGOLOGY

## 2022-10-19 PROCEDURE — 99203 PR OFFICE/OUTPT VISIT, NEW, LEVL III, 30-44 MIN: ICD-10-PCS | Mod: S$GLB,,, | Performed by: OTOLARYNGOLOGY

## 2022-10-19 PROCEDURE — 4010F ACE/ARB THERAPY RXD/TAKEN: CPT | Mod: CPTII,S$GLB,, | Performed by: OTOLARYNGOLOGY

## 2022-10-19 NOTE — PATIENT INSTRUCTIONS
Reviewed history and today's exam findings. Thankfully right ear appears normal and has improved since referral was made.    Ear care discussed.    Continue with Flonase if it helps control allergy symptoms and post nasal drip.    Otherwise, follow up with our clinic for any ear, nose or throat problems ([phone]).

## 2022-10-19 NOTE — PROGRESS NOTES
66 y/o female referred by DIONNE Parks after July visit for ear pain and R TM findings. Pt reports presenting to medical office at that time due to R ear pain and mucus draining in throat. No fever. Hearing was decreased at the time. Since then she has improved. She did have COVID a few weeks before she went to provider for these symptoms. She was told the R ear drum looked bruised. No ear d/c. No ear trauma. No head trauma. She was given Flonase - which helps with allergy symptoms and PND. No prior ear surgery.    PMHx, PSHx, Meds, Allergies, SocHx, FamHx reviewed in EPIC    Review of Systems     Constitutional: Negative for chills and fever.      HENT: Positive for ear pain (resolved), hearing loss (resolved) and postnasal drip.  Negative for runny nose and stuffy nose.      PE: BP (!) 156/87   Pulse 76    Gen: female, well nourished, well developed, NAD, cooperative, good historian  Ears:  EAC clear on R &minimal cerumen laterally on L & TM translucent with normal bony landmarks bilaterally  Nose: external nose wnl, nasal septum near midline anteriorly, inferior turbinates wnl, no visible purulence or polyps  OC/OP:  MMM, tongue protrudes midline, palate raises symmetrically, tonsils symmetric, no erythema or exudate, minimal clear PND  Neck: supple, no TTP, no LAD or masses  Face:  no TTP, no erythema or flushing  Respiratory: Breathing comfortably without retractions  Skin: facial skin intact without visible lesions or flushing  Lymph: no neck lympadenopathy  Neuro:  facial movement symmetric, speech fluid, gait stable, tongue protrudes midline  Psych: alert & oriented x 3, reasonable, normal affect    Impression:   1. Ear pain, right - resolved        2. Post-nasal drip        3. Seasonal allergic rhinitis due to pollen            Discussion and Plan:       Reviewed history and today's exam findings.  Based on findings of DIONNE Parks during July visit, she may have had a viral myringitis or ETD with negative ME  pressure that caused the findings and symptoms noted. Regardless, and thankfully, the symptoms have resolved and her ear exam is WNL today.    Ear care discussed.    Continue with Flonase if it helps control allergy symptoms and post nasal drip.    Otherwise, follow up with our clinic for any ear, nose or throat problems ([phone]).     Parts or all of this note were created by voice recognition software; typographical errors in translating may be present.

## 2022-10-20 ENCOUNTER — TELEPHONE (OUTPATIENT)
Dept: FAMILY MEDICINE | Facility: CLINIC | Age: 67
End: 2022-10-20
Payer: MEDICARE

## 2022-10-20 NOTE — TELEPHONE ENCOUNTER
----- Message from Navdeep Marquez MD sent at 10/18/2022 10:34 PM CDT -----    #Low bone mass (Osteopenia)  RECOMMENDATIONS:  #Daily calcium intake 6863-4033 mg, dietary sources preferred; Vitamin D 7307-5104 IU daily.  #Weight bearing exercise and fall precautions.  #If dedicated imaging is negative for vertebral fracture, then no additional treatment is recommended at this time. If positive for fracture, would treat as osteoporosis.  #Repeat BMD in 2 years.

## 2023-03-21 RX ORDER — IPRATROPIUM BROMIDE AND ALBUTEROL SULFATE 2.5; .5 MG/3ML; MG/3ML
SOLUTION RESPIRATORY (INHALATION)
OUTPATIENT
Start: 2023-03-21

## 2023-03-22 NOTE — TELEPHONE ENCOUNTER
Refill Decision Note   Hollie Ponce  is requesting a refill authorization.  Brief Assessment and Rationale for Refill:  Quick Discontinue     Medication Therapy Plan:  Receipt confirmed by pharmacy (3/21/2023  8:56 PM CDT)    Medication Reconciliation Completed: No   Comments:     No Care Gaps recommended.     Note composed:11:31 PM 03/21/2023

## 2023-03-22 NOTE — TELEPHONE ENCOUNTER
No new care gaps identified.  [facility] Embedded Care Gaps. Reference number: [id number]. 3/21/2023   9:20:02 PM CDT

## 2023-04-13 ENCOUNTER — LAB VISIT (OUTPATIENT)
Dept: LAB | Facility: HOSPITAL | Age: 68
End: 2023-04-13
Attending: FAMILY MEDICINE
Payer: MEDICARE

## 2023-04-13 ENCOUNTER — OFFICE VISIT (OUTPATIENT)
Dept: FAMILY MEDICINE | Facility: CLINIC | Age: 68
End: 2023-04-13
Payer: MEDICARE

## 2023-04-13 VITALS
TEMPERATURE: 98 F | BODY MASS INDEX: 28.47 KG/M2 | DIASTOLIC BLOOD PRESSURE: 82 MMHG | RESPIRATION RATE: 18 BRPM | HEART RATE: 79 BPM | SYSTOLIC BLOOD PRESSURE: 138 MMHG | OXYGEN SATURATION: 95 % | HEIGHT: 56 IN | WEIGHT: 126.56 LBS

## 2023-04-13 DIAGNOSIS — R73.03 PREDIABETES: ICD-10-CM

## 2023-04-13 DIAGNOSIS — I25.2 HISTORY OF NON-ST ELEVATION MYOCARDIAL INFARCTION (NSTEMI): ICD-10-CM

## 2023-04-13 DIAGNOSIS — J43.2 CENTRILOBULAR EMPHYSEMA: ICD-10-CM

## 2023-04-13 DIAGNOSIS — N18.31 CHRONIC KIDNEY DISEASE, STAGE 3A: ICD-10-CM

## 2023-04-13 DIAGNOSIS — I73.9 PAD (PERIPHERAL ARTERY DISEASE): ICD-10-CM

## 2023-04-13 DIAGNOSIS — I27.20 PULMONARY HTN: ICD-10-CM

## 2023-04-13 DIAGNOSIS — R73.03 PREDIABETES: Primary | ICD-10-CM

## 2023-04-13 LAB
ALBUMIN SERPL BCP-MCNC: 4.2 G/DL (ref 3.5–5.2)
ALP SERPL-CCNC: 57 U/L (ref 55–135)
ALT SERPL W/O P-5'-P-CCNC: 18 U/L (ref 10–44)
ANION GAP SERPL CALC-SCNC: 9 MMOL/L (ref 8–16)
AST SERPL-CCNC: 19 U/L (ref 10–40)
BILIRUB SERPL-MCNC: 0.3 MG/DL (ref 0.1–1)
BUN SERPL-MCNC: 10 MG/DL (ref 8–23)
CALCIUM SERPL-MCNC: 10.1 MG/DL (ref 8.7–10.5)
CHLORIDE SERPL-SCNC: 103 MMOL/L (ref 95–110)
CO2 SERPL-SCNC: 29 MMOL/L (ref 23–29)
CREAT SERPL-MCNC: 0.9 MG/DL (ref 0.5–1.4)
EST. GFR  (NO RACE VARIABLE): >60 ML/MIN/1.73 M^2
GLUCOSE SERPL-MCNC: 95 MG/DL (ref 70–110)
POTASSIUM SERPL-SCNC: 4.2 MMOL/L (ref 3.5–5.1)
PROT SERPL-MCNC: 6.9 G/DL (ref 6–8.4)
SODIUM SERPL-SCNC: 141 MMOL/L (ref 136–145)

## 2023-04-13 PROCEDURE — 3288F PR FALLS RISK ASSESSMENT DOCUMENTED: ICD-10-PCS | Mod: CPTII,S$GLB,, | Performed by: FAMILY MEDICINE

## 2023-04-13 PROCEDURE — 99214 OFFICE O/P EST MOD 30 MIN: CPT | Mod: S$GLB,,, | Performed by: FAMILY MEDICINE

## 2023-04-13 PROCEDURE — 3288F FALL RISK ASSESSMENT DOCD: CPT | Mod: CPTII,S$GLB,, | Performed by: FAMILY MEDICINE

## 2023-04-13 PROCEDURE — 3075F SYST BP GE 130 - 139MM HG: CPT | Mod: CPTII,S$GLB,, | Performed by: FAMILY MEDICINE

## 2023-04-13 PROCEDURE — 3079F DIAST BP 80-89 MM HG: CPT | Mod: CPTII,S$GLB,, | Performed by: FAMILY MEDICINE

## 2023-04-13 PROCEDURE — 99999 PR PBB SHADOW E&M-EST. PATIENT-LVL IV: ICD-10-PCS | Mod: PBBFAC,,, | Performed by: FAMILY MEDICINE

## 2023-04-13 PROCEDURE — 4010F ACE/ARB THERAPY RXD/TAKEN: CPT | Mod: CPTII,S$GLB,, | Performed by: FAMILY MEDICINE

## 2023-04-13 PROCEDURE — 83036 HEMOGLOBIN GLYCOSYLATED A1C: CPT | Performed by: FAMILY MEDICINE

## 2023-04-13 PROCEDURE — 1101F PR PT FALLS ASSESS DOC 0-1 FALLS W/OUT INJ PAST YR: ICD-10-PCS | Mod: CPTII,S$GLB,, | Performed by: FAMILY MEDICINE

## 2023-04-13 PROCEDURE — 1159F MED LIST DOCD IN RCRD: CPT | Mod: CPTII,S$GLB,, | Performed by: FAMILY MEDICINE

## 2023-04-13 PROCEDURE — 4010F PR ACE/ARB THEARPY RXD/TAKEN: ICD-10-PCS | Mod: CPTII,S$GLB,, | Performed by: FAMILY MEDICINE

## 2023-04-13 PROCEDURE — 3075F PR MOST RECENT SYSTOLIC BLOOD PRESS GE 130-139MM HG: ICD-10-PCS | Mod: CPTII,S$GLB,, | Performed by: FAMILY MEDICINE

## 2023-04-13 PROCEDURE — 99214 PR OFFICE/OUTPT VISIT, EST, LEVL IV, 30-39 MIN: ICD-10-PCS | Mod: S$GLB,,, | Performed by: FAMILY MEDICINE

## 2023-04-13 PROCEDURE — 36415 COLL VENOUS BLD VENIPUNCTURE: CPT | Mod: PO | Performed by: FAMILY MEDICINE

## 2023-04-13 PROCEDURE — 3008F PR BODY MASS INDEX (BMI) DOCUMENTED: ICD-10-PCS | Mod: CPTII,S$GLB,, | Performed by: FAMILY MEDICINE

## 2023-04-13 PROCEDURE — 99999 PR PBB SHADOW E&M-EST. PATIENT-LVL IV: CPT | Mod: PBBFAC,,, | Performed by: FAMILY MEDICINE

## 2023-04-13 PROCEDURE — 3079F PR MOST RECENT DIASTOLIC BLOOD PRESSURE 80-89 MM HG: ICD-10-PCS | Mod: CPTII,S$GLB,, | Performed by: FAMILY MEDICINE

## 2023-04-13 PROCEDURE — 1126F AMNT PAIN NOTED NONE PRSNT: CPT | Mod: CPTII,S$GLB,, | Performed by: FAMILY MEDICINE

## 2023-04-13 PROCEDURE — 3008F BODY MASS INDEX DOCD: CPT | Mod: CPTII,S$GLB,, | Performed by: FAMILY MEDICINE

## 2023-04-13 PROCEDURE — 1126F PR PAIN SEVERITY QUANTIFIED, NO PAIN PRESENT: ICD-10-PCS | Mod: CPTII,S$GLB,, | Performed by: FAMILY MEDICINE

## 2023-04-13 PROCEDURE — 1101F PT FALLS ASSESS-DOCD LE1/YR: CPT | Mod: CPTII,S$GLB,, | Performed by: FAMILY MEDICINE

## 2023-04-13 PROCEDURE — 80053 COMPREHEN METABOLIC PANEL: CPT | Performed by: FAMILY MEDICINE

## 2023-04-13 PROCEDURE — 1159F PR MEDICATION LIST DOCUMENTED IN MEDICAL RECORD: ICD-10-PCS | Mod: CPTII,S$GLB,, | Performed by: FAMILY MEDICINE

## 2023-04-13 RX ORDER — TIOTROPIUM BROMIDE 18 UG/1
CAPSULE ORAL; RESPIRATORY (INHALATION)
Qty: 90 CAPSULE | Refills: 1 | Status: SHIPPED | OUTPATIENT
Start: 2023-04-13 | End: 2023-10-02

## 2023-04-13 RX ORDER — MONTELUKAST SODIUM 10 MG/1
10 TABLET ORAL NIGHTLY
Qty: 90 TABLET | Refills: 1 | Status: SHIPPED | OUTPATIENT
Start: 2023-04-13 | End: 2023-07-04

## 2023-04-13 RX ORDER — LISINOPRIL 5 MG/1
5 TABLET ORAL DAILY
Qty: 90 TABLET | Refills: 3 | Status: SHIPPED | OUTPATIENT
Start: 2023-04-13

## 2023-04-13 RX ORDER — HYDROCHLOROTHIAZIDE 25 MG/1
TABLET ORAL
COMMUNITY
Start: 2023-02-06 | End: 2023-05-18 | Stop reason: SDUPTHER

## 2023-04-13 RX ORDER — CYCLOBENZAPRINE HCL 5 MG
TABLET ORAL
COMMUNITY
Start: 2023-01-27 | End: 2023-04-13

## 2023-04-13 RX ORDER — ROSUVASTATIN CALCIUM 20 MG/1
20 TABLET, COATED ORAL NIGHTLY
Qty: 90 TABLET | Refills: 3 | Status: SHIPPED | OUTPATIENT
Start: 2023-04-13 | End: 2023-06-13

## 2023-04-13 RX ORDER — IPRATROPIUM BROMIDE AND ALBUTEROL SULFATE 2.5; .5 MG/3ML; MG/3ML
SOLUTION RESPIRATORY (INHALATION)
Qty: 270 ML | Refills: 2 | Status: SHIPPED | OUTPATIENT
Start: 2023-04-13 | End: 2023-05-03

## 2023-04-13 RX ORDER — ALBUTEROL SULFATE 90 UG/1
2 AEROSOL, METERED RESPIRATORY (INHALATION) EVERY 6 HOURS PRN
Qty: 8.5 G | Refills: 11 | Status: SHIPPED | OUTPATIENT
Start: 2023-04-13 | End: 2024-01-21

## 2023-04-13 RX ORDER — FLUTICASONE PROPIONATE AND SALMETEROL XINAFOATE 230; 21 UG/1; UG/1
2 AEROSOL, METERED RESPIRATORY (INHALATION) 2 TIMES DAILY
Qty: 36 G | Refills: 5 | Status: SHIPPED | OUTPATIENT
Start: 2023-04-13 | End: 2023-10-13 | Stop reason: SDUPTHER

## 2023-04-13 RX ORDER — NAPROXEN 500 MG/1
TABLET ORAL
COMMUNITY
Start: 2023-02-25 | End: 2023-04-13

## 2023-04-13 NOTE — PROGRESS NOTES
Health Maintenance Due   Topic     TETANUS VACCINE  Consult pcp    Shingles Vaccine (1 of 2)  hx chicken pox. Notified pt can get vaccine at pharmacy    Pneumococcal Vaccines (Age 65+) (3 - PPSV23 if available, else PCV20)     COVID-19 Vaccine (4 - Booster for Moderna series) Not offered at this office    LDCT Lung Screen  Consult pcp    Influenza Vaccine (1)

## 2023-04-13 NOTE — PROGRESS NOTES
Subjective:       Patient ID: Hollie Ponce is a 67 y.o. female.    Chief Complaint: Annual Exam      HPI  59-year-old female presents for 6 month follow-up.  States he is taking medications.  States he needs refills.  Doing well otherwise.        Review of Systems   Constitutional: Negative.    HENT: Negative.     Respiratory: Negative.     Cardiovascular: Negative.    Gastrointestinal: Negative.    Endocrine: Negative.    Genitourinary: Negative.    Musculoskeletal: Negative.    Neurological: Negative.    Psychiatric/Behavioral: Negative.          Past Medical History:   Diagnosis Date    Asthma     COPD (chronic obstructive pulmonary disease)     Hypertension      Past Surgical History:   Procedure Laterality Date     SECTION      HERNIA REPAIR      LEFT HEART CATHETERIZATION Left 2020    Procedure: Left heart cath;  Surgeon: Shabnam Vernon MD;  Location: [facility] CATH LAB;  Service: Cardiology;  Laterality: Left;     Family History   Problem Relation Age of Onset    Cancer Mother         Bladder    Liver disease Father      Social History     Socioeconomic History    Marital status: Single   Tobacco Use    Smoking status: Former     Packs/day: 0.50     Years: 50.00     Pack years: 25.00     Types: Cigarettes     Quit date: 2020     Years since quittin.3    Smokeless tobacco: Never   Substance and Sexual Activity    Alcohol use: Never    Drug use: Never    Sexual activity: Not Currently       Current Outpatient Medications:     carvediloL (COREG) 3.125 MG tablet, TAKE 1 TABLET(3.125 MG) BY MOUTH TWICE DAILY WITH MEALS, Disp: 180 tablet, Rfl: 3    clopidogreL (PLAVIX) 75 mg tablet, TAKE 1 TABLET(75 MG) BY MOUTH EVERY DAY, Disp: 90 tablet, Rfl: 3    cyclobenzaprine (FLEXERIL) 10 MG tablet, TAKE 1 TABLET(10 MG) BY MOUTH EVERY NIGHT AS NEEDED FOR MUSCLE SPASMS, Disp: 90 tablet, Rfl: 1    fluticasone propionate (FLONASE) 50 mcg/actuation nasal spray, SHAKE LIQUID AND USE 2 SPRAYS(100 MCG) IN  EACH NOSTRIL EVERY DAY, Disp: 48 g, Rfl: 3    hydroCHLOROthiazide (HYDRODIURIL) 25 MG tablet, Take by mouth., Disp: , Rfl:     loratadine (CLARITIN) 10 mg tablet, TAKE 1 TABLET(10 MG) BY MOUTH DAILY AS NEEDED FOR ALLERGIES, Disp: 90 tablet, Rfl: 3    miscellaneous medical supply (470V TWO WAY VALVE) Misc, Disp nebulizer and tubing ,medicine reservoir,and mask  So as to use  Every 4-6 hrs for sob/wheeze, Disp: , Rfl:     nicotine polacrilex 2 MG Lozg, Take 1 lozenge (2 mg total) by mouth as needed. Use 1-2 per hour in place of a cigarette. Limit to 6 a day., Disp: 108 lozenge, Rfl: 0    potassium chloride (KLOR-CON) 8 MEQ TbSR, TK 1 T PO D, Disp: , Rfl:     albuterol (PROVENTIL/VENTOLIN HFA) 90 mcg/actuation inhaler, Inhale 2 puffs into the lungs every 6 (six) hours as needed for Wheezing or Shortness of Breath. Rescue, Disp: 8.5 g, Rfl: 11    albuterol-ipratropium (DUO-NEB) 2.5 mg-0.5 mg/3 mL nebulizer solution, USE 1 VIAL VIA NEBULIZER EVERY 6 HOURS AS NEEDED FOR WHEEZING, SHORTNESS OF BREATH OR RESCUE, Disp: 270 mL, Rfl: 2    fluticasone-salmeterol 230-21 mcg/dose (ADVAIR HFA) 230-21 mcg/actuation HFAA inhaler, Inhale 2 puffs into the lungs 2 (two) times daily. Controller, Disp: 36 g, Rfl: 5    lisinopriL (PRINIVIL,ZESTRIL) 5 MG tablet, Take 1 tablet (5 mg total) by mouth once daily., Disp: 90 tablet, Rfl: 3    montelukast (SINGULAIR) 10 mg tablet, Take 1 tablet (10 mg total) by mouth every evening., Disp: 90 tablet, Rfl: 1    rosuvastatin (CRESTOR) 20 MG tablet, Take 1 tablet (20 mg total) by mouth every evening., Disp: 90 tablet, Rfl: 3    tiotropium (SPIRIVA WITH HANDIHALER) 18 mcg inhalation capsule, INHALE THE CONTENTS OF 1 CAPSULE(18 MCG) INTO THE LUNGS EVERY DAY, Disp: 90 capsule, Rfl: 1   Objective:      Vitals:    04/13/23 0953   BP: 138/82   BP Location: Left arm   Patient Position: Sitting   BP Method: Small (Manual)   Pulse: 79   Resp: 18   Temp: 98.2 °F (36.8 °C)   TempSrc: Oral   SpO2: 95%   Weight:  "57.4 kg (126 lb 8.7 oz)   Height: 4' 8" (1.422 m)       Physical Exam  Constitutional:       General: She is not in acute distress.     Appearance: She is not diaphoretic.   HENT:      Head: Normocephalic and atraumatic.   Eyes:      Conjunctiva/sclera: Conjunctivae normal.   Pulmonary:      Effort: Pulmonary effort is normal.   Musculoskeletal:      Cervical back: Neck supple.   Skin:     Findings: No rash.   Neurological:      Mental Status: She is alert and oriented to person, place, and time.   Psychiatric:         Behavior: Behavior normal.         Thought Content: Thought content normal.         Judgment: Judgment normal.          Assessment:       1. Prediabetes    2. Centrilobular emphysema    3. History of non-ST elevation myocardial infarction (NSTEMI)    4. PAD (peripheral artery disease)    5. Pulmonary HTN    6. Chronic kidney disease, stage 3a        Plan:       Prediabetes  -     Hemoglobin A1C; Future; Expected date: 04/13/2023  -     Comprehensive Metabolic Panel; Future; Expected date: 04/13/2023    Centrilobular emphysema  -     albuterol-ipratropium (DUO-NEB) 2.5 mg-0.5 mg/3 mL nebulizer solution; USE 1 VIAL VIA NEBULIZER EVERY 6 HOURS AS NEEDED FOR WHEEZING, SHORTNESS OF BREATH OR RESCUE  Dispense: 270 mL; Refill: 2  -     fluticasone-salmeterol 230-21 mcg/dose (ADVAIR HFA) 230-21 mcg/actuation HFAA inhaler; Inhale 2 puffs into the lungs 2 (two) times daily. Controller  Dispense: 36 g; Refill: 5  -     albuterol (PROVENTIL/VENTOLIN HFA) 90 mcg/actuation inhaler; Inhale 2 puffs into the lungs every 6 (six) hours as needed for Wheezing or Shortness of Breath. Rescue  Dispense: 8.5 g; Refill: 11  -     montelukast (SINGULAIR) 10 mg tablet; Take 1 tablet (10 mg total) by mouth every evening.  Dispense: 90 tablet; Refill: 1  -     tiotropium (SPIRIVA WITH HANDIHALER) 18 mcg inhalation capsule; INHALE THE CONTENTS OF 1 CAPSULE(18 MCG) INTO THE LUNGS EVERY DAY  Dispense: 90 capsule; Refill: " 1    History of non-ST elevation myocardial infarction (NSTEMI)  -     lisinopriL (PRINIVIL,ZESTRIL) 5 MG tablet; Take 1 tablet (5 mg total) by mouth once daily.  Dispense: 90 tablet; Refill: 3  -     rosuvastatin (CRESTOR) 20 MG tablet; Take 1 tablet (20 mg total) by mouth every evening.  Dispense: 90 tablet; Refill: 3    PAD (peripheral artery disease)  -     rosuvastatin (CRESTOR) 20 MG tablet; Take 1 tablet (20 mg total) by mouth every evening.  Dispense: 90 tablet; Refill: 3    Pulmonary HTN    Chronic kidney disease, stage 3a    Continue meds.  Follow up labs.          Future Appointments   Date Time Provider Department Center   10/13/2023 10:00 AM [facility] [place]       Patient note was created using [employer].  Any errors in syntax or even information may not have been identified and edited on initial review prior to signing this note.

## 2023-04-14 LAB
ESTIMATED AVG GLUCOSE: 114 MG/DL (ref 68–131)
HBA1C MFR BLD: 5.6 % (ref 4–5.6)

## 2023-05-02 DIAGNOSIS — J43.2 CENTRILOBULAR EMPHYSEMA: ICD-10-CM

## 2023-05-03 RX ORDER — IPRATROPIUM BROMIDE AND ALBUTEROL SULFATE 2.5; .5 MG/3ML; MG/3ML
SOLUTION RESPIRATORY (INHALATION)
Qty: 1080 ML | Refills: 3 | Status: SHIPPED | OUTPATIENT
Start: 2023-05-03

## 2023-05-03 NOTE — TELEPHONE ENCOUNTER
Refill Decision Note   Hollie Ponce  is requesting a refill authorization.  Brief Assessment and Rationale for Refill:  Approve     Medication Therapy Plan:         Alert overridden per protocol: Yes   Comments:     Note composed:9:35 AM 05/03/2023             Appointments     Last Visit   4/13/2023 Navdeep Marquez MD   Next Visit   10/13/2023 Navdeep Marquez MD

## 2023-05-03 NOTE — TELEPHONE ENCOUNTER
No care due was identified.  [facility] Embedded Care Due Messages. Reference number: [id number].   5/02/2023 7:09:25 PM CDT

## 2023-05-18 RX ORDER — HYDROCHLOROTHIAZIDE 25 MG/1
TABLET ORAL
Qty: 90 TABLET | Refills: 0 | Status: SHIPPED | OUTPATIENT
Start: 2023-05-18 | End: 2023-09-02

## 2023-05-18 NOTE — TELEPHONE ENCOUNTER
Refill Routing Note   Medication(s) are not appropriate for processing by [facility] for the following reason(s):      No active prescription written by PCP    [facility] action(s):  Defer None identified          Appointments  past 12m or future 3m with PCP    Date Provider   Last Visit   4/13/2023 Navdeep Marquez MD   Next Visit   10/13/2023 Navdeep Marquez MD   ED visits in past 90 days: 0        Note composed:2:28 PM 05/18/2023

## 2023-05-18 NOTE — TELEPHONE ENCOUNTER
No care due was identified.  [facility] Embedded Care Due Messages. Reference number: [id number].   5/18/2023 2:19:48 PM CDT

## 2023-05-30 DIAGNOSIS — I25.2 HISTORY OF NON-ST ELEVATION MYOCARDIAL INFARCTION (NSTEMI): ICD-10-CM

## 2023-05-30 DIAGNOSIS — I10 ESSENTIAL HYPERTENSION: ICD-10-CM

## 2023-05-30 RX ORDER — CARVEDILOL 3.12 MG/1
TABLET ORAL
Qty: 180 TABLET | Refills: 3 | Status: SHIPPED | OUTPATIENT
Start: 2023-05-30 | End: 2023-12-19

## 2023-05-30 NOTE — TELEPHONE ENCOUNTER
No care due was identified.  [facility] Embedded Care Due Messages. Reference number: [id number].   5/30/2023 12:59:39 PM CDT

## 2023-05-30 NOTE — TELEPHONE ENCOUNTER
Refill Routing Note   Medication(s) are not appropriate for processing by [facility] for the following reason(s):      Drug-disease interaction    [facility] action(s):  Defer None identified   Medication Therapy Plan: Drug-Disease: carvediloL and Centrilobular emphysema      Appointments  past 12m or future 3m with PCP    Date Provider   Last Visit   4/13/2023 Navdeep Marquez MD   Next Visit   10/13/2023 Navdeep Marquez MD   ED visits in past 90 days: 0        Note composed:4:13 PM 05/30/2023

## 2023-06-13 DIAGNOSIS — I73.9 PAD (PERIPHERAL ARTERY DISEASE): ICD-10-CM

## 2023-06-13 DIAGNOSIS — I25.2 HISTORY OF NON-ST ELEVATION MYOCARDIAL INFARCTION (NSTEMI): ICD-10-CM

## 2023-06-13 RX ORDER — ROSUVASTATIN CALCIUM 20 MG/1
TABLET, COATED ORAL
Qty: 90 TABLET | Refills: 0 | Status: SHIPPED | OUTPATIENT
Start: 2023-06-13 | End: 2023-10-13 | Stop reason: SDUPTHER

## 2023-06-13 NOTE — TELEPHONE ENCOUNTER
Refill Routing Note   Medication(s) are not appropriate for processing by [facility] for the following reason(s):      Allergy or intolerance    [facility] action(s):  Defer None identified   Medication Therapy Plan: DRUG CLASS MATCH with ATORVASTATIN (Class: STATINS-HMG-COA REDUCTASE INHIBITORS).    Pharmacist review requested: Yes     Appointments  past 12m or future 3m with PCP    Date Provider   Last Visit   4/13/2023 Navdeep Marquez MD   Next Visit   10/13/2023 Navdeep Marquez MD   ED visits in past 90 days: 0        Note composed:10:32 AM 06/13/2023

## 2023-06-13 NOTE — TELEPHONE ENCOUNTER
Refill Decision Note   Hollie Ponce  is requesting a refill authorization.  Brief Assessment and Rationale for Refill:  Approve     Medication Therapy Plan:       Medication Reconciliation Completed: No   Comments:     No Care Gaps recommended.     Note composed:2:05 PM 06/13/2023

## 2023-06-13 NOTE — TELEPHONE ENCOUNTER
No care due was identified.  [facility] Embedded Care Due Messages. Reference number: [id number].   6/13/2023 3:11:36 AM CDT

## 2023-07-04 DIAGNOSIS — M54.2 CERVICAL PAIN: ICD-10-CM

## 2023-07-04 DIAGNOSIS — J43.2 CENTRILOBULAR EMPHYSEMA: ICD-10-CM

## 2023-07-04 RX ORDER — MONTELUKAST SODIUM 10 MG/1
TABLET ORAL
Qty: 90 TABLET | Refills: 2 | Status: SHIPPED | OUTPATIENT
Start: 2023-07-04

## 2023-07-04 NOTE — TELEPHONE ENCOUNTER
No care due was identified.  [facility] Embedded Care Due Messages. Reference number: [id number].   7/04/2023 6:18:53 AM CDT

## 2023-07-04 NOTE — TELEPHONE ENCOUNTER
Refill Routing Note   Medication(s) are not appropriate for processing by [facility] for the following reason(s):      Medication outside of protocol    [facility] action(s):  Route None identified          Appointments  past 12m or future 3m with PCP    Date Provider   Last Visit   4/13/2023 Navdeep Marquez MD   Next Visit   10/13/2023 Navdeep Marquez MD   ED visits in past 90 days: 0        Note composed:7:18 AM 07/04/2023

## 2023-07-05 RX ORDER — CYCLOBENZAPRINE HCL 10 MG
TABLET ORAL
Qty: 90 TABLET | Refills: 1 | Status: SHIPPED | OUTPATIENT
Start: 2023-07-05 | End: 2024-03-28 | Stop reason: SDUPTHER

## 2023-09-02 RX ORDER — HYDROCHLOROTHIAZIDE 25 MG/1
TABLET ORAL
Qty: 90 TABLET | Refills: 0 | OUTPATIENT
Start: 2023-09-02

## 2023-09-02 NOTE — TELEPHONE ENCOUNTER
Refill Decision Note   Hollie Ponce  is requesting a refill authorization.  Brief Assessment and Rationale for Refill:  Quick Discontinue     Medication Therapy Plan:       Medication Reconciliation Completed: No   Comments:     No Care Gaps recommended.     Duplicate Pended Encounter(s)/ Last Prescribed Details:    Pharmacy    [id number] - [place] - [address] [facility] [address]   Phone:  [phone]  Fax:  [phone]   CLARY #:  [id number]   PORTER Reason: --     Outpatient Medication Detail     Disp Refills Start End PORTER   hydroCHLOROthiazide (HYDRODIURIL) 25 MG tablet 90 tablet 2 9/2/2023  No   Sig - Route: Take 1 tablet (25 mg total) by mouth once daily. - Oral   Sent to pharmacy as: hydroCHLOROthiazide (HYDRODIURIL) 25 MG tablet   Class: Normal   Notes to Pharmacy: .   Order: [id number]   Cosign for Ordering: Required by Navdeep Marquez MD   Date/Time Signed: 9/2/2023 17:01       E-Prescribing Status: Receipt confirmed by pharmacy (9/2/2023  5:01 PM CDT)            Note composed:5:01 PM 09/02/2023   Note composed:5:01 PM 09/02/2023

## 2023-09-02 NOTE — TELEPHONE ENCOUNTER
No care due was identified.  [facility] Embedded Care Due Messages. Reference number: [id number].   9/01/2023 8:08:34 PM CDT

## 2023-09-15 ENCOUNTER — TELEPHONE (OUTPATIENT)
Dept: FAMILY MEDICINE | Facility: CLINIC | Age: 68
End: 2023-09-15
Payer: MEDICARE

## 2023-09-15 DIAGNOSIS — I73.9 PAD (PERIPHERAL ARTERY DISEASE): ICD-10-CM

## 2023-09-15 DIAGNOSIS — I25.2 HISTORY OF NON-ST ELEVATION MYOCARDIAL INFARCTION (NSTEMI): ICD-10-CM

## 2023-09-15 RX ORDER — CLOPIDOGREL BISULFATE 75 MG/1
TABLET ORAL
Qty: 90 TABLET | Refills: 0 | Status: SHIPPED | OUTPATIENT
Start: 2023-09-15 | End: 2023-10-19

## 2023-09-15 NOTE — TELEPHONE ENCOUNTER
No care due was identified.  [facility] Embedded Care Due Messages. Reference number: [id number].   9/15/2023 3:10:43 AM CDT

## 2023-09-15 NOTE — TELEPHONE ENCOUNTER
Refill Routing Note   Medication(s) are not appropriate for processing by [facility] for the following reason(s):      Required labs outdated    [facility] action(s):  Defer Care Due:  None identified            Appointments  past 12m or future 3m with PCP    Date Provider   Last Visit   4/13/2023 Navdeep Marquez MD   Next Visit   10/13/2023 Navdeep Marquez MD   ED visits in past 90 days: 0        Note composed:7:55 AM 09/15/2023

## 2023-10-01 DIAGNOSIS — J43.2 CENTRILOBULAR EMPHYSEMA: ICD-10-CM

## 2023-10-01 NOTE — TELEPHONE ENCOUNTER
Refill Routing Note   Medication(s) are not appropriate for processing by [facility] for the following reason(s):      Drug-drug interaction    [facility] action(s):  Defer Care Due:  None identified     Medication Therapy Plan: Drug drug: albuterol-ipratropium and SPIRIVA WITH HANDIHALER      Appointments  past 12m or future 3m with PCP    Date Provider   Last Visit   4/13/2023 Navdeep Marquez MD   Next Visit   10/13/2023 Navdeep Marquez MD   ED visits in past 90 days: 0        Note composed:10:44 AM 10/01/2023

## 2023-10-01 NOTE — TELEPHONE ENCOUNTER
No care due was identified.  [facility] Embedded Care Due Messages. Reference number: [id number].   10/01/2023 3:09:38 AM CDT

## 2023-10-02 RX ORDER — TIOTROPIUM BROMIDE 18 UG/1
CAPSULE ORAL; RESPIRATORY (INHALATION)
Qty: 90 CAPSULE | Refills: 1 | Status: SHIPPED | OUTPATIENT
Start: 2023-10-02

## 2023-10-13 ENCOUNTER — LAB VISIT (OUTPATIENT)
Dept: LAB | Facility: HOSPITAL | Age: 68
End: 2023-10-13
Attending: FAMILY MEDICINE
Payer: MEDICARE

## 2023-10-13 ENCOUNTER — OFFICE VISIT (OUTPATIENT)
Dept: FAMILY MEDICINE | Facility: CLINIC | Age: 68
End: 2023-10-13
Payer: MEDICARE

## 2023-10-13 VITALS
SYSTOLIC BLOOD PRESSURE: 138 MMHG | DIASTOLIC BLOOD PRESSURE: 84 MMHG | WEIGHT: 128.5 LBS | OXYGEN SATURATION: 97 % | RESPIRATION RATE: 16 BRPM | HEART RATE: 76 BPM | BODY MASS INDEX: 28.91 KG/M2 | TEMPERATURE: 98 F | HEIGHT: 56 IN

## 2023-10-13 DIAGNOSIS — I25.2 HISTORY OF NON-ST ELEVATION MYOCARDIAL INFARCTION (NSTEMI): ICD-10-CM

## 2023-10-13 DIAGNOSIS — R79.9 ABNORMAL FINDING OF BLOOD CHEMISTRY, UNSPECIFIED: ICD-10-CM

## 2023-10-13 DIAGNOSIS — N18.31 CHRONIC KIDNEY DISEASE, STAGE 3A: ICD-10-CM

## 2023-10-13 DIAGNOSIS — Z12.31 ENCOUNTER FOR SCREENING MAMMOGRAM FOR BREAST CANCER: ICD-10-CM

## 2023-10-13 DIAGNOSIS — R73.03 PREDIABETES: ICD-10-CM

## 2023-10-13 DIAGNOSIS — R09.82 POST-NASAL DRIP: ICD-10-CM

## 2023-10-13 DIAGNOSIS — Z00.00 ANNUAL PHYSICAL EXAM: Primary | ICD-10-CM

## 2023-10-13 DIAGNOSIS — I73.9 PAD (PERIPHERAL ARTERY DISEASE): ICD-10-CM

## 2023-10-13 DIAGNOSIS — J43.2 CENTRILOBULAR EMPHYSEMA: ICD-10-CM

## 2023-10-13 DIAGNOSIS — I10 ESSENTIAL HYPERTENSION: ICD-10-CM

## 2023-10-13 LAB
BASOPHILS # BLD AUTO: 0.02 K/UL (ref 0–0.2)
BASOPHILS NFR BLD: 0.5 % (ref 0–1.9)
DIFFERENTIAL METHOD: ABNORMAL
EOSINOPHIL # BLD AUTO: 0 K/UL (ref 0–0.5)
EOSINOPHIL NFR BLD: 1 % (ref 0–8)
ERYTHROCYTE [DISTWIDTH] IN BLOOD BY AUTOMATED COUNT: 13.4 % (ref 11.5–14.5)
ESTIMATED AVG GLUCOSE: 111 MG/DL (ref 68–131)
HBA1C MFR BLD: 5.5 % (ref 4–5.6)
HCT VFR BLD AUTO: 42.4 % (ref 37–48.5)
HGB BLD-MCNC: 13.5 G/DL (ref 12–16)
IMM GRANULOCYTES # BLD AUTO: 0.01 K/UL (ref 0–0.04)
IMM GRANULOCYTES NFR BLD AUTO: 0.3 % (ref 0–0.5)
LYMPHOCYTES # BLD AUTO: 2.1 K/UL (ref 1–4.8)
LYMPHOCYTES NFR BLD: 54.6 % (ref 18–48)
MCH RBC QN AUTO: 29.9 PG (ref 27–31)
MCHC RBC AUTO-ENTMCNC: 31.8 G/DL (ref 32–36)
MCV RBC AUTO: 94 FL (ref 82–98)
MONOCYTES # BLD AUTO: 0.5 K/UL (ref 0.3–1)
MONOCYTES NFR BLD: 12.4 % (ref 4–15)
NEUTROPHILS # BLD AUTO: 1.2 K/UL (ref 1.8–7.7)
NEUTROPHILS NFR BLD: 31.2 % (ref 38–73)
NRBC BLD-RTO: 0 /100 WBC
PLATELET # BLD AUTO: 266 K/UL (ref 150–450)
PMV BLD AUTO: 10.6 FL (ref 9.2–12.9)
RBC # BLD AUTO: 4.51 M/UL (ref 4–5.4)
WBC # BLD AUTO: 3.88 K/UL (ref 3.9–12.7)

## 2023-10-13 PROCEDURE — 4010F PR ACE/ARB THEARPY RXD/TAKEN: ICD-10-PCS | Mod: CPTII,S$GLB,, | Performed by: FAMILY MEDICINE

## 2023-10-13 PROCEDURE — 80053 COMPREHEN METABOLIC PANEL: CPT | Performed by: FAMILY MEDICINE

## 2023-10-13 PROCEDURE — 99397 PER PM REEVAL EST PAT 65+ YR: CPT | Mod: S$GLB,,, | Performed by: FAMILY MEDICINE

## 2023-10-13 PROCEDURE — 84443 ASSAY THYROID STIM HORMONE: CPT | Performed by: FAMILY MEDICINE

## 2023-10-13 PROCEDURE — 3075F PR MOST RECENT SYSTOLIC BLOOD PRESS GE 130-139MM HG: ICD-10-PCS | Mod: CPTII,S$GLB,, | Performed by: FAMILY MEDICINE

## 2023-10-13 PROCEDURE — 3079F DIAST BP 80-89 MM HG: CPT | Mod: CPTII,S$GLB,, | Performed by: FAMILY MEDICINE

## 2023-10-13 PROCEDURE — 80061 LIPID PANEL: CPT | Performed by: FAMILY MEDICINE

## 2023-10-13 PROCEDURE — 36415 COLL VENOUS BLD VENIPUNCTURE: CPT | Mod: PO | Performed by: FAMILY MEDICINE

## 2023-10-13 PROCEDURE — 1159F PR MEDICATION LIST DOCUMENTED IN MEDICAL RECORD: ICD-10-PCS | Mod: CPTII,S$GLB,, | Performed by: FAMILY MEDICINE

## 2023-10-13 PROCEDURE — 99999 PR PBB SHADOW E&M-EST. PATIENT-LVL IV: CPT | Mod: PBBFAC,,, | Performed by: FAMILY MEDICINE

## 2023-10-13 PROCEDURE — 1101F PR PT FALLS ASSESS DOC 0-1 FALLS W/OUT INJ PAST YR: ICD-10-PCS | Mod: CPTII,S$GLB,, | Performed by: FAMILY MEDICINE

## 2023-10-13 PROCEDURE — 3288F FALL RISK ASSESSMENT DOCD: CPT | Mod: CPTII,S$GLB,, | Performed by: FAMILY MEDICINE

## 2023-10-13 PROCEDURE — 1159F MED LIST DOCD IN RCRD: CPT | Mod: CPTII,S$GLB,, | Performed by: FAMILY MEDICINE

## 2023-10-13 PROCEDURE — 3008F BODY MASS INDEX DOCD: CPT | Mod: CPTII,S$GLB,, | Performed by: FAMILY MEDICINE

## 2023-10-13 PROCEDURE — 3008F PR BODY MASS INDEX (BMI) DOCUMENTED: ICD-10-PCS | Mod: CPTII,S$GLB,, | Performed by: FAMILY MEDICINE

## 2023-10-13 PROCEDURE — 83036 HEMOGLOBIN GLYCOSYLATED A1C: CPT | Performed by: FAMILY MEDICINE

## 2023-10-13 PROCEDURE — 99397 PR PREVENTIVE VISIT,EST,65 & OVER: ICD-10-PCS | Mod: S$GLB,,, | Performed by: FAMILY MEDICINE

## 2023-10-13 PROCEDURE — 3044F HG A1C LEVEL LT 7.0%: CPT | Mod: CPTII,S$GLB,, | Performed by: FAMILY MEDICINE

## 2023-10-13 PROCEDURE — 3079F PR MOST RECENT DIASTOLIC BLOOD PRESSURE 80-89 MM HG: ICD-10-PCS | Mod: CPTII,S$GLB,, | Performed by: FAMILY MEDICINE

## 2023-10-13 PROCEDURE — 3044F PR MOST RECENT HEMOGLOBIN A1C LEVEL <7.0%: ICD-10-PCS | Mod: CPTII,S$GLB,, | Performed by: FAMILY MEDICINE

## 2023-10-13 PROCEDURE — 99999 PR PBB SHADOW E&M-EST. PATIENT-LVL IV: ICD-10-PCS | Mod: PBBFAC,,, | Performed by: FAMILY MEDICINE

## 2023-10-13 PROCEDURE — 1101F PT FALLS ASSESS-DOCD LE1/YR: CPT | Mod: CPTII,S$GLB,, | Performed by: FAMILY MEDICINE

## 2023-10-13 PROCEDURE — 85025 COMPLETE CBC W/AUTO DIFF WBC: CPT | Performed by: FAMILY MEDICINE

## 2023-10-13 PROCEDURE — 3075F SYST BP GE 130 - 139MM HG: CPT | Mod: CPTII,S$GLB,, | Performed by: FAMILY MEDICINE

## 2023-10-13 PROCEDURE — 3288F PR FALLS RISK ASSESSMENT DOCUMENTED: ICD-10-PCS | Mod: CPTII,S$GLB,, | Performed by: FAMILY MEDICINE

## 2023-10-13 PROCEDURE — 4010F ACE/ARB THERAPY RXD/TAKEN: CPT | Mod: CPTII,S$GLB,, | Performed by: FAMILY MEDICINE

## 2023-10-13 RX ORDER — FLUTICASONE PROPIONATE 50 MCG
SPRAY, SUSPENSION (ML) NASAL
Qty: 48 G | Refills: 3 | Status: SHIPPED | OUTPATIENT
Start: 2023-10-13 | End: 2023-12-14

## 2023-10-13 RX ORDER — EZETIMIBE 10 MG/1
10 TABLET ORAL
COMMUNITY
Start: 2023-07-24 | End: 2023-12-26 | Stop reason: SDUPTHER

## 2023-10-13 RX ORDER — NAPROXEN 500 MG/1
500 TABLET ORAL 2 TIMES DAILY
COMMUNITY
Start: 2023-08-07

## 2023-10-13 RX ORDER — ROSUVASTATIN CALCIUM 20 MG/1
20 TABLET, COATED ORAL NIGHTLY
Qty: 90 TABLET | Refills: 1 | Status: SHIPPED | OUTPATIENT
Start: 2023-10-13

## 2023-10-13 RX ORDER — FLUTICASONE PROPIONATE AND SALMETEROL XINAFOATE 230; 21 UG/1; UG/1
2 AEROSOL, METERED RESPIRATORY (INHALATION) 2 TIMES DAILY
Qty: 36 G | Refills: 5 | Status: SHIPPED | OUTPATIENT
Start: 2023-10-13

## 2023-10-13 RX ORDER — CYCLOBENZAPRINE HCL 5 MG
5-10 TABLET ORAL NIGHTLY
COMMUNITY
Start: 2023-10-02

## 2023-10-13 NOTE — PROGRESS NOTES
Health Maintenance Due   Topic     TETANUS VACCINE   Notified pt can get vaccine at pharmacy.    Shingles Vaccine (1 of 2) Hx of chickenpox. Notified pt can get vaccine at pharmacy.    Pneumococcal Vaccines (Age 65+) (3 - PPSV23 or PCV20)     LDCT Lung Screen  Consult pcp    Mammogram      Influenza Vaccine (1)     COVID-19 Vaccine (4 - 2023-24 season) Not offered at this Facility

## 2023-10-13 NOTE — PROGRESS NOTES
Subjective:       Patient ID: Hollie Ponce is a 68 y.o. female.    Chief Complaint: Follow-up      Follow-up      68-year-old female presents for annual exam.  Doing well overall.  States she needs refills on some of her medications.  Feels carvedilol has her feeling cold for few hours.  Patient states she does take medication about 6 hours apart since she eats later in the day.    Review of Systems   Constitutional: Negative.    HENT: Negative.     Respiratory: Negative.     Cardiovascular: Negative.    Gastrointestinal: Negative.    Endocrine: Negative.    Genitourinary: Negative.    Musculoskeletal: Negative.    Neurological: Negative.    Psychiatric/Behavioral: Negative.            Past Medical History:   Diagnosis Date    Asthma     COPD (chronic obstructive pulmonary disease)     Hypertension      Past Surgical History:   Procedure Laterality Date     SECTION      HERNIA REPAIR      LEFT HEART CATHETERIZATION Left 2020    Procedure: Left heart cath;  Surgeon: Shabnam Vernon MD;  Location: [facility] CATH LAB;  Service: Cardiology;  Laterality: Left;     Family History   Problem Relation Age of Onset    Cancer Mother         Bladder    Liver disease Father      Social History     Socioeconomic History    Marital status: Single   Tobacco Use    Smoking status: Former     Current packs/day: 0.00     Average packs/day: 0.5 packs/day for 50.0 years (25.0 ttl pk-yrs)     Types: Cigarettes     Start date: 1970     Quit date: 2020     Years since quittin.8    Smokeless tobacco: Never   Substance and Sexual Activity    Alcohol use: Never    Drug use: Never    Sexual activity: Not Currently       Current Outpatient Medications:     albuterol (PROVENTIL/VENTOLIN HFA) 90 mcg/actuation inhaler, Inhale 2 puffs into the lungs every 6 (six) hours as needed for Wheezing or Shortness of Breath. Rescue, Disp: 8.5 g, Rfl: 11    albuterol-ipratropium (DUO-NEB) 2.5 mg-0.5 mg/3 mL nebulizer solution, USE  1 VIAL VIA NEBULIZER EVERY 6 HOURS AS NEEDED FOR WHEEZING OR SHORTNESS OF BREATH OR RESCUE, Disp: 1080 mL, Rfl: 3    carvediloL (COREG) 3.125 MG tablet, TAKE 1 TABLET(3.125 MG) BY MOUTH TWICE DAILY WITH MEALS, Disp: 180 tablet, Rfl: 3    clopidogreL (PLAVIX) 75 mg tablet, TAKE 1 TABLET(75 MG) BY MOUTH EVERY DAY, Disp: 90 tablet, Rfl: 0    cyclobenzaprine (FLEXERIL) 10 MG tablet, TAKE 1 TABLET(10 MG) BY MOUTH EVERY NIGHT AS NEEDED FOR MUSCLE SPASMS, Disp: 90 tablet, Rfl: 1    ezetimibe (ZETIA) 10 mg tablet, Take 10 mg by mouth., Disp: , Rfl:     hydroCHLOROthiazide (HYDRODIURIL) 25 MG tablet, Take 1 tablet (25 mg total) by mouth once daily., Disp: 90 tablet, Rfl: 2    lisinopriL (PRINIVIL,ZESTRIL) 5 MG tablet, Take 1 tablet (5 mg total) by mouth once daily., Disp: 90 tablet, Rfl: 3    loratadine (CLARITIN) 10 mg tablet, TAKE 1 TABLET(10 MG) BY MOUTH DAILY AS NEEDED FOR ALLERGIES, Disp: 90 tablet, Rfl: 3    miscellaneous medical supply (470V TWO WAY VALVE) Misc, Disp nebulizer and tubing ,medicine reservoir,and mask  So as to use  Every 4-6 hrs for sob/wheeze, Disp: , Rfl:     montelukast (SINGULAIR) 10 mg tablet, TAKE 1 TABLET(10 MG) BY MOUTH EVERY EVENING, Disp: 90 tablet, Rfl: 2    nicotine polacrilex 2 MG Lozg, Take 1 lozenge (2 mg total) by mouth as needed. Use 1-2 per hour in place of a cigarette. Limit to 6 a day., Disp: 108 lozenge, Rfl: 0    SPIRIVA WITH HANDIHALER 18 mcg inhalation capsule, INHALE THE CONTENTS OF 1 CAPSULE(18 MCG) INTO THE LUNGS EVERY DAY, Disp: 90 capsule, Rfl: 1    cyclobenzaprine (FLEXERIL) 5 MG tablet, Take 5-10 mg by mouth every evening., Disp: , Rfl:     fluticasone propionate (FLONASE) 50 mcg/actuation nasal spray, SHAKE LIQUID AND USE 2 SPRAYS(100 MCG) IN EACH NOSTRIL EVERY DAY, Disp: 48 g, Rfl: 3    fluticasone-salmeterol 230-21 mcg/dose (ADVAIR HFA) 230-21 mcg/actuation HFAA inhaler, Inhale 2 puffs into the lungs 2 (two) times daily. Controller, Disp: 36 g, Rfl: 5    naproxen  "(NAPROSYN) 500 MG tablet, Take 500 mg by mouth 2 (two) times daily., Disp: , Rfl:     potassium chloride (KLOR-CON) 8 MEQ TbSR, TK 1 T PO D, Disp: , Rfl:     rosuvastatin (CRESTOR) 20 MG tablet, Take 1 tablet (20 mg total) by mouth every evening., Disp: 90 tablet, Rfl: 1   Objective:      Vitals:    10/13/23 0944   BP: 138/84   BP Location: Left arm   Patient Position: Sitting   BP Method: Large (Manual)   Pulse: 76   Resp: 16   Temp: 98 °F (36.7 °C)   TempSrc: Oral   SpO2: 97%   Weight: 58.3 kg (128 lb 8.5 oz)   Height: 4' 8" (1.422 m)       Physical Exam  Constitutional:       General: She is not in acute distress.  HENT:      Head: Normocephalic and atraumatic.   Eyes:      Conjunctiva/sclera: Conjunctivae normal.   Cardiovascular:      Rate and Rhythm: Normal rate and regular rhythm.      Heart sounds: Normal heart sounds. No murmur heard.     No friction rub. No gallop.   Pulmonary:      Effort: Pulmonary effort is normal.      Breath sounds: Normal breath sounds. No wheezing or rales.   Musculoskeletal:      Cervical back: Neck supple.   Skin:     General: Skin is warm and dry.   Neurological:      Mental Status: She is alert and oriented to person, place, and time.   Psychiatric:         Behavior: Behavior normal.         Thought Content: Thought content normal.         Judgment: Judgment normal.            Assessment:       1. Annual physical exam    2. Encounter for screening mammogram for breast cancer    3. PAD (peripheral artery disease)    4. Essential hypertension    5. Chronic kidney disease, stage 3a    6. Centrilobular emphysema    7. History of non-ST elevation myocardial infarction (NSTEMI)    8. Abnormal finding of blood chemistry, unspecified    9. Prediabetes    10. Post-nasal drip        Plan:       Annual physical exam    Encounter for screening mammogram for breast cancer  -     Mammo Digital Screening Bilat; Future; Expected date: 10/13/2023    PAD (peripheral artery disease)  -     " Ambulatory referral/consult to Cardiology; Future; Expected date: 10/20/2023  -     rosuvastatin (CRESTOR) 20 MG tablet; Take 1 tablet (20 mg total) by mouth every evening.  Dispense: 90 tablet; Refill: 1    Essential hypertension  -     CBC Auto Differential; Future; Expected date: 10/13/2023  -     Comprehensive Metabolic Panel; Future; Expected date: 10/13/2023  -     Hemoglobin A1C; Future; Expected date: 10/13/2023  -     TSH; Future; Expected date: 10/13/2023  -     Lipid Panel; Future; Expected date: 10/13/2023    Chronic kidney disease, stage 3a  -     CBC Auto Differential; Future; Expected date: 10/13/2023  -     Comprehensive Metabolic Panel; Future; Expected date: 10/13/2023  -     Hemoglobin A1C; Future; Expected date: 10/13/2023  -     TSH; Future; Expected date: 10/13/2023  -     Lipid Panel; Future; Expected date: 10/13/2023    Centrilobular emphysema  -     fluticasone-salmeterol 230-21 mcg/dose (ADVAIR HFA) 230-21 mcg/actuation HFAA inhaler; Inhale 2 puffs into the lungs 2 (two) times daily. Controller  Dispense: 36 g; Refill: 5    History of non-ST elevation myocardial infarction (NSTEMI)  -     rosuvastatin (CRESTOR) 20 MG tablet; Take 1 tablet (20 mg total) by mouth every evening.  Dispense: 90 tablet; Refill: 1    Abnormal finding of blood chemistry, unspecified  -     Hemoglobin A1C; Future; Expected date: 10/13/2023    Prediabetes  -     Hemoglobin A1C; Future; Expected date: 10/13/2023    Post-nasal drip  -     fluticasone propionate (FLONASE) 50 mcg/actuation nasal spray; SHAKE LIQUID AND USE 2 SPRAYS(100 MCG) IN EACH NOSTRIL EVERY DAY  Dispense: 48 g; Refill: 3    Follow-up labs.  Continue meds.  Place referral to Cardiology to continue with care.  Advised patient to take carvedilol as prescribed.  If symptoms persist advised patient to follow-up.          Future Appointments   Date Time Provider Department Center   12/8/2023  3:20 PM Shabnam Vernon MD [facility] CARDIO [facility]    4/16/2024 10:00 AM [facility]       Patient note was created using Wedia.  Any errors in syntax or even information may not have been identified and edited on initial review prior to signing this note.

## 2023-10-14 LAB
ALBUMIN SERPL BCP-MCNC: 4.1 G/DL (ref 3.5–5.2)
ALP SERPL-CCNC: 64 U/L (ref 55–135)
ALT SERPL W/O P-5'-P-CCNC: 20 U/L (ref 10–44)
ANION GAP SERPL CALC-SCNC: 10 MMOL/L (ref 8–16)
AST SERPL-CCNC: 22 U/L (ref 10–40)
BILIRUB SERPL-MCNC: 0.3 MG/DL (ref 0.1–1)
BUN SERPL-MCNC: 13 MG/DL (ref 8–23)
CALCIUM SERPL-MCNC: 10.4 MG/DL (ref 8.7–10.5)
CHLORIDE SERPL-SCNC: 100 MMOL/L (ref 95–110)
CHOLEST SERPL-MCNC: 156 MG/DL (ref 120–199)
CHOLEST/HDLC SERPL: 2.7 {RATIO} (ref 2–5)
CO2 SERPL-SCNC: 27 MMOL/L (ref 23–29)
CREAT SERPL-MCNC: 1 MG/DL (ref 0.5–1.4)
EST. GFR  (NO RACE VARIABLE): >60 ML/MIN/1.73 M^2
GLUCOSE SERPL-MCNC: 80 MG/DL (ref 70–110)
HDLC SERPL-MCNC: 57 MG/DL (ref 40–75)
HDLC SERPL: 36.5 % (ref 20–50)
LDLC SERPL CALC-MCNC: 90.4 MG/DL (ref 63–159)
NONHDLC SERPL-MCNC: 99 MG/DL
POTASSIUM SERPL-SCNC: 4.4 MMOL/L (ref 3.5–5.1)
PROT SERPL-MCNC: 6.9 G/DL (ref 6–8.4)
SODIUM SERPL-SCNC: 137 MMOL/L (ref 136–145)
TRIGL SERPL-MCNC: 43 MG/DL (ref 30–150)
TSH SERPL DL<=0.005 MIU/L-ACNC: 1.89 UIU/ML (ref 0.4–4)

## 2023-10-19 DIAGNOSIS — I25.2 HISTORY OF NON-ST ELEVATION MYOCARDIAL INFARCTION (NSTEMI): ICD-10-CM

## 2023-10-19 DIAGNOSIS — I73.9 PAD (PERIPHERAL ARTERY DISEASE): ICD-10-CM

## 2023-10-19 RX ORDER — CLOPIDOGREL BISULFATE 75 MG/1
TABLET ORAL
Qty: 90 TABLET | Refills: 3 | Status: SHIPPED | OUTPATIENT
Start: 2023-10-19

## 2023-10-19 NOTE — TELEPHONE ENCOUNTER
No care due was identified.  [facility] Embedded Care Due Messages. Reference number: [id number].   10/19/2023 3:10:21 AM CDT

## 2023-10-19 NOTE — TELEPHONE ENCOUNTER
Refill Decision Note   Hollie Ponce  is requesting a refill authorization.  Brief Assessment and Rationale for Refill:  Approve     Medication Therapy Plan:         Comments:     Note composed:1:11 PM 10/19/2023

## 2023-12-06 DIAGNOSIS — J43.2 CENTRILOBULAR EMPHYSEMA: ICD-10-CM

## 2023-12-06 RX ORDER — LORATADINE 10 MG/1
TABLET ORAL
Qty: 90 TABLET | Refills: 3 | Status: SHIPPED | OUTPATIENT
Start: 2023-12-06 | End: 2024-01-03

## 2023-12-06 NOTE — TELEPHONE ENCOUNTER
No care due was identified.  Health [facility] Embedded Care Due Messages. Reference number: [id number].   12/06/2023 12:56:06 PM CST

## 2023-12-18 DIAGNOSIS — I10 ESSENTIAL HYPERTENSION: ICD-10-CM

## 2023-12-18 DIAGNOSIS — I25.2 HISTORY OF NON-ST ELEVATION MYOCARDIAL INFARCTION (NSTEMI): ICD-10-CM

## 2023-12-18 NOTE — TELEPHONE ENCOUNTER
No care due was identified.  Health [facility] Embedded Care Due Messages. Reference number: [id number].   12/18/2023 4:10:44 PM CST

## 2023-12-18 NOTE — TELEPHONE ENCOUNTER
No care due was identified.  [facility] Embedded Care Due Messages. Reference number: [id number].   12/18/2023 3:40:43 PM CST

## 2023-12-19 RX ORDER — CARVEDILOL 3.12 MG/1
TABLET ORAL
Qty: 180 TABLET | Refills: 3 | Status: SHIPPED | OUTPATIENT
Start: 2023-12-19

## 2023-12-19 NOTE — TELEPHONE ENCOUNTER
Refill Routing Note   Medication(s) are not appropriate for processing by [facility] for the following reason(s):        Drug-disease interaction    [facility] action(s):  Defer        Medication Therapy Plan: Drug-Disease: carvediloL and Centrilobular emphysema    Pharmacist review requested: Yes     Appointments  past 12m or future 3m with PCP    Date Provider   Last Visit   10/13/2023 Navdeep Marquez MD   Next Visit   4/16/2024 Navdeep Marquez MD   ED visits in past 90 days: 0        Note composed:9:38 AM 12/19/2023

## 2023-12-19 NOTE — TELEPHONE ENCOUNTER
Refill Decision Note   Hollie Ponce  is requesting a refill authorization.  Brief Assessment and Rationale for Refill:  Approve     Medication Therapy Plan:         Pharmacist review requested: Yes   Extended chart review required: Yes   Comments:     Note composed:1:22 PM 12/19/2023

## 2023-12-26 NOTE — TELEPHONE ENCOUNTER
No care due was identified.  [facility] Embedded Care Due Messages. Reference number: [id number].   12/26/2023 2:04:04 PM CST

## 2023-12-27 RX ORDER — EZETIMIBE 10 MG/1
10 TABLET ORAL DAILY
Qty: 90 TABLET | Refills: 1 | Status: SHIPPED | OUTPATIENT
Start: 2023-12-27

## 2024-01-02 DIAGNOSIS — J43.2 CENTRILOBULAR EMPHYSEMA: ICD-10-CM

## 2024-01-02 NOTE — TELEPHONE ENCOUNTER
No care due was identified.  [facility] Embedded Care Due Messages. Reference number: [id number].   1/02/2024 2:40:01 PM CST

## 2024-01-03 RX ORDER — LORATADINE 10 MG/1
TABLET ORAL
Qty: 90 TABLET | Refills: 3 | Status: SHIPPED | OUTPATIENT
Start: 2024-01-03

## 2024-01-03 NOTE — TELEPHONE ENCOUNTER
Refill Decision Note   Hollie Ponce  is requesting a refill authorization.  Brief Assessment and Rationale for Refill:  Approve     Medication Therapy Plan:         Comments:     Note composed:7:43 AM 01/03/2024

## 2024-01-17 ENCOUNTER — PATIENT MESSAGE (OUTPATIENT)
Dept: ADMINISTRATIVE | Facility: HOSPITAL | Age: 69
End: 2024-01-17
Payer: MEDICARE

## 2024-01-17 DIAGNOSIS — Z12.11 COLON CANCER SCREENING: Primary | ICD-10-CM

## 2024-01-20 DIAGNOSIS — J43.2 CENTRILOBULAR EMPHYSEMA: ICD-10-CM

## 2024-01-20 NOTE — TELEPHONE ENCOUNTER
No care due was identified.  Health [facility] Embedded Care Due Messages. Reference number: [id number].   1/20/2024 3:12:06 AM CST

## 2024-01-21 RX ORDER — ALBUTEROL SULFATE 90 UG/1
AEROSOL, METERED RESPIRATORY (INHALATION)
Qty: 20.1 G | Refills: 2 | Status: SHIPPED | OUTPATIENT
Start: 2024-01-21 | End: 2024-04-16 | Stop reason: SDUPTHER

## 2024-01-21 NOTE — TELEPHONE ENCOUNTER
Refill Decision Note   Hollie Ponce  is requesting a refill authorization.  Brief Assessment and Rationale for Refill:  Approve     Medication Therapy Plan:         Comments:     Note composed:3:37 AM 01/21/2024

## 2024-03-24 DIAGNOSIS — M54.2 CERVICAL PAIN: ICD-10-CM

## 2024-03-24 NOTE — TELEPHONE ENCOUNTER
No care due was identified.  [facility] Embedded Care Due Messages. Reference number: [id number].   3/24/2024 6:05:00 AM CDT

## 2024-03-25 RX ORDER — HYDROCHLOROTHIAZIDE 25 MG/1
25 TABLET ORAL
Qty: 90 TABLET | Refills: 1 | Status: SHIPPED | OUTPATIENT
Start: 2024-03-25 | End: 2024-04-16 | Stop reason: SDUPTHER

## 2024-03-25 NOTE — TELEPHONE ENCOUNTER
Refill Routing Note   Medication(s) are not appropriate for processing by [facility] for the following reason(s):        Outside of protocol    [facility] action(s):  Route  Approve               Appointments  past 12m or future 3m with PCP    Date Provider   Last Visit   10/13/2023 Navdeep Marquez MD   Next Visit   4/16/2024 Navdeep Marquez MD   ED visits in past 90 days: 0        Note composed:3:19 PM 03/25/2024

## 2024-03-28 RX ORDER — CYCLOBENZAPRINE HCL 10 MG
TABLET ORAL
Qty: 90 TABLET | Refills: 1 | Status: SHIPPED | OUTPATIENT
Start: 2024-03-28

## 2024-04-15 DIAGNOSIS — J43.2 CENTRILOBULAR EMPHYSEMA: ICD-10-CM

## 2024-04-15 RX ORDER — MONTELUKAST SODIUM 10 MG/1
10 TABLET ORAL NIGHTLY
Qty: 90 TABLET | Refills: 2 | Status: CANCELLED | OUTPATIENT
Start: 2024-04-15

## 2024-04-15 NOTE — TELEPHONE ENCOUNTER
No care due was identified.  Health [facility] Embedded Care Due Messages. Reference number: [id number].   4/15/2024 11:21:41 AM CDT

## 2024-04-15 NOTE — PROGRESS NOTES
Health Maintenance Due   Topic     Annual [facility]      TETANUS VACCINE      Shingles Vaccine (1 of 2) hx chickenpox ; inform pt can get vaccine at pharmacy.    RSV Vaccine (Age 60+ and Pregnant patients) (1 - 1-dose 60+ series)     Pneumococcal Vaccines (Age 65+) (3 of 3 - PPSV23 or PCV20)     LDCT Lung Screen      Mammogram      Influenza Vaccine (1)     COVID-19 Vaccine (4 - 2023-24 season)     Colorectal Cancer Screening  CONSULT WITH PCP

## 2024-04-16 ENCOUNTER — OFFICE VISIT (OUTPATIENT)
Dept: FAMILY MEDICINE | Facility: CLINIC | Age: 69
End: 2024-04-16
Payer: MEDICARE

## 2024-04-16 ENCOUNTER — LAB VISIT (OUTPATIENT)
Dept: LAB | Facility: HOSPITAL | Age: 69
End: 2024-04-16
Attending: FAMILY MEDICINE
Payer: MEDICARE

## 2024-04-16 VITALS
SYSTOLIC BLOOD PRESSURE: 158 MMHG | BODY MASS INDEX: 29.75 KG/M2 | RESPIRATION RATE: 16 BRPM | HEIGHT: 56 IN | DIASTOLIC BLOOD PRESSURE: 82 MMHG | OXYGEN SATURATION: 95 % | TEMPERATURE: 99 F | HEART RATE: 82 BPM | WEIGHT: 132.25 LBS

## 2024-04-16 DIAGNOSIS — I25.2 HISTORY OF NON-ST ELEVATION MYOCARDIAL INFARCTION (NSTEMI): ICD-10-CM

## 2024-04-16 DIAGNOSIS — I27.20 PULMONARY HTN: ICD-10-CM

## 2024-04-16 DIAGNOSIS — R09.82 POST-NASAL DRIP: ICD-10-CM

## 2024-04-16 DIAGNOSIS — I10 ESSENTIAL HYPERTENSION: ICD-10-CM

## 2024-04-16 DIAGNOSIS — N18.31 CHRONIC KIDNEY DISEASE, STAGE 3A: ICD-10-CM

## 2024-04-16 DIAGNOSIS — I73.9 PAD (PERIPHERAL ARTERY DISEASE): ICD-10-CM

## 2024-04-16 DIAGNOSIS — R73.03 PREDIABETES: ICD-10-CM

## 2024-04-16 DIAGNOSIS — J43.2 CENTRILOBULAR EMPHYSEMA: ICD-10-CM

## 2024-04-16 DIAGNOSIS — N18.31 CHRONIC KIDNEY DISEASE, STAGE 3A: Primary | ICD-10-CM

## 2024-04-16 LAB
ALBUMIN SERPL BCP-MCNC: 3.8 G/DL (ref 3.5–5.2)
ALP SERPL-CCNC: 58 U/L (ref 55–135)
ALT SERPL W/O P-5'-P-CCNC: 13 U/L (ref 10–44)
ANION GAP SERPL CALC-SCNC: 9 MMOL/L (ref 8–16)
AST SERPL-CCNC: 15 U/L (ref 10–40)
BASOPHILS # BLD AUTO: 0.02 K/UL (ref 0–0.2)
BASOPHILS NFR BLD: 0.5 % (ref 0–1.9)
BILIRUB SERPL-MCNC: 0.3 MG/DL (ref 0.1–1)
BUN SERPL-MCNC: 12 MG/DL (ref 8–23)
CALCIUM SERPL-MCNC: 9.6 MG/DL (ref 8.7–10.5)
CHLORIDE SERPL-SCNC: 104 MMOL/L (ref 95–110)
CHOLEST SERPL-MCNC: 146 MG/DL (ref 120–199)
CHOLEST/HDLC SERPL: 2.9 {RATIO} (ref 2–5)
CO2 SERPL-SCNC: 28 MMOL/L (ref 23–29)
CREAT SERPL-MCNC: 0.9 MG/DL (ref 0.5–1.4)
DIFFERENTIAL METHOD BLD: ABNORMAL
EOSINOPHIL # BLD AUTO: 0 K/UL (ref 0–0.5)
EOSINOPHIL NFR BLD: 0.9 % (ref 0–8)
ERYTHROCYTE [DISTWIDTH] IN BLOOD BY AUTOMATED COUNT: 13.4 % (ref 11.5–14.5)
EST. GFR  (NO RACE VARIABLE): >60 ML/MIN/1.73 M^2
ESTIMATED AVG GLUCOSE: 120 MG/DL (ref 68–131)
GLUCOSE SERPL-MCNC: 66 MG/DL (ref 70–110)
HBA1C MFR BLD: 5.8 % (ref 4–5.6)
HCT VFR BLD AUTO: 42.7 % (ref 37–48.5)
HDLC SERPL-MCNC: 50 MG/DL (ref 40–75)
HDLC SERPL: 34.2 % (ref 20–50)
HGB BLD-MCNC: 13.4 G/DL (ref 12–16)
IMM GRANULOCYTES # BLD AUTO: 0.01 K/UL (ref 0–0.04)
IMM GRANULOCYTES NFR BLD AUTO: 0.2 % (ref 0–0.5)
LDLC SERPL CALC-MCNC: 85.4 MG/DL (ref 63–159)
LYMPHOCYTES # BLD AUTO: 2.1 K/UL (ref 1–4.8)
LYMPHOCYTES NFR BLD: 47 % (ref 18–48)
MCH RBC QN AUTO: 29.8 PG (ref 27–31)
MCHC RBC AUTO-ENTMCNC: 31.4 G/DL (ref 32–36)
MCV RBC AUTO: 95 FL (ref 82–98)
MONOCYTES # BLD AUTO: 0.5 K/UL (ref 0.3–1)
MONOCYTES NFR BLD: 10.5 % (ref 4–15)
NEUTROPHILS # BLD AUTO: 1.8 K/UL (ref 1.8–7.7)
NEUTROPHILS NFR BLD: 40.9 % (ref 38–73)
NONHDLC SERPL-MCNC: 96 MG/DL
NRBC BLD-RTO: 0 /100 WBC
PLATELET # BLD AUTO: 258 K/UL (ref 150–450)
PMV BLD AUTO: 10.5 FL (ref 9.2–12.9)
POTASSIUM SERPL-SCNC: 4.2 MMOL/L (ref 3.5–5.1)
PROT SERPL-MCNC: 6.3 G/DL (ref 6–8.4)
RBC # BLD AUTO: 4.5 M/UL (ref 4–5.4)
SODIUM SERPL-SCNC: 141 MMOL/L (ref 136–145)
TRIGL SERPL-MCNC: 53 MG/DL (ref 30–150)
TSH SERPL DL<=0.005 MIU/L-ACNC: 1.89 UIU/ML (ref 0.4–4)
WBC # BLD AUTO: 4.38 K/UL (ref 3.9–12.7)

## 2024-04-16 PROCEDURE — 84443 ASSAY THYROID STIM HORMONE: CPT | Performed by: FAMILY MEDICINE

## 2024-04-16 PROCEDURE — 80061 LIPID PANEL: CPT | Performed by: FAMILY MEDICINE

## 2024-04-16 PROCEDURE — 36415 COLL VENOUS BLD VENIPUNCTURE: CPT | Mod: PO | Performed by: FAMILY MEDICINE

## 2024-04-16 PROCEDURE — 99999 PR PBB SHADOW E&M-EST. PATIENT-LVL III: CPT | Mod: PBBFAC,,, | Performed by: FAMILY MEDICINE

## 2024-04-16 PROCEDURE — 1101F PT FALLS ASSESS-DOCD LE1/YR: CPT | Mod: CPTII,S$GLB,, | Performed by: FAMILY MEDICINE

## 2024-04-16 PROCEDURE — 1159F MED LIST DOCD IN RCRD: CPT | Mod: CPTII,S$GLB,, | Performed by: FAMILY MEDICINE

## 2024-04-16 PROCEDURE — 83036 HEMOGLOBIN GLYCOSYLATED A1C: CPT | Performed by: FAMILY MEDICINE

## 2024-04-16 PROCEDURE — 3077F SYST BP >= 140 MM HG: CPT | Mod: CPTII,S$GLB,, | Performed by: FAMILY MEDICINE

## 2024-04-16 PROCEDURE — 3008F BODY MASS INDEX DOCD: CPT | Mod: CPTII,S$GLB,, | Performed by: FAMILY MEDICINE

## 2024-04-16 PROCEDURE — 85025 COMPLETE CBC W/AUTO DIFF WBC: CPT | Performed by: FAMILY MEDICINE

## 2024-04-16 PROCEDURE — 80053 COMPREHEN METABOLIC PANEL: CPT | Performed by: FAMILY MEDICINE

## 2024-04-16 PROCEDURE — 3079F DIAST BP 80-89 MM HG: CPT | Mod: CPTII,S$GLB,, | Performed by: FAMILY MEDICINE

## 2024-04-16 PROCEDURE — 3288F FALL RISK ASSESSMENT DOCD: CPT | Mod: CPTII,S$GLB,, | Performed by: FAMILY MEDICINE

## 2024-04-16 PROCEDURE — 4010F ACE/ARB THERAPY RXD/TAKEN: CPT | Mod: CPTII,S$GLB,, | Performed by: FAMILY MEDICINE

## 2024-04-16 PROCEDURE — 99214 OFFICE O/P EST MOD 30 MIN: CPT | Mod: S$GLB,,, | Performed by: FAMILY MEDICINE

## 2024-04-16 RX ORDER — IPRATROPIUM BROMIDE AND ALBUTEROL SULFATE 2.5; .5 MG/3ML; MG/3ML
SOLUTION RESPIRATORY (INHALATION)
Qty: 1080 ML | Refills: 3 | Status: SHIPPED | OUTPATIENT
Start: 2024-04-16

## 2024-04-16 RX ORDER — LISINOPRIL 10 MG/1
10 TABLET ORAL DAILY
Qty: 90 TABLET | Refills: 3 | Status: SHIPPED | OUTPATIENT
Start: 2024-04-16 | End: 2024-04-30 | Stop reason: SDUPTHER

## 2024-04-16 RX ORDER — CLOPIDOGREL BISULFATE 75 MG/1
75 TABLET ORAL DAILY
Qty: 90 TABLET | Refills: 3 | Status: SHIPPED | OUTPATIENT
Start: 2024-04-16

## 2024-04-16 RX ORDER — TIOTROPIUM BROMIDE 18 UG/1
CAPSULE ORAL; RESPIRATORY (INHALATION)
Qty: 90 CAPSULE | Refills: 1 | Status: SHIPPED | OUTPATIENT
Start: 2024-04-16

## 2024-04-16 RX ORDER — FLUTICASONE PROPIONATE 50 MCG
SPRAY, SUSPENSION (ML) NASAL
Qty: 48 G | Refills: 3 | Status: SHIPPED | OUTPATIENT
Start: 2024-04-16

## 2024-04-16 RX ORDER — MONTELUKAST SODIUM 10 MG/1
10 TABLET ORAL NIGHTLY
Qty: 90 TABLET | Refills: 3 | Status: SHIPPED | OUTPATIENT
Start: 2024-04-16

## 2024-04-16 RX ORDER — CARVEDILOL 3.12 MG/1
TABLET ORAL
Qty: 180 TABLET | Refills: 3 | Status: SHIPPED | OUTPATIENT
Start: 2024-04-16

## 2024-04-16 RX ORDER — LORATADINE 10 MG/1
TABLET ORAL
Qty: 90 TABLET | Refills: 3 | Status: SHIPPED | OUTPATIENT
Start: 2024-04-16

## 2024-04-16 RX ORDER — ROSUVASTATIN CALCIUM 20 MG/1
20 TABLET, COATED ORAL NIGHTLY
Qty: 90 TABLET | Refills: 1 | Status: SHIPPED | OUTPATIENT
Start: 2024-04-16 | End: 2024-04-22 | Stop reason: SDUPTHER

## 2024-04-16 RX ORDER — ALBUTEROL SULFATE 90 UG/1
2 AEROSOL, METERED RESPIRATORY (INHALATION) EVERY 6 HOURS PRN
Qty: 20.1 G | Refills: 2 | Status: SHIPPED | OUTPATIENT
Start: 2024-04-16

## 2024-04-16 RX ORDER — HYDROCHLOROTHIAZIDE 25 MG/1
25 TABLET ORAL DAILY
Qty: 90 TABLET | Refills: 1 | Status: SHIPPED | OUTPATIENT
Start: 2024-04-16

## 2024-04-16 NOTE — PROGRESS NOTES
Subjective:       Patient ID: Hollie Ponce is a 68 y.o. female.    Chief Complaint: Follow-up      Follow-up      68-year-old female presents for six-month follow-up.  States she is taking her medications.  Blood pressure is elevated.  States she has a blood pressure cuff at home but does not check her blood pressure.  Denies any other issues.    Review of Systems   Constitutional: Negative.    HENT: Negative.     Respiratory: Negative.     Cardiovascular: Negative.    Gastrointestinal: Negative.    Endocrine: Negative.    Genitourinary: Negative.    Musculoskeletal: Negative.    Neurological: Negative.    Psychiatric/Behavioral: Negative.            Past Medical History:   Diagnosis Date    Asthma     COPD (chronic obstructive pulmonary disease)     Hypertension      Past Surgical History:   Procedure Laterality Date     SECTION      HERNIA REPAIR      LEFT HEART CATHETERIZATION Left 2020    Procedure: Left heart cath;  Surgeon: Shabnam Vernon MD;  Location: [facility] CATH LAB;  Service: Cardiology;  Laterality: Left;     Family History   Problem Relation Name Age of Onset    Cancer Mother          Bladder    Liver disease Father       Social History     Socioeconomic History    Marital status: Single   Tobacco Use    Smoking status: Former     Current packs/day: 0.00     Average packs/day: 0.5 packs/day for 50.0 years (25.0 ttl pk-yrs)     Types: Cigarettes     Start date: 1970     Quit date: 2020     Years since quitting: 3.3    Smokeless tobacco: Never   Substance and Sexual Activity    Alcohol use: Never    Drug use: Never    Sexual activity: Not Currently       Current Outpatient Medications:     cyclobenzaprine (FLEXERIL) 10 MG tablet, TAKE 1 TABLET(10 MG) BY MOUTH EVERY NIGHT AS NEEDED FOR MUSCLE SPASMS, Disp: 90 tablet, Rfl: 1    ezetimibe (ZETIA) 10 mg tablet, Take 1 tablet (10 mg total) by mouth once daily., Disp: 90 tablet, Rfl: 1    fluticasone-salmeterol 230-21 mcg/dose (ADVAIR  HFA) 230-21 mcg/actuation HFAA inhaler, Inhale 2 puffs into the lungs 2 (two) times daily. Controller, Disp: 36 g, Rfl: 5    miscellaneous medical supply (470V TWO WAY VALVE) Misc, Disp nebulizer and tubing ,medicine reservoir,and mask  So as to use  Every 4-6 hrs for sob/wheeze, Disp: , Rfl:     naproxen (NAPROSYN) 500 MG tablet, Take 500 mg by mouth 2 (two) times daily., Disp: , Rfl:     nicotine polacrilex 2 MG Lozg, Take 1 lozenge (2 mg total) by mouth as needed. Use 1-2 per hour in place of a cigarette. Limit to 6 a day., Disp: 108 lozenge, Rfl: 0    potassium chloride (KLOR-CON) 8 MEQ TbSR, TK 1 T PO D, Disp: , Rfl:     albuterol (PROVENTIL/VENTOLIN HFA) 90 mcg/actuation inhaler, Inhale 2 puffs into the lungs every 6 (six) hours as needed for Wheezing or Shortness of Breath. Rescue, Disp: 20.1 g, Rfl: 2    albuterol-ipratropium (DUO-NEB) 2.5 mg-0.5 mg/3 mL nebulizer solution, USE 1 VIAL VIA NEBULIZER EVERY 6 HOURS AS NEEDED FOR WHEEZING OR SHORTNESS OF BREATH OR RESCUE, Disp: 1080 mL, Rfl: 3    carvediloL (COREG) 3.125 MG tablet, TAKE 1 TABLET(3.125 MG) BY MOUTH TWICE DAILY WITH MEALS, Disp: 180 tablet, Rfl: 3    clopidogreL (PLAVIX) 75 mg tablet, Take 1 tablet (75 mg total) by mouth once daily., Disp: 90 tablet, Rfl: 3    cyclobenzaprine (FLEXERIL) 5 MG tablet, Take 5-10 mg by mouth every evening. (Patient not taking: Reported on 4/16/2024), Disp: , Rfl:     fluticasone propionate (FLONASE) 50 mcg/actuation nasal spray, SHAKE LIQUID AND USE 2 SPRAYS(100 MCG) IN EACH NOSTRIL EVERY DAY, Disp: 48 g, Rfl: 3    hydroCHLOROthiazide (HYDRODIURIL) 25 MG tablet, Take 1 tablet (25 mg total) by mouth once daily., Disp: 90 tablet, Rfl: 1    lisinopriL 10 MG tablet, Take 1 tablet (10 mg total) by mouth once daily., Disp: 90 tablet, Rfl: 3    loratadine (CLARITIN) 10 mg tablet, TAKE 1 TABLET(10 MG) BY MOUTH DAILY AS NEEDED FOR ALLERGIES, Disp: 90 tablet, Rfl: 3    montelukast (SINGULAIR) 10 mg tablet, Take 1 tablet (10 mg  "total) by mouth every evening., Disp: 90 tablet, Rfl: 3    rosuvastatin (CRESTOR) 20 MG tablet, Take 1 tablet (20 mg total) by mouth every evening., Disp: 90 tablet, Rfl: 1    tiotropium (SPIRIVA WITH HANDIHALER) 18 mcg inhalation capsule, INHALE THE CONTENTS OF 1 CAPSULE(18 MCG) INTO THE LUNGS EVERY DAY, Disp: 90 capsule, Rfl: 1   Objective:      Vitals:    04/16/24 0922 04/16/24 0938   BP: (!) 178/94 (!) 158/82   BP Location: Right arm    Patient Position: Sitting    BP Method: Large (Manual)    Pulse: 82    Resp: 16    Temp: 98.6 °F (37 °C)    TempSrc: Oral    SpO2: 95%    Weight: 60 kg (132 lb 4.4 oz)    Height: 4' 8" (1.422 m)        Physical Exam  Constitutional:       General: She is not in acute distress.     Appearance: She is not diaphoretic.   HENT:      Head: Normocephalic and atraumatic.   Eyes:      Conjunctiva/sclera: Conjunctivae normal.   Pulmonary:      Effort: Pulmonary effort is normal.   Musculoskeletal:      Cervical back: Neck supple.   Skin:     Findings: No rash.   Neurological:      Mental Status: She is alert and oriented to person, place, and time.   Psychiatric:         Behavior: Behavior normal.         Thought Content: Thought content normal.         Judgment: Judgment normal.            Assessment:       1. Chronic kidney disease, stage 3a    2. Centrilobular emphysema    3. Essential hypertension    4. History of non-ST elevation myocardial infarction (NSTEMI)    5. Post-nasal drip    6. PAD (peripheral artery disease)    7. Prediabetes    8. Pulmonary HTN        Plan:       Chronic kidney disease, stage 3a  -     Comprehensive Metabolic Panel; Future; Expected date: 04/16/2024    Centrilobular emphysema  -     tiotropium (SPIRIVA WITH HANDIHALER) 18 mcg inhalation capsule; INHALE THE CONTENTS OF 1 CAPSULE(18 MCG) INTO THE LUNGS EVERY DAY  Dispense: 90 capsule; Refill: 1  -     albuterol (PROVENTIL/VENTOLIN HFA) 90 mcg/actuation inhaler; Inhale 2 puffs into the lungs every 6 (six) " hours as needed for Wheezing or Shortness of Breath. Rescue  Dispense: 20.1 g; Refill: 2  -     loratadine (CLARITIN) 10 mg tablet; TAKE 1 TABLET(10 MG) BY MOUTH DAILY AS NEEDED FOR ALLERGIES  Dispense: 90 tablet; Refill: 3  -     albuterol-ipratropium (DUO-NEB) 2.5 mg-0.5 mg/3 mL nebulizer solution; USE 1 VIAL VIA NEBULIZER EVERY 6 HOURS AS NEEDED FOR WHEEZING OR SHORTNESS OF BREATH OR RESCUE  Dispense: 1080 mL; Refill: 3  -     montelukast (SINGULAIR) 10 mg tablet; Take 1 tablet (10 mg total) by mouth every evening.  Dispense: 90 tablet; Refill: 3    Essential hypertension  -     hydroCHLOROthiazide (HYDRODIURIL) 25 MG tablet; Take 1 tablet (25 mg total) by mouth once daily.  Dispense: 90 tablet; Refill: 1  -     carvediloL (COREG) 3.125 MG tablet; TAKE 1 TABLET(3.125 MG) BY MOUTH TWICE DAILY WITH MEALS  Dispense: 180 tablet; Refill: 3  -     TSH; Future; Expected date: 04/16/2024  -     Lipid Panel; Future; Expected date: 04/16/2024    History of non-ST elevation myocardial infarction (NSTEMI)  -     carvediloL (COREG) 3.125 MG tablet; TAKE 1 TABLET(3.125 MG) BY MOUTH TWICE DAILY WITH MEALS  Dispense: 180 tablet; Refill: 3  -     clopidogreL (PLAVIX) 75 mg tablet; Take 1 tablet (75 mg total) by mouth once daily.  Dispense: 90 tablet; Refill: 3  -     rosuvastatin (CRESTOR) 20 MG tablet; Take 1 tablet (20 mg total) by mouth every evening.  Dispense: 90 tablet; Refill: 1  -     lisinopriL 10 MG tablet; Take 1 tablet (10 mg total) by mouth once daily.  Dispense: 90 tablet; Refill: 3    Post-nasal drip  -     fluticasone propionate (FLONASE) 50 mcg/actuation nasal spray; SHAKE LIQUID AND USE 2 SPRAYS(100 MCG) IN EACH NOSTRIL EVERY DAY  Dispense: 48 g; Refill: 3    PAD (peripheral artery disease)  -     clopidogreL (PLAVIX) 75 mg tablet; Take 1 tablet (75 mg total) by mouth once daily.  Dispense: 90 tablet; Refill: 3  -     rosuvastatin (CRESTOR) 20 MG tablet; Take 1 tablet (20 mg total) by mouth every evening.   Dispense: 90 tablet; Refill: 1    Prediabetes  -     CBC Auto Differential; Future; Expected date: 04/16/2024  -     Comprehensive Metabolic Panel; Future; Expected date: 04/16/2024  -     Hemoglobin A1C; Future; Expected date: 04/16/2024    Pulmonary HTN      Follow-up labs.  Refilled medications.  Increase lisinopril to 10 mg due to elevated blood pressure.  Recheck in 2 weeks with nurse visit.        Future Appointments   Date Time Provider Department Center   4/30/2024 10:00 AM NURSE, [facility] [place]       Patient note was created using spotflux.  Any errors in syntax or even information may not have been identified and edited on initial review prior to signing this note.

## 2024-04-19 ENCOUNTER — TELEPHONE (OUTPATIENT)
Dept: FAMILY MEDICINE | Facility: CLINIC | Age: 69
End: 2024-04-19
Payer: MEDICARE

## 2024-04-22 DIAGNOSIS — I25.2 HISTORY OF NON-ST ELEVATION MYOCARDIAL INFARCTION (NSTEMI): ICD-10-CM

## 2024-04-22 DIAGNOSIS — I73.9 PAD (PERIPHERAL ARTERY DISEASE): ICD-10-CM

## 2024-04-22 NOTE — TELEPHONE ENCOUNTER
No care due was identified.  [facility] Embedded Care Due Messages. Reference number: [id number].   4/22/2024 8:06:33 AM CDT

## 2024-04-24 RX ORDER — ROSUVASTATIN CALCIUM 20 MG/1
20 TABLET, COATED ORAL NIGHTLY
Qty: 90 TABLET | Refills: 1 | Status: SHIPPED | OUTPATIENT
Start: 2024-04-24

## 2024-04-30 ENCOUNTER — CLINICAL SUPPORT (OUTPATIENT)
Dept: FAMILY MEDICINE | Facility: CLINIC | Age: 69
End: 2024-04-30
Payer: MEDICARE

## 2024-04-30 VITALS — DIASTOLIC BLOOD PRESSURE: 82 MMHG | SYSTOLIC BLOOD PRESSURE: 144 MMHG

## 2024-04-30 DIAGNOSIS — I25.2 HISTORY OF NON-ST ELEVATION MYOCARDIAL INFARCTION (NSTEMI): ICD-10-CM

## 2024-04-30 DIAGNOSIS — I10 ESSENTIAL HYPERTENSION: Primary | ICD-10-CM

## 2024-04-30 PROCEDURE — 99999 PR PBB SHADOW E&M-EST. PATIENT-LVL I: CPT | Mod: PBBFAC,,,

## 2024-04-30 RX ORDER — LISINOPRIL 20 MG/1
20 TABLET ORAL DAILY
Qty: 90 TABLET | Refills: 3 | Status: SHIPPED | OUTPATIENT
Start: 2024-04-30

## 2024-04-30 NOTE — PROGRESS NOTES
Hollie Ponce 68 y.o. female is here today for Blood Pressure check.   History of HTN yes.    Review of patient's allergies indicates:   Allergen Reactions    Flagyl [metronidazole hcl]      Hives      Sulfamethoxazole-trimethoprim Itching    Aspirin Nausea Only    Lipitor [atorvastatin]      Myalgia      Creatinine   Date Value Ref Range Status   04/16/2024 0.9 0.5 - 1.4 mg/dL Final     Sodium   Date Value Ref Range Status   04/16/2024 141 136 - 145 mmol/L Final     Potassium   Date Value Ref Range Status   04/16/2024 4.2 3.5 - 5.1 mmol/L Final   ]  Patient verifies taking blood pressure medications on a regular basis at the same time of the day.     Current Outpatient Medications:     albuterol (PROVENTIL/VENTOLIN HFA) 90 mcg/actuation inhaler, Inhale 2 puffs into the lungs every 6 (six) hours as needed for Wheezing or Shortness of Breath. Rescue, Disp: 20.1 g, Rfl: 2    albuterol-ipratropium (DUO-NEB) 2.5 mg-0.5 mg/3 mL nebulizer solution, USE 1 VIAL VIA NEBULIZER EVERY 6 HOURS AS NEEDED FOR WHEEZING OR SHORTNESS OF BREATH OR RESCUE, Disp: 1080 mL, Rfl: 3    carvediloL (COREG) 3.125 MG tablet, TAKE 1 TABLET(3.125 MG) BY MOUTH TWICE DAILY WITH MEALS, Disp: 180 tablet, Rfl: 3    clopidogreL (PLAVIX) 75 mg tablet, Take 1 tablet (75 mg total) by mouth once daily., Disp: 90 tablet, Rfl: 3    cyclobenzaprine (FLEXERIL) 10 MG tablet, TAKE 1 TABLET(10 MG) BY MOUTH EVERY NIGHT AS NEEDED FOR MUSCLE SPASMS, Disp: 90 tablet, Rfl: 1    cyclobenzaprine (FLEXERIL) 5 MG tablet, Take 5-10 mg by mouth every evening. (Patient not taking: Reported on 4/16/2024), Disp: , Rfl:     ezetimibe (ZETIA) 10 mg tablet, Take 1 tablet (10 mg total) by mouth once daily., Disp: 90 tablet, Rfl: 1    fluticasone propionate (FLONASE) 50 mcg/actuation nasal spray, SHAKE LIQUID AND USE 2 SPRAYS(100 MCG) IN EACH NOSTRIL EVERY DAY, Disp: 48 g, Rfl: 3    fluticasone-salmeterol 230-21 mcg/dose (ADVAIR HFA) 230-21 mcg/actuation HFAA inhaler, Inhale 2  puffs into the lungs 2 (two) times daily. Controller, Disp: 36 g, Rfl: 5    hydroCHLOROthiazide (HYDRODIURIL) 25 MG tablet, Take 1 tablet (25 mg total) by mouth once daily., Disp: 90 tablet, Rfl: 1    lisinopriL 10 MG tablet, Take 1 tablet (10 mg total) by mouth once daily., Disp: 90 tablet, Rfl: 3    loratadine (CLARITIN) 10 mg tablet, TAKE 1 TABLET(10 MG) BY MOUTH DAILY AS NEEDED FOR ALLERGIES, Disp: 90 tablet, Rfl: 3    miscellaneous medical supply (470V TWO WAY VALVE) Misc, Disp nebulizer and tubing ,medicine reservoir,and mask  So as to use  Every 4-6 hrs for sob/wheeze, Disp: , Rfl:     montelukast (SINGULAIR) 10 mg tablet, Take 1 tablet (10 mg total) by mouth every evening., Disp: 90 tablet, Rfl: 3    naproxen (NAPROSYN) 500 MG tablet, Take 500 mg by mouth 2 (two) times daily., Disp: , Rfl:     nicotine polacrilex 2 MG Lozg, Take 1 lozenge (2 mg total) by mouth as needed. Use 1-2 per hour in place of a cigarette. Limit to 6 a day., Disp: 108 lozenge, Rfl: 0    potassium chloride (KLOR-CON) 8 MEQ TbSR, TK 1 T PO D, Disp: , Rfl:     rosuvastatin (CRESTOR) 20 MG tablet, Take 1 tablet (20 mg total) by mouth every evening., Disp: 90 tablet, Rfl: 1    tiotropium (SPIRIVA WITH HANDIHALER) 18 mcg inhalation capsule, INHALE THE CONTENTS OF 1 CAPSULE(18 MCG) INTO THE LUNGS EVERY DAY, Disp: 90 capsule, Rfl: 1  Does patient have record of home blood pressure readings yes. Readings have been averaging 150/80's.   Last dose of blood pressure medication was taken at this morning.  Patient is asymptomatic.   Complains of no complaints.    Vitals:    04/30/24 0939 04/30/24 0940 04/30/24 0956   BP: (!) 157/99 (!) 150/88 (!) 144/82   BP Location: Right arm Right arm Right arm   Patient Position: Sitting Sitting Sitting   BP Method: Medium (Automatic) Medium (Manual) Medium (Manual)         Dr. Marquez informed of nurse visit.

## 2024-05-08 ENCOUNTER — PATIENT OUTREACH (OUTPATIENT)
Dept: ADMINISTRATIVE | Facility: HOSPITAL | Age: 69
End: 2024-05-08
Payer: MEDICARE

## 2024-05-08 DIAGNOSIS — N18.31 CHRONIC KIDNEY DISEASE, STAGE 3A: Primary | ICD-10-CM

## 2024-05-08 NOTE — PROGRESS NOTES
Population Health Chart Review & Patient Outreach Details      Additional Pop Health Notes:    DUE FOR MAMMOGRAM, ANNUAL UACR, AND COLON SCREENING. IF ALREADY DONE, NEED TO GET RECORDS INFORMATION.  REMIND TO MAIL BACK COLOGUARD.  Pt will call back when ready for mammogram. Urine scheduled. Give cologuard number to call to mail kit again           Updates Requested / Reviewed:      Updated Care Coordination Note, Care Everywhere, and Care Team Updated         Health Maintenance Topics Overdue:      VBHM Score: 3     Mammogram  Uncontrolled BP  LDCT Lung Screen    Pneumonia Vaccine  Tetanus Vaccine  Shingles/Zoster Vaccine  RSV Vaccine                  Health Maintenance Topic(s) Outreach Outcomes & Actions Taken:    Breast Cancer Screening - Outreach Outcomes & Actions Taken  : Pt Declined Scheduling Mammogram and will schedule when ready.    Colorectal Cancer Screening - Outreach Outcomes & Actions Taken  : pt will call cologuard to mail out another one.

## 2024-05-15 ENCOUNTER — CLINICAL SUPPORT (OUTPATIENT)
Dept: FAMILY MEDICINE | Facility: CLINIC | Age: 69
End: 2024-05-15
Payer: MEDICARE

## 2024-05-15 ENCOUNTER — LAB VISIT (OUTPATIENT)
Dept: LAB | Facility: HOSPITAL | Age: 69
End: 2024-05-15
Attending: FAMILY MEDICINE
Payer: MEDICARE

## 2024-05-15 VITALS — DIASTOLIC BLOOD PRESSURE: 80 MMHG | SYSTOLIC BLOOD PRESSURE: 136 MMHG

## 2024-05-15 DIAGNOSIS — I10 ESSENTIAL HYPERTENSION: Primary | ICD-10-CM

## 2024-05-15 DIAGNOSIS — N18.31 CHRONIC KIDNEY DISEASE, STAGE 3A: ICD-10-CM

## 2024-05-15 LAB
ALBUMIN/CREAT UR: NORMAL UG/MG (ref 0–30)
CREAT UR-MCNC: 74 MG/DL (ref 15–325)
MICROALBUMIN UR DL<=1MG/L-MCNC: <5 UG/ML

## 2024-05-15 PROCEDURE — 99999 PR PBB SHADOW E&M-EST. PATIENT-LVL I: CPT | Mod: PBBFAC,,,

## 2024-05-15 PROCEDURE — 82043 UR ALBUMIN QUANTITATIVE: CPT | Performed by: FAMILY MEDICINE

## 2024-05-15 NOTE — PROGRESS NOTES
Hollie Ponce 68 y.o. female is here today for Blood Pressure check.   History of HTN yes.    Review of patient's allergies indicates:   Allergen Reactions    Flagyl [metronidazole hcl]      Hives      Sulfamethoxazole-trimethoprim Itching    Aspirin Nausea Only    Lipitor [atorvastatin]      Myalgia      Creatinine   Date Value Ref Range Status   04/16/2024 0.9 0.5 - 1.4 mg/dL Final     Sodium   Date Value Ref Range Status   04/16/2024 141 136 - 145 mmol/L Final     Potassium   Date Value Ref Range Status   04/16/2024 4.2 3.5 - 5.1 mmol/L Final   ]  Patient verifies taking blood pressure medications on a regular basis at the same time of the day.     Current Outpatient Medications:     albuterol (PROVENTIL/VENTOLIN HFA) 90 mcg/actuation inhaler, Inhale 2 puffs into the lungs every 6 (six) hours as needed for Wheezing or Shortness of Breath. Rescue, Disp: 20.1 g, Rfl: 2    albuterol-ipratropium (DUO-NEB) 2.5 mg-0.5 mg/3 mL nebulizer solution, USE 1 VIAL VIA NEBULIZER EVERY 6 HOURS AS NEEDED FOR WHEEZING OR SHORTNESS OF BREATH OR RESCUE, Disp: 1080 mL, Rfl: 3    carvediloL (COREG) 3.125 MG tablet, TAKE 1 TABLET(3.125 MG) BY MOUTH TWICE DAILY WITH MEALS, Disp: 180 tablet, Rfl: 3    clopidogreL (PLAVIX) 75 mg tablet, Take 1 tablet (75 mg total) by mouth once daily., Disp: 90 tablet, Rfl: 3    cyclobenzaprine (FLEXERIL) 10 MG tablet, TAKE 1 TABLET(10 MG) BY MOUTH EVERY NIGHT AS NEEDED FOR MUSCLE SPASMS, Disp: 90 tablet, Rfl: 1    cyclobenzaprine (FLEXERIL) 5 MG tablet, Take 5-10 mg by mouth every evening. (Patient not taking: Reported on 4/16/2024), Disp: , Rfl:     ezetimibe (ZETIA) 10 mg tablet, Take 1 tablet (10 mg total) by mouth once daily., Disp: 90 tablet, Rfl: 1    fluticasone propionate (FLONASE) 50 mcg/actuation nasal spray, SHAKE LIQUID AND USE 2 SPRAYS(100 MCG) IN EACH NOSTRIL EVERY DAY, Disp: 48 g, Rfl: 3    fluticasone-salmeterol 230-21 mcg/dose (ADVAIR HFA) 230-21 mcg/actuation HFAA inhaler, Inhale 2  puffs into the lungs 2 (two) times daily. Controller, Disp: 36 g, Rfl: 5    hydroCHLOROthiazide (HYDRODIURIL) 25 MG tablet, Take 1 tablet (25 mg total) by mouth once daily., Disp: 90 tablet, Rfl: 1    lisinopriL (PRINIVIL,ZESTRIL) 20 MG tablet, Take 1 tablet (20 mg total) by mouth once daily., Disp: 90 tablet, Rfl: 3    loratadine (CLARITIN) 10 mg tablet, TAKE 1 TABLET(10 MG) BY MOUTH DAILY AS NEEDED FOR ALLERGIES, Disp: 90 tablet, Rfl: 3    miscellaneous medical supply (470V TWO WAY VALVE) Misc, Disp nebulizer and tubing ,medicine reservoir,and mask  So as to use  Every 4-6 hrs for sob/wheeze, Disp: , Rfl:     montelukast (SINGULAIR) 10 mg tablet, Take 1 tablet (10 mg total) by mouth every evening., Disp: 90 tablet, Rfl: 3    naproxen (NAPROSYN) 500 MG tablet, Take 500 mg by mouth 2 (two) times daily., Disp: , Rfl:     nicotine polacrilex 2 MG Lozg, Take 1 lozenge (2 mg total) by mouth as needed. Use 1-2 per hour in place of a cigarette. Limit to 6 a day., Disp: 108 lozenge, Rfl: 0    potassium chloride (KLOR-CON) 8 MEQ TbSR, TK 1 T PO D, Disp: , Rfl:     rosuvastatin (CRESTOR) 20 MG tablet, Take 1 tablet (20 mg total) by mouth every evening., Disp: 90 tablet, Rfl: 1    tiotropium (SPIRIVA WITH HANDIHALER) 18 mcg inhalation capsule, INHALE THE CONTENTS OF 1 CAPSULE(18 MCG) INTO THE LUNGS EVERY DAY, Disp: 90 capsule, Rfl: 1  Does patient have record of home blood pressure readings yes. Readings have been averaging 150/90's - 120/80's.   Last dose of blood pressure medication was taken at this morning.  Patient is asymptomatic.   Complains of no complaints.    Vitals:    05/15/24 0932 05/15/24 0953   BP: (!) 148/80 136/80   BP Location: Right arm Right arm   Patient Position: Sitting Sitting   BP Method: Medium (Manual) Medium (Manual)         Dr. Jimmy informed of nurse visit. I scheduled her annual in October with Dr Smith

## 2024-06-22 RX ORDER — EZETIMIBE 10 MG/1
10 TABLET ORAL
Qty: 90 TABLET | Refills: 3 | Status: SHIPPED | OUTPATIENT
Start: 2024-06-22

## 2024-06-22 NOTE — TELEPHONE ENCOUNTER
Refill Decision Note   Hollie Ponce  is requesting a refill authorization.  Brief Assessment and Rationale for Refill:  Approve     Medication Therapy Plan:         Comments:     Note composed:9:53 AM 06/22/2024

## 2024-06-22 NOTE — TELEPHONE ENCOUNTER
No care due was identified.  [facility] Embedded Care Due Messages. Reference number: [id number].   6/22/2024 6:02:11 AM CDT

## 2024-08-13 ENCOUNTER — PATIENT OUTREACH (OUTPATIENT)
Dept: ADMINISTRATIVE | Facility: HOSPITAL | Age: 69
End: 2024-08-13
Payer: MEDICARE

## 2024-08-13 DIAGNOSIS — Z87.891 FORMER SMOKER: Primary | ICD-10-CM

## 2024-08-13 NOTE — PROGRESS NOTES
Population Health Chart Review & Patient Outreach Details      Additional Pop Health Notes:    DUE FOR MAMMOGRAM,  AND LDCT LUNG SCREEN. IF ALREADY DONE, NEED TO GET RECORDS INFORMATION.  MAMMOGRAM SCHEDULED 8/15/2024. PT WOULD LIKE TO CONSULT LDCT AT NEXT VISIT WITH DR. TOWNSEND.             Updates Requested / Reviewed:      Updated Care Coordination Note and Care Everywhere         Health Maintenance Topics Overdue:      VBHM Score: 2     Mammogram  LDCT Lung Screen    Pneumonia Vaccine  Tetanus Vaccine  Shingles/Zoster Vaccine  RSV Vaccine                  Health Maintenance Topic(s) Outreach Outcomes & Actions Taken:    Breast Cancer Screening - Outreach Outcomes & Actions Taken  : Mammogram Screening Scheduled    Low Dose CT Screening - Outreach Outcomes & Actions Taken  : Pt Declined Scheduling Low Dose CT and WILL CONSULT AT NEXT VISIT.

## 2024-08-15 ENCOUNTER — HOSPITAL ENCOUNTER (OUTPATIENT)
Dept: RADIOLOGY | Facility: HOSPITAL | Age: 69
Discharge: HOME | End: 2024-08-15
Attending: FAMILY MEDICINE
Payer: MEDICARE

## 2024-08-15 DIAGNOSIS — Z12.31 ENCOUNTER FOR SCREENING MAMMOGRAM FOR BREAST CANCER: ICD-10-CM

## 2024-08-15 PROCEDURE — 77063 BREAST TOMOSYNTHESIS BI: CPT | Mod: TC

## 2024-08-15 PROCEDURE — 77067 SCR MAMMO BI INCL CAD: CPT | Mod: TC

## 2024-08-27 ENCOUNTER — PATIENT MESSAGE (OUTPATIENT)
Dept: FAMILY MEDICINE | Facility: CLINIC | Age: 69
End: 2024-08-27
Payer: MEDICARE

## 2024-10-09 DIAGNOSIS — Z78.0 MENOPAUSE: ICD-10-CM

## 2024-10-18 DIAGNOSIS — J43.2 CENTRILOBULAR EMPHYSEMA: ICD-10-CM

## 2024-10-18 NOTE — TELEPHONE ENCOUNTER
No care due was identified.  Health [facility] Embedded Care Due Messages. Reference number: [id number].   10/18/2024 1:11:41 PM CDT

## 2024-10-19 NOTE — TELEPHONE ENCOUNTER
Refill Routing Note   Medication(s) are not appropriate for processing by [facility] for the following reason(s):        Drug-disease interaction    [facility] action(s):  Defer        Medication Therapy Plan: Drug drug:  albuterol-ipratropium and tiotropium      Appointments  past 12m or future 3m with PCP    Date Provider   Last Visit   4/16/2024 Navdeep Marquez MD   Next Visit   Visit date not found Navdeep Marquez MD   ED visits in past 90 days: 0        Note composed:5:50 PM 10/19/2024

## 2024-10-21 ENCOUNTER — TELEPHONE (OUTPATIENT)
Dept: FAMILY MEDICINE | Facility: CLINIC | Age: 69
End: 2024-10-21
Payer: MEDICARE

## 2024-10-22 ENCOUNTER — OFFICE VISIT (OUTPATIENT)
Dept: FAMILY MEDICINE | Facility: CLINIC | Age: 69
End: 2024-10-22
Payer: MEDICARE

## 2024-10-22 VITALS
DIASTOLIC BLOOD PRESSURE: 102 MMHG | HEIGHT: 56 IN | WEIGHT: 132.5 LBS | TEMPERATURE: 99 F | SYSTOLIC BLOOD PRESSURE: 166 MMHG | HEART RATE: 85 BPM | BODY MASS INDEX: 29.81 KG/M2 | OXYGEN SATURATION: 96 %

## 2024-10-22 DIAGNOSIS — J43.2 CENTRILOBULAR EMPHYSEMA: ICD-10-CM

## 2024-10-22 DIAGNOSIS — K43.9 VENTRAL HERNIA WITHOUT OBSTRUCTION OR GANGRENE: ICD-10-CM

## 2024-10-22 DIAGNOSIS — I10 ESSENTIAL HYPERTENSION: ICD-10-CM

## 2024-10-22 DIAGNOSIS — J06.9 URTI (ACUTE UPPER RESPIRATORY INFECTION): ICD-10-CM

## 2024-10-22 DIAGNOSIS — R73.03 PREDIABETES: ICD-10-CM

## 2024-10-22 DIAGNOSIS — Z00.00 ANNUAL PHYSICAL EXAM: Primary | ICD-10-CM

## 2024-10-22 DIAGNOSIS — I25.2 HISTORY OF NON-ST ELEVATION MYOCARDIAL INFARCTION (NSTEMI): ICD-10-CM

## 2024-10-22 DIAGNOSIS — F17.210 SMOKING GREATER THAN 25 PACK YEARS: ICD-10-CM

## 2024-10-22 PROBLEM — N18.31 CHRONIC KIDNEY DISEASE, STAGE 3A: Status: RESOLVED | Noted: 2022-08-12 | Resolved: 2024-10-22

## 2024-10-22 PROCEDURE — 3008F BODY MASS INDEX DOCD: CPT | Mod: CPTII,S$GLB,, | Performed by: INTERNAL MEDICINE

## 2024-10-22 PROCEDURE — 99999 PR PBB SHADOW E&M-EST. PATIENT-LVL V: CPT | Mod: PBBFAC,,, | Performed by: INTERNAL MEDICINE

## 2024-10-22 PROCEDURE — 99406 BEHAV CHNG SMOKING 3-10 MIN: CPT | Mod: S$GLB,,, | Performed by: INTERNAL MEDICINE

## 2024-10-22 PROCEDURE — 1160F RVW MEDS BY RX/DR IN RCRD: CPT | Mod: CPTII,S$GLB,, | Performed by: INTERNAL MEDICINE

## 2024-10-22 PROCEDURE — 4010F ACE/ARB THERAPY RXD/TAKEN: CPT | Mod: CPTII,S$GLB,, | Performed by: INTERNAL MEDICINE

## 2024-10-22 PROCEDURE — 1101F PT FALLS ASSESS-DOCD LE1/YR: CPT | Mod: CPTII,S$GLB,, | Performed by: INTERNAL MEDICINE

## 2024-10-22 PROCEDURE — 3044F HG A1C LEVEL LT 7.0%: CPT | Mod: CPTII,S$GLB,, | Performed by: INTERNAL MEDICINE

## 2024-10-22 PROCEDURE — 3061F NEG MICROALBUMINURIA REV: CPT | Mod: CPTII,S$GLB,, | Performed by: INTERNAL MEDICINE

## 2024-10-22 PROCEDURE — 99397 PER PM REEVAL EST PAT 65+ YR: CPT | Mod: 25,S$GLB,, | Performed by: INTERNAL MEDICINE

## 2024-10-22 PROCEDURE — 1126F AMNT PAIN NOTED NONE PRSNT: CPT | Mod: CPTII,S$GLB,, | Performed by: INTERNAL MEDICINE

## 2024-10-22 PROCEDURE — 3066F NEPHROPATHY DOC TX: CPT | Mod: CPTII,S$GLB,, | Performed by: INTERNAL MEDICINE

## 2024-10-22 PROCEDURE — 1159F MED LIST DOCD IN RCRD: CPT | Mod: CPTII,S$GLB,, | Performed by: INTERNAL MEDICINE

## 2024-10-22 PROCEDURE — 3288F FALL RISK ASSESSMENT DOCD: CPT | Mod: CPTII,S$GLB,, | Performed by: INTERNAL MEDICINE

## 2024-10-22 PROCEDURE — 3080F DIAST BP >= 90 MM HG: CPT | Mod: CPTII,S$GLB,, | Performed by: INTERNAL MEDICINE

## 2024-10-22 PROCEDURE — 3077F SYST BP >= 140 MM HG: CPT | Mod: CPTII,S$GLB,, | Performed by: INTERNAL MEDICINE

## 2024-10-22 RX ORDER — TIOTROPIUM BROMIDE 18 UG/1
CAPSULE ORAL; RESPIRATORY (INHALATION)
Qty: 90 CAPSULE | Refills: 1 | Status: SHIPPED | OUTPATIENT
Start: 2024-10-22

## 2024-10-22 RX ORDER — METHYLPREDNISOLONE 4 MG/1
TABLET ORAL
Qty: 21 EACH | Refills: 0 | Status: SHIPPED | OUTPATIENT
Start: 2024-10-22

## 2024-10-22 RX ORDER — FLUTICASONE PROPIONATE AND SALMETEROL XINAFOATE 230; 21 UG/1; UG/1
2 AEROSOL, METERED RESPIRATORY (INHALATION) 2 TIMES DAILY
Qty: 36 G | Refills: 5 | Status: SHIPPED | OUTPATIENT
Start: 2024-10-22

## 2024-10-22 RX ORDER — DM/P-EPHED/ACETAMINOPH/DOXYLAM 30-7.5/3
2 LIQUID (ML) ORAL
Qty: 108 LOZENGE | Refills: 0 | Status: SHIPPED | OUTPATIENT
Start: 2024-10-22

## 2024-10-22 RX ORDER — AZITHROMYCIN 250 MG/1
TABLET, FILM COATED ORAL
Qty: 6 TABLET | Refills: 0 | Status: SHIPPED | OUTPATIENT
Start: 2024-10-22 | End: 2024-10-27

## 2024-10-22 NOTE — PROGRESS NOTES
Health Maintenance Due   Topic     TETANUS VACCINE   hx chicken pox. Notified pt can get vaccine at pharmacy    Shingles Vaccine (1 of 2)  hx chicken pox. Notified pt can get vaccine at pharmacy    RSV Vaccine (Age 60+ and Pregnant patients) (1 - Risk 60-74 years 1-dose series) Not offered at this office    Pneumococcal Vaccines (Age 65+) (3 of 3 - PPSV23 or PCV20)     LDCT Lung Screen  Consult pcp    DEXA Scan  Pending order    Influenza Vaccine (1)     COVID-19 Vaccine (5 - 2024-25 season)

## 2024-10-22 NOTE — ASSESSMENT & PLAN NOTE
- Evaluated blood pressure elevation in context of recent illness and smoking resumption.  - Noted patient's report of blood pressure readings around 150/80 at home.  - Discussed potential causes of hypertension, including recent smoking and increased coffee consumption.  - Determined need for medication adjustments pending follow-up blood pressure readings.  - Recommend enrolling in Digital Medicine Program for blood pressure monitoring.  - Instructed the patient to record blood pressure readings at home and bring log to next visit.  - Scheduled follow up in 2 weeks for blood pressure check with nurse.  - Advised to contact office if any issues with enrolling in Digital Medicine Program.  - Continued current medication regimen of lisinopril 20mg and hydrochlorothiazide for blood pressure management.

## 2024-10-22 NOTE — ASSESSMENT & PLAN NOTE
Acute, bacterial?  - Performed lung exam and detected emphysema sounds.  - Examined patient's nose and observed congestion.  - Continued use of nasal spray for treatment.  - Prescribed Z-Jayant and steroids for congestion.

## 2024-10-22 NOTE — PROGRESS NOTES
Chief Complaint: Annual Exam, Establish Care, and Nasal Congestion (Pt has had symptoms for about a week, pt took mucinex and cough syrup )      Hollie Ponce  is a 69 y.o. year old patient who presents today for     History of Present Illness    CHIEF COMPLAINT:  Hollie presents today for congestion and respiratory symptoms.    RESPIRATORY SYMPTOMS:  She reports chest congestion, runny nose, sneezing fits, and a tickle in her throat since last week. She experiences increased shortness of breath compared to her baseline, leading to increased use of her inhaler. She has been using her nebulizer 2-3 times daily for symptom management.    MEDICAL HISTORY:  She has a history of emphysema, hypertension, and COPD. She reports a past episode of severe coughing and difficulty breathing at home, which led to an emergency room visit. She attributes this to her COPD and reports that she was smoking more heavily at that time.    SURGICAL HISTORY:  She reports a history of hernia surgery several years ago, though she cannot recall the exact date. She notes a recurrence of the hernia with occasional pain in the area, but denies current pain upon exam. She denies blood in stool and reports good bowel movements.    MEDICATIONS:  Current medications include Spiriva inhaler (tiotropium), rosuvastatin, Montelukast (Singulair), Lisinopril 20 mg, Hydrochlorothiazide, Advair inhaler (fluticasone), nasal spray, Flexeril (cyclobenzaprine) 10 mg as needed, Plavix (clopidogrel), and Coreg (carvedilol). She denies taking potassium, naproxen, and Zetia. She previously took naproxen for pain related to an accident but is no longer using it.    SMOKING HISTORY:  She is a current smoker, consuming 3-4 cigarettes per day. She resumed smoking approximately 6 months ago after quitting for 9 months. She reports a long-standing history of smoking since 1970, with periods of heavier use in the past, including up to a pack and a half per day.    BLOOD  PRESSURE:  She reports home blood pressure readings of approximately 150/80 when not ill, acknowledging this is still elevated. She mentions a recent increase in lisinopril dosage to 20 mg, which initially resulted in lower blood pressure readings. She speculates that her coffee consumption might be contributing to the elevated readings. She denies awareness of automatic blood pressure reporting systems for home measurements.      ROS:  General: -fever, -chills, -fatigue, -weight gain, -weight loss  Eyes: -vision changes, -redness, -discharge  ENT: -ear pain, +nasal congestion, -sore throat, +runny nose  Cardiovascular: -chest pain, -palpitations, -lower extremity edema  Respiratory: -cough, +shortness of breath, +chest congestion  Gastrointestinal: -abdominal pain, -nausea, -vomiting, -diarrhea, -constipation, -blood in stool  Genitourinary: -dysuria, -hematuria, -frequency  Musculoskeletal: -joint pain, -muscle pain  Skin: -rash, -lesion  Neurological: -headache, -dizziness, -numbness, -tingling  Psychiatric: -anxiety, -depression, -sleep difficulty  Allergic: +frequent sneezing         Past Medical History:   Diagnosis Date    Asthma     COPD (chronic obstructive pulmonary disease)     Hypertension        Past Surgical History:   Procedure Laterality Date     SECTION      HERNIA REPAIR      LEFT HEART CATHETERIZATION Left 2020    Procedure: Left heart cath;  Surgeon: Shabnam Vernon MD;  Location: [facility] CATH LAB;  Service: Cardiology;  Laterality: Left;        Family History   Problem Relation Name Age of Onset    Cancer Mother          Bladder    Liver disease Father          Social History     Socioeconomic History    Marital status: Single   Tobacco Use    Smoking status: Every Day     Current packs/day: 0.25     Average packs/day: 0.5 packs/day for 50.6 years (25.1 ttl pk-yrs)     Types: Cigarettes     Start date: 1970     Last attempt to quit: 2020    Smokeless tobacco: Never    Substance and Sexual Activity    Alcohol use: Never    Drug use: Never    Sexual activity: Not Currently         Current Outpatient Medications:     albuterol (PROVENTIL/VENTOLIN HFA) 90 mcg/actuation inhaler, Inhale 2 puffs into the lungs every 6 (six) hours as needed for Wheezing or Shortness of Breath. Rescue, Disp: 20.1 g, Rfl: 2    albuterol-ipratropium (DUO-NEB) 2.5 mg-0.5 mg/3 mL nebulizer solution, USE 1 VIAL VIA NEBULIZER EVERY 6 HOURS AS NEEDED FOR WHEEZING OR SHORTNESS OF BREATH OR RESCUE, Disp: 1080 mL, Rfl: 3    carvediloL (COREG) 3.125 MG tablet, TAKE 1 TABLET(3.125 MG) BY MOUTH TWICE DAILY WITH MEALS, Disp: 180 tablet, Rfl: 3    clopidogreL (PLAVIX) 75 mg tablet, Take 1 tablet (75 mg total) by mouth once daily., Disp: 90 tablet, Rfl: 3    cyclobenzaprine (FLEXERIL) 5 MG tablet, Take 10 mg by mouth nightly as needed for Muscle spasms., Disp: , Rfl:     fluticasone propionate (FLONASE) 50 mcg/actuation nasal spray, SHAKE LIQUID AND USE 2 SPRAYS(100 MCG) IN EACH NOSTRIL EVERY DAY, Disp: 48 g, Rfl: 3    hydroCHLOROthiazide (HYDRODIURIL) 25 MG tablet, Take 1 tablet (25 mg total) by mouth once daily., Disp: 90 tablet, Rfl: 1    lisinopriL (PRINIVIL,ZESTRIL) 20 MG tablet, Take 1 tablet (20 mg total) by mouth once daily., Disp: 90 tablet, Rfl: 3    loratadine (CLARITIN) 10 mg tablet, TAKE 1 TABLET(10 MG) BY MOUTH DAILY AS NEEDED FOR ALLERGIES, Disp: 90 tablet, Rfl: 3    miscellaneous medical supply (470V TWO WAY VALVE) Misc, Disp nebulizer and tubing ,medicine reservoir,and mask  So as to use  Every 4-6 hrs for sob/wheeze, Disp: , Rfl:     montelukast (SINGULAIR) 10 mg tablet, Take 1 tablet (10 mg total) by mouth every evening., Disp: 90 tablet, Rfl: 3    nicotine polacrilex 2 MG Lozg, Take 1 lozenge (2 mg total) by mouth as needed. Use 1-2 per hour in place of a cigarette. Limit to 6 a day., Disp: 108 lozenge, Rfl: 0    rosuvastatin (CRESTOR) 20 MG tablet, Take 1 tablet (20 mg total) by mouth every  evening., Disp: 90 tablet, Rfl: 1    tiotropium (SPIRIVA WITH HANDIHALER) 18 mcg inhalation capsule, INHALE THE CONTENTS OF 1 CAPSULE(18 MCG) INTO THE LUNGS EVERY DAY, Disp: 90 capsule, Rfl: 1    azithromycin (Z-MEENA) 250 MG tablet, Take 2 tablets by mouth on day 1; Take 1 tablet by mouth on days 2-5, Disp: 6 tablet, Rfl: 0    fluticasone-salmeterol 230-21 mcg/dose (ADVAIR HFA) 230-21 mcg/actuation HFAA inhaler, Inhale 2 puffs into the lungs 2 (two) times daily. Controller, Disp: 36 g, Rfl: 5    methylPREDNISolone (MEDROL DOSEPACK) 4 mg tablet, use as directed, Disp: 21 each, Rfl: 0           Objective:      Vitals:    10/22/24 1156   BP: (!) 166/102   Pulse:    Temp:        Physical Exam  Vitals and nursing note reviewed.   Constitutional:       Appearance: Normal appearance.   HENT:      Head: Normocephalic and atraumatic.      Right Ear: Tympanic membrane, ear canal and external ear normal. There is no impacted cerumen.      Left Ear: Tympanic membrane, ear canal and external ear normal. There is no impacted cerumen.      Nose: Congestion and rhinorrhea present.      Mouth/Throat:      Pharynx: No oropharyngeal exudate or posterior oropharyngeal erythema.   Cardiovascular:      Rate and Rhythm: Normal rate and regular rhythm.   Pulmonary:      Effort: Pulmonary effort is normal.      Breath sounds: Wheezing present. No rales.   Abdominal:      Palpations: Abdomen is soft.      Tenderness: There is no abdominal tenderness.   Musculoskeletal:         General: Normal range of motion.      Right lower leg: No edema.      Left lower leg: No edema.   Skin:     General: Skin is warm and dry.   Neurological:      General: No focal deficit present.      Mental Status: She is alert and oriented to person, place, and time.   Psychiatric:         Mood and Affect: Mood normal.         Behavior: Behavior normal.          Assessment:       1. Annual physical exam    2. Centrilobular emphysema    3. Essential hypertension    4.  History of non-ST elevation myocardial infarction (NSTEMI)    5. Prediabetes    6. Smoking greater than 25 pack years    7. Ventral hernia without obstruction or gangrene    8. URTI (acute upper respiratory infection)          Plan:   1. Annual physical exam  Assessment & Plan:  Discussed HCM with patient  - patient deferred available vaccines due to illness. Discussed the importance of vaccines.   - annual labs results reviewed  - last pap smear on 3/27/2020   - Mammogram: Met on 8/15/2024  -  Results for orders placed during the hospital encounter of 08/31/22    DXA Bone Density Spine And Hip 8/31/2022     -   CRC completed July 2024, repeat 3 years  - advised patient to follow up with ophthalmologist and dentist every year  - reviewed medical, surgical, family and social history        2. Centrilobular emphysema  Assessment & Plan:  As seen on CT   Pack year 25 years   - cont Spiriva and Advair daily  - cont Singulair  - smoking cessation counseling     Orders:  -     tiotropium (SPIRIVA WITH HANDIHALER) 18 mcg inhalation capsule; INHALE THE CONTENTS OF 1 CAPSULE(18 MCG) INTO THE LUNGS EVERY DAY  Dispense: 90 capsule; Refill: 1  -     fluticasone-salmeterol 230-21 mcg/dose (ADVAIR HFA) 230-21 mcg/actuation HFAA inhaler; Inhale 2 puffs into the lungs 2 (two) times daily. Controller  Dispense: 36 g; Refill: 5    3. Essential hypertension  Assessment & Plan:  - Evaluated blood pressure elevation in context of recent illness and smoking resumption.  - Noted patient's report of blood pressure readings around 150/80 at home.  - Discussed potential causes of hypertension, including recent smoking and increased coffee consumption.  - Determined need for medication adjustments pending follow-up blood pressure readings.  - Recommend enrolling in Digital Medicine Program for blood pressure monitoring.  - Instructed the patient to record blood pressure readings at home and bring log to next visit.  - Scheduled follow up in 2  weeks for blood pressure check with nurse.  - Advised to contact office if any issues with enrolling in Digital Medicine Program.  - Continued current medication regimen of lisinopril 20mg and hydrochlorothiazide for blood pressure management.    Orders:  -     Hypertension Digital Medicine (HDMP) Enrollment Order    4. History of non-ST elevation myocardial infarction (NSTEMI)  Assessment & Plan:  Stable   - cont plavix and statin       5. Prediabetes  Assessment & Plan:  Stable   Lab Results   Component Value Date    HGBA1C 5.8 (H) 04/16/2024     - cont to monitor      6. Smoking greater than 25 pack years  Assessment & Plan:  - Advised the patient to stop smoking.  - Provided counseling on smoking cessation.  - Explained importance of smoking cessation for improving breathing and overall health.  - Noted patient's report of smoking 3-4 cigarettes a day for the past 6 months after quitting for 9 months.  - Refilled nicotine lozenges to aid in smoking cessation.  - Advised the patient to stop smoking.  - Reviewed smoking history and its impact on emphysema, necessitating annual CT scan.  - Hollie to quit smoking.    Orders:  -     nicotine polacrilex 2 MG Lozg; Take 1 lozenge (2 mg total) by mouth as needed. Use 1-2 per hour in place of a cigarette. Limit to 6 a day.  Dispense: 108 lozenge; Refill: 0  -     CT Chest Lung Screening Low Dose; Future; Expected date: 10/22/2024    7. Ventral hernia without obstruction or gangrene  Assessment & Plan:  - Evaluated hernia recurrence, noting occasional pain and tenderness in the area.  - Performed abdominal exam and confirmed presence of hernia.  - Assessed that the hernia is likely uncomplicated.  - Ordered ultrasound of abdomen to evaluate hernia and check for potential complications.  - Noted patient's history of hernia surgery.    Orders:  -     US Abdomen Complete; Future; Expected date: 10/22/2024    8. URTI (acute upper respiratory infection)  Assessment &  Plan:  Acute, bacterial?  - Performed lung exam and detected emphysema sounds.  - Examined patient's nose and observed congestion.  - Continued use of nasal spray for treatment.  - Prescribed Z-Meena and steroids for congestion.    Orders:  -     azithromycin (Z-MEENA) 250 MG tablet; Take 2 tablets by mouth on day 1; Take 1 tablet by mouth on days 2-5  Dispense: 6 tablet; Refill: 0  -     methylPREDNISolone (MEDROL DOSEPACK) 4 mg tablet; use as directed  Dispense: 21 each; Refill: 0       FOLLOW UP:  - Advised on the importance of staying up to date with vaccines.  - Follow up in 3 months for comprehensive visit.         Follow up in about 3 months (around 1/22/2025) for f/up.    This note was generated with the assistance of ambient listening technology. Verbal consent was obtained by the patient and accompanying visitor(s) for the recording of patient appointment to facilitate this note. I attest to having reviewed and edited the generated note for accuracy, though some syntax or spelling errors may persist. Please contact the author of this note for any clarification.

## 2024-10-22 NOTE — ASSESSMENT & PLAN NOTE
Discussed HCM with patient  - patient deferred available vaccines due to illness. Discussed the importance of vaccines.   - annual labs results reviewed  - last pap smear on 3/27/2020   - Mammogram: Met on 8/15/2024  -  Results for orders placed during the hospital encounter of 08/31/22    DXA Bone Density Spine And Hip 8/31/2022     -   CRC completed July 2024, repeat 3 years  - advised patient to follow up with ophthalmologist and dentist every year  - reviewed medical, surgical, family and social history

## 2024-10-22 NOTE — ASSESSMENT & PLAN NOTE
- Advised the patient to stop smoking.  - Provided counseling on smoking cessation.  - Explained importance of smoking cessation for improving breathing and overall health.  - Noted patient's report of smoking 3-4 cigarettes a day for the past 6 months after quitting for 9 months.  - Refilled nicotine lozenges to aid in smoking cessation.  - Advised the patient to stop smoking.  - Reviewed smoking history and its impact on emphysema, necessitating annual CT scan.  - Hollie to quit smoking.

## 2024-10-22 NOTE — ASSESSMENT & PLAN NOTE
As seen on CT   Pack year 25 years   - cont Spiriva and Advair daily  - cont Singulair  - smoking cessation counseling

## 2024-10-22 NOTE — ASSESSMENT & PLAN NOTE
- Evaluated hernia recurrence, noting occasional pain and tenderness in the area.  - Performed abdominal exam and confirmed presence of hernia.  - Assessed that the hernia is likely uncomplicated.  - Ordered ultrasound of abdomen to evaluate hernia and check for potential complications.  - Noted patient's history of hernia surgery.

## 2024-10-23 RX ORDER — TIOTROPIUM BROMIDE 18 UG/1
CAPSULE ORAL; RESPIRATORY (INHALATION)
Qty: 90 CAPSULE | Refills: 1 | OUTPATIENT
Start: 2024-10-23

## 2024-10-31 ENCOUNTER — HOSPITAL ENCOUNTER (OUTPATIENT)
Dept: RADIOLOGY | Facility: HOSPITAL | Age: 69
Discharge: HOME | End: 2024-10-31
Attending: INTERNAL MEDICINE
Payer: MEDICARE

## 2024-10-31 DIAGNOSIS — F17.210 SMOKING GREATER THAN 25 PACK YEARS: ICD-10-CM

## 2024-10-31 DIAGNOSIS — K43.9 VENTRAL HERNIA WITHOUT OBSTRUCTION OR GANGRENE: ICD-10-CM

## 2024-10-31 PROCEDURE — 71271 CT THORAX LUNG CANCER SCR C-: CPT | Mod: TC

## 2024-10-31 PROCEDURE — 76705 ECHO EXAM OF ABDOMEN: CPT | Mod: TC

## 2024-10-31 PROCEDURE — 71271 CT THORAX LUNG CANCER SCR C-: CPT | Mod: 26,,, | Performed by: INTERNAL MEDICINE

## 2024-10-31 PROCEDURE — 76705 ECHO EXAM OF ABDOMEN: CPT | Mod: 26,,, | Performed by: INTERNAL MEDICINE

## 2024-11-02 ENCOUNTER — PATIENT MESSAGE (OUTPATIENT)
Dept: OTHER | Facility: OTHER | Age: 69
End: 2024-11-02
Payer: MEDICARE

## 2024-11-05 ENCOUNTER — TELEPHONE (OUTPATIENT)
Dept: FAMILY MEDICINE | Facility: CLINIC | Age: 69
End: 2024-11-05
Payer: MEDICARE

## 2024-11-05 NOTE — TELEPHONE ENCOUNTER
Pt had nurse visit today but was not able to make it; she is enrolled in digital medicine and has been monitoring, you can view her bp record on 11/1 encounter; pt states she will be out of town until December 8th and will continue to monitor bp; informed her to check bp 2 hours after she has taken her medication and to make sure she has been sitting for at least 15 minutes; pt verbalized understanding

## 2024-11-05 NOTE — TELEPHONE ENCOUNTER
----- Message from Tech Diana sent at 11/5/2024  8:46 AM CST -----  Regarding: Patient call back  .Type: Patient Call Back    Who called:self     What is the request in detail:would like a call back regarding the digital blood pressure machine    Can the clinic reply by [facility]?no     Would the patient rather a call back or a response via [facility]?call      Best call back number:.[phone]      Additional Information:

## 2024-11-15 ENCOUNTER — TELEPHONE (OUTPATIENT)
Dept: FAMILY MEDICINE | Facility: CLINIC | Age: 69
End: 2024-11-15
Payer: MEDICARE

## 2024-12-11 ENCOUNTER — PATIENT MESSAGE (OUTPATIENT)
Dept: ADMINISTRATIVE | Facility: HOSPITAL | Age: 69
End: 2024-12-11
Payer: MEDICARE

## 2024-12-16 DIAGNOSIS — M54.2 CERVICAL PAIN: ICD-10-CM

## 2024-12-16 DIAGNOSIS — I10 ESSENTIAL HYPERTENSION: ICD-10-CM

## 2024-12-16 RX ORDER — HYDROCHLOROTHIAZIDE 25 MG/1
TABLET ORAL
Qty: 90 TABLET | Refills: 0 | Status: SHIPPED | OUTPATIENT
Start: 2024-12-16

## 2024-12-16 RX ORDER — CYCLOBENZAPRINE HCL 10 MG
TABLET ORAL
Qty: 90 TABLET | Refills: 1 | OUTPATIENT
Start: 2024-12-16

## 2024-12-16 NOTE — TELEPHONE ENCOUNTER
No care due was identified.  Health [facility] Embedded Care Due Messages. Reference number: [id number].   12/16/2024 5:58:49 AM CST

## 2024-12-16 NOTE — TELEPHONE ENCOUNTER
Refill Routing Note   Medication(s) are not appropriate for processing by [facility] for the following reason(s):        Outside of protocol  No active prescription written by provider  Required vitals abnormal    [facility] action(s):  Defer  Route      Medication Therapy Plan: CYCLOBENZAPRINE-OOP      Appointments  past 12m or future 3m with PCP    Date Provider   Last Visit   10/22/2024 Ariadne Smith MD   Next Visit   1/22/2025 Ariadne Smith MD   ED visits in past 90 days: 0        Note composed:11:51 AM 12/16/2024

## 2024-12-18 DIAGNOSIS — J43.2 CENTRILOBULAR EMPHYSEMA: ICD-10-CM

## 2024-12-18 NOTE — TELEPHONE ENCOUNTER
No care due was identified.  Health [facility] Embedded Care Due Messages. Reference number: [id number].   12/18/2024 3:10:42 AM CST

## 2024-12-19 RX ORDER — FLUTICASONE PROPIONATE AND SALMETEROL XINAFOATE 230; 21 UG/1; UG/1
2 AEROSOL, METERED RESPIRATORY (INHALATION) 2 TIMES DAILY
Qty: 36 G | Refills: 0 | Status: SHIPPED | OUTPATIENT
Start: 2024-12-19

## 2024-12-19 NOTE — TELEPHONE ENCOUNTER
Refill Routing Note   Medication(s) are not appropriate for processing by [facility] for the following reason(s):        New or recently adjusted medication    [facility] action(s):  Defer             Appointments  past 12m or future 3m with PCP    Date Provider   Last Visit   10/22/2024 Ariadne Smith MD   Next Visit   1/22/2025 Ariadne Smith MD   ED visits in past 90 days: 0        Note composed:8:18 PM 12/18/2024

## 2025-01-28 ENCOUNTER — TELEPHONE (OUTPATIENT)
Dept: FAMILY MEDICINE | Facility: CLINIC | Age: 70
End: 2025-01-28
Payer: MEDICARE

## 2025-01-28 NOTE — TELEPHONE ENCOUNTER
----- Message from Mariela sent at 1/28/2025  2:35 PM CST -----  .Type: Patient Call Back    Who called: Self     What is the request in detail: Calling to reschedule 1/22/25 appointment. Ask that the nurse give her a call     Can the clinic reply by [facility]? No     Would the patient rather a call back or a response via [facility]? Call Back     Best call back number: .[phone] (home)       Additional Information:

## 2025-02-07 ENCOUNTER — OFFICE VISIT (OUTPATIENT)
Dept: FAMILY MEDICINE | Facility: CLINIC | Age: 70
End: 2025-02-07
Payer: MEDICARE

## 2025-02-07 VITALS
WEIGHT: 135.13 LBS | TEMPERATURE: 98 F | OXYGEN SATURATION: 95 % | HEIGHT: 56 IN | HEART RATE: 69 BPM | RESPIRATION RATE: 18 BRPM | BODY MASS INDEX: 30.4 KG/M2 | DIASTOLIC BLOOD PRESSURE: 70 MMHG | SYSTOLIC BLOOD PRESSURE: 130 MMHG

## 2025-02-07 DIAGNOSIS — I25.2 HISTORY OF NON-ST ELEVATION MYOCARDIAL INFARCTION (NSTEMI): ICD-10-CM

## 2025-02-07 DIAGNOSIS — M85.89 OTHER SPECIFIED DISORDERS OF BONE DENSITY AND STRUCTURE, MULTIPLE SITES: ICD-10-CM

## 2025-02-07 DIAGNOSIS — R73.03 PREDIABETES: ICD-10-CM

## 2025-02-07 DIAGNOSIS — J43.2 CENTRILOBULAR EMPHYSEMA: Primary | ICD-10-CM

## 2025-02-07 DIAGNOSIS — I27.20 PULMONARY HTN: ICD-10-CM

## 2025-02-07 DIAGNOSIS — N18.31 CHRONIC KIDNEY DISEASE, STAGE 3A: ICD-10-CM

## 2025-02-07 DIAGNOSIS — F17.210 SMOKING GREATER THAN 25 PACK YEARS: ICD-10-CM

## 2025-02-07 DIAGNOSIS — I73.9 PAD (PERIPHERAL ARTERY DISEASE): ICD-10-CM

## 2025-02-07 DIAGNOSIS — I10 ESSENTIAL HYPERTENSION: ICD-10-CM

## 2025-02-07 DIAGNOSIS — M85.80 OSTEOPENIA, UNSPECIFIED LOCATION: ICD-10-CM

## 2025-02-07 PROBLEM — Z71.89 GOALS OF CARE, COUNSELING/DISCUSSION: Status: RESOLVED | Noted: 2021-12-14 | Resolved: 2025-02-07

## 2025-02-07 PROBLEM — J06.9 URTI (ACUTE UPPER RESPIRATORY INFECTION): Status: RESOLVED | Noted: 2024-10-22 | Resolved: 2025-02-07

## 2025-02-07 PROBLEM — Z00.00 ANNUAL PHYSICAL EXAM: Status: RESOLVED | Noted: 2024-10-22 | Resolved: 2025-02-07

## 2025-02-07 PROCEDURE — 3008F BODY MASS INDEX DOCD: CPT | Mod: CPTII,S$GLB,, | Performed by: INTERNAL MEDICINE

## 2025-02-07 PROCEDURE — 1160F RVW MEDS BY RX/DR IN RCRD: CPT | Mod: CPTII,S$GLB,, | Performed by: INTERNAL MEDICINE

## 2025-02-07 PROCEDURE — 1159F MED LIST DOCD IN RCRD: CPT | Mod: CPTII,S$GLB,, | Performed by: INTERNAL MEDICINE

## 2025-02-07 PROCEDURE — 3288F FALL RISK ASSESSMENT DOCD: CPT | Mod: CPTII,S$GLB,, | Performed by: INTERNAL MEDICINE

## 2025-02-07 PROCEDURE — 1126F AMNT PAIN NOTED NONE PRSNT: CPT | Mod: CPTII,S$GLB,, | Performed by: INTERNAL MEDICINE

## 2025-02-07 PROCEDURE — 3078F DIAST BP <80 MM HG: CPT | Mod: CPTII,S$GLB,, | Performed by: INTERNAL MEDICINE

## 2025-02-07 PROCEDURE — 99215 OFFICE O/P EST HI 40 MIN: CPT | Mod: S$GLB,,, | Performed by: INTERNAL MEDICINE

## 2025-02-07 PROCEDURE — 3075F SYST BP GE 130 - 139MM HG: CPT | Mod: CPTII,S$GLB,, | Performed by: INTERNAL MEDICINE

## 2025-02-07 PROCEDURE — 1101F PT FALLS ASSESS-DOCD LE1/YR: CPT | Mod: CPTII,S$GLB,, | Performed by: INTERNAL MEDICINE

## 2025-02-07 PROCEDURE — 99999 PR PBB SHADOW E&M-EST. PATIENT-LVL V: CPT | Mod: PBBFAC,,, | Performed by: INTERNAL MEDICINE

## 2025-02-07 PROCEDURE — 4010F ACE/ARB THERAPY RXD/TAKEN: CPT | Mod: CPTII,S$GLB,, | Performed by: INTERNAL MEDICINE

## 2025-02-07 RX ORDER — IPRATROPIUM BROMIDE AND ALBUTEROL SULFATE 2.5; .5 MG/3ML; MG/3ML
3 SOLUTION RESPIRATORY (INHALATION) EVERY 4 HOURS PRN
Qty: 1080 ML | Refills: 3 | Status: SHIPPED | OUTPATIENT
Start: 2025-02-07

## 2025-02-07 RX ORDER — LISINOPRIL 40 MG/1
40 TABLET ORAL DAILY
Qty: 90 TABLET | Refills: 3 | Status: SHIPPED | OUTPATIENT
Start: 2025-02-07 | End: 2026-02-07

## 2025-02-07 RX ORDER — ROSUVASTATIN CALCIUM 20 MG/1
20 TABLET, COATED ORAL NIGHTLY
Qty: 90 TABLET | Refills: 3 | Status: SHIPPED | OUTPATIENT
Start: 2025-02-07 | End: 2026-02-07

## 2025-02-07 RX ORDER — CARVEDILOL 3.12 MG/1
3.12 TABLET ORAL 2 TIMES DAILY
Qty: 180 TABLET | Refills: 3 | Status: SHIPPED | OUTPATIENT
Start: 2025-02-07 | End: 2026-02-07

## 2025-02-07 RX ORDER — TIOTROPIUM BROMIDE 18 UG/1
18 CAPSULE ORAL; RESPIRATORY (INHALATION) DAILY
Qty: 90 CAPSULE | Refills: 3 | Status: SHIPPED | OUTPATIENT
Start: 2025-02-07 | End: 2026-02-07

## 2025-02-07 RX ORDER — ALBUTEROL SULFATE 2.5 MG/.5ML
SOLUTION RESPIRATORY (INHALATION)
COMMUNITY

## 2025-02-07 RX ORDER — CARVEDILOL PHOSPHATE 20 MG/1
1 CAPSULE, EXTENDED RELEASE ORAL EVERY MORNING
COMMUNITY
End: 2025-02-07 | Stop reason: DRUGHIGH

## 2025-02-07 RX ORDER — ASPIRIN 81 MG/1
81 TABLET ORAL
COMMUNITY

## 2025-02-07 RX ORDER — FLUTICASONE PROPIONATE AND SALMETEROL 100; 50 UG/1; UG/1
1 POWDER RESPIRATORY (INHALATION) EVERY 12 HOURS
COMMUNITY

## 2025-02-07 RX ORDER — ALBUTEROL SULFATE 90 UG/1
2 INHALANT RESPIRATORY (INHALATION) EVERY 6 HOURS PRN
Qty: 20.1 G | Refills: 2 | Status: SHIPPED | OUTPATIENT
Start: 2025-02-07

## 2025-02-07 RX ORDER — LISINOPRIL 2.5 MG/1
1 TABLET ORAL EVERY MORNING
COMMUNITY
End: 2025-02-07 | Stop reason: DRUGHIGH

## 2025-02-07 NOTE — ASSESSMENT & PLAN NOTE
- Increased lisinopril dosage from 20mg to 40mg daily to better control persistently elevated blood pressure; advised the patient to take two 20mg tablets or  a new 40mg prescription.  - Emphasized the importance of consistent blood pressure control for overall health.  - Continued hydrochlorothiazide 25mg daily and carvedilol 3.125mg twice daily.  - Refilled hydrochlorothiazide (1 refill).  - Instructed the patient to maintain use of digital blood pressure monitoring device at least 3 times per week.  - Advised the patient to reduce soda intake and quit smoking to improve blood pressure.

## 2025-02-07 NOTE — ASSESSMENT & PLAN NOTE
- Continued statin therapy with rosuvastatin 20mg daily for cardiovascular risk reduction.  - Noted that cholesterol levels were good in the last lab test conducted in April.

## 2025-02-07 NOTE — PROGRESS NOTES
Health Maintenance Due   Topic     TETANUS VACCINE  Patient advised to go to pharmacy    Shingles Vaccine (1 of 2) Hx of Chicken Pox. Patient advised to go to pharmacy    RSV Vaccine (Age 60+ and Pregnant patients) (1 - Risk 60-74 years 1-dose series) Not offered at this facility    DEXA Scan  Patient declined    Influenza Vaccine (1) Patient declined    COVID-19 Vaccine (5 - 2024-25 season) Patient declined

## 2025-02-07 NOTE — PROGRESS NOTES
Chief Complaint: Follow-up      Hollie Ponce  is a 69 y.o. year old patient who presents today for     History of Present Illness    CHIEF COMPLAINT:  Hollie presents today for blood pressure follow-up    HYPERTENSION:  She reports elevated blood pressure which she attributes to excessive coffee and soda consumption, drinking approximately 6 ounces of soda daily. She continues Lisinopril 20 mg daily, Hydrochlorothiazide 25 mg daily, and Carvedilol 3.125 mg twice daily for management.    RESPIRATORY:  She has a history of COPD with prior hospitalization due to acute exacerbation with associated respiratory distress. She continues Spiriva (tiotropium), Ipratropium, and Advair twice daily (morning and night) with reported improvement. She uses Albuterol nebulizer solution 2-3 times daily as needed during weather changes or breathing problems. She also uses an unspecified nasal spray.    SOCIAL HISTORY:  She currently smokes 5 cigarettes per day and reports actively trying to reduce consumption.    LABS:  Labs from April showed normal cholesterol, thyroid function, A1C, kidney and liver function, and blood levels. Urinalysis showed mild proteinuria.      ROS:  General: -fever, -chills, -fatigue, -weight gain, -weight loss, -change in weight  Eyes: -vision changes, -redness, -discharge  ENT: -ear pain, -nasal congestion, -sore throat  Cardiovascular: -chest pain, -palpitations, -lower extremity edema  Respiratory: -cough, +shortness of breath  Gastrointestinal: -abdominal pain, -nausea, -vomiting, -diarrhea, -constipation, -blood in stool  Genitourinary: -dysuria, -hematuria, -frequency  Musculoskeletal: -joint pain, -muscle pain  Skin: -rash, -lesion  Neurological: -headache, -dizziness, -numbness, -tingling  Psychiatric: -anxiety, -depression, -sleep difficulty         Past Medical History:   Diagnosis Date    Asthma     COPD (chronic obstructive pulmonary disease)     Hypertension        Past Surgical History:    Procedure Laterality Date     SECTION      HERNIA REPAIR      LEFT HEART CATHETERIZATION Left 2020    Procedure: Left heart cath;  Surgeon: Shabnam Vernon MD;  Location: [facility] CATH LAB;  Service: Cardiology;  Laterality: Left;        Family History   Problem Relation Name Age of Onset    Cancer Mother          Bladder    Liver disease Father          Social History     Socioeconomic History    Marital status: Single   Tobacco Use    Smoking status: Every Day     Current packs/day: 0.25     Average packs/day: 0.5 packs/day for 50.9 years (25.2 ttl pk-yrs)     Types: Cigarettes     Start date: 1970     Last attempt to quit: 2020    Smokeless tobacco: Never   Substance and Sexual Activity    Alcohol use: Never    Drug use: Never    Sexual activity: Not Currently         Current Outpatient Medications:     albuterol sulfate 2.5 mg/0.5 mL Nebu, Inhale into the lungs., Disp: , Rfl:     aspirin (ECOTRIN) 81 MG EC tablet, Take 81 mg by mouth., Disp: , Rfl:     carvediloL (COREG) 3.125 MG tablet, Take 1 tablet (3.125 mg total) by mouth 2 (two) times daily. TAKE 1 TABLET(3.125 MG) BY MOUTH TWICE DAILY WITH MEALS, Disp: 180 tablet, Rfl: 3    clopidogreL (PLAVIX) 75 mg tablet, Take 1 tablet (75 mg total) by mouth once daily., Disp: 90 tablet, Rfl: 3    fluticasone propionate (FLONASE) 50 mcg/actuation nasal spray, SHAKE LIQUID AND USE 2 SPRAYS(100 MCG) IN EACH NOSTRIL EVERY DAY, Disp: 48 g, Rfl: 3    fluticasone-salmeterol 230-21 mcg/dose (ADVAIR HFA) 230-21 mcg/actuation HFAA inhaler, INHALE 2 PUFFS INTO THE LUNGS TWICE DAILY, Disp: 36 g, Rfl: 0    fluticasone-salmeterol diskus inhaler 100-50 mcg, Inhale 1 puff into the lungs every 12 (twelve) hours., Disp: , Rfl:     hydroCHLOROthiazide (HYDRODIURIL) 25 MG tablet, TAKE 1 TABLET(25 MG) BY MOUTH DAILY, Disp: 90 tablet, Rfl: 0    loratadine (CLARITIN) 10 mg tablet, TAKE 1 TABLET(10 MG) BY MOUTH DAILY AS NEEDED FOR ALLERGIES, Disp: 90 tablet, Rfl: 3     miscellaneous medical supply (470V TWO WAY VALVE) Misc, Disp nebulizer and tubing ,medicine reservoir,and mask  So as to use  Every 4-6 hrs for sob/wheeze, Disp: , Rfl:     montelukast (SINGULAIR) 10 mg tablet, Take 1 tablet (10 mg total) by mouth every evening., Disp: 90 tablet, Rfl: 3    nicotine polacrilex 2 MG Lozg, Take 1 lozenge (2 mg total) by mouth as needed. Use 1-2 per hour in place of a cigarette. Limit to 6 a day., Disp: 108 lozenge, Rfl: 0    albuterol (PROVENTIL/VENTOLIN HFA) 90 mcg/actuation inhaler, Inhale 2 puffs into the lungs every 6 (six) hours as needed for Wheezing or Shortness of Breath. Rescue, Disp: 20.1 g, Rfl: 2    albuterol-ipratropium (DUO-NEB) 2.5 mg-0.5 mg/3 mL nebulizer solution, Take 3 mLs by nebulization every 4 (four) hours as needed for Wheezing., Disp: 1080 mL, Rfl: 3    lisinopriL (PRINIVIL,ZESTRIL) 40 MG tablet, Take 1 tablet (40 mg total) by mouth once daily., Disp: 90 tablet, Rfl: 3    rosuvastatin (CRESTOR) 20 MG tablet, Take 1 tablet (20 mg total) by mouth every evening., Disp: 90 tablet, Rfl: 3    tiotropium (SPIRIVA WITH HANDIHALER) 18 mcg inhalation capsule, Inhale 1 capsule (18 mcg total) into the lungs Daily. INHALE THE CONTENTS OF 1 CAPSULE(18 MCG) INTO THE LUNGS EVERY DAY, Disp: 90 capsule, Rfl: 3           Objective:      Vitals:    02/07/25 1134   BP: 130/70   Pulse:    Resp:    Temp:        Physical Exam  Constitutional:       Appearance: Normal appearance.   HENT:      Head: Normocephalic and atraumatic.   Skin:     General: Skin is warm and dry.   Neurological:      General: No focal deficit present.      Mental Status: She is alert and oriented to person, place, and time.   Psychiatric:         Mood and Affect: Mood normal.         Behavior: Behavior normal.          Assessment:       1. Centrilobular emphysema    2. Essential hypertension    3. History of non-ST elevation myocardial infarction (NSTEMI)    4. PAD (peripheral artery disease)    5. Pulmonary HTN     6. Prediabetes    7. Chronic kidney disease, stage 3a    8. Osteopenia, unspecified location    9. Other specified disorders of bone density and structure, multiple sites    10. Smoking greater than 25 pack years          Plan:   1. Centrilobular emphysema  Assessment & Plan:  As seen on CT   Pack year 25 years   - Continued current COPD management regimen, including Spiriva, ipratropium, Advair, and albuterol, as the patient reports improvement in breathing.  - Prescribed Spiriva and ipratropium for daily use.  - Prescribed Advair twice daily (morning and night).  - Prescribed albuterol inhaler and nebulizer solution as needed, especially during weather changes or when experiencing breathing problems.  - Refilled prescriptions for Spiriva, ipratropium, albuterol nebulizer solution, and albuterol inhaler ( puffs).  - Acknowledged the patient's use of multiple inhalers and nebulizer solution for COPD management.    Orders:  -     albuterol-ipratropium (DUO-NEB) 2.5 mg-0.5 mg/3 mL nebulizer solution; Take 3 mLs by nebulization every 4 (four) hours as needed for Wheezing.  Dispense: 1080 mL; Refill: 3  -     tiotropium (SPIRIVA WITH HANDIHALER) 18 mcg inhalation capsule; Inhale 1 capsule (18 mcg total) into the lungs Daily. INHALE THE CONTENTS OF 1 CAPSULE(18 MCG) INTO THE LUNGS EVERY DAY  Dispense: 90 capsule; Refill: 3  -     albuterol (PROVENTIL/VENTOLIN HFA) 90 mcg/actuation inhaler; Inhale 2 puffs into the lungs every 6 (six) hours as needed for Wheezing or Shortness of Breath. Rescue  Dispense: 20.1 g; Refill: 2    2. Essential hypertension  Assessment & Plan:  - Increased lisinopril dosage from 20mg to 40mg daily to better control persistently elevated blood pressure; advised the patient to take two 20mg tablets or  a new 40mg prescription.  - Emphasized the importance of consistent blood pressure control for overall health.  - Continued hydrochlorothiazide 25mg daily and carvedilol 3.125mg twice  daily.  - Refilled hydrochlorothiazide (1 refill).  - Instructed the patient to maintain use of digital blood pressure monitoring device at least 3 times per week.  - Advised the patient to reduce soda intake and quit smoking to improve blood pressure.    Orders:  -     carvediloL (COREG) 3.125 MG tablet; Take 1 tablet (3.125 mg total) by mouth 2 (two) times daily. TAKE 1 TABLET(3.125 MG) BY MOUTH TWICE DAILY WITH MEALS  Dispense: 180 tablet; Refill: 3  -     Lipid Panel; Future; Expected date: 02/07/2025  -     TSH; Future; Expected date: 02/07/2025    3. History of non-ST elevation myocardial infarction (NSTEMI)  Assessment & Plan:  - Maintained dual antiplatelet therapy (aspirin and clopidogrel) pending cardiology re-evaluation.  - Continued clopidogrel.  - Refilled clopidogrel (1 refill).    Orders:  -     carvediloL (COREG) 3.125 MG tablet; Take 1 tablet (3.125 mg total) by mouth 2 (two) times daily. TAKE 1 TABLET(3.125 MG) BY MOUTH TWICE DAILY WITH MEALS  Dispense: 180 tablet; Refill: 3  -     rosuvastatin (CRESTOR) 20 MG tablet; Take 1 tablet (20 mg total) by mouth every evening.  Dispense: 90 tablet; Refill: 3  -     lisinopriL (PRINIVIL,ZESTRIL) 40 MG tablet; Take 1 tablet (40 mg total) by mouth once daily.  Dispense: 90 tablet; Refill: 3  -     Ambulatory referral/consult to Cardiology; Future; Expected date: 02/14/2025    4. PAD (peripheral artery disease)  Assessment & Plan:  - Continued statin therapy with rosuvastatin 20mg daily for cardiovascular risk reduction.  - Noted that cholesterol levels were good in the last lab test conducted in April.    Orders:  -     rosuvastatin (CRESTOR) 20 MG tablet; Take 1 tablet (20 mg total) by mouth every evening.  Dispense: 90 tablet; Refill: 3  -     Ambulatory referral/consult to Cardiology; Future; Expected date: 02/14/2025    5. Pulmonary HTN  Overview:  pasp of 49 mmHg.  group III with severe copd.    Assessment & Plan:  Report SOB improving with inhaler    Cont to treat COPD      6. Prediabetes  -     Hemoglobin A1C; Future; Expected date: 02/07/2025  -     Comprehensive Metabolic Panel; Future; Expected date: 02/07/2025  -     CBC Auto Differential; Future; Expected date: 02/07/2025    7. Chronic kidney disease, stage 3a  -     Comprehensive Metabolic Panel; Future; Expected date: 02/07/2025    8. Osteopenia, unspecified location  Assessment & Plan:  - Recommend bone density scan due to history of osteopenia and elevated fracture risk (8.6% in 10 years).  - Ordered a bone density scan to be scheduled, recommending it every 2 years to monitor bone health.    Orders:  -     DXA Bone Density Axial Skeleton 1 or more sites; Future; Expected date: 02/07/2025    9. Other specified disorders of bone density and structure, multiple sites  -     DXA Bone Density Axial Skeleton 1 or more sites; Future; Expected date: 02/07/2025    10. Smoking greater than 25 pack years  Assessment & Plan:  - Explained the negative impact of smoking on bone density and overall health, emphasizing its contribution to breathing problems.  - Recommend continuing efforts to decrease smoking from the current level of about 5 cigarettes per day, aiming for complete cessation to improve overall health.         WEIGHT MANAGEMENT:  - Discussed the harmful effects of daily soda consumption on health, particularly in relation to diabetes risk.  - Hollie to reduce soda intake from daily to once per week.      MEDICATIONS/SUPPLEMENTS:  - Refilled nasal spray.    CARDIOLOGY REFERRAL:  - Referred to cardiologist (Dr. Marcus) for re-evaluation of dual antiplatelet therapy.    FOLLOW UP:  - Follow up in 8 months for annual physical.  - Complete labs prior to annual physical appointment.  - Use patient portal to request medication refills when last refill is being used.       Total 40 mins spent on patient with more than 50% spent counseling    Follow up in about 8 months (around 10/7/2025) for Annual,  DEXA.    This note was generated with the assistance of ambient listening technology. Verbal consent was obtained by the patient and accompanying visitor(s) for the recording of patient appointment to facilitate this note. I attest to having reviewed and edited the generated note for accuracy, though some syntax or spelling errors may persist. Please contact the author of this note for any clarification.

## 2025-02-07 NOTE — ASSESSMENT & PLAN NOTE
As seen on CT   Pack year 25 years   - Continued current COPD management regimen, including Spiriva, ipratropium, Advair, and albuterol, as the patient reports improvement in breathing.  - Prescribed Spiriva and ipratropium for daily use.  - Prescribed Advair twice daily (morning and night).  - Prescribed albuterol inhaler and nebulizer solution as needed, especially during weather changes or when experiencing breathing problems.  - Refilled prescriptions for Spiriva, ipratropium, albuterol nebulizer solution, and albuterol inhaler ( puffs).  - Acknowledged the patient's use of multiple inhalers and nebulizer solution for COPD management.

## 2025-02-07 NOTE — ASSESSMENT & PLAN NOTE
- Explained the negative impact of smoking on bone density and overall health, emphasizing its contribution to breathing problems.  - Recommend continuing efforts to decrease smoking from the current level of about 5 cigarettes per day, aiming for complete cessation to improve overall health.

## 2025-03-16 DIAGNOSIS — J43.2 CENTRILOBULAR EMPHYSEMA: ICD-10-CM

## 2025-03-16 DIAGNOSIS — I25.2 HISTORY OF NON-ST ELEVATION MYOCARDIAL INFARCTION (NSTEMI): ICD-10-CM

## 2025-03-16 RX ORDER — LISINOPRIL 20 MG/1
TABLET ORAL
Qty: 90 TABLET | Refills: 3 | OUTPATIENT
Start: 2025-03-16

## 2025-03-16 NOTE — TELEPHONE ENCOUNTER
Refill Routing Note   Medication(s) are not appropriate for processing by [facility] for the following reason(s):        No active prescription written by provider    [facility] action(s):  Defer  Quick Discontinue      Medication Therapy Plan: LISINOPRIL WAS INCREASED TO 40 MG ON 2/7/25./ FOVS.,FLOS      Appointments  past 12m or future 3m with PCP    Date Provider   Last Visit   2/7/2025 Ariadne Smith MD   Next Visit   Visit date not found Ariadne Smith MD   ED visits in past 90 days: 0        Note composed:5:41 PM 03/16/2025

## 2025-03-16 NOTE — TELEPHONE ENCOUNTER
Care Due:                  Date            Visit Type   Department     Provider  --------------------------------------------------------------------------------                                EP -                              PRIMARY      [facility]  Last Visit: 02-      CARE ([facility])   MEDICINE       Ariadne Smith                              EP -                              PRIMARY      [facility]  Next Visit: 10-      CARE ([facility])   MEDICINE       Ariadne  Luis                                                            Last  Test          Frequency    Reason                     Performed    Due Date  --------------------------------------------------------------------------------    CBC.........  12 months..  clopidogreL..............  04- 04-    CMP.........  12 months..  hydroCHLOROthiazide,       04- 04-                             lisinopriL, rosuvastatin.    Lipid Panel.  12 months..  rosuvastatin.............  04- 04-    [facility] Embedded Care Due Messages. Reference number: [id number].   3/16/2025 6:00:23 AM CDT

## 2025-03-17 DIAGNOSIS — I10 ESSENTIAL HYPERTENSION: ICD-10-CM

## 2025-03-17 RX ORDER — LORATADINE 10 MG/1
TABLET ORAL
Qty: 90 TABLET | Refills: 3 | Status: SHIPPED | OUTPATIENT
Start: 2025-03-17

## 2025-03-17 RX ORDER — HYDROCHLOROTHIAZIDE 25 MG/1
25 TABLET ORAL DAILY
Qty: 90 TABLET | Refills: 3 | Status: SHIPPED | OUTPATIENT
Start: 2025-03-17 | End: 2026-03-17

## 2025-03-17 NOTE — TELEPHONE ENCOUNTER
No care due was identified.  [facility] Embedded Care Due Messages. Reference number: [id number].   3/17/2025 3:44:25 PM CDT

## 2025-04-04 ENCOUNTER — NURSE TRIAGE (OUTPATIENT)
Dept: ADMINISTRATIVE | Facility: CLINIC | Age: 70
End: 2025-04-04
Payer: MEDICARE

## 2025-04-04 ENCOUNTER — HOSPITAL ENCOUNTER (INPATIENT)
Facility: HOSPITAL | Age: 70
LOS: 11 days | Discharge: HOME | DRG: 190 | End: 2025-04-16
Attending: EMERGENCY MEDICINE | Admitting: STUDENT IN AN ORGANIZED HEALTH CARE EDUCATION/TRAINING PROGRAM
Payer: MEDICARE

## 2025-04-04 DIAGNOSIS — R06.02 SHORTNESS OF BREATH: ICD-10-CM

## 2025-04-04 DIAGNOSIS — R09.02 HYPOXIA: ICD-10-CM

## 2025-04-04 DIAGNOSIS — I10 HYPERTENSION, UNSPECIFIED TYPE: ICD-10-CM

## 2025-04-04 DIAGNOSIS — R79.89 ELEVATED TROPONIN: ICD-10-CM

## 2025-04-04 DIAGNOSIS — J44.1 COPD EXACERBATION: Primary | ICD-10-CM

## 2025-04-04 DIAGNOSIS — J44.1 ACUTE EXACERBATION OF COPD WITH ASTHMA: ICD-10-CM

## 2025-04-04 DIAGNOSIS — I73.9 PAD (PERIPHERAL ARTERY DISEASE): ICD-10-CM

## 2025-04-04 DIAGNOSIS — I21.4 NSTEMI (NON-ST ELEVATED MYOCARDIAL INFARCTION): ICD-10-CM

## 2025-04-04 DIAGNOSIS — F17.200 TOBACCO DEPENDENCY: ICD-10-CM

## 2025-04-04 DIAGNOSIS — R07.9 CHEST PAIN: ICD-10-CM

## 2025-04-04 DIAGNOSIS — I25.2 HISTORY OF NON-ST ELEVATION MYOCARDIAL INFARCTION (NSTEMI): ICD-10-CM

## 2025-04-04 PROBLEM — J96.01 ACUTE RESPIRATORY FAILURE WITH HYPOXIA: Status: ACTIVE | Noted: 2025-04-04

## 2025-04-04 PROBLEM — I25.10 CAD (CORONARY ARTERY DISEASE): Status: ACTIVE | Noted: 2020-11-16

## 2025-04-04 LAB
ABSOLUTE EOSINOPHIL (OHS): 0.01 K/UL
ABSOLUTE MONOCYTE (OHS): 0.95 K/UL (ref 0.3–1)
ABSOLUTE NEUTROPHIL COUNT (OHS): 2.67 K/UL (ref 1.8–7.7)
ALBUMIN SERPL BCP-MCNC: 4.1 G/DL (ref 3.5–5.2)
ALP SERPL-CCNC: 57 UNIT/L (ref 40–150)
ALT SERPL W/O P-5'-P-CCNC: 17 UNIT/L (ref 10–44)
ANION GAP (OHS): 12 MMOL/L (ref 8–16)
AST SERPL-CCNC: 19 UNIT/L (ref 11–45)
BASOPHILS # BLD AUTO: 0.04 K/UL
BASOPHILS NFR BLD AUTO: 0.8 %
BILIRUB SERPL-MCNC: 0.4 MG/DL (ref 0.1–1)
BNP SERPL-MCNC: 41 PG/ML (ref 0–99)
BUN SERPL-MCNC: 8 MG/DL (ref 8–23)
CALCIUM SERPL-MCNC: 9.1 MG/DL (ref 8.7–10.5)
CHLORIDE SERPL-SCNC: 97 MMOL/L (ref 95–110)
CO2 SERPL-SCNC: 25 MMOL/L (ref 23–29)
CREAT SERPL-MCNC: 0.8 MG/DL (ref 0.5–1.4)
CTP QC/QA: YES
CTP QC/QA: YES
ERYTHROCYTE [DISTWIDTH] IN BLOOD BY AUTOMATED COUNT: 13 % (ref 11.5–14.5)
GFR SERPLBLD CREATININE-BSD FMLA CKD-EPI: >60 ML/MIN/1.73/M2
GLUCOSE SERPL-MCNC: 97 MG/DL (ref 70–110)
HCT VFR BLD AUTO: 42.4 % (ref 37–48.5)
HGB BLD-MCNC: 14.2 GM/DL (ref 12–16)
IMM GRANULOCYTES # BLD AUTO: 0.02 K/UL (ref 0–0.04)
IMM GRANULOCYTES NFR BLD AUTO: 0.4 % (ref 0–0.5)
LYMPHOCYTES # BLD AUTO: 1.32 K/UL (ref 1–4.8)
MCH RBC QN AUTO: 30 PG (ref 27–31)
MCHC RBC AUTO-ENTMCNC: 33.5 G/DL (ref 32–36)
MCV RBC AUTO: 90 FL (ref 82–98)
NUCLEATED RBC (/100WBC) (OHS): 0 /100 WBC
PLATELET # BLD AUTO: 220 K/UL (ref 150–450)
PMV BLD AUTO: 10.1 FL (ref 9.2–12.9)
POC MOLECULAR INFLUENZA A AGN: NEGATIVE
POC MOLECULAR INFLUENZA B AGN: NEGATIVE
POTASSIUM SERPL-SCNC: 3.4 MMOL/L (ref 3.5–5.1)
PROT SERPL-MCNC: 7.4 GM/DL (ref 6–8.4)
RBC # BLD AUTO: 4.73 M/UL (ref 4–5.4)
RELATIVE EOSINOPHIL (OHS): 0.2 %
RELATIVE LYMPHOCYTE (OHS): 26.3 % (ref 18–48)
RELATIVE MONOCYTE (OHS): 19 % (ref 4–15)
RELATIVE NEUTROPHIL (OHS): 53.3 % (ref 38–73)
RSV AG SPEC QL IA: NEGATIVE
SARS-COV-2 RDRP RESP QL NAA+PROBE: NEGATIVE
SODIUM SERPL-SCNC: 134 MMOL/L (ref 136–145)
TROPONIN I SERPL DL<=0.01 NG/ML-MCNC: <0.006 NG/ML
WBC # BLD AUTO: 5.01 K/UL (ref 3.9–12.7)

## 2025-04-04 PROCEDURE — 85025 COMPLETE CBC W/AUTO DIFF WBC: CPT | Performed by: NURSE PRACTITIONER

## 2025-04-04 PROCEDURE — 63600175 PHARM REV CODE 636 W HCPCS: Performed by: EMERGENCY MEDICINE

## 2025-04-04 PROCEDURE — 87635 SARS-COV-2 COVID-19 AMP PRB: CPT | Performed by: NURSE PRACTITIONER

## 2025-04-04 PROCEDURE — 87502 INFLUENZA DNA AMP PROBE: CPT

## 2025-04-04 PROCEDURE — 87634 RSV DNA/RNA AMP PROBE: CPT | Performed by: NURSE PRACTITIONER

## 2025-04-04 PROCEDURE — 93010 ELECTROCARDIOGRAM REPORT: CPT | Mod: ,,, | Performed by: INTERNAL MEDICINE

## 2025-04-04 PROCEDURE — 96375 TX/PRO/DX INJ NEW DRUG ADDON: CPT

## 2025-04-04 PROCEDURE — G0378 HOSPITAL OBSERVATION PER HR: HCPCS

## 2025-04-04 PROCEDURE — 94644 CONT INHLJ TX 1ST HOUR: CPT

## 2025-04-04 PROCEDURE — 94640 AIRWAY INHALATION TREATMENT: CPT

## 2025-04-04 PROCEDURE — 25000003 PHARM REV CODE 250: Performed by: EMERGENCY MEDICINE

## 2025-04-04 PROCEDURE — 80053 COMPREHEN METABOLIC PANEL: CPT | Performed by: NURSE PRACTITIONER

## 2025-04-04 PROCEDURE — 96374 THER/PROPH/DIAG INJ IV PUSH: CPT

## 2025-04-04 PROCEDURE — 84484 ASSAY OF TROPONIN QUANT: CPT | Performed by: NURSE PRACTITIONER

## 2025-04-04 PROCEDURE — 99285 EMERGENCY DEPT VISIT HI MDM: CPT | Mod: 25

## 2025-04-04 PROCEDURE — 83880 ASSAY OF NATRIURETIC PEPTIDE: CPT | Performed by: NURSE PRACTITIONER

## 2025-04-04 PROCEDURE — 25000242 PHARM REV CODE 250 ALT 637 W/ HCPCS: Performed by: EMERGENCY MEDICINE

## 2025-04-04 PROCEDURE — 93005 ELECTROCARDIOGRAM TRACING: CPT

## 2025-04-04 RX ORDER — PREDNISONE 20 MG/1
40 TABLET ORAL DAILY
Status: DISCONTINUED | OUTPATIENT
Start: 2025-04-05 | End: 2025-04-08

## 2025-04-04 RX ORDER — ALUMINUM HYDROXIDE, MAGNESIUM HYDROXIDE, AND SIMETHICONE 1200; 120; 1200 MG/30ML; MG/30ML; MG/30ML
30 SUSPENSION ORAL 4 TIMES DAILY PRN
Status: DISCONTINUED | OUTPATIENT
Start: 2025-04-05 | End: 2025-04-16 | Stop reason: HOSPADM

## 2025-04-04 RX ORDER — SODIUM CHLORIDE 0.9 % (FLUSH) 0.9 %
10 SYRINGE (ML) INJECTION EVERY 12 HOURS PRN
Status: DISCONTINUED | OUTPATIENT
Start: 2025-04-05 | End: 2025-04-16 | Stop reason: HOSPADM

## 2025-04-04 RX ORDER — LISINOPRIL 20 MG/1
40 TABLET ORAL DAILY
Status: DISCONTINUED | OUTPATIENT
Start: 2025-04-05 | End: 2025-04-16 | Stop reason: HOSPADM

## 2025-04-04 RX ORDER — ENOXAPARIN SODIUM 100 MG/ML
40 INJECTION SUBCUTANEOUS EVERY 24 HOURS
Status: DISCONTINUED | OUTPATIENT
Start: 2025-04-05 | End: 2025-04-05

## 2025-04-04 RX ORDER — IBUPROFEN 200 MG
16 TABLET ORAL
Status: DISCONTINUED | OUTPATIENT
Start: 2025-04-05 | End: 2025-04-16 | Stop reason: HOSPADM

## 2025-04-04 RX ORDER — MONTELUKAST SODIUM 10 MG/1
10 TABLET ORAL NIGHTLY
Status: DISCONTINUED | OUTPATIENT
Start: 2025-04-05 | End: 2025-04-16 | Stop reason: HOSPADM

## 2025-04-04 RX ORDER — SODIUM,POTASSIUM PHOSPHATES 280-250MG
2 POWDER IN PACKET (EA) ORAL
Status: DISCONTINUED | OUTPATIENT
Start: 2025-04-05 | End: 2025-04-16 | Stop reason: HOSPADM

## 2025-04-04 RX ORDER — IBUPROFEN 200 MG
1 TABLET ORAL DAILY PRN
Status: DISCONTINUED | OUTPATIENT
Start: 2025-04-05 | End: 2025-04-16 | Stop reason: HOSPADM

## 2025-04-04 RX ORDER — SODIUM CHLORIDE 0.9 % (FLUSH) 0.9 %
3 SYRINGE (ML) INJECTION
Status: DISCONTINUED | OUTPATIENT
Start: 2025-04-05 | End: 2025-04-16 | Stop reason: HOSPADM

## 2025-04-04 RX ORDER — CARVEDILOL 3.12 MG/1
3.12 TABLET ORAL 2 TIMES DAILY
Status: DISCONTINUED | OUTPATIENT
Start: 2025-04-05 | End: 2025-04-16 | Stop reason: HOSPADM

## 2025-04-04 RX ORDER — ATORVASTATIN CALCIUM 40 MG/1
80 TABLET, FILM COATED ORAL DAILY
Status: DISCONTINUED | OUTPATIENT
Start: 2025-04-05 | End: 2025-04-06

## 2025-04-04 RX ORDER — IPRATROPIUM BROMIDE AND ALBUTEROL SULFATE 2.5; .5 MG/3ML; MG/3ML
3 SOLUTION RESPIRATORY (INHALATION)
Status: DISPENSED | OUTPATIENT
Start: 2025-04-04 | End: 2025-04-05

## 2025-04-04 RX ORDER — ACETAMINOPHEN 325 MG/1
650 TABLET ORAL EVERY 8 HOURS PRN
Status: DISCONTINUED | OUTPATIENT
Start: 2025-04-05 | End: 2025-04-16 | Stop reason: HOSPADM

## 2025-04-04 RX ORDER — ONDANSETRON HYDROCHLORIDE 2 MG/ML
4 INJECTION, SOLUTION INTRAVENOUS EVERY 8 HOURS PRN
Status: DISCONTINUED | OUTPATIENT
Start: 2025-04-05 | End: 2025-04-16 | Stop reason: HOSPADM

## 2025-04-04 RX ORDER — NALOXONE HCL 0.4 MG/ML
0.02 VIAL (ML) INJECTION
Status: DISCONTINUED | OUTPATIENT
Start: 2025-04-05 | End: 2025-04-16 | Stop reason: HOSPADM

## 2025-04-04 RX ORDER — IPRATROPIUM BROMIDE AND ALBUTEROL SULFATE 2.5; .5 MG/3ML; MG/3ML
3 SOLUTION RESPIRATORY (INHALATION)
Status: DISCONTINUED | OUTPATIENT
Start: 2025-04-04 | End: 2025-04-04

## 2025-04-04 RX ORDER — LORAZEPAM 0.5 MG/1
0.5 TABLET ORAL
Status: COMPLETED | OUTPATIENT
Start: 2025-04-04 | End: 2025-04-04

## 2025-04-04 RX ORDER — GLUCAGON 1 MG
1 KIT INJECTION
Status: DISCONTINUED | OUTPATIENT
Start: 2025-04-05 | End: 2025-04-16 | Stop reason: HOSPADM

## 2025-04-04 RX ORDER — TALC
6 POWDER (GRAM) TOPICAL NIGHTLY PRN
Status: DISCONTINUED | OUTPATIENT
Start: 2025-04-05 | End: 2025-04-08

## 2025-04-04 RX ORDER — LANOLIN ALCOHOL/MO/W.PET/CERES
800 CREAM (GRAM) TOPICAL
Status: DISCONTINUED | OUTPATIENT
Start: 2025-04-05 | End: 2025-04-16 | Stop reason: HOSPADM

## 2025-04-04 RX ORDER — IPRATROPIUM BROMIDE AND ALBUTEROL SULFATE 2.5; .5 MG/3ML; MG/3ML
9 SOLUTION RESPIRATORY (INHALATION) ONCE
Status: COMPLETED | OUTPATIENT
Start: 2025-04-04 | End: 2025-04-04

## 2025-04-04 RX ORDER — ACETAMINOPHEN 325 MG/1
650 TABLET ORAL EVERY 4 HOURS PRN
Status: DISCONTINUED | OUTPATIENT
Start: 2025-04-05 | End: 2025-04-16 | Stop reason: HOSPADM

## 2025-04-04 RX ORDER — IPRATROPIUM BROMIDE AND ALBUTEROL SULFATE 2.5; .5 MG/3ML; MG/3ML
3 SOLUTION RESPIRATORY (INHALATION) EVERY 4 HOURS
Status: DISCONTINUED | OUTPATIENT
Start: 2025-04-05 | End: 2025-04-07

## 2025-04-04 RX ORDER — HYDROXYZINE HYDROCHLORIDE 25 MG/1
50 TABLET, FILM COATED ORAL 3 TIMES DAILY PRN
Status: DISCONTINUED | OUTPATIENT
Start: 2025-04-05 | End: 2025-04-09

## 2025-04-04 RX ORDER — METHYLPREDNISOLONE SOD SUCC 125 MG
125 VIAL (EA) INJECTION
Status: COMPLETED | OUTPATIENT
Start: 2025-04-04 | End: 2025-04-04

## 2025-04-04 RX ORDER — HYDRALAZINE HYDROCHLORIDE 20 MG/ML
10 INJECTION INTRAMUSCULAR; INTRAVENOUS
Status: COMPLETED | OUTPATIENT
Start: 2025-04-04 | End: 2025-04-04

## 2025-04-04 RX ORDER — CLOPIDOGREL BISULFATE 75 MG/1
75 TABLET ORAL DAILY
Status: DISCONTINUED | OUTPATIENT
Start: 2025-04-05 | End: 2025-04-06

## 2025-04-04 RX ORDER — IBUPROFEN 200 MG
24 TABLET ORAL
Status: DISCONTINUED | OUTPATIENT
Start: 2025-04-05 | End: 2025-04-16 | Stop reason: HOSPADM

## 2025-04-04 RX ORDER — SIMETHICONE 80 MG
1 TABLET,CHEWABLE ORAL 4 TIMES DAILY PRN
Status: DISCONTINUED | OUTPATIENT
Start: 2025-04-05 | End: 2025-04-16 | Stop reason: HOSPADM

## 2025-04-04 RX ORDER — ASPIRIN 81 MG/1
81 TABLET ORAL DAILY
Status: DISCONTINUED | OUTPATIENT
Start: 2025-04-05 | End: 2025-04-16 | Stop reason: HOSPADM

## 2025-04-04 RX ORDER — AMOXICILLIN 250 MG
1 CAPSULE ORAL DAILY PRN
Status: DISCONTINUED | OUTPATIENT
Start: 2025-04-05 | End: 2025-04-16 | Stop reason: HOSPADM

## 2025-04-04 RX ORDER — BISACODYL 10 MG/1
10 SUPPOSITORY RECTAL DAILY PRN
Status: DISCONTINUED | OUTPATIENT
Start: 2025-04-05 | End: 2025-04-16 | Stop reason: HOSPADM

## 2025-04-04 RX ORDER — CLONIDINE HYDROCHLORIDE 0.1 MG/1
0.1 TABLET ORAL EVERY 6 HOURS PRN
Status: DISCONTINUED | OUTPATIENT
Start: 2025-04-05 | End: 2025-04-16 | Stop reason: HOSPADM

## 2025-04-04 RX ORDER — HYDROCHLOROTHIAZIDE 25 MG/1
25 TABLET ORAL DAILY
Status: DISCONTINUED | OUTPATIENT
Start: 2025-04-05 | End: 2025-04-06

## 2025-04-04 RX ADMIN — METHYLPREDNISOLONE SODIUM SUCCINATE 125 MG: 125 INJECTION, POWDER, FOR SOLUTION INTRAMUSCULAR; INTRAVENOUS at 07:04

## 2025-04-04 RX ADMIN — HYDRALAZINE HYDROCHLORIDE 10 MG: 20 INJECTION INTRAMUSCULAR; INTRAVENOUS at 07:04

## 2025-04-04 RX ADMIN — LORAZEPAM 0.5 MG: 0.5 TABLET ORAL at 10:04

## 2025-04-04 RX ADMIN — IPRATROPIUM BROMIDE AND ALBUTEROL SULFATE 9 ML: 2.5; .5 SOLUTION RESPIRATORY (INHALATION) at 07:04

## 2025-04-04 NOTE — ED PROVIDER NOTES
"Encounter Date: 2025    SCRIBE #1 NOTE: I, Camille Logan, am scribing for, and in the presence of,  Meghna Baxter MD. I have scribed the following portions of the note - Other sections scribed: HPI, ROS, PE.       History     Chief Complaint   Patient presents with    Shortness of Breath     Pt to ED c/o SOB for the last few days but got worse today. Reports Hx of COPD and Asthma. Reports having a "cold".      Hollie Ponce is a 69 y.o. female, with a PMHx of HTN, HLD, NSTEMI, PAD, prediabetes, asthma, and COPD, who presents to the ED with worsening SOB that began today. She reports associated wheezing. Patient reports she is currently sick with URI symptoms that began 2-3 days ago, including a productive cough with white sputum, fatigue, rhinorrhea, nasal congestion, and chest congestion. She does note she has a chronic cough at baseline. She reports attempting treatment with a nebulizer and inhaler at home, which improved her wheezing and without relief for her SOB. No other exacerbating or alleviating factors. Denies sore throat, chest pain, dizziness, weakness, nausea, vomiting, numbness, tingling or other associated symptoms. She reports her daily medications include: HCTZ 25 mg, Coreg 3.125 BID (last dose this morning), and Crestor 20 mg.Patient reports smoking 5 cigarettes a day but denies EtOH or illicit drug use. . Reports drug allergies to Sulfa, Flagyl, and ASA..    The history is provided by the patient. No  was used.     Review of patient's allergies indicates:   Allergen Reactions    Flagyl [metronidazole hcl]      Hives      Sulfamethoxazole-trimethoprim Itching    Aspirin Nausea Only    Lipitor [atorvastatin]      Myalgia      Past Medical History:   Diagnosis Date    Asthma     COPD (chronic obstructive pulmonary disease)     Hypertension      Past Surgical History:   Procedure Laterality Date     SECTION      HERNIA REPAIR      LEFT HEART CATHETERIZATION Left " 11/16/2020    Procedure: Left heart cath;  Surgeon: Shabnam Vernon MD;  Location: [facility] CATH LAB;  Service: Cardiology;  Laterality: Left;     Family History   Problem Relation Name Age of Onset    Cancer Mother          Bladder    Liver disease Father       Social History[1]  Review of Systems   Constitutional:  Positive for fatigue. Negative for chills, diaphoresis and fever.   HENT:  Positive for congestion and rhinorrhea. Negative for sore throat.    Eyes:  Negative for photophobia and visual disturbance.   Respiratory:  Positive for cough (productive, white sputum), shortness of breath and wheezing.    Cardiovascular:  Negative for chest pain and leg swelling.   Gastrointestinal:  Negative for abdominal pain, blood in stool, constipation, diarrhea, nausea and vomiting.   Genitourinary:  Negative for dysuria, flank pain, frequency, hematuria and urgency.   Musculoskeletal:  Negative for neck pain and neck stiffness.   Skin:  Negative for rash and wound.   Neurological:  Negative for weakness, light-headedness, numbness and headaches.   Psychiatric/Behavioral:  Negative for confusion and suicidal ideas.    All other systems reviewed and are negative.      Physical Exam     Initial Vitals [04/04/25 1747]   BP Pulse Resp Temp SpO2   (!) 199/95 86 (!) 24 97.8 °F (36.6 °C) 95 %      MAP       --         Physical Exam    Nursing note and vitals reviewed.  Constitutional: She appears well-developed and well-nourished. She is not diaphoretic. No distress.   HENT:   Head: Normocephalic and atraumatic. Mouth/Throat: Oropharynx is clear and moist. No oropharyngeal exudate.   Eyes: Conjunctivae and EOM are normal. Pupils are equal, round, and reactive to light. Right eye exhibits no discharge. Left eye exhibits no discharge.   Neck: Neck supple. No JVD present.   Normal range of motion.  Cardiovascular:  Normal rate, regular rhythm, normal heart sounds and intact distal pulses.     Exam reveals no gallop and no friction  rub.       No murmur heard.  Pulmonary/Chest: No respiratory distress. She has wheezes. She has no rhonchi. She has no rales.   Mild lung wheezing. Speaking in full sentences. Dry sounding cough present on exam.  Sitting upright in bed.   Abdominal: Abdomen is soft. Bowel sounds are normal. She exhibits no distension. There is no abdominal tenderness. There is no rebound and no guarding.   Musculoskeletal:         General: No tenderness or edema.      Cervical back: Normal range of motion and neck supple.     Lymphadenopathy:     She has no cervical adenopathy.   Neurological: She is alert and oriented to person, place, and time. She has normal strength and normal reflexes. No cranial nerve deficit or sensory deficit. Coordination and gait normal. GCS score is 15. GCS eye subscore is 4. GCS verbal subscore is 5. GCS motor subscore is 6.   Skin: Skin is warm and dry. Capillary refill takes less than 2 seconds.   Psychiatric: She has a normal mood and affect. Thought content normal.         ED Course   Procedures  Labs Reviewed   COMPREHENSIVE METABOLIC PANEL - Abnormal       Result Value    Sodium 134 (*)     Potassium 3.4 (*)     Chloride 97      CO2 25      Glucose 97      BUN 8      Creatinine 0.8      Calcium 9.1      Protein Total 7.4      Albumin 4.1      Bilirubin Total 0.4      ALP 57      AST 19      ALT 17      Anion Gap 12      eGFR >60     CBC WITH DIFFERENTIAL - Abnormal    WBC 5.01      RBC 4.73      HGB 14.2      HCT 42.4      MCV 90      MCH 30.0      MCHC 33.5      RDW 13.0      Platelet Count 220      MPV 10.1      Nucleated RBC 0      Neut % 53.3      Lymph % 26.3      Mono % 19.0 (*)     Eos % 0.2      Basophil % 0.8      Imm Grans % 0.4      Neut # 2.67      Lymph # 1.32      Mono # 0.95      Eos # 0.01      Baso # 0.04      Imm Grans # 0.02     TROPONIN I - Normal    Troponin-I <0.006     B-TYPE NATRIURETIC PEPTIDE - Normal    BNP 41     RSV ANTIGEN DETECTION - Normal    RSV, RAPID BY MOLECULAR  METHOD Negative     CBC W/ AUTO DIFFERENTIAL    Narrative:     The following orders were created for panel order CBC Auto Differential.  Procedure                               Abnormality         Status                     ---------                               -----------         ------                     CBC with Differential[[id number]]       Abnormal            Final result                 Please view results for these tests on the individual orders.   SARS-COV-2 RDRP GENE    POC Rapid COVID Negative       Acceptable Yes     POCT INFLUENZA A/B MOLECULAR    POC Molecular Influenza A Ag Negative      POC Molecular Influenza B Ag Negative       Acceptable Yes            Imaging Results              X-Ray Chest PA And Lateral (Final result)  Result time 04/04/25 18:00:11      Final result by Dylon Pérez MD (04/04/25 18:00:11)                   Impression:      No acute cardiopulmonary process identified.      Electronically signed by: Dylon Pérez MD  Date:    04/04/2025  Time:    18:00               Narrative:    EXAMINATION:  XR CHEST PA AND LATERAL    CLINICAL HISTORY:  SOB;    TECHNIQUE:  PA and lateral views of the chest were performed.    COMPARISON:  November 2020.    FINDINGS:  Cardiac silhouette is normal in size.  Lungs are symmetrically expanded.  No evidence of focal consolidative process, pneumothorax, or significant pleural effusion.  No acute osseous abnormality identified.                                       Medications   albuterol-ipratropium 2.5 mg-0.5 mg/3 mL nebulizer solution 3 mL (0 mLs Nebulization Hold 4/4/25 2230)   hydrALAZINE injection 10 mg (10 mg Intravenous Given 4/4/25 1930)   methylPREDNISolone sodium succinate injection 125 mg (125 mg Intravenous Given 4/4/25 1930)   albuterol-ipratropium 2.5 mg-0.5 mg/3 mL nebulizer solution 9 mL (9 mLs Nebulization Given 4/4/25 1912)   LORazepam tablet 0.5 mg (0.5 mg Oral Given 4/4/25 2247)     Medical  Decision Making  hypoxia, COPD exacerbation: 69-year-old female with past medical history of COPD not on any home O2 presents with a head cold with worsening shortness of breath since Tuesday. Patient states she is using her inhalers and nebulizers at home without improvement. Cough productive white sputum. Nasal congestion. Wheezing noted. He is still smoking 5 cigarettes per day. She reports taking her blood pressure medicines this morning which include Coreg, lisinopril and hydrochlorothiazide. Her blood pressure was noted to be significantly elevated at 199 systolic when she arrived.  Differential diagnosis includes was not limited to COPD exacerbation, URI, CHF exacerbation, ACS, anemia, metabolic derangement.  Patient given Solu-Medrol, DuoNebs and hydralazine. Her lung sounded more open after this however her oxygen saturations did decrease and she is now requiring 2 L nasal cannula. Saturations in the 80s on room air. We attempted to titrate her off of oxygen and she is not tolerating. Blood pressure has improved to systolic 150s after hydralazine. I am giving her additional DuoNeb at this time. Labs with sodium of 134 and potassium of 3.4 in the setting of recent DuoNeb. Initial troponin negative. BNP negative. RSV COVID and flu negative. No leukocytosis or anemia. Chest x-ray with no acute concerning findings. We would like to bring patient in for COPD exacerbation now on oxygen.  Case discussed with Dr. Lama who agrees to place patient in observation with Dr. Llanes.  Patient states she does not want a repeat albuterol at this time we would like to wait slightly longer.  We will give small dose of p.o. Ativan as patient appears significantly anxious at this time and may be contributing.    Amount and/or Complexity of Data Reviewed  Labs: ordered. Decision-making details documented in ED Course.  Radiology: ordered. Decision-making details documented in ED Course.  ECG/medicine tests: ordered and  independent interpretation performed. Decision-making details documented in ED Course.    Risk  Prescription drug management.            Scribe Attestation:   Scribe #1: I performed the above scribed service and the documentation accurately describes the services I performed. I attest to the accuracy of the note.        ED Course as of 04/04/25 2254 Fri Apr 04, 2025 1910 EKG 12-lead  Normal sinus rhythm at 87.  No ST elevation or significant depression.  T-wave inversions in V1 and flattening in aVL.  Normal axis.  QTC is 447. [JT]      ED Course User Index  [JT] Meghna Baxter MD                         I, Meghna Baxter, personally performed the services described in this documentation. All medical record entries made by the scribe were at my direction and in my presence. I have reviewed the chart and agree that the record reflects my personal performance and is accurate and complete.      DISCLAIMER: This note was prepared with Smeet voice recognition transcription software. Garbled syntax, mangled pronouns, and other bizarre constructions may be attributed to that software system.   Clinical Impression:  Final diagnoses:  [R06.02] Shortness of breath  [R09.02] Hypoxia (Primary)  [J44.1] COPD exacerbation  [I10] Hypertension, unspecified type          ED Disposition Condition    Observation Stable                    [1]   Social History  Tobacco Use    Smoking status: Every Day     Current packs/day: 0.25     Average packs/day: 0.5 packs/day for 51.0 years (25.3 ttl pk-yrs)     Types: Cigarettes     Start date: 12/13/1970     Last attempt to quit: 12/13/2020    Smokeless tobacco: Never   Substance Use Topics    Alcohol use: Never    Drug use: Never        Meghna Baxter MD  04/04/25 2254

## 2025-04-04 NOTE — Clinical Note
Diagnosis: Shortness of breath [786.05.ICD-9-CM]   Future Attending Provider: CARLOTTA VIZCAINO [[id number]]

## 2025-04-04 NOTE — FIRST PROVIDER EVALUATION
"Medical screening examination initiated.  I have conducted a focused provider triage encounter, findings are as follows:    Brief history of present illness:  SOB with URI symptoms that stated today    Vitals:    04/04/25 1747   BP: (!) 199/95   BP Location: Right arm   Pulse: 86   Resp: (!) 24   Temp: 97.8 °F (36.6 °C)   TempSrc: Oral   SpO2: 95%   Weight: 59 kg (130 lb)   Height: 4' 8" (1.422 m)       Pertinent physical exam:  NAD-mild trachypnea, SPO 95% on RA    Brief workup plan:  labs, CXR, flu, covid, rsv, EKG, UA    Preliminary workup initiated; this workup will be continued and followed by the physician or advanced practice provider that is assigned to the patient when roomed.  "

## 2025-04-04 NOTE — TELEPHONE ENCOUNTER
Patient's call transferred as a priority call. Patient states history of COPD and c/o Difficulty Breathing/SOB when ambulating. Patient states symptoms are worsening and has used her Advair Inhaler, Nebulizer/Breathing Treatment and Flonase for management of her symptoms.     Care Advice given per Breathing Difficulty - Adult Guideline. Patient advised to Go to Office or Urgent Care Center Now for evaluation and to have another adult drive her to facility due to SOB. Patient declined Same Day visit due to lack of transportation at this time. Patient also advised to contact the [facility] on Call [facility] for any worsening symptoms. Patient states understanding of care advice and states she will visit an Urgent Care Center and have her daughter transport her to facility.    Reason for Disposition   MILD difficulty breathing (e.g., minimal/no SOB at rest, SOB with walking, pulse < 100) of new-onset or worse than normal    Additional Information   Negative: SEVERE difficulty breathing (e.g., struggling for each breath, speaks in single words, pulse > 120)   Negative: Breathing stopped and hasn't returned   Negative: Choking on something   Negative: Bluish (or gray) lips or face   Negative: Difficult to awaken or acting confused (e.g., disoriented, slurred speech)   Negative: Passed out (e.g., fainted, lost consciousness, blacked out and was not responding)   Negative: Wheezing started suddenly after medicine, an allergic food, or bee sting   Negative: Stridor (harsh sound while breathing in)   Negative: Slow, shallow and weak breathing   Negative: Sounds like a life-threatening emergency to the triager   Negative: Chest pain   Negative: Wheezing (high pitched whistling sound) and previous asthma attacks or use of asthma medicines   Negative: Breathing difficulty and within 14 days of COVID-19 EXPOSURE (close contact) with someone diagnosed with COVID-19 (e.g., COVID test positive)   Negative: Breathing difficulty and  "COVID-19 is widespread in the community   Negative: Breathing diffculty and only present when coughing   Negative: Breathing difficulty and only from stuffy nose   Negative: Breathing diffculty and only from stuffy nose or runny nose from common cold   Negative: MODERATE difficulty breathing (e.g., speaks in phrases, SOB even at rest, pulse 100-120) of new-onset or worse than normal   Negative: Oxygen level (e.g., pulse oximetry) 90% or lower   Negative: Wheezing can be heard across the room   Negative: Drooling or spitting out saliva (because can't swallow)   Negative: Any history of prior "blood clot" in leg or lungs   Negative: Illness requiring prolonged bedrest in past month (e.g., immobilization, long hospital stay)   Negative: Major surgery in the past month   Negative: Hip or leg fracture (broken bone) in past month (or had cast on leg or ankle in past month)   Negative: Long-distance travel in past month (e.g., car, bus, train, plane; with trip lasting 6 or more hours)   Negative: Cancer treatment in past six months (or has cancer now)   Negative: Extra heartbeats, irregular heart beating, or heart is beating very fast (i.e., 'palpitations')   Negative: Fever > 103 F (39.4 C)   Negative: Fever > 101 F (38.3 C) and over 60 years of age   Negative: Fever > 100 F (37.8 C) and bedridden (e.g., nursing home patient, stroke, chronic illness, recovering from surgery)   Negative: Fever > 100 F (37.8 C) and diabetes mellitus or weak immune system (e.g., HIV positive, cancer chemo, splenectomy, organ transplant, chronic steroids)   Negative: Periods where breathing stops and then resumes normally and bedridden (e.g., nursing home patient, CVA)   Negative: Pregnant or postpartum (from 0 to 6 weeks after delivery)   Negative: Patient sounds very sick or weak to the triager    Protocols used: Breathing Difficulty-A-[place]    "

## 2025-04-05 PROBLEM — I21.4 NSTEMI (NON-ST ELEVATED MYOCARDIAL INFARCTION): Status: ACTIVE | Noted: 2025-04-05

## 2025-04-05 PROBLEM — I65.23 CAROTID ATHEROSCLEROSIS, BILATERAL: Status: ACTIVE | Noted: 2025-04-05

## 2025-04-05 PROBLEM — E78.5 DYSLIPIDEMIA: Status: ACTIVE | Noted: 2025-04-05

## 2025-04-05 LAB
ABSOLUTE EOSINOPHIL (OHS): 0 K/UL
ABSOLUTE MONOCYTE (OHS): 0.11 K/UL (ref 0.3–1)
ABSOLUTE NEUTROPHIL COUNT (OHS): 3.8 K/UL (ref 1.8–7.7)
ANION GAP (OHS): 14 MMOL/L (ref 8–16)
APICAL FOUR CHAMBER EJECTION FRACTION: 77 %
APICAL TWO CHAMBER EJECTION FRACTION: 73 %
AV INDEX (PROSTH): 1.06
AV MEAN GRADIENT: 3 MMHG
AV PEAK GRADIENT: 5 MMHG
AV VALVE AREA BY VELOCITY RATIO: 2.8 CM²
AV VALVE AREA: 3 CM²
AV VELOCITY RATIO: 1
BASOPHILS # BLD AUTO: 0 K/UL
BASOPHILS NFR BLD AUTO: 0 %
BSA FOR ECHO PROCEDURE: 1.53 M2
BUN SERPL-MCNC: 8 MG/DL (ref 8–23)
CALCIUM SERPL-MCNC: 9.3 MG/DL (ref 8.7–10.5)
CHLORIDE SERPL-SCNC: 96 MMOL/L (ref 95–110)
CHOLEST SERPL-MCNC: 139 MG/DL (ref 120–199)
CHOLEST/HDLC SERPL: 2.3 {RATIO} (ref 2–5)
CO2 SERPL-SCNC: 20 MMOL/L (ref 23–29)
CREAT SERPL-MCNC: 0.8 MG/DL (ref 0.5–1.4)
CV ECHO LV RWT: 0.36 CM
DOP CALC AO PEAK VEL: 1.1 M/S
DOP CALC AO VTI: 18.7 CM
DOP CALC LVOT AREA: 2.8 CM2
DOP CALC LVOT DIAMETER: 1.9 CM
DOP CALC LVOT PEAK VEL: 1.1 M/S
DOP CALC LVOT STROKE VOLUME: 56.1 CM3
DOP CALC MV VTI: 16.4 CM
DOP CALCLVOT PEAK VEL VTI: 19.8 CM
E WAVE DECELERATION TIME: 213 MSEC
E/A RATIO: 0.81
E/E' RATIO: 8 M/S
ECHO LV POSTERIOR WALL: 0.8 CM (ref 0.6–1.1)
ERYTHROCYTE [DISTWIDTH] IN BLOOD BY AUTOMATED COUNT: 13.1 % (ref 11.5–14.5)
FRACTIONAL SHORTENING: 45.5 % (ref 28–44)
GFR SERPLBLD CREATININE-BSD FMLA CKD-EPI: >60 ML/MIN/1.73/M2
GLUCOSE SERPL-MCNC: 183 MG/DL (ref 70–110)
HCT VFR BLD AUTO: 40.9 % (ref 37–48.5)
HDLC SERPL-MCNC: 60 MG/DL (ref 40–75)
HDLC SERPL: 43.2 % (ref 20–50)
HGB BLD-MCNC: 13.6 GM/DL (ref 12–16)
IMM GRANULOCYTES # BLD AUTO: 0.01 K/UL (ref 0–0.04)
IMM GRANULOCYTES NFR BLD AUTO: 0.2 % (ref 0–0.5)
INTERVENTRICULAR SEPTUM: 0.6 CM (ref 0.6–1.1)
IVC DIAMETER: 1.13 CM
LA MAJOR: 4.2 CM
LA MINOR: 3.9 CM
LA WIDTH: 3.5 CM
LDLC SERPL CALC-MCNC: 72.6 MG/DL (ref 63–159)
LEFT ATRIUM SIZE: 2.9 CM
LEFT ATRIUM VOLUME INDEX: 24 ML/M2
LEFT ATRIUM VOLUME: 35 CM3
LEFT INTERNAL DIMENSION IN SYSTOLE: 2.4 CM (ref 2.1–4)
LEFT VENTRICLE DIASTOLIC VOLUME INDEX: 60.14 ML/M2
LEFT VENTRICLE DIASTOLIC VOLUME: 89 ML
LEFT VENTRICLE END DIASTOLIC VOLUME APICAL 2 CHAMBER: 34.77 ML
LEFT VENTRICLE END DIASTOLIC VOLUME APICAL 4 CHAMBER: 53.17 ML
LEFT VENTRICLE MASS INDEX: 62.2 G/M2
LEFT VENTRICLE SYSTOLIC VOLUME INDEX: 13.5 ML/M2
LEFT VENTRICLE SYSTOLIC VOLUME: 20 ML
LEFT VENTRICULAR INTERNAL DIMENSION IN DIASTOLE: 4.4 CM (ref 3.5–6)
LEFT VENTRICULAR MASS: 92.1 G
LV LATERAL E/E' RATIO: 9 M/S
LV SEPTAL E/E' RATIO: 7.7 M/S
LVED V (TEICH): 89.49 ML
LVES V (TEICH): 20.24 ML
LVOT MG: 2.27 MMHG
LVOT MV: 0.71 CM/S
LYMPHOCYTES # BLD AUTO: 0.38 K/UL (ref 1–4.8)
MAGNESIUM SERPL-MCNC: 1.5 MG/DL (ref 1.6–2.6)
MCH RBC QN AUTO: 29.6 PG (ref 27–31)
MCHC RBC AUTO-ENTMCNC: 33.3 G/DL (ref 32–36)
MCV RBC AUTO: 89 FL (ref 82–98)
MV MEAN GRADIENT: 1 MMHG
MV PEAK A VEL: 0.67 M/S
MV PEAK E VEL: 0.54 M/S
MV PEAK GRADIENT: 2 MMHG
MV STENOSIS PRESSURE HALF TIME: 61.9 MS
MV VALVE AREA BY CONTINUITY EQUATION: 3.42 CM2
MV VALVE AREA P 1/2 METHOD: 3.55 CM2
NONHDLC SERPL-MCNC: 79 MG/DL
NUCLEATED RBC (/100WBC) (OHS): 0 /100 WBC
OHS CV RV/LV RATIO: 0.77 CM
OHS LV EJECTION FRACTION SIMPSONS BIPLANE MOD: 75 %
OHS QRS DURATION: 70 MS
OHS QTC CALCULATION: 447 MS
PHOSPHATE SERPL-MCNC: 3.4 MG/DL (ref 2.7–4.5)
PISA TR MAX VEL: 3.6 M/S
PLATELET # BLD AUTO: 226 K/UL (ref 150–450)
PMV BLD AUTO: 9.8 FL (ref 9.2–12.9)
POTASSIUM SERPL-SCNC: 3.4 MMOL/L (ref 3.5–5.1)
PV PEAK GRADIENT: 5 MMHG
PV PEAK VELOCITY: 1.1 M/S
RA MAJOR: 4.2 CM
RA PRESSURE ESTIMATED: 3 MMHG
RA WIDTH: 3.7 CM
RBC # BLD AUTO: 4.6 M/UL (ref 4–5.4)
RELATIVE EOSINOPHIL (OHS): 0 %
RELATIVE LYMPHOCYTE (OHS): 8.8 % (ref 18–48)
RELATIVE MONOCYTE (OHS): 2.6 % (ref 4–15)
RELATIVE NEUTROPHIL (OHS): 88.4 % (ref 38–73)
RIGHT VENTRICLE DIASTOLIC BASEL DIMENSION: 3.4 CM
RIGHT VENTRICULAR END-DIASTOLIC DIMENSION: 3.41 CM
RV TB RVSP: 7 MMHG
RV TISSUE DOPPLER FREE WALL SYSTOLIC VELOCITY 1 (APICAL 4 CHAMBER VIEW): 23.14 CM/S
SINUS: 2.66 CM
SODIUM SERPL-SCNC: 130 MMOL/L (ref 136–145)
STJ: 2.59 CM
TDI LATERAL: 0.06 M/S
TDI SEPTAL: 0.07 M/S
TDI: 0.07 M/S
TR MAX PG: 51 MMHG
TRICUSPID ANNULAR PLANE SYSTOLIC EXCURSION: 1.61 CM
TRIGL SERPL-MCNC: 32 MG/DL (ref 30–150)
TROPONIN I SERPL DL<=0.01 NG/ML-MCNC: 0.33 NG/ML
TV REST PULMONARY ARTERY PRESSURE: 55 MMHG
WBC # BLD AUTO: 4.3 K/UL (ref 3.9–12.7)
Z-SCORE OF LEFT VENTRICULAR DIMENSION IN END DIASTOLE: -0.03
Z-SCORE OF LEFT VENTRICULAR DIMENSION IN END SYSTOLE: -1.05

## 2025-04-05 PROCEDURE — 94799 UNLISTED PULMONARY SVC/PX: CPT

## 2025-04-05 PROCEDURE — 36415 COLL VENOUS BLD VENIPUNCTURE: CPT | Performed by: STUDENT IN AN ORGANIZED HEALTH CARE EDUCATION/TRAINING PROGRAM

## 2025-04-05 PROCEDURE — 80048 BASIC METABOLIC PNL TOTAL CA: CPT | Performed by: STUDENT IN AN ORGANIZED HEALTH CARE EDUCATION/TRAINING PROGRAM

## 2025-04-05 PROCEDURE — 25000003 PHARM REV CODE 250: Performed by: INTERNAL MEDICINE

## 2025-04-05 PROCEDURE — 99223 1ST HOSP IP/OBS HIGH 75: CPT | Mod: 25,,, | Performed by: INTERNAL MEDICINE

## 2025-04-05 PROCEDURE — 25000003 PHARM REV CODE 250: Performed by: STUDENT IN AN ORGANIZED HEALTH CARE EDUCATION/TRAINING PROGRAM

## 2025-04-05 PROCEDURE — 25000242 PHARM REV CODE 250 ALT 637 W/ HCPCS: Performed by: STUDENT IN AN ORGANIZED HEALTH CARE EDUCATION/TRAINING PROGRAM

## 2025-04-05 PROCEDURE — 84100 ASSAY OF PHOSPHORUS: CPT | Performed by: STUDENT IN AN ORGANIZED HEALTH CARE EDUCATION/TRAINING PROGRAM

## 2025-04-05 PROCEDURE — 25000003 PHARM REV CODE 250: Performed by: HOSPITALIST

## 2025-04-05 PROCEDURE — 94640 AIRWAY INHALATION TREATMENT: CPT | Mod: XB

## 2025-04-05 PROCEDURE — 63600175 PHARM REV CODE 636 W HCPCS: Performed by: INTERNAL MEDICINE

## 2025-04-05 PROCEDURE — 82465 ASSAY BLD/SERUM CHOLESTEROL: CPT | Performed by: INTERNAL MEDICINE

## 2025-04-05 PROCEDURE — 27000221 HC OXYGEN, UP TO 24 HOURS

## 2025-04-05 PROCEDURE — 83735 ASSAY OF MAGNESIUM: CPT | Performed by: STUDENT IN AN ORGANIZED HEALTH CARE EDUCATION/TRAINING PROGRAM

## 2025-04-05 PROCEDURE — 84484 ASSAY OF TROPONIN QUANT: CPT | Performed by: STUDENT IN AN ORGANIZED HEALTH CARE EDUCATION/TRAINING PROGRAM

## 2025-04-05 PROCEDURE — 94761 N-INVAS EAR/PLS OXIMETRY MLT: CPT

## 2025-04-05 PROCEDURE — 85025 COMPLETE CBC W/AUTO DIFF WBC: CPT | Performed by: STUDENT IN AN ORGANIZED HEALTH CARE EDUCATION/TRAINING PROGRAM

## 2025-04-05 PROCEDURE — 11000001 HC ACUTE MED/SURG PRIVATE ROOM

## 2025-04-05 PROCEDURE — 63600175 PHARM REV CODE 636 W HCPCS: Performed by: STUDENT IN AN ORGANIZED HEALTH CARE EDUCATION/TRAINING PROGRAM

## 2025-04-05 PROCEDURE — 99900035 HC TECH TIME PER 15 MIN (STAT)

## 2025-04-05 RX ORDER — SODIUM CHLORIDE 9 MG/ML
INJECTION, SOLUTION INTRAVENOUS CONTINUOUS
Status: ACTIVE | OUTPATIENT
Start: 2025-04-06 | End: 2025-04-06

## 2025-04-05 RX ORDER — CLOPIDOGREL BISULFATE 75 MG/1
225 TABLET ORAL ONCE
Status: COMPLETED | OUTPATIENT
Start: 2025-04-05 | End: 2025-04-05

## 2025-04-05 RX ORDER — ENOXAPARIN SODIUM 100 MG/ML
1 INJECTION SUBCUTANEOUS
Status: DISPENSED | OUTPATIENT
Start: 2025-04-05 | End: 2025-04-06

## 2025-04-05 RX ORDER — HYDROXYZINE PAMOATE 25 MG/1
25 CAPSULE ORAL EVERY 8 HOURS PRN
Status: DISCONTINUED | OUTPATIENT
Start: 2025-04-05 | End: 2025-04-16 | Stop reason: HOSPADM

## 2025-04-05 RX ORDER — DIPHENHYDRAMINE HCL 25 MG
25 CAPSULE ORAL ONCE
Status: CANCELLED | OUTPATIENT
Start: 2025-04-06

## 2025-04-05 RX ORDER — DOXYCYCLINE HYCLATE 100 MG
100 TABLET ORAL EVERY 12 HOURS
Status: COMPLETED | OUTPATIENT
Start: 2025-04-05 | End: 2025-04-09

## 2025-04-05 RX ORDER — SODIUM CHLORIDE 0.9 % (FLUSH) 0.9 %
10 SYRINGE (ML) INJECTION
Status: DISCONTINUED | OUTPATIENT
Start: 2025-04-05 | End: 2025-04-16 | Stop reason: HOSPADM

## 2025-04-05 RX ADMIN — DOXYCYCLINE HYCLATE 100 MG: 100 TABLET, COATED ORAL at 01:04

## 2025-04-05 RX ADMIN — ENOXAPARIN SODIUM 60 MG: 100 INJECTION SUBCUTANEOUS at 10:04

## 2025-04-05 RX ADMIN — DOXYCYCLINE HYCLATE 100 MG: 100 TABLET, COATED ORAL at 10:04

## 2025-04-05 RX ADMIN — IPRATROPIUM BROMIDE AND ALBUTEROL SULFATE 3 ML: 2.5; .5 SOLUTION RESPIRATORY (INHALATION) at 03:04

## 2025-04-05 RX ADMIN — SODIUM CHLORIDE: 9 INJECTION, SOLUTION INTRAVENOUS at 11:04

## 2025-04-05 RX ADMIN — CARVEDILOL 3.12 MG: 3.12 TABLET, FILM COATED ORAL at 10:04

## 2025-04-05 RX ADMIN — PREDNISONE 40 MG: 20 TABLET ORAL at 09:04

## 2025-04-05 RX ADMIN — LISINOPRIL 40 MG: 20 TABLET ORAL at 09:04

## 2025-04-05 RX ADMIN — IPRATROPIUM BROMIDE AND ALBUTEROL SULFATE 3 ML: 2.5; .5 SOLUTION RESPIRATORY (INHALATION) at 11:04

## 2025-04-05 RX ADMIN — IPRATROPIUM BROMIDE AND ALBUTEROL SULFATE 3 ML: 2.5; .5 SOLUTION RESPIRATORY (INHALATION) at 07:04

## 2025-04-05 RX ADMIN — CARVEDILOL 3.12 MG: 3.12 TABLET, FILM COATED ORAL at 09:04

## 2025-04-05 RX ADMIN — IPRATROPIUM BROMIDE AND ALBUTEROL SULFATE 3 ML: 2.5; .5 SOLUTION RESPIRATORY (INHALATION) at 01:04

## 2025-04-05 RX ADMIN — IPRATROPIUM BROMIDE AND ALBUTEROL SULFATE 3 ML: 2.5; .5 SOLUTION RESPIRATORY (INHALATION) at 08:04

## 2025-04-05 RX ADMIN — ASPIRIN 81 MG: 81 TABLET, COATED ORAL at 09:04

## 2025-04-05 RX ADMIN — CLOPIDOGREL BISULFATE 225 MG: 75 TABLET, FILM COATED ORAL at 11:04

## 2025-04-05 RX ADMIN — MONTELUKAST 10 MG: 10 TABLET, FILM COATED ORAL at 10:04

## 2025-04-05 RX ADMIN — HYDROCHLOROTHIAZIDE 25 MG: 25 TABLET ORAL at 09:04

## 2025-04-05 RX ADMIN — CLOPIDOGREL BISULFATE 75 MG: 75 TABLET ORAL at 09:04

## 2025-04-05 NOTE — ED TRIAGE NOTES
Pt arrived reporting worsening SOB that started today that has been unrelieved with breathing tx and albuterol at home. Denies CP, dizziness, weakness, n/v, weakness, numbness/tingling. HOB elevated, SR up x 2, call light within reach. Daughter at bedside

## 2025-04-05 NOTE — PROGRESS NOTES
[facility]  [facility] Medicine  Progress Note    Patient Name: Hollie Ponce  MRN: [id number]  Patient Class: IP- Inpatient   Admission Date: 4/4/2025  Length of Stay: 0 days  Attending Physician: Franky Peguero MD  Primary Care Provider: Ariadne Smith MD        Subjective     Principal Problem:NSTEMI (non-ST elevated myocardial infarction)        HPI:  This is a 69-year-old female with a past medical history of COPD (not on O2), nonobstructive CAD, hypertension, peripheral artery disease, tobacco use who presents with dyspnea.      Patient presents for evaluation of shortness of breath that started on the day of presentation.  She reports worsening exertional dyspnea, associated with a dry cough, and chest congestion.  She reports having rhinorrhea/sinus congestion.  Her daughter was sick with similar symptoms, but limited her exposure to the patient and was wearing a mask around her  Patient smokes 5 cigarettes daily.    In the ED, the patient was tachypneic (20s-40s), tachycardic (100s-110s), and mildly hypoxic (90%, requiring 3 L O2).  Labs were remarkable for hypokalemia (3.4), negative BNP (41), negative troponin (<0.006).  Chest x-ray showed no acute cardiopulmonary process.  Patient was given Solu-Medrol 125 mg IV, hydralazine 10 mg IV, DuoNeb x1, Ativan 0.5 mg p.o..  She was admitted for further management.    Overview/Hospital Course:  Mrs. oPnce was hospitalized for COPD exacerbation after presenting with dyspnea and chest tightness.  She was noted to be tachypneic, tachycardic, and hypoxic on room air.  Supplemental oxygen initiated.  She received corticosteroids and nebulizer treatments with some symptomatic improvement.  Initial troponin negative; however, repeat troponin significantly elevated.  She reports persistent and some mild chest tightness.  Cardiology consulted, appreciate recs.  Initiate treatment for NSTEMI.    Interval History:  Dyspnea and chest tightness improved, but  still present    Review of Systems   Respiratory:  Positive for chest tightness and shortness of breath.    Cardiovascular:  Positive for chest pain.   All other systems reviewed and are negative.    Objective:     Vital Signs (Most Recent):  Temp: 98.8 °F (37.1 °C) (04/05/25 0530)  Pulse: 84 (04/05/25 1115)  Resp: 18 (04/05/25 1115)  BP: (!) 163/83 (04/05/25 1000)  SpO2: 96 % (04/05/25 1115) Vital Signs (24h Range):  Temp:  [97.8 °F (36.6 °C)-98.8 °F (37.1 °C)] 98.8 °F (37.1 °C)  Pulse:  [] 84  Resp:  [18-50] 18  SpO2:  [90 %-99 %] 96 %  BP: (127-199)/(56-95) 163/83     Weight: 59 kg (130 lb 1.1 oz)  Body mass index is 29.16 kg/m².  No intake or output data in the 24 hours ending 04/05/25 1127      Physical Exam  Vitals and nursing note reviewed.   Constitutional:       General: She is not in acute distress.     Appearance: She is well-developed.   HENT:      Head: Normocephalic and atraumatic.      Right Ear: External ear normal.      Left Ear: External ear normal.   Cardiovascular:      Rate and Rhythm: Normal rate. Rhythm irregular.   Pulmonary:      Effort: Pulmonary effort is normal. No respiratory distress.      Breath sounds: Decreased breath sounds and wheezing present.   Skin:     General: Skin is warm and dry.   Neurological:      Mental Status: She is alert.               Significant Labs: All pertinent labs within the past 24 hours have been reviewed.    Significant Imaging: I have reviewed all pertinent imaging results/findings within the past 24 hours.      Assessment & Plan  NSTEMI (non-ST elevated myocardial infarction)  Patient presents with NSTEMI. Chest pain is currently controlled. Patient is currently on NSTEMI Pathway.    EKG reviewed. Troponins reviewed and results noted-   Recent Labs   Lab 04/05/25  0341   TROPONINI 0.327*   .     Lipid panel reviewed and shows-     Lab Results   Component Value Date    LDLCALC 85.4 04/16/2024     Lab Results   Component Value Date    TRIG 32  04/05/2025         Medical management includes; Beta Blocker, Dual Anti-Platelet therapy, Anticoagulation, and High Intensity Stain Echo has been performed. Latest ECHO results are as follows- Results for orders placed during the hospital encounter of 04/04/25    Echo    Interpretation Summary    Left Ventricle: The left ventricle is normal in size. Normal wall thickness. There is normal systolic function with a visually estimated ejection fraction of 65 - 70%. Grade I diastolic dysfunction.    Right Ventricle: The right ventricle is normal in size. Systolic function is normal.    Mitral Valve: There is mild regurgitation.    Tricuspid Valve: There is mild regurgitation.    Pulmonary Artery: The estimated pulmonary artery systolic pressure is 55 mmHg.  .   Consult for cardiac rehab is not ordered. Patient counseled on lifestyle modifications- continue current medications. Cardiology is consulted. Plan of care reviewed with cardiology team. Continue to monitor patient closely and adjust therapy as needed.     COPD exacerbation  Patient's COPD is with exacerbation noted by continued dyspnea currently.  Patient is currently on COPD Pathway. Continue scheduled inhalers Steroids and Supplemental oxygen and monitor respiratory status closely.   Essential hypertension  Patient's blood pressure range in the last 24 hours was: BP  Min: 127/56  Max: 199/95.The patient's inpatient anti-hypertensive regimen is listed below:  Current Antihypertensives  carvediloL tablet 3.125 mg, 2 times daily, Oral  hydroCHLOROthiazide tablet 25 mg, Daily, Oral  lisinopriL tablet 40 mg, Daily, Oral  cloNIDine tablet 0.1 mg, Every 6 hours PRN, Oral    Plan  - BP is controlled, no changes needed to their regimen    CAD (coronary artery disease)  Patient with known CAD which is controlled Will continue ASA, Plavix, and Statin and monitor for S/Sx of angina/ACS. Continue to monitor on telemetry.   PAD (peripheral artery disease)  History noted.  Continue home medications.     Pulmonary HTN  Results for orders placed during the hospital encounter of 11/16/20    Echo Color Flow Doppler? Yes; Bubble Contrast? No    Interpretation Summary  · The left ventricle is normal in size with normal systolic function. The estimated ejection fraction is 55%.  · There is mild left ventricular concentric hypertrophy.  · Normal left ventricular diastolic function.  · Normal right ventricular size with normal right ventricular systolic function.  · Mild left atrial enlargement.  · Normal central venous pressure (3 mmHg).  · The estimated PA systolic pressure is 49 mmHg.  · There is pulmonary hypertension.    BNP  Recent Labs   Lab 04/04/25  1929   BNP 41       Optimize volume status     Tobacco dependency  Dangers of cigarette smoking were reviewed with patient in detail. Patient was Counseled for 3-10 minutes. Nicotine replacement options were discussed. Nicotine replacement was discussed- prescribed  Acute respiratory failure with hypoxia  Patient with Hypoxic Respiratory failure which is Acute.  she is not on home oxygen. Supplemental oxygen was provided and noted-      .   Signs/symptoms of respiratory failure include- increased work of breathing and respiratory distress. Contributing diagnoses includes - COPD Labs and images were reviewed. Patient Has not had a recent ABG. Will treat underlying causes and adjust management of respiratory failure as follows- Wean O2 as tolerated   Carotid atherosclerosis, bilateral      Dyslipidemia      VTE Risk Mitigation (From admission, onward)           Ordered     enoxaparin injection 60 mg  Every 12 hours (non-standard times)         04/05/25 0946     Place sequential compression device  Until discontinued         04/04/25 2312     IP VTE HIGH RISK PATIENT  Once         04/04/25 2312     Place sequential compression device  Until discontinued         04/04/25 2312                    Discharge Planning   YOLA:      Code Status: Full  Code   Medical Readiness for Discharge Date:       Benito Blackburn Jr., APRN, AGACNP-BC  Hospitalist - Department of Hospital Medicine  [facility] [address] [place] [address]  Office #: [phone]; Pager #: [phone]

## 2025-04-05 NOTE — ASSESSMENT & PLAN NOTE
Patient's blood pressure range in the last 24 hours was: BP  Min: 159/78  Max: 199/95.The patient's inpatient anti-hypertensive regimen is listed below:  Current Antihypertensives  carvediloL tablet 3.125 mg, 2 times daily, Oral  hydroCHLOROthiazide tablet 25 mg, Daily, Oral  lisinopriL tablet 40 mg, Daily, Oral  cloNIDine tablet 0.1 mg, Every 6 hours PRN, Oral    Plan  - BP is controlled, no changes needed to their regimen

## 2025-04-05 NOTE — NURSING
Pt is being transferred to room 300, no distressed noted. Report called to nurse Rosalind, all questions and concerns addressed. Tele box [id number] called in to Telemetry and placed on pt.

## 2025-04-05 NOTE — HPI
This is a 69-year-old female with a past medical history of COPD (not on O2), nonobstructive CAD, hypertension, peripheral artery disease, tobacco use who presents with dyspnea.      Patient presents for evaluation of shortness of breath that started on the day of presentation.  She reports worsening exertional dyspnea, associated with a dry cough, and chest congestion.  She reports having rhinorrhea/sinus congestion.  Her daughter was sick with similar symptoms, but limited her exposure to the patient and was wearing a mask around her  Patient smokes 5 cigarettes daily.    In the ED, the patient was tachypneic (20s-40s), tachycardic (100s-110s), and mildly hypoxic (90%, requiring 3 L O2).  Labs were remarkable for hypokalemia (3.4), negative BNP (41), negative troponin (<0.006).  Chest x-ray showed no acute cardiopulmonary process.  Patient was given Solu-Medrol 125 mg IV, hydralazine 10 mg IV, DuoNeb x1, Ativan 0.5 mg p.o..  She was admitted for further management.

## 2025-04-05 NOTE — H&P
"[facility] Hospital Medicine  History & Physical    Patient Name: Hollie Ponce  MRN: [id number]  Patient Class: OP- Observation  Admission Date: 2025  Attending Physician: Sherice Edouard, *   Primary Care Provider: Ariadne Smith MD         Patient information was obtained from patient, relative(s), and ER records.     Subjective:     Principal Problem:COPD exacerbation    Chief Complaint:   Chief Complaint   Patient presents with    Shortness of Breath     Pt to ED c/o SOB for the last few days but got worse today. Reports Hx of COPD and Asthma. Reports having a "cold".         HPI: This is a 69-year-old female with a past medical history of COPD (not on O2), nonobstructive CAD, hypertension, peripheral artery disease, tobacco use who presents with dyspnea.      Patient presents for evaluation of shortness of breath that started on the day of presentation.  She reports worsening exertional dyspnea, associated with a dry cough, and chest congestion.  She reports having rhinorrhea/sinus congestion.  Her daughter was sick with similar symptoms, but limited her exposure to the patient and was wearing a mask around her  Patient smokes 5 cigarettes daily.    In the ED, the patient was tachypneic (20s-40s), tachycardic (100s-110s), and mildly hypoxic (90%, requiring 3 L O2).  Labs were remarkable for hypokalemia (3.4), negative BNP (41), negative troponin (<0.006).  Chest x-ray showed no acute cardiopulmonary process.  Patient was given Solu-Medrol 125 mg IV, hydralazine 10 mg IV, DuoNeb x1, Ativan 0.5 mg p.o..  She was admitted for further management.    Past Medical History:   Diagnosis Date    Asthma     COPD (chronic obstructive pulmonary disease)     Hypertension        Past Surgical History:   Procedure Laterality Date     SECTION      HERNIA REPAIR      LEFT HEART CATHETERIZATION Left 2020    Procedure: Left heart cath;  Surgeon: Shabnam Vernon MD;  Location: [facility] " LAB;  Service: Cardiology;  Laterality: Left;       Review of patient's allergies indicates:   Allergen Reactions    Flagyl [metronidazole hcl]      Hives      Sulfamethoxazole-trimethoprim Itching    Aspirin Nausea Only    Lipitor [atorvastatin]      Myalgia        No current facility-administered medications on file prior to encounter.     Current Outpatient Medications on File Prior to Encounter   Medication Sig    albuterol (PROVENTIL/VENTOLIN HFA) 90 mcg/actuation inhaler Inhale 2 puffs into the lungs every 6 (six) hours as needed for Wheezing or Shortness of Breath. Rescue    albuterol-ipratropium (DUO-NEB) 2.5 mg-0.5 mg/3 mL nebulizer solution Take 3 mLs by nebulization every 4 (four) hours as needed for Wheezing.    albuterol sulfate 2.5 mg/0.5 mL Nebu Inhale into the lungs.    aspirin (ECOTRIN) 81 MG EC tablet Take 81 mg by mouth.    carvediloL (COREG) 3.125 MG tablet Take 1 tablet (3.125 mg total) by mouth 2 (two) times daily. TAKE 1 TABLET(3.125 MG) BY MOUTH TWICE DAILY WITH MEALS    clopidogreL (PLAVIX) 75 mg tablet Take 1 tablet (75 mg total) by mouth once daily.    fluticasone propionate (FLONASE) 50 mcg/actuation nasal spray SHAKE LIQUID AND USE 2 SPRAYS(100 MCG) IN EACH NOSTRIL EVERY DAY    fluticasone-salmeterol 230-21 mcg/dose (ADVAIR HFA) 230-21 mcg/actuation HFAA inhaler INHALE 2 PUFFS INTO THE LUNGS TWICE DAILY    fluticasone-salmeterol diskus inhaler 100-50 mcg Inhale 1 puff into the lungs every 12 (twelve) hours.    hydroCHLOROthiazide (HYDRODIURIL) 25 MG tablet Take 1 tablet (25 mg total) by mouth once daily.    lisinopriL (PRINIVIL,ZESTRIL) 40 MG tablet Take 1 tablet (40 mg total) by mouth once daily.    loratadine (CLARITIN) 10 mg tablet TAKE 1 TABLET BY MOUTH DAILY AS NEEDED FOR ALLERGIES    miscellaneous medical supply (470V TWO WAY VALVE) Misc Disp nebulizer and tubing ,medicine reservoir,and mask  So as to use  Every 4-6 hrs for sob/wheeze    montelukast (SINGULAIR) 10 mg tablet Take 1  tablet (10 mg total) by mouth every evening.    nicotine polacrilex 2 MG Lozg Take 1 lozenge (2 mg total) by mouth as needed. Use 1-2 per hour in place of a cigarette. Limit to 6 a day.    rosuvastatin (CRESTOR) 20 MG tablet Take 1 tablet (20 mg total) by mouth every evening.    tiotropium (SPIRIVA WITH HANDIHALER) 18 mcg inhalation capsule Inhale 1 capsule (18 mcg total) into the lungs Daily. INHALE THE CONTENTS OF 1 CAPSULE(18 MCG) INTO THE LUNGS EVERY DAY     Family History       Problem Relation (Age of Onset)    Cancer Mother    Liver disease Father          Tobacco Use    Smoking status: Every Day     Current packs/day: 0.25     Average packs/day: 0.5 packs/day for 51.0 years (25.3 ttl pk-yrs)     Types: Cigarettes     Start date: 12/13/1970     Last attempt to quit: 12/13/2020    Smokeless tobacco: Never   Substance and Sexual Activity    Alcohol use: Never    Drug use: Never    Sexual activity: Not Currently     Review of Systems   Respiratory:  Positive for cough and shortness of breath.    Cardiovascular: Negative.    Gastrointestinal: Negative.    Genitourinary: Negative.    Musculoskeletal: Negative.    Neurological: Negative.      Objective:     Vital Signs (Most Recent):  Temp: 97.8 °F (36.6 °C) (04/04/25 1747)  Pulse: 93 (04/04/25 2300)  Resp: (!) 30 (04/04/25 2218)  BP: (!) 184/91 (04/04/25 2300)  SpO2: 98 % (04/04/25 2300) Vital Signs (24h Range):  Temp:  [97.8 °F (36.6 °C)] 97.8 °F (36.6 °C)  Pulse:  [] 93  Resp:  [24-50] 30  SpO2:  [90 %-99 %] 98 %  BP: (159-199)/(77-95) 184/91     Weight: 59 kg (130 lb)  Body mass index is 29.15 kg/m².     Physical Exam  Vitals and nursing note reviewed.   Constitutional:       General: She is not in acute distress.     Appearance: She is not ill-appearing.   HENT:      Mouth/Throat:      Mouth: Mucous membranes are moist.   Cardiovascular:      Rate and Rhythm: Normal rate.   Pulmonary:      Breath sounds: Wheezing present.   Abdominal:      General:  Abdomen is flat.   Skin:     General: Skin is warm.   Neurological:      General: No focal deficit present.      Mental Status: She is alert.                Significant Labs: All pertinent labs within the past 24 hours have been reviewed.    Significant Imaging: I have reviewed all pertinent imaging results/findings within the past 24 hours.  Assessment/Plan:     Assessment & Plan  COPD exacerbation  Patient's COPD is with exacerbation noted by continued dyspnea currently.  Patient is currently on COPD Pathway. Continue scheduled inhalers Steroids and Supplemental oxygen and monitor respiratory status closely.   Essential hypertension  Patient's blood pressure range in the last 24 hours was: BP  Min: 159/78  Max: 199/95.The patient's inpatient anti-hypertensive regimen is listed below:  Current Antihypertensives  carvediloL tablet 3.125 mg, 2 times daily, Oral  hydroCHLOROthiazide tablet 25 mg, Daily, Oral  lisinopriL tablet 40 mg, Daily, Oral  cloNIDine tablet 0.1 mg, Every 6 hours PRN, Oral    Plan  - BP is controlled, no changes needed to their regimen    CAD (coronary artery disease)  Patient with known CAD which is controlled Will continue ASA, Plavix, and Statin and monitor for S/Sx of angina/ACS. Continue to monitor on telemetry.   PAD (peripheral artery disease)  History noted. Continue home medications.     Pulmonary HTN  Results for orders placed during the hospital encounter of 11/16/20    Echo Color Flow Doppler? Yes; Bubble Contrast? No    Interpretation Summary  · The left ventricle is normal in size with normal systolic function. The estimated ejection fraction is 55%.  · There is mild left ventricular concentric hypertrophy.  · Normal left ventricular diastolic function.  · Normal right ventricular size with normal right ventricular systolic function.  · Mild left atrial enlargement.  · Normal central venous pressure (3 mmHg).  · The estimated PA systolic pressure is 49 mmHg.  · There is pulmonary  hypertension.    BNP  Recent Labs   Lab 04/04/25 1929   BNP 41       Optimize volume status     Tobacco dependency  Dangers of cigarette smoking were reviewed with patient in detail. Patient was Counseled for 3-10 minutes. Nicotine replacement options were discussed. Nicotine replacement was discussed- prescribed  Acute respiratory failure with hypoxia  Patient with Hypoxic Respiratory failure which is Acute.  she is not on home oxygen. Supplemental oxygen was provided and noted-      .   Signs/symptoms of respiratory failure include- increased work of breathing and respiratory distress. Contributing diagnoses includes - COPD Labs and images were reviewed. Patient Has not had a recent ABG. Will treat underlying causes and adjust management of respiratory failure as follows- Wean O2 as tolerated   VTE Risk Mitigation (From admission, onward)           Ordered     enoxaparin injection 40 mg  Daily         04/04/25 2312     Place sequential compression device  Until discontinued         04/04/25 2312     IP VTE HIGH RISK PATIENT  Once         04/04/25 2312     Place sequential compression device  Until discontinued         04/04/25 2312                       On 04/04/2025, patient should be placed in hospital observation services under my care.             Kelby Lawson MD  Department of Hospital Medicine  [facility] - Emergency Dept

## 2025-04-05 NOTE — ASSESSMENT & PLAN NOTE
Concerning associated sxs with EKG changes and elev trop, ?type 1 vs 2 MI.  Initiate ACS rx:   Enox 1mg/kg bid  Load plavix, cont ASA  Statin  Check echo (prelim normal LV fxn/WM)  Smoking cessation  NPO after MN for possible cath in am if persistent sxs.    Risks, benefits and alternatives of the catheterization procedure were discussed with the patient which include but are not limited to: bleeding, infection, death, heart attack, arrhythmia, kidney failure, stroke, need for emergency surgery, etc.  Patient understands and and agrees to proceed.  Consent was placed on the chart.

## 2025-04-05 NOTE — ASSESSMENT & PLAN NOTE
Patient's blood pressure range in the last 24 hours was: BP  Min: 127/56  Max: 199/95.The patient's inpatient anti-hypertensive regimen is listed below:  Current Antihypertensives  carvediloL tablet 3.125 mg, 2 times daily, Oral  hydroCHLOROthiazide tablet 25 mg, Daily, Oral  lisinopriL tablet 40 mg, Daily, Oral  cloNIDine tablet 0.1 mg, Every 6 hours PRN, Oral    Plan  - BP is controlled, no changes needed to their regimen

## 2025-04-05 NOTE — PLAN OF CARE
Case Management Assessment     PCP: Ariadne Smith MD; prefers mid-AM appointments  Pharmacy: Ariadne Smith MD   Patient Arrived From: Home with family  Existing Help at Home: Adult dauther  Barriers to Discharge: None    Discharge Plan:    A. Home with family; follow-ups    B. TBD    Independent at home with adult daughter, who assists if needed; no assist usually needed; no currently medical services or equipment; daughter believes patient may need O2 at discharge           04/05/25 1518   Discharge Assessment   Assessment Type Discharge Planning Assessment   Confirmed/corrected address, phone number and insurance Yes   Confirmed Demographics Correct on Facesheet   Source of Information patient;health record   Communicated YOLA with patient/caregiver Date not available/Unable to determine   Reason For Admission SOB   People in Home child(ciera), adult   Facility Arrived From: Home   Do you expect to return to your current living situation? Yes   Do you have help at home or someone to help you manage your care at home? Yes   Who are your caregiver(s) and their phone number(s)? Ramesh-daughter: [phone]   Prior to hospitilization cognitive status: Alert/Oriented   Current cognitive status: Alert/Oriented   Walking or Climbing Stairs Difficulty no   Dressing/Bathing Difficulty no   Home Accessibility wheelchair accessible   Home Layout Able to live on 1st floor   Equipment Currently Used at Home none   Readmission within 30 days? No   Patient currently being followed by outpatient case management? No   Do you currently have service(s) that help you manage your care at home? No   Do you take prescription medications? No   Do you have prescription coverage? Yes   Coverage [employer]   Do you have any problems affording any of your prescribed medications? No   Is the patient taking medications as prescribed? yes   Who is going to help you get home at discharge? Daughter   How do you get to doctors appointments? car, drives  self;family or friend will provide   Are you on dialysis? No   Do you take coumadin? No   Discharge Plan A Home with family  (Follow-ups)   Discharge Plan B Other  (Follow-up)   DME Needed Upon Discharge  other (see comments)  (TBD)   Discharge Plan discussed with: Patient   Transition of Care Barriers None   OTHER   Name(s) of People in Home Rameshyudy BRISCOE Role explained to patient; two patient identifiers recognized; SW contact information placed on Communication board. Discussed patient managing health care at home and discussed discharge plans A and B; determined who would be helping patient at home with recovery: Mary Anne will help with recovery at home.

## 2025-04-05 NOTE — ED NOTES
Upon finishing breathing tx, pt became anxious and increasingly SOB. Pt put on 3L of O2. MD made aware and at bedside

## 2025-04-05 NOTE — SUBJECTIVE & OBJECTIVE
Past Medical History:   Diagnosis Date    Asthma     COPD (chronic obstructive pulmonary disease)     Hypertension        Past Surgical History:   Procedure Laterality Date     SECTION      HERNIA REPAIR      LEFT HEART CATHETERIZATION Left 2020    Procedure: Left heart cath;  Surgeon: Shabnam Vernon MD;  Location: [facility] CATH LAB;  Service: Cardiology;  Laterality: Left;       Review of patient's allergies indicates:   Allergen Reactions    Flagyl [metronidazole hcl]      Hives      Sulfamethoxazole-trimethoprim Itching    Aspirin Nausea Only    Lipitor [atorvastatin]      Myalgia        No current facility-administered medications on file prior to encounter.     Current Outpatient Medications on File Prior to Encounter   Medication Sig    albuterol (PROVENTIL/VENTOLIN HFA) 90 mcg/actuation inhaler Inhale 2 puffs into the lungs every 6 (six) hours as needed for Wheezing or Shortness of Breath. Rescue    albuterol-ipratropium (DUO-NEB) 2.5 mg-0.5 mg/3 mL nebulizer solution Take 3 mLs by nebulization every 4 (four) hours as needed for Wheezing.    albuterol sulfate 2.5 mg/0.5 mL Nebu Inhale into the lungs.    aspirin (ECOTRIN) 81 MG EC tablet Take 81 mg by mouth.    carvediloL (COREG) 3.125 MG tablet Take 1 tablet (3.125 mg total) by mouth 2 (two) times daily. TAKE 1 TABLET(3.125 MG) BY MOUTH TWICE DAILY WITH MEALS    clopidogreL (PLAVIX) 75 mg tablet Take 1 tablet (75 mg total) by mouth once daily.    fluticasone propionate (FLONASE) 50 mcg/actuation nasal spray SHAKE LIQUID AND USE 2 SPRAYS(100 MCG) IN EACH NOSTRIL EVERY DAY    fluticasone-salmeterol 230-21 mcg/dose (ADVAIR HFA) 230-21 mcg/actuation HFAA inhaler INHALE 2 PUFFS INTO THE LUNGS TWICE DAILY    fluticasone-salmeterol diskus inhaler 100-50 mcg Inhale 1 puff into the lungs every 12 (twelve) hours.    hydroCHLOROthiazide (HYDRODIURIL) 25 MG tablet Take 1 tablet (25 mg total) by mouth once daily.    lisinopriL (PRINIVIL,ZESTRIL) 40 MG tablet  Take 1 tablet (40 mg total) by mouth once daily.    loratadine (CLARITIN) 10 mg tablet TAKE 1 TABLET BY MOUTH DAILY AS NEEDED FOR ALLERGIES    miscellaneous medical supply (470V TWO WAY VALVE) Misc Disp nebulizer and tubing ,medicine reservoir,and mask  So as to use  Every 4-6 hrs for sob/wheeze    montelukast (SINGULAIR) 10 mg tablet Take 1 tablet (10 mg total) by mouth every evening.    nicotine polacrilex 2 MG Lozg Take 1 lozenge (2 mg total) by mouth as needed. Use 1-2 per hour in place of a cigarette. Limit to 6 a day.    rosuvastatin (CRESTOR) 20 MG tablet Take 1 tablet (20 mg total) by mouth every evening.    tiotropium (SPIRIVA WITH HANDIHALER) 18 mcg inhalation capsule Inhale 1 capsule (18 mcg total) into the lungs Daily. INHALE THE CONTENTS OF 1 CAPSULE(18 MCG) INTO THE LUNGS EVERY DAY     Family History       Problem Relation (Age of Onset)    Cancer Mother    Liver disease Father          Tobacco Use    Smoking status: Every Day     Current packs/day: 0.25     Average packs/day: 0.5 packs/day for 51.0 years (25.3 ttl pk-yrs)     Types: Cigarettes     Start date: 12/13/1970     Last attempt to quit: 12/13/2020    Smokeless tobacco: Never   Substance and Sexual Activity    Alcohol use: Never    Drug use: Never    Sexual activity: Not Currently     Review of Systems   Respiratory:  Positive for cough and shortness of breath.    Cardiovascular: Negative.    Gastrointestinal: Negative.    Genitourinary: Negative.    Musculoskeletal: Negative.    Neurological: Negative.      Objective:     Vital Signs (Most Recent):  Temp: 97.8 °F (36.6 °C) (04/04/25 1747)  Pulse: 93 (04/04/25 2300)  Resp: (!) 30 (04/04/25 2218)  BP: (!) 184/91 (04/04/25 2300)  SpO2: 98 % (04/04/25 2300) Vital Signs (24h Range):  Temp:  [97.8 °F (36.6 °C)] 97.8 °F (36.6 °C)  Pulse:  [] 93  Resp:  [24-50] 30  SpO2:  [90 %-99 %] 98 %  BP: (159-199)/(77-95) 184/91     Weight: 59 kg (130 lb)  Body mass index is 29.15 kg/m².     Physical  Exam  Vitals and nursing note reviewed.   Constitutional:       General: She is not in acute distress.     Appearance: She is not ill-appearing.   HENT:      Mouth/Throat:      Mouth: Mucous membranes are moist.   Cardiovascular:      Rate and Rhythm: Normal rate.   Pulmonary:      Breath sounds: Wheezing present.   Abdominal:      General: Abdomen is flat.   Skin:     General: Skin is warm.   Neurological:      General: No focal deficit present.      Mental Status: She is alert.                Significant Labs: All pertinent labs within the past 24 hours have been reviewed.    Significant Imaging: I have reviewed all pertinent imaging results/findings within the past 24 hours.

## 2025-04-05 NOTE — ASSESSMENT & PLAN NOTE
Results for orders placed during the hospital encounter of 11/16/20    Echo Color Flow Doppler? Yes; Bubble Contrast? No    Interpretation Summary  · The left ventricle is normal in size with normal systolic function. The estimated ejection fraction is 55%.  · There is mild left ventricular concentric hypertrophy.  · Normal left ventricular diastolic function.  · Normal right ventricular size with normal right ventricular systolic function.  · Mild left atrial enlargement.  · Normal central venous pressure (3 mmHg).  · The estimated PA systolic pressure is 49 mmHg.  · There is pulmonary hypertension.    BNP  Recent Labs   Lab 04/04/25  1929   BNP 41       Optimize volume status

## 2025-04-05 NOTE — SUBJECTIVE & OBJECTIVE
Interval History:  Dyspnea and chest tightness improved, but still present    Review of Systems   Respiratory:  Positive for chest tightness and shortness of breath.    Cardiovascular:  Positive for chest pain.   All other systems reviewed and are negative.    Objective:     Vital Signs (Most Recent):  Temp: 98.8 °F (37.1 °C) (04/05/25 0530)  Pulse: 84 (04/05/25 1115)  Resp: 18 (04/05/25 1115)  BP: (!) 163/83 (04/05/25 1000)  SpO2: 96 % (04/05/25 1115) Vital Signs (24h Range):  Temp:  [97.8 °F (36.6 °C)-98.8 °F (37.1 °C)] 98.8 °F (37.1 °C)  Pulse:  [] 84  Resp:  [18-50] 18  SpO2:  [90 %-99 %] 96 %  BP: (127-199)/(56-95) 163/83     Weight: 59 kg (130 lb 1.1 oz)  Body mass index is 29.16 kg/m².  No intake or output data in the 24 hours ending 04/05/25 1127      Physical Exam  Vitals and nursing note reviewed.   Constitutional:       General: She is not in acute distress.     Appearance: She is well-developed.   HENT:      Head: Normocephalic and atraumatic.      Right Ear: External ear normal.      Left Ear: External ear normal.   Cardiovascular:      Rate and Rhythm: Normal rate. Rhythm irregular.   Pulmonary:      Effort: Pulmonary effort is normal. No respiratory distress.      Breath sounds: Decreased breath sounds and wheezing present.   Skin:     General: Skin is warm and dry.   Neurological:      Mental Status: She is alert.               Significant Labs: All pertinent labs within the past 24 hours have been reviewed.    Significant Imaging: I have reviewed all pertinent imaging results/findings within the past 24 hours.

## 2025-04-05 NOTE — SUBJECTIVE & OBJECTIVE
Past Medical History:   Diagnosis Date    Asthma     COPD (chronic obstructive pulmonary disease)     Hypertension        Past Surgical History:   Procedure Laterality Date     SECTION      HERNIA REPAIR      LEFT HEART CATHETERIZATION Left 2020    Procedure: Left heart cath;  Surgeon: Shabnam Vernon MD;  Location: [facility] CATH LAB;  Service: Cardiology;  Laterality: Left;       Review of patient's allergies indicates:   Allergen Reactions    Flagyl [metronidazole hcl]      Hives      Sulfamethoxazole-trimethoprim Itching    Aspirin Nausea Only    Lipitor [atorvastatin]      Myalgia        No current facility-administered medications on file prior to encounter.     Current Outpatient Medications on File Prior to Encounter   Medication Sig    albuterol (PROVENTIL/VENTOLIN HFA) 90 mcg/actuation inhaler Inhale 2 puffs into the lungs every 6 (six) hours as needed for Wheezing or Shortness of Breath. Rescue    albuterol-ipratropium (DUO-NEB) 2.5 mg-0.5 mg/3 mL nebulizer solution Take 3 mLs by nebulization every 4 (four) hours as needed for Wheezing.    albuterol sulfate 2.5 mg/0.5 mL Nebu Inhale into the lungs.    aspirin (ECOTRIN) 81 MG EC tablet Take 81 mg by mouth.    carvediloL (COREG) 3.125 MG tablet Take 1 tablet (3.125 mg total) by mouth 2 (two) times daily. TAKE 1 TABLET(3.125 MG) BY MOUTH TWICE DAILY WITH MEALS    clopidogreL (PLAVIX) 75 mg tablet Take 1 tablet (75 mg total) by mouth once daily.    fluticasone propionate (FLONASE) 50 mcg/actuation nasal spray SHAKE LIQUID AND USE 2 SPRAYS(100 MCG) IN EACH NOSTRIL EVERY DAY    fluticasone-salmeterol 230-21 mcg/dose (ADVAIR HFA) 230-21 mcg/actuation HFAA inhaler INHALE 2 PUFFS INTO THE LUNGS TWICE DAILY    fluticasone-salmeterol diskus inhaler 100-50 mcg Inhale 1 puff into the lungs every 12 (twelve) hours.    hydroCHLOROthiazide (HYDRODIURIL) 25 MG tablet Take 1 tablet (25 mg total) by mouth once daily.    lisinopriL (PRINIVIL,ZESTRIL) 40 MG tablet  Take 1 tablet (40 mg total) by mouth once daily.    loratadine (CLARITIN) 10 mg tablet TAKE 1 TABLET BY MOUTH DAILY AS NEEDED FOR ALLERGIES    miscellaneous medical supply (470V TWO WAY VALVE) Misc Disp nebulizer and tubing ,medicine reservoir,and mask  So as to use  Every 4-6 hrs for sob/wheeze    montelukast (SINGULAIR) 10 mg tablet Take 1 tablet (10 mg total) by mouth every evening.    nicotine polacrilex 2 MG Lozg Take 1 lozenge (2 mg total) by mouth as needed. Use 1-2 per hour in place of a cigarette. Limit to 6 a day.    rosuvastatin (CRESTOR) 20 MG tablet Take 1 tablet (20 mg total) by mouth every evening.    tiotropium (SPIRIVA WITH HANDIHALER) 18 mcg inhalation capsule Inhale 1 capsule (18 mcg total) into the lungs Daily. INHALE THE CONTENTS OF 1 CAPSULE(18 MCG) INTO THE LUNGS EVERY DAY     Family History       Problem Relation (Age of Onset)    Cancer Mother    Liver disease Father          Tobacco Use    Smoking status: Every Day     Current packs/day: 0.25     Average packs/day: 0.5 packs/day for 51.0 years (25.3 ttl pk-yrs)     Types: Cigarettes     Start date: 12/13/1970     Last attempt to quit: 12/13/2020    Smokeless tobacco: Never   Substance and Sexual Activity    Alcohol use: Never    Drug use: Never    Sexual activity: Not Currently     Review of Systems   Constitutional: Negative for chills, diaphoresis, fever and malaise/fatigue.   HENT:  Negative for nosebleeds.    Eyes:  Negative for blurred vision and double vision.   Cardiovascular:  Positive for chest pain. Negative for claudication, cyanosis, dyspnea on exertion, leg swelling, orthopnea, palpitations, paroxysmal nocturnal dyspnea and syncope.   Respiratory:  Positive for shortness of breath. Negative for cough and wheezing.    Skin:  Negative for dry skin and poor wound healing.   Musculoskeletal:  Negative for back pain, joint swelling and myalgias.   Gastrointestinal:  Negative for abdominal pain, nausea and vomiting.   Genitourinary:   Negative for hematuria.   Neurological:  Negative for dizziness, headaches, numbness, seizures and weakness.   Psychiatric/Behavioral:  Negative for altered mental status and depression.      Objective:     Vital Signs (Most Recent):  Temp: 98.8 °F (37.1 °C) (04/05/25 0530)  Pulse: 88 (04/05/25 0913)  Resp: (!) 27 (04/05/25 0814)  BP: (!) 143/70 (04/05/25 0913)  SpO2: 96 % (04/05/25 0900) Vital Signs (24h Range):  Temp:  [97.8 °F (36.6 °C)-98.8 °F (37.1 °C)] 98.8 °F (37.1 °C)  Pulse:  [] 88  Resp:  [24-50] 27  SpO2:  [90 %-99 %] 96 %  BP: (127-199)/(56-95) 143/70     Weight: 59 kg (130 lb)  Body mass index is 29.15 kg/m².    SpO2: 96 %       No intake or output data in the 24 hours ending 04/05/25 0917    Lines/Drains/Airways       Peripheral Intravenous Line  Duration                  Peripheral IV - Single Lumen 04/04/25 1910 20 G Left Antecubital <1 day                     Physical Exam  Constitutional:       General: She is not in acute distress.     Appearance: Normal appearance. She is well-developed. She is not ill-appearing, toxic-appearing or diaphoretic.   HENT:      Head: Normocephalic and atraumatic.   Eyes:      General: No scleral icterus.     Extraocular Movements: Extraocular movements intact.      Conjunctiva/sclera: Conjunctivae normal.      Pupils: Pupils are equal, round, and reactive to light.   Neck:      Vascular: No JVD.      Trachea: No tracheal deviation.   Cardiovascular:      Rate and Rhythm: Normal rate and regular rhythm.      Pulses:           Radial pulses are 2+ on the right side.      Heart sounds: S1 normal and S2 normal. No murmur heard.     No friction rub. No gallop.   Pulmonary:      Effort: Pulmonary effort is normal. No respiratory distress.      Breath sounds: Normal breath sounds. No stridor. No wheezing, rhonchi or rales.   Chest:      Chest wall: No tenderness.   Abdominal:      General: There is no distension.      Palpations: Abdomen is soft.   Musculoskeletal:          General: No swelling or tenderness. Normal range of motion.      Cervical back: Normal range of motion and neck supple. No rigidity.      Right lower leg: No edema.      Left lower leg: No edema.   Skin:     General: Skin is warm and dry.      Coloration: Skin is not jaundiced.   Neurological:      General: No focal deficit present.      Mental Status: She is alert and oriented to person, place, and time.      Cranial Nerves: No cranial nerve deficit.   Psychiatric:         Mood and Affect: Mood normal.         Behavior: Behavior normal.          Current Medications:   albuterol-ipratropium  3 mL Nebulization ED 1 Time    albuterol-ipratropium  3 mL Nebulization Q4H    aspirin  81 mg Oral Daily    atorvastatin  80 mg Oral Daily    carvediloL  3.125 mg Oral BID    clopidogreL  75 mg Oral Daily    enoxparin  40 mg Subcutaneous Daily    hydroCHLOROthiazide  25 mg Oral Daily    lisinopriL  40 mg Oral Daily    montelukast  10 mg Oral QHS    predniSONE  40 mg Oral Daily         Current Facility-Administered Medications:     acetaminophen, 650 mg, Oral, Q8H PRN    acetaminophen, 650 mg, Oral, Q4H PRN    aluminum-magnesium hydroxide-simethicone, 30 mL, Oral, QID PRN    bisacodyL, 10 mg, Rectal, Daily PRN    cloNIDine, 0.1 mg, Oral, Q6H PRN    glucagon (human recombinant), 1 mg, Intramuscular, PRN    glucose, 16 g, Oral, PRN    glucose, 24 g, Oral, PRN    hydrOXYzine HCL, 50 mg, Oral, TID PRN    magnesium oxide, 800 mg, Oral, PRN    magnesium oxide, 800 mg, Oral, PRN    melatonin, 6 mg, Oral, Nightly PRN    naloxone, 0.02 mg, Intravenous, PRN    nicotine, 1 patch, Transdermal, Daily PRN    ondansetron, 4 mg, Intravenous, Q8H PRN    potassium bicarbonate, 35 mEq, Oral, PRN    potassium bicarbonate, 50 mEq, Oral, PRN    potassium bicarbonate, 60 mEq, Oral, PRN    potassium, sodium phosphates, 2 packet, Oral, PRN    potassium, sodium phosphates, 2 packet, Oral, PRN    potassium, sodium phosphates, 2 packet, Oral, PRN     senna-docusate, 1 tablet, Oral, Daily PRN    simethicone, 1 tablet, Oral, QID PRN    sodium chloride 0.9%, 10 mL, Intravenous, Q12H PRN    sodium chloride 0.9%, 3 mL, Intravenous, PRN    Laboratory (all labs reviewed):  CBC:  Recent Labs   Lab 08/12/22  1413 10/13/23  1030 04/16/24  1006 04/04/25 1929 04/05/25  0341   WBC 5.43 3.88 L 4.38 5.01 4.30   Hemoglobin 13.4 13.5 13.4  --   --    HGB  --   --   --  14.2 13.6   Hematocrit 42.3 42.4 42.7  --   --    HCT  --   --   --  42.4 40.9   Platelet Count  --   --   --  220 226   Platelets 245 266 258  --   --        CHEMISTRIES:  Recent Labs   Lab 08/12/22  1413 04/13/23  1044 10/13/23  1030 04/16/24  1006 04/04/25  1929 04/05/25  0341   Glucose 103 95 80 66 L  --   --    Sodium 137 141 137 141 134 L 130 L   Potassium 3.8 4.2 4.4 4.2 3.4 L 3.4 L   BUN 12 10 13 12 8 8   Creatinine 1.1 0.9 1.0 0.9 0.8 0.8   eGFR 54.7 A >60.0 >60.0 >60.0 >60 >60   Calcium 9.7 10.1 10.4 9.6 9.1 9.3   Magnesium   --   --   --   --   --  1.5 L       CARDIAC BIOMARKERS:  Recent Labs   Lab 04/04/25 1929 04/05/25  0341   Troponin-I <0.006 0.327 H       COAGS:        LIPIDS/LFTS:  Recent Labs   Lab 08/12/22  1413 04/13/23  1044 10/13/23  1030 04/16/24  1006 04/04/25  1929   Cholesterol 122  --  156 146  --    Triglycerides 38  --  43 53  --    HDL 59  --  57 50  --    LDL Cholesterol 55.4 L  --  90.4 85.4  --    Non-HDL Cholesterol 63  --  99 96  --    AST 20 19 22 15 19   ALT 21 18 20 13 17       BNP:  Recent Labs   Lab 04/04/25  1929   BNP 41       TSH:  Recent Labs   Lab 08/12/22  1413 10/13/23  1030 04/16/24  1006   TSH 0.926 1.890 1.888       Free T4:        Diagnostic Results:  ECG (personally reviewed and interpreted tracing(s)):  4/4/25 1750 SR 87, inflat ST abnl ?isch, new vs 6/17/21    Chest X-Ray (personally reviewed and interpreted image(s)): 4/4/25 NAD, aortic atherosclerosis    Echo: 11/16/20 (repeat ordered, prelim normal LV fxn/WM)  The left ventricle is normal in size with  normal systolic function. The estimated ejection fraction is 55%.  There is mild left ventricular concentric hypertrophy.  Normal left ventricular diastolic function.  Normal right ventricular size with normal right ventricular systolic function.  Mild left atrial enlargement.  Normal central venous pressure (3 mmHg).  The estimated PA systolic pressure is 49 mmHg.  There is pulmonary hypertension.    Carotid US 1/6/21  There is 20-39% right Internal Carotid Stenosis. Vessel tortuosity may lead to overestimate of stenosis.  There is 20-39% left Internal Carotid Stenosis. Vessel tortuosity may lead to overestimate of stenosis.    Aortic US 1/6/21  No evidence of AAA.  Aortic atherosclerosis.  Increased flow velocity across bilateral iliac arteries suggesting >50% bilateral KATIA stenosis.    LE art US/BRITTNI 1/6/21  Diffuse SFA PAD bilaterally.  >50% mid R SFA stenosis.  >50% ost L SFA stenosis.  Mildly decreased BRITTNI bilaterally.    Ex BRITTNI 1/6/21  Mildly decreased resting BRITTNI bilaterally.  Moderately decreased exercise BRITTNI bilaterally.  Moderately dampened PVR waveforms bilaterally.    Cath: 11/16/20  The pre-procedure left ventricular end diastolic pressure was 11.  The estimated blood loss was <50 mL.  There was non-obstructive coronary artery disease..  No significant culprit lesion detected to explain her non-STEMI.  Left main:  No significant stenosis  Lad:  Luminal irregularities.  10-20% midstenosis  Circumflex:  OM 30-40% stenosis.  Luminal irregularities  RCA:  Mid 30 to 40% stenosis.  Access:  Right radial artery access  Assessment plan  Maximize medical management  Risk factor reductions  Smoking cessation

## 2025-04-05 NOTE — CONSULTS
"[facility] [place]  Cardiology  Consult Note    Patient Name: Hollie Ponce  MRN: [id number]  Admission Date: 4/4/2025  Hospital Length of Stay: 0 days  Code Status: Full Code   Attending Provider: Franky Peguero MD   Consulting Provider: Jhonny Schofield MD  Primary Care Physician: Ariadne Smith MD  Principal Problem:COPD exacerbation    Patient information was obtained from patient and ER records.     Inpatient consult to Cardiology  Consult performed by: Jhonny Schofield MD  Consult ordered by: Benito Blackburn Jr., NP  Reason for consult: NSTEMI        Subjective:     Chief Complaint:  SOB/CP     HPI:   69-year-old female with a past medical history of COPD (not on O2), nonobstructive CAD, hypertension, peripheral artery disease, tobacco use who presents with dyspnea.    Patient presents for evaluation of shortness of breath that started on the day of presentation.  She reports worsening exertional dyspnea, associated with a dry cough, and chest congestion.  She reports having rhinorrhea/sinus congestion.  Her daughter was sick with similar symptoms, but limited her exposure to the patient and was wearing a mask around her  Patient smokes 5 cigarettes daily.  In the ED, the patient was tachypneic (20s-40s), tachycardic (100s-110s), and mildly hypoxic (90%, requiring 3 L O2).  Labs were remarkable for hypokalemia (3.4), negative BNP (41), negative troponin (<0.006).  Chest x-ray showed no acute cardiopulmonary process.  Patient was given Solu-Medrol 125 mg IV, hydralazine 10 mg IV, DuoNeb x1, Ativan 0.5 mg p.o..  She was admitted for further management.    Last seen by Dr. Vernon 6/2021, no follow up since.    Cardiology consulted for "elevated troponin, concern for nstemi"    The patient is a pleasant 69-year-old woman who presents to the emergency room with complaints of approximately 24 hours of progressive dyspnea and associated chest discomfort.  These symptoms persist in the emergency room.  EKG " notes inferolateral ST depression.  Troponin is mildly elevated.  Bedside echo notes normal LV function and wall motion.  She did eat breakfast this morning.  She is being treated for a COPD exacerbation.  She does continue to smoke cigarettes.  Pros and cons of coronary angiography discussed with the patient and family at the bedside.  She agrees to proceed permit has been signed.  We will plan catheterization in a.m. 2025.    Past Medical History:   Diagnosis Date    Asthma     COPD (chronic obstructive pulmonary disease)     Hypertension        Past Surgical History:   Procedure Laterality Date     SECTION      HERNIA REPAIR      LEFT HEART CATHETERIZATION Left 2020    Procedure: Left heart cath;  Surgeon: Shabnam Vernon MD;  Location: [facility] CATH LAB;  Service: Cardiology;  Laterality: Left;       Review of patient's allergies indicates:   Allergen Reactions    Flagyl [metronidazole hcl]      Hives      Sulfamethoxazole-trimethoprim Itching    Aspirin Nausea Only    Lipitor [atorvastatin]      Myalgia        No current facility-administered medications on file prior to encounter.     Current Outpatient Medications on File Prior to Encounter   Medication Sig    albuterol (PROVENTIL/VENTOLIN HFA) 90 mcg/actuation inhaler Inhale 2 puffs into the lungs every 6 (six) hours as needed for Wheezing or Shortness of Breath. Rescue    albuterol-ipratropium (DUO-NEB) 2.5 mg-0.5 mg/3 mL nebulizer solution Take 3 mLs by nebulization every 4 (four) hours as needed for Wheezing.    albuterol sulfate 2.5 mg/0.5 mL Nebu Inhale into the lungs.    aspirin (ECOTRIN) 81 MG EC tablet Take 81 mg by mouth.    carvediloL (COREG) 3.125 MG tablet Take 1 tablet (3.125 mg total) by mouth 2 (two) times daily. TAKE 1 TABLET(3.125 MG) BY MOUTH TWICE DAILY WITH MEALS    clopidogreL (PLAVIX) 75 mg tablet Take 1 tablet (75 mg total) by mouth once daily.    fluticasone propionate (FLONASE) 50 mcg/actuation nasal spray SHAKE  LIQUID AND USE 2 SPRAYS(100 MCG) IN EACH NOSTRIL EVERY DAY    fluticasone-salmeterol 230-21 mcg/dose (ADVAIR HFA) 230-21 mcg/actuation HFAA inhaler INHALE 2 PUFFS INTO THE LUNGS TWICE DAILY    fluticasone-salmeterol diskus inhaler 100-50 mcg Inhale 1 puff into the lungs every 12 (twelve) hours.    hydroCHLOROthiazide (HYDRODIURIL) 25 MG tablet Take 1 tablet (25 mg total) by mouth once daily.    lisinopriL (PRINIVIL,ZESTRIL) 40 MG tablet Take 1 tablet (40 mg total) by mouth once daily.    loratadine (CLARITIN) 10 mg tablet TAKE 1 TABLET BY MOUTH DAILY AS NEEDED FOR ALLERGIES    [facility]470[facility] TWO [facility] VALVE) Misc Disp nebulizer and tubing ,medicine reservoir,and mask  So as to use  Every 4-6 hrs for sob/wheeze    montelukast (SINGULAIR) 10 mg tablet Take 1 tablet (10 mg total) by mouth every evening.    nicotine polacrilex 2 MG Lozg Take 1 lozenge (2 mg total) by mouth as needed. Use 1-2 per hour in place of a cigarette. Limit to 6 a day.    rosuvastatin (CRESTOR) 20 MG tablet Take 1 tablet (20 mg total) by mouth every evening.    tiotropium (SPIRIVA WITH HANDIHALER) 18 mcg inhalation capsule Inhale 1 capsule (18 mcg total) into the lungs Daily. INHALE THE CONTENTS OF 1 CAPSULE(18 MCG) INTO THE LUNGS EVERY DAY     Family History       Problem Relation (Age of Onset)    Cancer Mother    Liver disease Father          Tobacco Use    Smoking status: Every Day     Current packs/day: 0.25     Average packs/day: 0.5 packs/day for 51.0 years (25.3 ttl pk-yrs)     Types: Cigarettes     Start date: 12/13/1970     Last attempt to quit: 12/13/2020    Smokeless tobacco: Never   Substance and Sexual Activity    Alcohol use: Never    Drug use: Never    Sexual activity: Not Currently     Review of Systems   Constitutional: Negative for chills, diaphoresis, fever and malaise/fatigue.   HENT:  Negative for nosebleeds.    Eyes:  Negative for blurred vision and double vision.   Cardiovascular:  Positive for chest  pain. Negative for claudication, cyanosis, dyspnea on exertion, leg swelling, orthopnea, palpitations, paroxysmal nocturnal dyspnea and syncope.   Respiratory:  Positive for shortness of breath. Negative for cough and wheezing.    Skin:  Negative for dry skin and poor wound healing.   Musculoskeletal:  Negative for back pain, joint swelling and myalgias.   Gastrointestinal:  Negative for abdominal pain, nausea and vomiting.   Genitourinary:  Negative for hematuria.   Neurological:  Negative for dizziness, headaches, numbness, seizures and weakness.   Psychiatric/Behavioral:  Negative for altered mental status and depression.      Objective:     Vital Signs (Most Recent):  Temp: 98.8 °F (37.1 °C) (04/05/25 0530)  Pulse: 88 (04/05/25 0913)  Resp: (!) 27 (04/05/25 0814)  BP: (!) 143/70 (04/05/25 0913)  SpO2: 96 % (04/05/25 0900) Vital Signs (24h Range):  Temp:  [97.8 °F (36.6 °C)-98.8 °F (37.1 °C)] 98.8 °F (37.1 °C)  Pulse:  [] 88  Resp:  [24-50] 27  SpO2:  [90 %-99 %] 96 %  BP: (127-199)/(56-95) 143/70     Weight: 59 kg (130 lb)  Body mass index is 29.15 kg/m².    SpO2: 96 %       No intake or output data in the 24 hours ending 04/05/25 0917    Lines/Drains/Airways       Peripheral Intravenous Line  Duration                  Peripheral IV - Single Lumen 04/04/25 1910 20 G Left Antecubital <1 day                     Physical Exam  Constitutional:       General: She is not in acute distress.     Appearance: Normal appearance. She is well-developed. She is not ill-appearing, toxic-appearing or diaphoretic.   HENT:      Head: Normocephalic and atraumatic.   Eyes:      General: No scleral icterus.     Extraocular Movements: Extraocular movements intact.      Conjunctiva/sclera: Conjunctivae normal.      Pupils: Pupils are equal, round, and reactive to light.   Neck:      Vascular: No JVD.      Trachea: No tracheal deviation.   Cardiovascular:      Rate and Rhythm: Normal rate and regular rhythm.      Pulses:            Radial pulses are 2+ on the right side.      Heart sounds: S1 normal and S2 normal. No murmur heard.     No friction rub. No gallop.   Pulmonary:      Effort: Pulmonary effort is normal. No respiratory distress.      Breath sounds: Normal breath sounds. No stridor. No wheezing, rhonchi or rales.   Chest:      Chest wall: No tenderness.   Abdominal:      General: There is no distension.      Palpations: Abdomen is soft.   Musculoskeletal:         General: No swelling or tenderness. Normal range of motion.      Cervical back: Normal range of motion and neck supple. No rigidity.      Right lower leg: No edema.      Left lower leg: No edema.   Skin:     General: Skin is warm and dry.      Coloration: Skin is not jaundiced.   Neurological:      General: No focal deficit present.      Mental Status: She is alert and oriented to person, place, and time.      Cranial Nerves: No cranial nerve deficit.   Psychiatric:         Mood and Affect: Mood normal.         Behavior: Behavior normal.          Current Medications:   albuterol-ipratropium  3 mL Nebulization ED 1 Time    albuterol-ipratropium  3 mL Nebulization Q4H    aspirin  81 mg Oral Daily    atorvastatin  80 mg Oral Daily    carvediloL  3.125 mg Oral BID    clopidogreL  75 mg Oral Daily    enoxparin  40 mg Subcutaneous Daily    hydroCHLOROthiazide  25 mg Oral Daily    lisinopriL  40 mg Oral Daily    montelukast  10 mg Oral QHS    predniSONE  40 mg Oral Daily         Current Facility-Administered Medications:     acetaminophen, 650 mg, Oral, Q8H PRN    acetaminophen, 650 mg, Oral, Q4H PRN    aluminum-magnesium hydroxide-simethicone, 30 mL, Oral, QID PRN    bisacodyL, 10 mg, Rectal, Daily PRN    cloNIDine, 0.1 mg, Oral, Q6H PRN    glucagon (human recombinant), 1 mg, Intramuscular, PRN    glucose, 16 g, Oral, PRN    glucose, 24 g, Oral, PRN    hydrOXYzine HCL, 50 mg, Oral, TID PRN    magnesium oxide, 800 mg, Oral, PRN    magnesium oxide, 800 mg, Oral, PRN    melatonin, 6  mg, Oral, Nightly PRN    naloxone, 0.02 mg, Intravenous, PRN    nicotine, 1 patch, Transdermal, Daily PRN    ondansetron, 4 mg, Intravenous, Q8H PRN    potassium bicarbonate, 35 mEq, Oral, PRN    potassium bicarbonate, 50 mEq, Oral, PRN    potassium bicarbonate, 60 mEq, Oral, PRN    potassium, sodium phosphates, 2 packet, Oral, PRN    potassium, sodium phosphates, 2 packet, Oral, PRN    potassium, sodium phosphates, 2 packet, Oral, PRN    senna-docusate, 1 tablet, Oral, Daily PRN    simethicone, 1 tablet, Oral, QID PRN    sodium chloride 0.9%, 10 mL, Intravenous, Q12H PRN    sodium chloride 0.9%, 3 mL, Intravenous, PRN    Laboratory (all labs reviewed):  CBC:  Recent Labs   Lab 08/12/22  1413 10/13/23  1030 04/16/24  1006 04/04/25 1929 04/05/25  0341   WBC 5.43 3.88 L 4.38 5.01 4.30   Hemoglobin 13.4 13.5 13.4  --   --    HGB  --   --   --  14.2 13.6   Hematocrit 42.3 42.4 42.7  --   --    HCT  --   --   --  42.4 40.9   Platelet Count  --   --   --  220 226   Platelets 245 266 258  --   --        CHEMISTRIES:  Recent Labs   Lab 08/12/22  1413 04/13/23  1044 10/13/23  1030 04/16/24  1006 04/04/25 1929 04/05/25  0341   Glucose 103 95 80 66 L  --   --    Sodium 137 141 137 141 134 L 130 L   Potassium 3.8 4.2 4.4 4.2 3.4 L 3.4 L   BUN 12 10 13 12 8 8   Creatinine 1.1 0.9 1.0 0.9 0.8 0.8   eGFR 54.7 A >60.0 >60.0 >60.0 >60 >60   Calcium 9.7 10.1 10.4 9.6 9.1 9.3   Magnesium   --   --   --   --   --  1.5 L       CARDIAC BIOMARKERS:  Recent Labs   Lab 04/04/25 1929 04/05/25  0341   Troponin-I <0.006 0.327 H       COAGS:        LIPIDS/LFTS:  Recent Labs   Lab 08/12/22  1413 04/13/23  1044 10/13/23  1030 04/16/24  1006 04/04/25 1929   Cholesterol 122  --  156 146  --    Triglycerides 38  --  43 53  --    HDL 59  --  57 50  --    LDL Cholesterol 55.4 L  --  90.4 85.4  --    Non-HDL Cholesterol 63  --  99 96  --    AST 20 19 22 15 19   ALT 21 18 20 13 17       BNP:  Recent Labs   Lab 04/04/25 1929   BNP 41        TSH:  Recent Labs   Lab 08/12/22  1413 10/13/23  1030 04/16/24  1006   TSH 0.926 1.890 1.888       Free T4:        Diagnostic Results:  ECG (personally reviewed and interpreted tracing(s)):  4/4/25 1750 SR 87, inflat ST abnl ?isch, new vs 6/17/21    Chest X-Ray (personally reviewed and interpreted image(s)): 4/4/25 NAD, aortic atherosclerosis    Echo: 11/16/20 (repeat ordered, prelim normal LV fxn/WM)  The left ventricle is normal in size with normal systolic function. The estimated ejection fraction is 55%.  There is mild left ventricular concentric hypertrophy.  Normal left ventricular diastolic function.  Normal right ventricular size with normal right ventricular systolic function.  Mild left atrial enlargement.  Normal central venous pressure (3 mmHg).  The estimated PA systolic pressure is 49 mmHg.  There is pulmonary hypertension.    Carotid US 1/6/21  There is 20-39% right Internal Carotid Stenosis. Vessel tortuosity may lead to overestimate of stenosis.  There is 20-39% left Internal Carotid Stenosis. Vessel tortuosity may lead to overestimate of stenosis.    Aortic US 1/6/21  No evidence of AAA.  Aortic atherosclerosis.  Increased flow velocity across bilateral iliac arteries suggesting >50% bilateral KATIA stenosis.    LE art US/BRITTNI 1/6/21  Diffuse SFA PAD bilaterally.  >50% mid R SFA stenosis.  >50% ost L SFA stenosis.  Mildly decreased BRITTNI bilaterally.    Ex BRITTNI 1/6/21  Mildly decreased resting BRITTNI bilaterally.  Moderately decreased exercise BRITTNI bilaterally.  Moderately dampened PVR waveforms bilaterally.    Cath: 11/16/20  The pre-procedure left ventricular end diastolic pressure was 11.  The estimated blood loss was <50 mL.  There was non-obstructive coronary artery disease..  No significant culprit lesion detected to explain her non-STEMI.  Left main:  No significant stenosis  Lad:  Luminal irregularities.  10-20% midstenosis  Circumflex:  OM 30-40% stenosis.  Luminal irregularities  RCA:  Mid 30  to 40% stenosis.  Access:  Right radial artery access  Assessment plan  Maximize medical management  Risk factor reductions  Smoking cessation        Assessment and Plan:     * COPD exacerbation  Mgmt per IM  Smoking cessation    NSTEMI (non-ST elevated myocardial infarction)  Concerning associated sxs with EKG changes and elev trop, ?type 1 vs 2 MI.  Initiate ACS rx:   Enox 1mg/kg bid  Load plavix, cont ASA  Statin  Check echo (prelim normal LV fxn/WM)  Smoking cessation  NPO after MN for cath in am    Risks, benefits and alternatives of the catheterization procedure were discussed with the patient which include but are not limited to: bleeding, infection, death, heart attack, arrhythmia, kidney failure, stroke, need for emergency surgery, etc.  Patient understands and and agrees to proceed.  Consent was placed on the chart.      Dyslipidemia  Check lipids  Titrate rosuva to 40mg on discharge    Essential hypertension  Cont med rx    PAD (peripheral artery disease)  Noted on prior testing.  No immediate plan for revasc    Tobacco dependency  Smoking cessation strongly advised.    Carotid atherosclerosis, bilateral  Will repeat carotid US as outpatient.        VTE Risk Mitigation (From admission, onward)           Ordered     enoxaparin injection 60 mg  Every 12 hours (non-standard times)         04/05/25 0946     Place sequential compression device  Until discontinued         04/04/25 2312     IP VTE HIGH RISK PATIENT  Once         04/04/25 2312     Place sequential compression device  Until discontinued         04/04/25 2312                    Thank you for your consult. I will follow-up with patient. Please contact us if you have any additional questions.    Jhonny Schofield MD  Cardiology   [facility] - [place] - Emergency Dept

## 2025-04-05 NOTE — ASSESSMENT & PLAN NOTE
Patient with Hypoxic Respiratory failure which is Acute.  she is not on home oxygen. Supplemental oxygen was provided and noted-      .   Signs/symptoms of respiratory failure include- increased work of breathing and respiratory distress. Contributing diagnoses includes - COPD Labs and images were reviewed. Patient Has not had a recent ABG. Will treat underlying causes and adjust management of respiratory failure as follows- Wean O2 as tolerated

## 2025-04-05 NOTE — PLAN OF CARE
Inpatient Upgrade Note    Hollie Ponce has warranted treatment spanning two or more midnights of hospital level care for the management of chest pain. She continues to require further evaluation by consultants. Her condition is also complicated by the following comorbidities: Hypertension and Chronic respiratory disease.

## 2025-04-05 NOTE — HOSPITAL COURSE
Mrs. Ponce was hospitalized for COPD exacerbation after presenting with dyspnea and chest tightness.  She was noted to be tachypneic, tachycardic, and hypoxic on room air.  Supplemental oxygen initiated.  She received corticosteroids and nebulizer treatments with some symptomatic improvement.  Initial troponin negative; however, repeat troponin significantly elevated.  She reports persistent and some mild chest tightness.  Cardiology consulted, appreciate recs.  Initiate treatment for NSTEMI. LHC without evidence of obstructive CAD. Continued on COPD treatment.  Without much improvement initially, Pulmonology was consulted and steroids increased frequency and changed to IV.  Showing signs of improvement, will transition to PO steroids and begin to taper. Seen by ENT for nasal congestion, without signs of sinus infection though concerns for thrush. Started on nasal rinse, flonase, asteline, and nystatin. Outpatient ENT F/U.  Continued improvement in symptoms.  Ambulatory at 140 ft with physical therapy the day prior to discharge.  She qualified for home oxygen with ambulatory hypoxemia, but was not hypoxic at rest.  She will discharge home to complete a steroid taper with associated nasal rinse and antihistamines as per ENT.  She will follow up with the ENT and Pulmonary.  DME was set up with case management, and plan of care was discussed with the patient's daughter.  At discharge she has right without hypoxemia on home oxygen as provided.  Plavix was stopped at discharge as recommended by Cardiology while inpatient. Discussed CT of neck with ENT and trachea patent on CT neck still pending official radiology read.

## 2025-04-05 NOTE — ASSESSMENT & PLAN NOTE
Patient presents with NSTEMI. Chest pain is currently controlled. Patient is currently on NSTEMI Pathway.    EKG reviewed. Troponins reviewed and results noted-   Recent Labs   Lab 04/05/25  0341   TROPONINI 0.327*   .     Lipid panel reviewed and shows-     Lab Results   Component Value Date    LDLCALC 85.4 04/16/2024     Lab Results   Component Value Date    TRIG 32 04/05/2025         Medical management includes; Beta Blocker, Dual Anti-Platelet therapy, Anticoagulation, and High Intensity Stain Echo has been performed. Latest ECHO results are as follows- Results for orders placed during the hospital encounter of 04/04/25    Echo    Interpretation Summary    Left Ventricle: The left ventricle is normal in size. Normal wall thickness. There is normal systolic function with a visually estimated ejection fraction of 65 - 70%. Grade I diastolic dysfunction.    Right Ventricle: The right ventricle is normal in size. Systolic function is normal.    Mitral Valve: There is mild regurgitation.    Tricuspid Valve: There is mild regurgitation.    Pulmonary Artery: The estimated pulmonary artery systolic pressure is 55 mmHg.  .   Consult for cardiac rehab is not ordered. Patient counseled on lifestyle modifications- continue current medications. Cardiology is consulted. Plan of care reviewed with cardiology team. Continue to monitor patient closely and adjust therapy as needed.      [General Appearance - Well Developed] : well developed [Skin Color & Pigmentation] : normal skin color and pigmentation [Edema] : no peripheral edema [] : no respiratory distress [Oriented To Time, Place, And Person] : oriented to person, place, and time [Normal Station and Gait] : the gait and station were normal for the patient's age [Abdomen Soft] : soft [FreeTextEntry1] : deferred [No Focal Deficits] : no focal deficits

## 2025-04-05 NOTE — HPI
"69-year-old female with a past medical history of COPD (not on O2), nonobstructive CAD, hypertension, peripheral artery disease, tobacco use who presents with dyspnea.    Patient presents for evaluation of shortness of breath that started on the day of presentation.  She reports worsening exertional dyspnea, associated with a dry cough, and chest congestion.  She reports having rhinorrhea/sinus congestion.  Her daughter was sick with similar symptoms, but limited her exposure to the patient and was wearing a mask around her  Patient smokes 5 cigarettes daily.  In the ED, the patient was tachypneic (20s-40s), tachycardic (100s-110s), and mildly hypoxic (90%, requiring 3 L O2).  Labs were remarkable for hypokalemia (3.4), negative BNP (41), negative troponin (<0.006).  Chest x-ray showed no acute cardiopulmonary process.  Patient was given Solu-Medrol 125 mg IV, hydralazine 10 mg IV, DuoNeb x1, Ativan 0.5 mg p.o..  She was admitted for further management.    Last seen by Dr. Vernon 6/2021, no follow up since.    Cardiology consulted for "elevated troponin, concern for nstemi"    The patient is a pleasant 69-year-old woman who presents to the emergency room with complaints of approximately 24 hours of progressive dyspnea and associated chest discomfort.  These symptoms persist in the emergency room.  EKG notes inferolateral ST depression.  Troponin is mildly elevated.  Bedside echo notes normal LV function and wall motion.  She did eat breakfast this morning.  She is being treated for a COPD exacerbation.  She does continue to smoke cigarettes.  Pros and cons of coronary angiography discussed with the patient and family at the bedside.  She agrees to proceed permit has been signed.  If symptoms persist despite initiation of ACS Rx, we will plan catheterization in a.m. 04/06/2025.  "

## 2025-04-06 LAB
ABSOLUTE EOSINOPHIL (OHS): 0 K/UL
ABSOLUTE MONOCYTE (OHS): 0.73 K/UL (ref 0.3–1)
ABSOLUTE NEUTROPHIL COUNT (OHS): 4.6 K/UL (ref 1.8–7.7)
ANION GAP (OHS): 10 MMOL/L (ref 8–16)
BASOPHILS # BLD AUTO: 0.01 K/UL
BASOPHILS NFR BLD AUTO: 0.2 %
BUN SERPL-MCNC: 13 MG/DL (ref 8–23)
CALCIUM SERPL-MCNC: 8.9 MG/DL (ref 8.7–10.5)
CHLORIDE SERPL-SCNC: 96 MMOL/L (ref 95–110)
CO2 SERPL-SCNC: 24 MMOL/L (ref 23–29)
CREAT SERPL-MCNC: 0.7 MG/DL (ref 0.5–1.4)
ERYTHROCYTE [DISTWIDTH] IN BLOOD BY AUTOMATED COUNT: 13.2 % (ref 11.5–14.5)
GFR SERPLBLD CREATININE-BSD FMLA CKD-EPI: >60 ML/MIN/1.73/M2
GLUCOSE SERPL-MCNC: 107 MG/DL (ref 70–110)
HCT VFR BLD AUTO: 43.2 % (ref 37–48.5)
HGB BLD-MCNC: 14 GM/DL (ref 12–16)
IMM GRANULOCYTES # BLD AUTO: 0.02 K/UL (ref 0–0.04)
IMM GRANULOCYTES NFR BLD AUTO: 0.3 % (ref 0–0.5)
LYMPHOCYTES # BLD AUTO: 1.13 K/UL (ref 1–4.8)
MAGNESIUM SERPL-MCNC: 1.7 MG/DL (ref 1.6–2.6)
MCH RBC QN AUTO: 29.4 PG (ref 27–31)
MCHC RBC AUTO-ENTMCNC: 32.4 G/DL (ref 32–36)
MCV RBC AUTO: 91 FL (ref 82–98)
NUCLEATED RBC (/100WBC) (OHS): 0 /100 WBC
PHOSPHATE SERPL-MCNC: 2.7 MG/DL (ref 2.7–4.5)
PLATELET # BLD AUTO: 228 K/UL (ref 150–450)
PMV BLD AUTO: 9.8 FL (ref 9.2–12.9)
POTASSIUM SERPL-SCNC: 3.8 MMOL/L (ref 3.5–5.1)
RBC # BLD AUTO: 4.77 M/UL (ref 4–5.4)
RELATIVE EOSINOPHIL (OHS): 0 %
RELATIVE LYMPHOCYTE (OHS): 17.4 % (ref 18–48)
RELATIVE MONOCYTE (OHS): 11.2 % (ref 4–15)
RELATIVE NEUTROPHIL (OHS): 70.9 % (ref 38–73)
SODIUM SERPL-SCNC: 130 MMOL/L (ref 136–145)
WBC # BLD AUTO: 6.49 K/UL (ref 3.9–12.7)

## 2025-04-06 PROCEDURE — 25000003 PHARM REV CODE 250: Performed by: HOSPITALIST

## 2025-04-06 PROCEDURE — 93458 L HRT ARTERY/VENTRICLE ANGIO: CPT | Performed by: INTERNAL MEDICINE

## 2025-04-06 PROCEDURE — 63600175 PHARM REV CODE 636 W HCPCS: Performed by: INTERNAL MEDICINE

## 2025-04-06 PROCEDURE — 25000003 PHARM REV CODE 250: Performed by: INTERNAL MEDICINE

## 2025-04-06 PROCEDURE — 25000003 PHARM REV CODE 250: Performed by: STUDENT IN AN ORGANIZED HEALTH CARE EDUCATION/TRAINING PROGRAM

## 2025-04-06 PROCEDURE — 36415 COLL VENOUS BLD VENIPUNCTURE: CPT | Performed by: STUDENT IN AN ORGANIZED HEALTH CARE EDUCATION/TRAINING PROGRAM

## 2025-04-06 PROCEDURE — 63600175 PHARM REV CODE 636 W HCPCS: Performed by: STUDENT IN AN ORGANIZED HEALTH CARE EDUCATION/TRAINING PROGRAM

## 2025-04-06 PROCEDURE — 93458 L HRT ARTERY/VENTRICLE ANGIO: CPT | Mod: 26,,, | Performed by: INTERNAL MEDICINE

## 2025-04-06 PROCEDURE — C1894 INTRO/SHEATH, NON-LASER: HCPCS | Performed by: INTERNAL MEDICINE

## 2025-04-06 PROCEDURE — 27000646 HC AEROBIKA DEVICE

## 2025-04-06 PROCEDURE — 99152 MOD SED SAME PHYS/QHP 5/>YRS: CPT | Mod: ,,, | Performed by: INTERNAL MEDICINE

## 2025-04-06 PROCEDURE — 11000001 HC ACUTE MED/SURG PRIVATE ROOM

## 2025-04-06 PROCEDURE — 80048 BASIC METABOLIC PNL TOTAL CA: CPT | Performed by: STUDENT IN AN ORGANIZED HEALTH CARE EDUCATION/TRAINING PROGRAM

## 2025-04-06 PROCEDURE — 84100 ASSAY OF PHOSPHORUS: CPT | Performed by: STUDENT IN AN ORGANIZED HEALTH CARE EDUCATION/TRAINING PROGRAM

## 2025-04-06 PROCEDURE — 99152 MOD SED SAME PHYS/QHP 5/>YRS: CPT | Performed by: INTERNAL MEDICINE

## 2025-04-06 PROCEDURE — 27000221 HC OXYGEN, UP TO 24 HOURS

## 2025-04-06 PROCEDURE — 99900035 HC TECH TIME PER 15 MIN (STAT)

## 2025-04-06 PROCEDURE — 25000242 PHARM REV CODE 250 ALT 637 W/ HCPCS: Performed by: STUDENT IN AN ORGANIZED HEALTH CARE EDUCATION/TRAINING PROGRAM

## 2025-04-06 PROCEDURE — 83735 ASSAY OF MAGNESIUM: CPT | Performed by: STUDENT IN AN ORGANIZED HEALTH CARE EDUCATION/TRAINING PROGRAM

## 2025-04-06 PROCEDURE — 94761 N-INVAS EAR/PLS OXIMETRY MLT: CPT

## 2025-04-06 PROCEDURE — C1887 CATHETER, GUIDING: HCPCS | Performed by: INTERNAL MEDICINE

## 2025-04-06 PROCEDURE — 94640 AIRWAY INHALATION TREATMENT: CPT

## 2025-04-06 PROCEDURE — 85025 COMPLETE CBC W/AUTO DIFF WBC: CPT | Performed by: STUDENT IN AN ORGANIZED HEALTH CARE EDUCATION/TRAINING PROGRAM

## 2025-04-06 PROCEDURE — 99233 SBSQ HOSP IP/OBS HIGH 50: CPT | Mod: ,,, | Performed by: INTERNAL MEDICINE

## 2025-04-06 PROCEDURE — 94664 DEMO&/EVAL PT USE INHALER: CPT

## 2025-04-06 PROCEDURE — B2111ZZ FLUOROSCOPY OF MULTIPLE CORONARY ARTERIES USING LOW OSMOLAR CONTRAST: ICD-10-PCS | Performed by: INTERNAL MEDICINE

## 2025-04-06 PROCEDURE — C1769 GUIDE WIRE: HCPCS | Performed by: INTERNAL MEDICINE

## 2025-04-06 PROCEDURE — 4A023N7 MEASUREMENT OF CARDIAC SAMPLING AND PRESSURE, LEFT HEART, PERCUTANEOUS APPROACH: ICD-10-PCS | Performed by: INTERNAL MEDICINE

## 2025-04-06 RX ORDER — MORPHINE SULFATE 4 MG/ML
2 INJECTION, SOLUTION INTRAMUSCULAR; INTRAVENOUS EVERY 10 MIN PRN
Refills: 0 | Status: DISCONTINUED | OUTPATIENT
Start: 2025-04-06 | End: 2025-04-07

## 2025-04-06 RX ORDER — ATROPINE SULFATE 0.1 MG/ML
0.5 INJECTION INTRAVENOUS
Status: DISCONTINUED | OUTPATIENT
Start: 2025-04-06 | End: 2025-04-16 | Stop reason: HOSPADM

## 2025-04-06 RX ORDER — VERAPAMIL HYDROCHLORIDE 2.5 MG/ML
INJECTION INTRAVENOUS
Status: DISCONTINUED | OUTPATIENT
Start: 2025-04-06 | End: 2025-04-06 | Stop reason: HOSPADM

## 2025-04-06 RX ORDER — MUPIROCIN 20 MG/G
OINTMENT TOPICAL 2 TIMES DAILY
Status: COMPLETED | OUTPATIENT
Start: 2025-04-06 | End: 2025-04-11

## 2025-04-06 RX ORDER — BENZONATATE 100 MG/1
100 CAPSULE ORAL 3 TIMES DAILY PRN
Status: DISCONTINUED | OUTPATIENT
Start: 2025-04-06 | End: 2025-04-16 | Stop reason: HOSPADM

## 2025-04-06 RX ORDER — NITROGLYCERIN 20 MG/100ML
INJECTION INTRAVENOUS
Status: DISCONTINUED | OUTPATIENT
Start: 2025-04-06 | End: 2025-04-07

## 2025-04-06 RX ORDER — MIDAZOLAM HYDROCHLORIDE 1 MG/ML
INJECTION, SOLUTION INTRAMUSCULAR; INTRAVENOUS
Status: DISCONTINUED | OUTPATIENT
Start: 2025-04-06 | End: 2025-04-06 | Stop reason: HOSPADM

## 2025-04-06 RX ORDER — HEPARIN SODIUM 1000 [USP'U]/ML
INJECTION, SOLUTION INTRAVENOUS; SUBCUTANEOUS
Status: DISCONTINUED | OUTPATIENT
Start: 2025-04-06 | End: 2025-04-06 | Stop reason: HOSPADM

## 2025-04-06 RX ORDER — HYDROCODONE BITARTRATE AND ACETAMINOPHEN 5; 325 MG/1; MG/1
1 TABLET ORAL EVERY 4 HOURS PRN
Refills: 0 | Status: DISCONTINUED | OUTPATIENT
Start: 2025-04-06 | End: 2025-04-16 | Stop reason: HOSPADM

## 2025-04-06 RX ORDER — HYDRALAZINE HYDROCHLORIDE 20 MG/ML
5 INJECTION INTRAMUSCULAR; INTRAVENOUS EVERY 6 HOURS PRN
Status: DISCONTINUED | OUTPATIENT
Start: 2025-04-06 | End: 2025-04-16 | Stop reason: HOSPADM

## 2025-04-06 RX ORDER — GUAIFENESIN AND DEXTROMETHORPHAN HYDROBROMIDE 10; 100 MG/5ML; MG/5ML
10 SYRUP ORAL EVERY 4 HOURS PRN
Status: DISCONTINUED | OUTPATIENT
Start: 2025-04-06 | End: 2025-04-07

## 2025-04-06 RX ORDER — FENTANYL CITRATE 50 UG/ML
INJECTION, SOLUTION INTRAMUSCULAR; INTRAVENOUS
Status: DISCONTINUED | OUTPATIENT
Start: 2025-04-06 | End: 2025-04-06 | Stop reason: HOSPADM

## 2025-04-06 RX ORDER — LIDOCAINE HYDROCHLORIDE 10 MG/ML
INJECTION, SOLUTION EPIDURAL; INFILTRATION; INTRACAUDAL; PERINEURAL
Status: DISCONTINUED | OUTPATIENT
Start: 2025-04-06 | End: 2025-04-06 | Stop reason: HOSPADM

## 2025-04-06 RX ORDER — ATORVASTATIN CALCIUM 40 MG/1
80 TABLET, FILM COATED ORAL NIGHTLY
Status: DISCONTINUED | OUTPATIENT
Start: 2025-04-07 | End: 2025-04-06

## 2025-04-06 RX ORDER — FUROSEMIDE 10 MG/ML
40 INJECTION INTRAMUSCULAR; INTRAVENOUS EVERY 12 HOURS
Status: DISCONTINUED | OUTPATIENT
Start: 2025-04-06 | End: 2025-04-07

## 2025-04-06 RX ADMIN — IPRATROPIUM BROMIDE AND ALBUTEROL SULFATE 3 ML: 2.5; .5 SOLUTION RESPIRATORY (INHALATION) at 04:04

## 2025-04-06 RX ADMIN — HYDROCHLOROTHIAZIDE 25 MG: 25 TABLET ORAL at 09:04

## 2025-04-06 RX ADMIN — IPRATROPIUM BROMIDE AND ALBUTEROL SULFATE 3 ML: 2.5; .5 SOLUTION RESPIRATORY (INHALATION) at 07:04

## 2025-04-06 RX ADMIN — ACETAMINOPHEN 650 MG: 325 TABLET ORAL at 06:04

## 2025-04-06 RX ADMIN — CLOPIDOGREL BISULFATE 75 MG: 75 TABLET ORAL at 09:04

## 2025-04-06 RX ADMIN — FUROSEMIDE 40 MG: 10 INJECTION, SOLUTION INTRAVENOUS at 09:04

## 2025-04-06 RX ADMIN — FUROSEMIDE 40 MG: 10 INJECTION, SOLUTION INTRAVENOUS at 01:04

## 2025-04-06 RX ADMIN — CARVEDILOL 3.12 MG: 3.12 TABLET, FILM COATED ORAL at 09:04

## 2025-04-06 RX ADMIN — SODIUM CHLORIDE 800 ML: 9 INJECTION, SOLUTION INTRAVENOUS at 12:04

## 2025-04-06 RX ADMIN — LISINOPRIL 40 MG: 20 TABLET ORAL at 09:04

## 2025-04-06 RX ADMIN — CARVEDILOL 3.12 MG: 3.12 TABLET, FILM COATED ORAL at 08:04

## 2025-04-06 RX ADMIN — IPRATROPIUM BROMIDE AND ALBUTEROL SULFATE 3 ML: 2.5; .5 SOLUTION RESPIRATORY (INHALATION) at 12:04

## 2025-04-06 RX ADMIN — CLONIDINE HYDROCHLORIDE 0.1 MG: 0.1 TABLET ORAL at 04:04

## 2025-04-06 RX ADMIN — PREDNISONE 40 MG: 20 TABLET ORAL at 01:04

## 2025-04-06 RX ADMIN — DOXYCYCLINE HYCLATE 100 MG: 100 TABLET, COATED ORAL at 01:04

## 2025-04-06 RX ADMIN — SODIUM CHLORIDE: 9 INJECTION, SOLUTION INTRAVENOUS at 09:04

## 2025-04-06 RX ADMIN — ASPIRIN 81 MG: 81 TABLET, COATED ORAL at 09:04

## 2025-04-06 RX ADMIN — MONTELUKAST 10 MG: 10 TABLET, FILM COATED ORAL at 08:04

## 2025-04-06 RX ADMIN — DOXYCYCLINE HYCLATE 100 MG: 100 TABLET, COATED ORAL at 08:04

## 2025-04-06 RX ADMIN — MUPIROCIN: 20 OINTMENT TOPICAL at 08:04

## 2025-04-06 RX ADMIN — IPRATROPIUM BROMIDE AND ALBUTEROL SULFATE 3 ML: 2.5; .5 SOLUTION RESPIRATORY (INHALATION) at 05:04

## 2025-04-06 NOTE — PROGRESS NOTES
[facility] [place]  Cardiology  Progress Note    Patient Name: Hollie Ponce  MRN: [id number]  Admission Date: 2025  Hospital Length of Stay: 1 days  Code Status: Full Code   Attending Physician: Franky Peguero MD   Primary Care Physician: Ariadne Smith MD  Expected Discharge Date:   Principal Problem:NSTEMI (non-ST elevated myocardial infarction)    Subjective:     Interval Hx: no cp. Some SOB overnight.  Cath planned today.  Family at bedside.    Tele: SR 80s (personally reviewed and interpreted)      Past Medical History:   Diagnosis Date    Asthma     COPD (chronic obstructive pulmonary disease)     Hypertension        Past Surgical History:   Procedure Laterality Date     SECTION      HERNIA REPAIR      LEFT HEART CATHETERIZATION Left 2020    Procedure: Left heart cath;  Surgeon: Shabnam Vernon MD;  Location: [facility] CATH LAB;  Service: Cardiology;  Laterality: Left;       Review of patient's allergies indicates:   Allergen Reactions    Flagyl [metronidazole hcl]      Hives      Sulfamethoxazole-trimethoprim Itching    Aspirin Nausea Only    Lipitor [atorvastatin]      Myalgia        No current facility-administered medications on file prior to encounter.     Current Outpatient Medications on File Prior to Encounter   Medication Sig    albuterol (PROVENTIL/VENTOLIN HFA) 90 mcg/actuation inhaler Inhale 2 puffs into the lungs every 6 (six) hours as needed for Wheezing or Shortness of Breath. Rescue    albuterol-ipratropium (DUO-NEB) 2.5 mg-0.5 mg/3 mL nebulizer solution Take 3 mLs by nebulization every 4 (four) hours as needed for Wheezing.    albuterol sulfate 2.5 mg/0.5 mL Nebu Inhale into the lungs.    aspirin (ECOTRIN) 81 MG EC tablet Take 81 mg by mouth.    carvediloL (COREG) 3.125 MG tablet Take 1 tablet (3.125 mg total) by mouth 2 (two) times daily. TAKE 1 TABLET(3.125 MG) BY MOUTH TWICE DAILY WITH MEALS    clopidogreL (PLAVIX) 75 mg tablet Take 1 tablet (75 mg total) by mouth once daily.     fluticasone propionate (FLONASE) 50 mcg/actuation nasal spray SHAKE LIQUID AND USE 2 SPRAYS(100 MCG) IN EACH NOSTRIL EVERY DAY    fluticasone-salmeterol 230-21 mcg/dose (ADVAIR HFA) 230-21 mcg/actuation HFAA inhaler INHALE 2 PUFFS INTO THE LUNGS TWICE DAILY    fluticasone-salmeterol diskus inhaler 100-50 mcg Inhale 1 puff into the lungs every 12 (twelve) hours.    hydroCHLOROthiazide (HYDRODIURIL) 25 MG tablet Take 1 tablet (25 mg total) by mouth once daily.    lisinopriL (PRINIVIL,ZESTRIL) 40 MG tablet Take 1 tablet (40 mg total) by mouth once daily.    loratadine (CLARITIN) 10 mg tablet TAKE 1 TABLET BY MOUTH DAILY AS NEEDED FOR ALLERGIES    miscellaneous medical supply (470V TWO WAY VALVE) Misc Disp nebulizer and tubing ,medicine reservoir,and mask  So as to use  Every 4-6 hrs for sob/wheeze    montelukast (SINGULAIR) 10 mg tablet Take 1 tablet (10 mg total) by mouth every evening.    nicotine polacrilex 2 MG Lozg Take 1 lozenge (2 mg total) by mouth as needed. Use 1-2 per hour in place of a cigarette. Limit to 6 a day.    rosuvastatin (CRESTOR) 20 MG tablet Take 1 tablet (20 mg total) by mouth every evening.    tiotropium (SPIRIVA WITH HANDIHALER) 18 mcg inhalation capsule Inhale 1 capsule (18 mcg total) into the lungs Daily. INHALE THE CONTENTS OF 1 CAPSULE(18 MCG) INTO THE LUNGS EVERY DAY     Family History       Problem Relation (Age of Onset)    Cancer Mother    Liver disease Father          Tobacco Use    Smoking status: Every Day     Current packs/day: 0.25     Average packs/day: 0.5 packs/day for 51.0 years (25.3 ttl pk-yrs)     Types: Cigarettes     Start date: 12/13/1970     Last attempt to quit: 12/13/2020    Smokeless tobacco: Never   Substance and Sexual Activity    Alcohol use: Never    Drug use: Never    Sexual activity: Not Currently     Review of Systems   Gastrointestinal:  Negative for melena.   Genitourinary:  Negative for hematuria.     Objective:     Vital Signs (Most Recent):  Temp: 98.7  °F (37.1 °C) (04/06/25 0724)  Pulse: 87 (04/06/25 0734)  Resp: 18 (04/06/25 0734)  BP: (!) 155/89 (04/06/25 0725)  SpO2: (!) 94 % (04/06/25 0734) Vital Signs (24h Range):  Temp:  [98 °F (36.7 °C)-98.7 °F (37.1 °C)] 98.7 °F (37.1 °C)  Pulse:  [] 87  Resp:  [18-27] 18  SpO2:  [94 %-98 %] 94 %  BP: (143-176)/() 155/89     Weight: 59 kg (130 lb 1.1 oz)  Body mass index is 29.16 kg/m².    SpO2: (!) 94 %         Intake/Output Summary (Last 24 hours) at 4/6/2025 0744  Last data filed at 4/6/2025 0619  Gross per 24 hour   Intake 300 ml   Output 1300 ml   Net -1000 ml       Lines/Drains/Airways       Peripheral Intravenous Line  Duration                  Peripheral IV - Single Lumen 04/04/25 1910 20 G Left Antecubital 1 day         Peripheral IV - Single Lumen 04/05/25 2327 20 G Posterior;Right Forearm <1 day                   Exam unchanged vs 4/5/25  Physical Exam  Constitutional:       General: She is not in acute distress.     Appearance: Normal appearance. She is well-developed. She is not ill-appearing, toxic-appearing or diaphoretic.   HENT:      Head: Normocephalic and atraumatic.   Eyes:      General: No scleral icterus.     Extraocular Movements: Extraocular movements intact.      Conjunctiva/sclera: Conjunctivae normal.      Pupils: Pupils are equal, round, and reactive to light.   Neck:      Vascular: No JVD.      Trachea: No tracheal deviation.   Cardiovascular:      Rate and Rhythm: Normal rate and regular rhythm.      Pulses:           Radial pulses are 2+ on the right side.      Heart sounds: S1 normal and S2 normal. No murmur heard.     No friction rub. No gallop.   Pulmonary:      Effort: Pulmonary effort is normal. No respiratory distress.      Breath sounds: Normal breath sounds. No stridor. No wheezing, rhonchi or rales.   Chest:      Chest wall: No tenderness.   Abdominal:      General: There is no distension.      Palpations: Abdomen is soft.   Musculoskeletal:         General: No swelling  or tenderness. Normal range of motion.      Cervical back: Normal range of motion and neck supple. No rigidity.      Right lower leg: No edema.      Left lower leg: No edema.   Skin:     General: Skin is warm and dry.      Coloration: Skin is not jaundiced.   Neurological:      General: No focal deficit present.      Mental Status: She is alert and oriented to person, place, and time.      Cranial Nerves: No cranial nerve deficit.   Psychiatric:         Mood and Affect: Mood normal.         Behavior: Behavior normal.          Current Medications:   albuterol-ipratropium  3 mL Nebulization Q4H    aspirin  81 mg Oral Daily    atorvastatin  80 mg Oral Daily    carvediloL  3.125 mg Oral BID    clopidogreL  75 mg Oral Daily    doxycycline  100 mg Oral Q12H    enoxaparin  1 mg/kg Subcutaneous Q12H    hydroCHLOROthiazide  25 mg Oral Daily    lisinopriL  40 mg Oral Daily    montelukast  10 mg Oral QHS    predniSONE  40 mg Oral Daily      0.9% NaCl   Intravenous Continuous 100 mL/hr at 04/05/25 2324 New Bag at 04/05/25 2324       Current Facility-Administered Medications:     acetaminophen, 650 mg, Oral, Q8H PRN    acetaminophen, 650 mg, Oral, Q4H PRN    aluminum-magnesium hydroxide-simethicone, 30 mL, Oral, QID PRN    benzonatate, 100 mg, Oral, TID PRN    bisacodyL, 10 mg, Rectal, Daily PRN    cloNIDine, 0.1 mg, Oral, Q6H PRN    glucagon (human recombinant), 1 mg, Intramuscular, PRN    glucose, 16 g, Oral, PRN    glucose, 24 g, Oral, PRN    hydrOXYzine HCL, 50 mg, Oral, TID PRN    hydrOXYzine pamoate, 25 mg, Oral, Q8H PRN    magnesium oxide, 800 mg, Oral, PRN    magnesium oxide, 800 mg, Oral, PRN    melatonin, 6 mg, Oral, Nightly PRN    naloxone, 0.02 mg, Intravenous, PRN    nicotine, 1 patch, Transdermal, Daily PRN    ondansetron, 4 mg, Intravenous, Q8H PRN    potassium bicarbonate, 35 mEq, Oral, PRN    potassium bicarbonate, 50 mEq, Oral, PRN    potassium bicarbonate, 60 mEq, Oral, PRN    potassium, sodium phosphates, 2  packet, Oral, PRN    potassium, sodium phosphates, 2 packet, Oral, PRN    potassium, sodium phosphates, 2 packet, Oral, PRN    senna-docusate, 1 tablet, Oral, Daily PRN    simethicone, 1 tablet, Oral, QID PRN    sodium chloride 0.9%, 10 mL, Intravenous, Q12H PRN    sodium chloride 0.9%, 10 mL, Intravenous, PRN    sodium chloride 0.9%, 3 mL, Intravenous, PRN    Laboratory (all labs reviewed):  CBC:  Recent Labs   Lab 10/13/23  1030 04/16/24  1006 04/04/25 1929 04/05/25 0341 04/06/25  0514   WBC 3.88 L 4.38 5.01 4.30 6.49   Hemoglobin 13.5 13.4  --   --   --    HGB  --   --  14.2 13.6 14.0   Hematocrit 42.4 42.7  --   --   --    HCT  --   --  42.4 40.9 43.2   Platelet Count  --   --  220 226 228   Platelets 266 258  --   --   --        CHEMISTRIES:  Recent Labs   Lab 08/12/22  1413 04/13/23  1044 10/13/23  1030 04/16/24  1006 04/04/25 1929 04/05/25 0341 04/06/25  0514   Glucose 103 95 80 66 L  --   --   --    Sodium 137 141 137 141 134 L 130 L 130 L   Potassium 3.8 4.2 4.4 4.2 3.4 L 3.4 L 3.8   BUN 12 10 13 12 8 8 13   Creatinine 1.1 0.9 1.0 0.9 0.8 0.8 0.7   eGFR 54.7 A >60.0 >60.0 >60.0 >60 >60 >60   Calcium 9.7 10.1 10.4 9.6 9.1 9.3 8.9   Magnesium   --   --   --   --   --  1.5 L 1.7       CARDIAC BIOMARKERS:  Recent Labs   Lab 04/04/25 1929 04/05/25  0341   Troponin-I <0.006 0.327 H       COAGS:        LIPIDS/LFTS:  Recent Labs   Lab 08/12/22  1413 04/13/23  1044 10/13/23  1030 04/16/24  1006 04/04/25  1929 04/05/25  0341   Cholesterol Total  --   --   --   --   --  139   Cholesterol 122  --  156 146  --   --    Triglycerides 38  --  43 53  --   --    Triglyceride  --   --   --   --   --  32   HDL 59  --  57 50  --   --    HDL Cholesterol  --   --   --   --   --  60   LDL Cholesterol 55.4 L  --  90.4 85.4  --   --    Non-HDL Cholesterol 63  --  99 96  --   --    Non HDL Cholesterol  --   --   --   --   --  79   AST 20 19 22 15 19  --    ALT 21 18 20 13 17  --        BNP:  Recent Labs   Lab 04/04/25 1929    BNP 41       TSH:  Recent Labs   Lab 08/12/22  1413 10/13/23  1030 04/16/24  1006   TSH 0.926 1.890 1.888       Free T4:        Diagnostic Results:  ECG (personally reviewed and interpreted tracing(s)):  4/4/25 1750 SR 87, inflat ST abnl ?isch, new vs 6/17/21    Chest X-Ray (personally reviewed and interpreted image(s)): 4/4/25 NAD, aortic atherosclerosis    Echo: 4/5/25 (images personally reviewed and interpreted)    Left Ventricle: The left ventricle is normal in size. Normal wall thickness. There is normal systolic function with a visually estimated ejection fraction of 65 - 70%. Grade I diastolic dysfunction.    Right Ventricle: The right ventricle is normal in size. Systolic function is normal.    Mitral Valve: There is mild regurgitation.    Tricuspid Valve: There is mild regurgitation.    Pulmonary Artery: The estimated pulmonary artery systolic pressure is 55 mmHg.    Carotid US 1/6/21  There is 20-39% right Internal Carotid Stenosis. Vessel tortuosity may lead to overestimate of stenosis.  There is 20-39% left Internal Carotid Stenosis. Vessel tortuosity may lead to overestimate of stenosis.    Aortic US 1/6/21  No evidence of AAA.  Aortic atherosclerosis.  Increased flow velocity across bilateral iliac arteries suggesting >50% bilateral KATIA stenosis.    LE art US/BRITTNI 1/6/21  Diffuse SFA PAD bilaterally.  >50% mid R SFA stenosis.  >50% ost L SFA stenosis.  Mildly decreased BRITTNI bilaterally.    Ex BRITTNI 1/6/21  Mildly decreased resting BRITTNI bilaterally.  Moderately decreased exercise BRITTNI bilaterally.  Moderately dampened PVR waveforms bilaterally.    Cath: 11/16/20  The pre-procedure left ventricular end diastolic pressure was 11.  The estimated blood loss was <50 mL.  There was non-obstructive coronary artery disease..  No significant culprit lesion detected to explain her non-STEMI.  Left main:  No significant stenosis  Lad:  Luminal irregularities.  10-20% midstenosis  Circumflex:  OM 30-40% stenosis.   Luminal irregularities  RCA:  Mid 30 to 40% stenosis.  Access:  Right radial artery access  Assessment plan  Maximize medical management  Risk factor reductions  Smoking cessation        Assessment and Plan:     * NSTEMI (non-ST elevated myocardial infarction)  Concerning associated sxs with EKG changes and elev trop, ?type 1 vs 2 MI.  Cont ASA/plavix/Statin  Smoking cessation  Cath planned today.      COPD exacerbation  Mgmt per IM  Smoking cessation    Dyslipidemia  Check lipids  Titrate rosuva to 40mg on discharge    Essential hypertension  Cont med rx    PAD (peripheral artery disease)  Noted on prior testing.  No immediate plan for revasc    Tobacco dependency  Smoking cessation strongly advised.    Carotid atherosclerosis, bilateral  Will repeat carotid US as outpatient.    Pulmonary HTN  Likely group 2        VTE Risk Mitigation (From admission, onward)           Ordered     enoxaparin injection 60 mg  Every 12 hours (non-standard times)         04/05/25 0946     Place sequential compression device  Until discontinued         04/04/25 2312     IP VTE HIGH RISK PATIENT  Once         04/04/25 2312     Place sequential compression device  Until discontinued         04/04/25 2312                    Jhonny Schofield MD  Cardiology  [facility] [place] - Telemetry

## 2025-04-06 NOTE — PLAN OF CARE
[facility] - Intensive Care  Discharge Reassessment    Primary Care Provider: Ariadne Smith MD    Changes in medical condition or discharge plan: Patient transferred to ICU s/p LHC    Does patient need new DME? No    Follow up appts needed:  PCP and Cardiology    Medically stable: No.  Transferred to ICU 4/6/2025    Expected Discharge Date: 4/7/2025    Reassessment (most recent)       Discharge Reassessment - 04/06/25 1323          Discharge Reassessment    Assessment Type Discharge Planning Reassessment     Did the patient's condition or plan change since previous assessment? Yes     Communicated YOLA with patient/caregiver No     Discharge Plan A Home with family     Discharge Plan B Home Health     DME Needed Upon Discharge  none     Transition of Care Barriers None     Why the patient remains in the hospital Requires continued medical care        Post-Acute Status    Coverage Payor: [employer]     Discharge Delays None known at this time                   Addendum: Update:    Patient was only in Room 260 for recovery post cath lab.  She was not admitted to ICU.  She will return to Telemetry Unit per William julien.  1734H.

## 2025-04-06 NOTE — NURSING
[facility] [place]  Nurses Note -- 4 Eyes      4/6/2025       Skin assessed on: Q Shift      [x] No Pressure Injuries Present    [x]Prevention Measures Documented    [] Yes LDA  for Pressure Injury Previously documented     [] Yes New Pressure Injury Discovered   [] LDA for New Pressure Injury Added      Attending RN:  Sammie Sands RN     Second RN:  ZARI Mckeon

## 2025-04-06 NOTE — ASSESSMENT & PLAN NOTE
4/6:  Plan is for left heart catheterization today.  Continue treatment for ACS and COPD.  Pending results    Patient presents with NSTEMI. Chest pain is currently controlled. Patient is currently on NSTEMI Pathway.    EKG reviewed. Troponins reviewed and results noted-   Recent Labs   Lab 04/05/25  0341   TROPONINI 0.327*   .     Lipid panel reviewed and shows-     Lab Results   Component Value Date    LDLCALC 85.4 04/16/2024     Lab Results   Component Value Date    TRIG 32 04/05/2025         Medical management includes; Beta Blocker, Dual Anti-Platelet therapy, Anticoagulation, and High Intensity Stain Echo has been performed. Latest ECHO results are as follows- Results for orders placed during the hospital encounter of 04/04/25    Echo    Interpretation Summary    Left Ventricle: The left ventricle is normal in size. Normal wall thickness. There is normal systolic function with a visually estimated ejection fraction of 65 - 70%. Grade I diastolic dysfunction.    Right Ventricle: The right ventricle is normal in size. Systolic function is normal.    Mitral Valve: There is mild regurgitation.    Tricuspid Valve: There is mild regurgitation.    Pulmonary Artery: The estimated pulmonary artery systolic pressure is 55 mmHg.  .   Consult for cardiac rehab is not ordered. Patient counseled on lifestyle modifications- continue current medications. Cardiology is consulted. Plan of care reviewed with cardiology team. Continue to monitor patient closely and adjust therapy as needed.

## 2025-04-06 NOTE — PROGRESS NOTES
[facility] Medicine  Progress Note    Patient Name: Hollie Ponce  MRN: [id number]  Patient Class: IP- Inpatient   Admission Date: 4/4/2025  Length of Stay: 1 days  Attending Physician: Franky Peguero MD  Primary Care Provider: Ariadne Smith MD        Subjective     Principal Problem:NSTEMI (non-ST elevated myocardial infarction)        HPI:  This is a 69-year-old female with a past medical history of COPD (not on O2), nonobstructive CAD, hypertension, peripheral artery disease, tobacco use who presents with dyspnea.      Patient presents for evaluation of shortness of breath that started on the day of presentation.  She reports worsening exertional dyspnea, associated with a dry cough, and chest congestion.  She reports having rhinorrhea/sinus congestion.  Her daughter was sick with similar symptoms, but limited her exposure to the patient and was wearing a mask around her  Patient smokes 5 cigarettes daily.    In the ED, the patient was tachypneic (20s-40s), tachycardic (100s-110s), and mildly hypoxic (90%, requiring 3 L O2).  Labs were remarkable for hypokalemia (3.4), negative BNP (41), negative troponin (<0.006).  Chest x-ray showed no acute cardiopulmonary process.  Patient was given Solu-Medrol 125 mg IV, hydralazine 10 mg IV, DuoNeb x1, Ativan 0.5 mg p.o..  She was admitted for further management.    Overview/Hospital Course:  Mrs. Ponce was hospitalized for COPD exacerbation after presenting with dyspnea and chest tightness.  She was noted to be tachypneic, tachycardic, and hypoxic on room air.  Supplemental oxygen initiated.  She received corticosteroids and nebulizer treatments with some symptomatic improvement.  Initial troponin negative; however, repeat troponin significantly elevated.  She reports persistent and some mild chest tightness.  Cardiology consulted, appreciate recs.  Initiate treatment for NSTEMI.    4/6:  Plan is for left heart catheterization today.  Continue  treatment for ACS and COPD.  Pending results    Interval History: no new complaints    Review of Systems   Respiratory:  Positive for cough and shortness of breath.    Cardiovascular:  Negative for chest pain.   All other systems reviewed and are negative.    Objective:     Vital Signs (Most Recent):  Temp: 98.7 °F (37.1 °C) (04/06/25 0724)  Pulse: 87 (04/06/25 0734)  Resp: 18 (04/06/25 0734)  BP: (!) 155/89 (04/06/25 0725)  SpO2: (!) 94 % (04/06/25 0734) Vital Signs (24h Range):  Temp:  [98 °F (36.7 °C)-98.7 °F (37.1 °C)] 98.7 °F (37.1 °C)  Pulse:  [] 87  Resp:  [18-20] 18  SpO2:  [94 %-98 %] 94 %  BP: (155-176)/() 155/89     Weight: 59 kg (130 lb 1.1 oz)  Body mass index is 29.16 kg/m².    Intake/Output Summary (Last 24 hours) at 4/6/2025 1038  Last data filed at 4/6/2025 0619  Gross per 24 hour   Intake 300 ml   Output 1300 ml   Net -1000 ml         Physical Exam  Vitals and nursing note reviewed.   Constitutional:       General: She is not in acute distress.     Appearance: She is well-developed.   HENT:      Head: Normocephalic and atraumatic.      Right Ear: External ear normal.      Left Ear: External ear normal.   Cardiovascular:      Rate and Rhythm: Normal rate and regular rhythm.   Pulmonary:      Effort: Pulmonary effort is normal. No respiratory distress.   Skin:     General: Skin is warm and dry.   Neurological:      Mental Status: She is alert and oriented to person, place, and time.   Psychiatric:         Thought Content: Thought content normal.               Significant Labs: All pertinent labs within the past 24 hours have been reviewed.    Significant Imaging: I have reviewed all pertinent imaging results/findings within the past 24 hours.      Assessment & Plan  NSTEMI (non-ST elevated myocardial infarction)  4/6:  Plan is for left heart catheterization today.  Continue treatment for ACS and COPD.  Pending results    Patient presents with NSTEMI. Chest pain is currently controlled.  Patient is currently on NSTEMI Pathway.    EKG reviewed. Troponins reviewed and results noted-   Recent Labs   Lab 04/05/25  0341   TROPONINI 0.327*   .     Lipid panel reviewed and shows-     Lab Results   Component Value Date    LDLCALC 85.4 04/16/2024     Lab Results   Component Value Date    TRIG 32 04/05/2025         Medical management includes; Beta Blocker, Dual Anti-Platelet therapy, Anticoagulation, and High Intensity Stain Echo has been performed. Latest ECHO results are as follows- Results for orders placed during the hospital encounter of 04/04/25    Echo    Interpretation Summary    Left Ventricle: The left ventricle is normal in size. Normal wall thickness. There is normal systolic function with a visually estimated ejection fraction of 65 - 70%. Grade I diastolic dysfunction.    Right Ventricle: The right ventricle is normal in size. Systolic function is normal.    Mitral Valve: There is mild regurgitation.    Tricuspid Valve: There is mild regurgitation.    Pulmonary Artery: The estimated pulmonary artery systolic pressure is 55 mmHg.  .   Consult for cardiac rehab is not ordered. Patient counseled on lifestyle modifications- continue current medications. Cardiology is consulted. Plan of care reviewed with cardiology team. Continue to monitor patient closely and adjust therapy as needed.     COPD exacerbation  Patient's COPD is with exacerbation noted by continued dyspnea currently.  Patient is currently on COPD Pathway. Continue scheduled inhalers Steroids and Supplemental oxygen and monitor respiratory status closely.   Essential hypertension  Patient's blood pressure range in the last 24 hours was: BP  Min: 155/89  Max: 176/102.The patient's inpatient anti-hypertensive regimen is listed below:  Current Antihypertensives  carvediloL tablet 3.125 mg, 2 times daily, Oral  hydroCHLOROthiazide tablet 25 mg, Daily, Oral  lisinopriL tablet 40 mg, Daily, Oral  cloNIDine tablet 0.1 mg, Every 6 hours PRN,  Oral    Plan  - BP is controlled, no changes needed to their regimen    CAD (coronary artery disease)  Patient with known CAD which is controlled Will continue ASA, Plavix, and Statin and monitor for S/Sx of angina/ACS. Continue to monitor on telemetry.   PAD (peripheral artery disease)  History noted. Continue home medications.     Pulmonary HTN  Results for orders placed during the hospital encounter of 11/16/20    Echo Color Flow Doppler? Yes; Bubble Contrast? No    Interpretation Summary  · The left ventricle is normal in size with normal systolic function. The estimated ejection fraction is 55%.  · There is mild left ventricular concentric hypertrophy.  · Normal left ventricular diastolic function.  · Normal right ventricular size with normal right ventricular systolic function.  · Mild left atrial enlargement.  · Normal central venous pressure (3 mmHg).  · The estimated PA systolic pressure is 49 mmHg.  · There is pulmonary hypertension.    BNP  Recent Labs   Lab 04/04/25  1929   BNP 41       Optimize volume status     Tobacco dependency  Tobacco cessation not discussed with patient today. Nicotine replacement has been- prescribed  Acute respiratory failure with hypoxia  Patient with Hypoxic Respiratory failure which is Acute.  she is not on home oxygen. Supplemental oxygen was provided and noted-      .   Signs/symptoms of respiratory failure include- increased work of breathing and respiratory distress. Contributing diagnoses includes - COPD Labs and images were reviewed. Patient Has not had a recent ABG. Will treat underlying causes and adjust management of respiratory failure as follows- Wean O2 as tolerated   Carotid atherosclerosis, bilateral  Continue ASA/plavix/statin  Dyslipidemia  Continue statin  VTE Risk Mitigation (From admission, onward)           Ordered     enoxaparin injection 60 mg  Every 12 hours (non-standard times)         04/05/25 0993     Place sequential compression device  Until  discontinued         04/04/25 2312     IP VTE HIGH RISK PATIENT  Once         04/04/25 2312     Place sequential compression device  Until discontinued         04/04/25 2312                    Discharge Planning   YOLA:      Code Status: Full Code   Medical Readiness for Discharge Date:   Discharge Plan A: Home with family (Follow-ups)      Benito Blackburn Jr., APRN, [facility]  Hospitalist - Department of Hospital Medicine  [facility] [address] [place] [address]  Office #: [phone]; Pager #: [phone]

## 2025-04-06 NOTE — PLAN OF CARE
Problem: Adult Inpatient Plan of Care  Goal: Optimal Comfort and Wellbeing  Outcome: Progressing     Problem: COPD (Chronic Obstructive Pulmonary Disease)  Goal: Optimal Chronic Illness Coping  Outcome: Progressing  Goal: Effective Oxygenation and Ventilation  Outcome: Progressing

## 2025-04-06 NOTE — BRIEF OP NOTE
[facility] - Cath Lab  Brief Operative Note     SUMMARY     Surgery Date: 4/6/2025     Surgeons and Role:     * Jhonny Schofield MD - Primary    Assisting Surgeon: None    Pre-op Diagnosis:  Shortness of breath [R06.02]    Post-op Diagnosis:  Post-Op Diagnosis Codes:     * Shortness of breath [R06.02]    Procedure(s) (LRB):  Cardiac catheterization (N/A)    Anesthesia: RN IV Sedation    Description of the findings of the procedure: uneventful LHC/cor angio via R radial.  Nonobst CAD.    Findings/Key Components:  LVEDP: 24mmHg  LVEF: 65% by echo    Dominance: Right  LM: normal  LAD: MLI  LCx: MLI   OM1 inf branch 40%  RCA: MLI, prox 20-30%    Hemostasis:  R Radial band    Impression:  NSTEMI, likely type 2  Minimal/nonobst CAD  Elev LVEDP  Normal LV fxn by echo  R rad TR band for hemostasis    Plan:  Cont med rx  Cont ASA   Stop Plavix  Change HCTZ to IV lasix 40mg bid for next 24 hrs  Cont rx HTN  Dispo planning appropriate    Estimated Blood Loss: <50cc         Specimens: None

## 2025-04-06 NOTE — HOSPITAL COURSE
Interval Hx: Cath yesterday with elev LVEDP, no sig CAD.  Still SOB, no cp.  No radial access site problems.    Tele: SR 80s (personally reviewed and interpreted)

## 2025-04-06 NOTE — NURSING
PER handoff given by Rosalind HAMEED      Pt resting in bed quietly. NAD noted. No c/o pain. She voice concern about the upcoming procedure and would like to talk to the cardiologist or primary dr in the morning.      Fall and safety precautions maintained. Bed alarm activated and audible.. Bed locked in lowest position, with side rails up x2. Call bell and personal items within reach    [facility]  Nurses Note -- 4 Eyes      4/5/2025       Skin assessed on: Q Shift      [x] No Pressure Injuries Present    []Prevention Measures Documented    [] Yes LDA  for Pressure Injury Previously documented     [] Yes New Pressure Injury Discovered   [] LDA for New Pressure Injury Added      Attending RN:  Linda Modi RN     Second RN:  Rosalind Mcgowan RN

## 2025-04-06 NOTE — ASSESSMENT & PLAN NOTE
Patient's blood pressure range in the last 24 hours was: BP  Min: 155/89  Max: 176/102.The patient's inpatient anti-hypertensive regimen is listed below:  Current Antihypertensives  carvediloL tablet 3.125 mg, 2 times daily, Oral  hydroCHLOROthiazide tablet 25 mg, Daily, Oral  lisinopriL tablet 40 mg, Daily, Oral  cloNIDine tablet 0.1 mg, Every 6 hours PRN, Oral    Plan  - BP is controlled, no changes needed to their regimen

## 2025-04-06 NOTE — ASSESSMENT & PLAN NOTE
Concerning associated sxs with EKG changes and elev trop, ?type 1 vs 2 MI.  Cont ASA/plavix/Statin  Smoking cessation  Cath planned today.

## 2025-04-06 NOTE — SUBJECTIVE & OBJECTIVE
Past Medical History:   Diagnosis Date    Asthma     COPD (chronic obstructive pulmonary disease)     Hypertension        Past Surgical History:   Procedure Laterality Date     SECTION      HERNIA REPAIR      LEFT HEART CATHETERIZATION Left 2020    Procedure: Left heart cath;  Surgeon: Shabnam Vernon MD;  Location: [facility] CATH LAB;  Service: Cardiology;  Laterality: Left;       Review of patient's allergies indicates:   Allergen Reactions    Flagyl [metronidazole hcl]      Hives      Sulfamethoxazole-trimethoprim Itching    Aspirin Nausea Only    Lipitor [atorvastatin]      Myalgia        No current facility-administered medications on file prior to encounter.     Current Outpatient Medications on File Prior to Encounter   Medication Sig    albuterol (PROVENTIL/VENTOLIN HFA) 90 mcg/actuation inhaler Inhale 2 puffs into the lungs every 6 (six) hours as needed for Wheezing or Shortness of Breath. Rescue    albuterol-ipratropium (DUO-NEB) 2.5 mg-0.5 mg/3 mL nebulizer solution Take 3 mLs by nebulization every 4 (four) hours as needed for Wheezing.    albuterol sulfate 2.5 mg/0.5 mL Nebu Inhale into the lungs.    aspirin (ECOTRIN) 81 MG EC tablet Take 81 mg by mouth.    carvediloL (COREG) 3.125 MG tablet Take 1 tablet (3.125 mg total) by mouth 2 (two) times daily. TAKE 1 TABLET(3.125 MG) BY MOUTH TWICE DAILY WITH MEALS    clopidogreL (PLAVIX) 75 mg tablet Take 1 tablet (75 mg total) by mouth once daily.    fluticasone propionate (FLONASE) 50 mcg/actuation nasal spray SHAKE LIQUID AND USE 2 SPRAYS(100 MCG) IN EACH NOSTRIL EVERY DAY    fluticasone-salmeterol 230-21 mcg/dose (ADVAIR HFA) 230-21 mcg/actuation HFAA inhaler INHALE 2 PUFFS INTO THE LUNGS TWICE DAILY    fluticasone-salmeterol diskus inhaler 100-50 mcg Inhale 1 puff into the lungs every 12 (twelve) hours.    hydroCHLOROthiazide (HYDRODIURIL) 25 MG tablet Take 1 tablet (25 mg total) by mouth once daily.    lisinopriL (PRINIVIL,ZESTRIL) 40 MG tablet  Take 1 tablet (40 mg total) by mouth once daily.    loratadine (CLARITIN) 10 mg tablet TAKE 1 TABLET BY MOUTH DAILY AS NEEDED FOR ALLERGIES    miscellaneous medical supply (470V TWO WAY VALVE) Misc Disp nebulizer and tubing ,medicine reservoir,and mask  So as to use  Every 4-6 hrs for sob/wheeze    montelukast (SINGULAIR) 10 mg tablet Take 1 tablet (10 mg total) by mouth every evening.    nicotine polacrilex 2 MG Lozg Take 1 lozenge (2 mg total) by mouth as needed. Use 1-2 per hour in place of a cigarette. Limit to 6 a day.    rosuvastatin (CRESTOR) 20 MG tablet Take 1 tablet (20 mg total) by mouth every evening.    tiotropium (SPIRIVA WITH HANDIHALER) 18 mcg inhalation capsule Inhale 1 capsule (18 mcg total) into the lungs Daily. INHALE THE CONTENTS OF 1 CAPSULE(18 MCG) INTO THE LUNGS EVERY DAY     Family History       Problem Relation (Age of Onset)    Cancer Mother    Liver disease Father          Tobacco Use    Smoking status: Every Day     Current packs/day: 0.25     Average packs/day: 0.5 packs/day for 51.0 years (25.3 ttl pk-yrs)     Types: Cigarettes     Start date: 12/13/1970     Last attempt to quit: 12/13/2020    Smokeless tobacco: Never   Substance and Sexual Activity    Alcohol use: Never    Drug use: Never    Sexual activity: Not Currently     Review of Systems   Gastrointestinal:  Negative for melena.   Genitourinary:  Negative for hematuria.     Objective:     Vital Signs (Most Recent):  Temp: 98.7 °F (37.1 °C) (04/06/25 0724)  Pulse: 87 (04/06/25 0734)  Resp: 18 (04/06/25 0734)  BP: (!) 155/89 (04/06/25 0725)  SpO2: (!) 94 % (04/06/25 0734) Vital Signs (24h Range):  Temp:  [98 °F (36.7 °C)-98.7 °F (37.1 °C)] 98.7 °F (37.1 °C)  Pulse:  [] 87  Resp:  [18-27] 18  SpO2:  [94 %-98 %] 94 %  BP: (143-176)/() 155/89     Weight: 59 kg (130 lb 1.1 oz)  Body mass index is 29.16 kg/m².    SpO2: (!) 94 %         Intake/Output Summary (Last 24 hours) at 4/6/2025 0744  Last data filed at 4/6/2025  0619  Gross per 24 hour   Intake 300 ml   Output 1300 ml   Net -1000 ml       Lines/Drains/Airways       Peripheral Intravenous Line  Duration                  Peripheral IV - Single Lumen 04/04/25 1910 20 G Left Antecubital 1 day         Peripheral IV - Single Lumen 04/05/25 2327 20 G Posterior;Right Forearm <1 day                   Exam unchanged vs 4/5/25  Physical Exam  Constitutional:       General: She is not in acute distress.     Appearance: Normal appearance. She is well-developed. She is not ill-appearing, toxic-appearing or diaphoretic.   HENT:      Head: Normocephalic and atraumatic.   Eyes:      General: No scleral icterus.     Extraocular Movements: Extraocular movements intact.      Conjunctiva/sclera: Conjunctivae normal.      Pupils: Pupils are equal, round, and reactive to light.   Neck:      Vascular: No JVD.      Trachea: No tracheal deviation.   Cardiovascular:      Rate and Rhythm: Normal rate and regular rhythm.      Pulses:           Radial pulses are 2+ on the right side.      Heart sounds: S1 normal and S2 normal. No murmur heard.     No friction rub. No gallop.   Pulmonary:      Effort: Pulmonary effort is normal. No respiratory distress.      Breath sounds: Normal breath sounds. No stridor. No wheezing, rhonchi or rales.   Chest:      Chest wall: No tenderness.   Abdominal:      General: There is no distension.      Palpations: Abdomen is soft.   Musculoskeletal:         General: No swelling or tenderness. Normal range of motion.      Cervical back: Normal range of motion and neck supple. No rigidity.      Right lower leg: No edema.      Left lower leg: No edema.   Skin:     General: Skin is warm and dry.      Coloration: Skin is not jaundiced.   Neurological:      General: No focal deficit present.      Mental Status: She is alert and oriented to person, place, and time.      Cranial Nerves: No cranial nerve deficit.   Psychiatric:         Mood and Affect: Mood normal.         Behavior:  Behavior normal.          Current Medications:   albuterol-ipratropium  3 mL Nebulization Q4H    aspirin  81 mg Oral Daily    atorvastatin  80 mg Oral Daily    carvediloL  3.125 mg Oral BID    clopidogreL  75 mg Oral Daily    doxycycline  100 mg Oral Q12H    enoxaparin  1 mg/kg Subcutaneous Q12H    hydroCHLOROthiazide  25 mg Oral Daily    lisinopriL  40 mg Oral Daily    montelukast  10 mg Oral QHS    predniSONE  40 mg Oral Daily      0.9% NaCl   Intravenous Continuous 100 mL/hr at 04/05/25 2324 New Bag at 04/05/25 2324       Current Facility-Administered Medications:     acetaminophen, 650 mg, Oral, Q8H PRN    acetaminophen, 650 mg, Oral, Q4H PRN    aluminum-magnesium hydroxide-simethicone, 30 mL, Oral, QID PRN    benzonatate, 100 mg, Oral, TID PRN    bisacodyL, 10 mg, Rectal, Daily PRN    cloNIDine, 0.1 mg, Oral, Q6H PRN    glucagon (human recombinant), 1 mg, Intramuscular, PRN    glucose, 16 g, Oral, PRN    glucose, 24 g, Oral, PRN    hydrOXYzine HCL, 50 mg, Oral, TID PRN    hydrOXYzine pamoate, 25 mg, Oral, Q8H PRN    magnesium oxide, 800 mg, Oral, PRN    magnesium oxide, 800 mg, Oral, PRN    melatonin, 6 mg, Oral, Nightly PRN    naloxone, 0.02 mg, Intravenous, PRN    nicotine, 1 patch, Transdermal, Daily PRN    ondansetron, 4 mg, Intravenous, Q8H PRN    potassium bicarbonate, 35 mEq, Oral, PRN    potassium bicarbonate, 50 mEq, Oral, PRN    potassium bicarbonate, 60 mEq, Oral, PRN    potassium, sodium phosphates, 2 packet, Oral, PRN    potassium, sodium phosphates, 2 packet, Oral, PRN    potassium, sodium phosphates, 2 packet, Oral, PRN    senna-docusate, 1 tablet, Oral, Daily PRN    simethicone, 1 tablet, Oral, QID PRN    sodium chloride 0.9%, 10 mL, Intravenous, Q12H PRN    sodium chloride 0.9%, 10 mL, Intravenous, PRN    sodium chloride 0.9%, 3 mL, Intravenous, PRN    Laboratory (all labs reviewed):  CBC:  Recent Labs   Lab 10/13/23  1030 04/16/24  1006 04/04/25  1929 04/05/25  0341 04/06/25  0514   WBC 3.88 L  4.38 5.01 4.30 6.49   Hemoglobin 13.5 13.4  --   --   --    HGB  --   --  14.2 13.6 14.0   Hematocrit 42.4 42.7  --   --   --    HCT  --   --  42.4 40.9 43.2   Platelet Count  --   --  220 226 228   Platelets 266 258  --   --   --        CHEMISTRIES:  Recent Labs   Lab 08/12/22 1413 04/13/23  1044 10/13/23  1030 04/16/24  1006 04/04/25  1929 04/05/25  0341 04/06/25  0514   Glucose 103 95 80 66 L  --   --   --    Sodium 137 141 137 141 134 L 130 L 130 L   Potassium 3.8 4.2 4.4 4.2 3.4 L 3.4 L 3.8   BUN 12 10 13 12 8 8 13   Creatinine 1.1 0.9 1.0 0.9 0.8 0.8 0.7   eGFR 54.7 A >60.0 >60.0 >60.0 >60 >60 >60   Calcium 9.7 10.1 10.4 9.6 9.1 9.3 8.9   Magnesium   --   --   --   --   --  1.5 L 1.7       CARDIAC BIOMARKERS:  Recent Labs   Lab 04/04/25 1929 04/05/25 0341   Troponin-I <0.006 0.327 H       COAGS:        LIPIDS/LFTS:  Recent Labs   Lab 08/12/22 1413 04/13/23  1044 10/13/23  1030 04/16/24  1006 04/04/25  1929 04/05/25  0341   Cholesterol Total  --   --   --   --   --  139   Cholesterol 122  --  156 146  --   --    Triglycerides 38  --  43 53  --   --    Triglyceride  --   --   --   --   --  32   HDL 59  --  57 50  --   --    HDL Cholesterol  --   --   --   --   --  60   LDL Cholesterol 55.4 L  --  90.4 85.4  --   --    Non-HDL Cholesterol 63  --  99 96  --   --    Non HDL Cholesterol  --   --   --   --   --  79   AST 20 19 22 15 19  --    ALT 21 18 20 13 17  --        BNP:  Recent Labs   Lab 04/04/25  1929   BNP 41       TSH:  Recent Labs   Lab 08/12/22  1413 10/13/23  1030 04/16/24  1006   TSH 0.926 1.890 1.888       Free T4:        Diagnostic Results:  ECG (personally reviewed and interpreted tracing(s)):  4/4/25 1750 SR 87, inflat ST abnl ?isch, new vs 6/17/21    Chest X-Ray (personally reviewed and interpreted image(s)): 4/4/25 NAD, aortic atherosclerosis    Echo: 4/5/25 (images personally reviewed and interpreted)    Left Ventricle: The left ventricle is normal in size. Normal wall thickness. There is  normal systolic function with a visually estimated ejection fraction of 65 - 70%. Grade I diastolic dysfunction.    Right Ventricle: The right ventricle is normal in size. Systolic function is normal.    Mitral Valve: There is mild regurgitation.    Tricuspid Valve: There is mild regurgitation.    Pulmonary Artery: The estimated pulmonary artery systolic pressure is 55 mmHg.    Carotid US 1/6/21  There is 20-39% right Internal Carotid Stenosis. Vessel tortuosity may lead to overestimate of stenosis.  There is 20-39% left Internal Carotid Stenosis. Vessel tortuosity may lead to overestimate of stenosis.    Aortic US 1/6/21  No evidence of AAA.  Aortic atherosclerosis.  Increased flow velocity across bilateral iliac arteries suggesting >50% bilateral KATIA stenosis.    LE art US/BRITTNI 1/6/21  Diffuse SFA PAD bilaterally.  >50% mid R SFA stenosis.  >50% ost L SFA stenosis.  Mildly decreased BRITTNI bilaterally.    Ex BRITTNI 1/6/21  Mildly decreased resting BRITTNI bilaterally.  Moderately decreased exercise BRITTNI bilaterally.  Moderately dampened PVR waveforms bilaterally.    Cath: 11/16/20  The pre-procedure left ventricular end diastolic pressure was 11.  The estimated blood loss was <50 mL.  There was non-obstructive coronary artery disease..  No significant culprit lesion detected to explain her non-STEMI.  Left main:  No significant stenosis  Lad:  Luminal irregularities.  10-20% midstenosis  Circumflex:  OM 30-40% stenosis.  Luminal irregularities  RCA:  Mid 30 to 40% stenosis.  Access:  Right radial artery access  Assessment plan  Maximize medical management  Risk factor reductions  Smoking cessation

## 2025-04-07 LAB
ABSOLUTE EOSINOPHIL (OHS): 0 K/UL
ABSOLUTE MONOCYTE (OHS): 0.72 K/UL (ref 0.3–1)
ABSOLUTE NEUTROPHIL COUNT (OHS): 3.93 K/UL (ref 1.8–7.7)
ANION GAP (OHS): 10 MMOL/L (ref 8–16)
BASOPHILS # BLD AUTO: 0.01 K/UL
BASOPHILS NFR BLD AUTO: 0.2 %
BUN SERPL-MCNC: 16 MG/DL (ref 8–23)
CALCIUM SERPL-MCNC: 8.6 MG/DL (ref 8.7–10.5)
CHLORIDE SERPL-SCNC: 92 MMOL/L (ref 95–110)
CO2 SERPL-SCNC: 30 MMOL/L (ref 23–29)
CREAT SERPL-MCNC: 0.7 MG/DL (ref 0.5–1.4)
ERYTHROCYTE [DISTWIDTH] IN BLOOD BY AUTOMATED COUNT: 13.2 % (ref 11.5–14.5)
GFR SERPLBLD CREATININE-BSD FMLA CKD-EPI: >60 ML/MIN/1.73/M2
GLUCOSE SERPL-MCNC: 111 MG/DL (ref 70–110)
HCT VFR BLD AUTO: 40.9 % (ref 37–48.5)
HGB BLD-MCNC: 13.4 GM/DL (ref 12–16)
IMM GRANULOCYTES # BLD AUTO: 0.01 K/UL (ref 0–0.04)
IMM GRANULOCYTES NFR BLD AUTO: 0.2 % (ref 0–0.5)
LYMPHOCYTES # BLD AUTO: 1.3 K/UL (ref 1–4.8)
MAGNESIUM SERPL-MCNC: 1.5 MG/DL (ref 1.6–2.6)
MCH RBC QN AUTO: 29.7 PG (ref 27–31)
MCHC RBC AUTO-ENTMCNC: 32.8 G/DL (ref 32–36)
MCV RBC AUTO: 91 FL (ref 82–98)
NUCLEATED RBC (/100WBC) (OHS): 0 /100 WBC
PHOSPHATE SERPL-MCNC: 3.5 MG/DL (ref 2.7–4.5)
PLATELET # BLD AUTO: 233 K/UL (ref 150–450)
PMV BLD AUTO: 10.2 FL (ref 9.2–12.9)
POTASSIUM SERPL-SCNC: 3.2 MMOL/L (ref 3.5–5.1)
RBC # BLD AUTO: 4.51 M/UL (ref 4–5.4)
RELATIVE EOSINOPHIL (OHS): 0 %
RELATIVE LYMPHOCYTE (OHS): 21.8 % (ref 18–48)
RELATIVE MONOCYTE (OHS): 12.1 % (ref 4–15)
RELATIVE NEUTROPHIL (OHS): 65.7 % (ref 38–73)
SODIUM SERPL-SCNC: 132 MMOL/L (ref 136–145)
WBC # BLD AUTO: 5.97 K/UL (ref 3.9–12.7)

## 2025-04-07 PROCEDURE — 63600175 PHARM REV CODE 636 W HCPCS: Performed by: PHYSICIAN ASSISTANT

## 2025-04-07 PROCEDURE — 85025 COMPLETE CBC W/AUTO DIFF WBC: CPT | Performed by: STUDENT IN AN ORGANIZED HEALTH CARE EDUCATION/TRAINING PROGRAM

## 2025-04-07 PROCEDURE — 87205 SMEAR GRAM STAIN: CPT | Performed by: PHYSICIAN ASSISTANT

## 2025-04-07 PROCEDURE — 99233 SBSQ HOSP IP/OBS HIGH 50: CPT | Mod: ,,, | Performed by: INTERNAL MEDICINE

## 2025-04-07 PROCEDURE — 11000001 HC ACUTE MED/SURG PRIVATE ROOM

## 2025-04-07 PROCEDURE — 25000003 PHARM REV CODE 250: Performed by: STUDENT IN AN ORGANIZED HEALTH CARE EDUCATION/TRAINING PROGRAM

## 2025-04-07 PROCEDURE — 36415 COLL VENOUS BLD VENIPUNCTURE: CPT | Performed by: STUDENT IN AN ORGANIZED HEALTH CARE EDUCATION/TRAINING PROGRAM

## 2025-04-07 PROCEDURE — 84100 ASSAY OF PHOSPHORUS: CPT | Performed by: STUDENT IN AN ORGANIZED HEALTH CARE EDUCATION/TRAINING PROGRAM

## 2025-04-07 PROCEDURE — 63600175 PHARM REV CODE 636 W HCPCS: Performed by: STUDENT IN AN ORGANIZED HEALTH CARE EDUCATION/TRAINING PROGRAM

## 2025-04-07 PROCEDURE — 94640 AIRWAY INHALATION TREATMENT: CPT

## 2025-04-07 PROCEDURE — 25000003 PHARM REV CODE 250: Performed by: HOSPITALIST

## 2025-04-07 PROCEDURE — 83735 ASSAY OF MAGNESIUM: CPT | Performed by: STUDENT IN AN ORGANIZED HEALTH CARE EDUCATION/TRAINING PROGRAM

## 2025-04-07 PROCEDURE — 99900035 HC TECH TIME PER 15 MIN (STAT)

## 2025-04-07 PROCEDURE — 94761 N-INVAS EAR/PLS OXIMETRY MLT: CPT

## 2025-04-07 PROCEDURE — 25000003 PHARM REV CODE 250: Performed by: INTERNAL MEDICINE

## 2025-04-07 PROCEDURE — 25000003 PHARM REV CODE 250: Performed by: PHYSICIAN ASSISTANT

## 2025-04-07 PROCEDURE — 94664 DEMO&/EVAL PT USE INHALER: CPT

## 2025-04-07 PROCEDURE — 25000242 PHARM REV CODE 250 ALT 637 W/ HCPCS: Performed by: PHYSICIAN ASSISTANT

## 2025-04-07 PROCEDURE — 27000646 HC AEROBIKA DEVICE

## 2025-04-07 PROCEDURE — 25000242 PHARM REV CODE 250 ALT 637 W/ HCPCS: Performed by: STUDENT IN AN ORGANIZED HEALTH CARE EDUCATION/TRAINING PROGRAM

## 2025-04-07 PROCEDURE — 80048 BASIC METABOLIC PNL TOTAL CA: CPT | Performed by: STUDENT IN AN ORGANIZED HEALTH CARE EDUCATION/TRAINING PROGRAM

## 2025-04-07 PROCEDURE — 27000221 HC OXYGEN, UP TO 24 HOURS

## 2025-04-07 RX ORDER — GUAIFENESIN 600 MG/1
600 TABLET, EXTENDED RELEASE ORAL 2 TIMES DAILY
Status: DISCONTINUED | OUTPATIENT
Start: 2025-04-07 | End: 2025-04-16 | Stop reason: HOSPADM

## 2025-04-07 RX ORDER — SODIUM CHLORIDE FOR INHALATION 3 %
4 VIAL, NEBULIZER (ML) INHALATION ONCE
Status: COMPLETED | OUTPATIENT
Start: 2025-04-07 | End: 2025-04-07

## 2025-04-07 RX ORDER — POTASSIUM CHLORIDE 750 MG/1
30 TABLET, EXTENDED RELEASE ORAL ONCE
Status: COMPLETED | OUTPATIENT
Start: 2025-04-07 | End: 2025-04-07

## 2025-04-07 RX ORDER — ALBUTEROL SULFATE 2.5 MG/.5ML
2.5 SOLUTION RESPIRATORY (INHALATION) EVERY 4 HOURS PRN
Status: DISCONTINUED | OUTPATIENT
Start: 2025-04-07 | End: 2025-04-16 | Stop reason: HOSPADM

## 2025-04-07 RX ORDER — FUROSEMIDE 40 MG/1
40 TABLET ORAL DAILY
Status: DISCONTINUED | OUTPATIENT
Start: 2025-04-07 | End: 2025-04-13

## 2025-04-07 RX ORDER — MAGNESIUM SULFATE HEPTAHYDRATE 40 MG/ML
2 INJECTION, SOLUTION INTRAVENOUS ONCE
Status: COMPLETED | OUTPATIENT
Start: 2025-04-07 | End: 2025-04-07

## 2025-04-07 RX ORDER — IPRATROPIUM BROMIDE AND ALBUTEROL SULFATE 2.5; .5 MG/3ML; MG/3ML
3 SOLUTION RESPIRATORY (INHALATION)
Status: DISCONTINUED | OUTPATIENT
Start: 2025-04-07 | End: 2025-04-11

## 2025-04-07 RX ORDER — FLUTICASONE PROPIONATE 50 MCG
2 SPRAY, SUSPENSION (ML) NASAL DAILY
Status: DISCONTINUED | OUTPATIENT
Start: 2025-04-07 | End: 2025-04-16 | Stop reason: HOSPADM

## 2025-04-07 RX ADMIN — POTASSIUM CHLORIDE 30 MEQ: 750 TABLET, EXTENDED RELEASE ORAL at 09:04

## 2025-04-07 RX ADMIN — CARVEDILOL 3.12 MG: 3.12 TABLET, FILM COATED ORAL at 09:04

## 2025-04-07 RX ADMIN — IPRATROPIUM BROMIDE AND ALBUTEROL SULFATE 3 ML: 2.5; .5 SOLUTION RESPIRATORY (INHALATION) at 12:04

## 2025-04-07 RX ADMIN — PREDNISONE 40 MG: 20 TABLET ORAL at 09:04

## 2025-04-07 RX ADMIN — DOXYCYCLINE HYCLATE 100 MG: 100 TABLET, COATED ORAL at 09:04

## 2025-04-07 RX ADMIN — FLUTICASONE PROPIONATE 100 MCG: 50 SPRAY, METERED NASAL at 01:04

## 2025-04-07 RX ADMIN — MAGNESIUM SULFATE HEPTAHYDRATE 2 G: 40 INJECTION, SOLUTION INTRAVENOUS at 09:04

## 2025-04-07 RX ADMIN — FUROSEMIDE 40 MG: 40 TABLET ORAL at 09:04

## 2025-04-07 RX ADMIN — MUPIROCIN: 20 OINTMENT TOPICAL at 10:04

## 2025-04-07 RX ADMIN — IPRATROPIUM BROMIDE AND ALBUTEROL SULFATE 3 ML: 2.5; .5 SOLUTION RESPIRATORY (INHALATION) at 03:04

## 2025-04-07 RX ADMIN — IPRATROPIUM BROMIDE AND ALBUTEROL SULFATE 3 ML: 2.5; .5 SOLUTION RESPIRATORY (INHALATION) at 04:04

## 2025-04-07 RX ADMIN — IPRATROPIUM BROMIDE AND ALBUTEROL SULFATE 3 ML: 2.5; .5 SOLUTION RESPIRATORY (INHALATION) at 08:04

## 2025-04-07 RX ADMIN — GUAIFENESIN 600 MG: 600 TABLET, EXTENDED RELEASE ORAL at 09:04

## 2025-04-07 RX ADMIN — GUAIFENESIN 600 MG: 600 TABLET, EXTENDED RELEASE ORAL at 01:04

## 2025-04-07 RX ADMIN — MONTELUKAST 10 MG: 10 TABLET, FILM COATED ORAL at 09:04

## 2025-04-07 RX ADMIN — ASPIRIN 81 MG: 81 TABLET, COATED ORAL at 09:04

## 2025-04-07 RX ADMIN — LISINOPRIL 40 MG: 20 TABLET ORAL at 09:04

## 2025-04-07 RX ADMIN — SODIUM CHLORIDE SOLN NEBU 3% 4 ML: 3 NEBU SOLN at 12:04

## 2025-04-07 RX ADMIN — MUPIROCIN: 20 OINTMENT TOPICAL at 09:04

## 2025-04-07 RX ADMIN — HYDROXYZINE PAMOATE 25 MG: 25 CAPSULE ORAL at 01:04

## 2025-04-07 NOTE — SUBJECTIVE & OBJECTIVE
Past Medical History:   Diagnosis Date    Asthma     COPD (chronic obstructive pulmonary disease)     Hypertension        Past Surgical History:   Procedure Laterality Date     SECTION      HERNIA REPAIR      LEFT HEART CATHETERIZATION Left 2020    Procedure: Left heart cath;  Surgeon: Shabnam Vernon MD;  Location: [facility] CATH LAB;  Service: Cardiology;  Laterality: Left;       Review of patient's allergies indicates:   Allergen Reactions    Flagyl [metronidazole hcl]      Hives      Sulfamethoxazole-trimethoprim Itching    Aspirin Nausea Only    Lipitor [atorvastatin]      Myalgia        No current facility-administered medications on file prior to encounter.     Current Outpatient Medications on File Prior to Encounter   Medication Sig    albuterol (PROVENTIL/VENTOLIN HFA) 90 mcg/actuation inhaler Inhale 2 puffs into the lungs every 6 (six) hours as needed for Wheezing or Shortness of Breath. Rescue    albuterol-ipratropium (DUO-NEB) 2.5 mg-0.5 mg/3 mL nebulizer solution Take 3 mLs by nebulization every 4 (four) hours as needed for Wheezing.    albuterol sulfate 2.5 mg/0.5 mL Nebu Inhale into the lungs.    aspirin (ECOTRIN) 81 MG EC tablet Take 81 mg by mouth.    carvediloL (COREG) 3.125 MG tablet Take 1 tablet (3.125 mg total) by mouth 2 (two) times daily. TAKE 1 TABLET(3.125 MG) BY MOUTH TWICE DAILY WITH MEALS    clopidogreL (PLAVIX) 75 mg tablet Take 1 tablet (75 mg total) by mouth once daily.    fluticasone propionate (FLONASE) 50 mcg/actuation nasal spray SHAKE LIQUID AND USE 2 SPRAYS(100 MCG) IN EACH NOSTRIL EVERY DAY    fluticasone-salmeterol 230-21 mcg/dose (ADVAIR HFA) 230-21 mcg/actuation HFAA inhaler INHALE 2 PUFFS INTO THE LUNGS TWICE DAILY    fluticasone-salmeterol diskus inhaler 100-50 mcg Inhale 1 puff into the lungs every 12 (twelve) hours.    hydroCHLOROthiazide (HYDRODIURIL) 25 MG tablet Take 1 tablet (25 mg total) by mouth once daily.    lisinopriL (PRINIVIL,ZESTRIL) 40 MG tablet  Take 1 tablet (40 mg total) by mouth once daily.    loratadine (CLARITIN) 10 mg tablet TAKE 1 TABLET BY MOUTH DAILY AS NEEDED FOR ALLERGIES    miscellaneous medical supply (470V TWO WAY VALVE) Misc Disp nebulizer and tubing ,medicine reservoir,and mask  So as to use  Every 4-6 hrs for sob/wheeze    montelukast (SINGULAIR) 10 mg tablet Take 1 tablet (10 mg total) by mouth every evening.    nicotine polacrilex 2 MG Lozg Take 1 lozenge (2 mg total) by mouth as needed. Use 1-2 per hour in place of a cigarette. Limit to 6 a day.    rosuvastatin (CRESTOR) 20 MG tablet Take 1 tablet (20 mg total) by mouth every evening.    tiotropium (SPIRIVA WITH HANDIHALER) 18 mcg inhalation capsule Inhale 1 capsule (18 mcg total) into the lungs Daily. INHALE THE CONTENTS OF 1 CAPSULE(18 MCG) INTO THE LUNGS EVERY DAY     Family History       Problem Relation (Age of Onset)    Cancer Mother    Liver disease Father          Tobacco Use    Smoking status: Every Day     Current packs/day: 0.25     Average packs/day: 0.5 packs/day for 51.0 years (25.3 ttl pk-yrs)     Types: Cigarettes     Start date: 12/13/1970     Last attempt to quit: 12/13/2020    Smokeless tobacco: Never   Substance and Sexual Activity    Alcohol use: Never    Drug use: Never    Sexual activity: Not Currently     Review of Systems   Gastrointestinal:  Negative for melena.   Genitourinary:  Negative for hematuria.     Objective:     Vital Signs (Most Recent):  Temp: 98 °F (36.7 °C) (04/07/25 0422)  Pulse: 84 (04/07/25 0640)  Resp: 20 (04/07/25 0425)  BP: 126/79 (04/07/25 0422)  SpO2: 98 % (04/07/25 0422) Vital Signs (24h Range):  Temp:  [97.9 °F (36.6 °C)-98 °F (36.7 °C)] 98 °F (36.7 °C)  Pulse:  [] 84  Resp:  [18-41] 20  SpO2:  [92 %-99 %] 98 %  BP: (126-205)/() 126/79     Weight: 59 kg (130 lb)  Body mass index is 29.15 kg/m².    SpO2: 98 %         Intake/Output Summary (Last 24 hours) at 4/7/2025 9406  Last data filed at 4/7/2025 0423  Gross per 24 hour    Intake 420 ml   Output 2700 ml   Net -2280 ml       Lines/Drains/Airways       Drain  Duration             Female External Urinary Catheter w/ Suction 04/06/25 1300 <1 day              Peripheral Intravenous Line  Duration                  Peripheral IV - Single Lumen 04/04/25 1910 20 G Left Antecubital 2 days         Peripheral IV - Single Lumen 04/05/25 2327 20 G Posterior;Right Forearm 1 day                   Exam unchanged vs 4/5/25  Physical Exam  Constitutional:       General: She is not in acute distress.     Appearance: Normal appearance. She is well-developed. She is not ill-appearing, toxic-appearing or diaphoretic.   HENT:      Head: Normocephalic and atraumatic.   Eyes:      General: No scleral icterus.     Extraocular Movements: Extraocular movements intact.      Conjunctiva/sclera: Conjunctivae normal.      Pupils: Pupils are equal, round, and reactive to light.   Neck:      Vascular: No JVD.      Trachea: No tracheal deviation.   Cardiovascular:      Rate and Rhythm: Normal rate and regular rhythm.      Heart sounds: S1 normal and S2 normal. No murmur heard.     No friction rub. No gallop.      Comments: R rad access site c/d/i  Pulmonary:      Effort: Pulmonary effort is normal. No respiratory distress.      Breath sounds: Normal breath sounds. No stridor. No wheezing, rhonchi or rales.   Chest:      Chest wall: No tenderness.   Abdominal:      General: There is no distension.      Palpations: Abdomen is soft.   Musculoskeletal:         General: No swelling or tenderness. Normal range of motion.      Cervical back: Normal range of motion and neck supple. No rigidity.      Right lower leg: No edema.      Left lower leg: No edema.   Skin:     General: Skin is warm and dry.      Coloration: Skin is not jaundiced.   Neurological:      General: No focal deficit present.      Mental Status: She is alert and oriented to person, place, and time.      Cranial Nerves: No cranial nerve deficit.   Psychiatric:          Mood and Affect: Mood normal.         Behavior: Behavior normal.          Current Medications:   albuterol-ipratropium  3 mL Nebulization Q4H    aspirin  81 mg Oral Daily    carvediloL  3.125 mg Oral BID    doxycycline  100 mg Oral Q12H    lisinopriL  40 mg Oral Daily    magnesium sulfate 2 g IVPB  2 g Intravenous Once    montelukast  10 mg Oral QHS    mupirocin   Nasal BID    potassium chloride  30 mEq Oral Once    predniSONE  40 mg Oral Daily      nitroGLYCERIN    Continuous PRN   Stopped at 04/06/25 1246       Current Facility-Administered Medications:     acetaminophen, 650 mg, Oral, Q8H PRN    acetaminophen, 650 mg, Oral, Q4H PRN    aluminum-magnesium hydroxide-simethicone, 30 mL, Oral, QID PRN    atropine, 0.5 mg, Intravenous, PRN    benzonatate, 100 mg, Oral, TID PRN    bisacodyL, 10 mg, Rectal, Daily PRN    cloNIDine, 0.1 mg, Oral, Q6H PRN    dextromethorphan-guaiFENesin  mg/5 ml, 10 mL, Oral, Q4H PRN    glucagon (human recombinant), 1 mg, Intramuscular, PRN    glucose, 16 g, Oral, PRN    glucose, 24 g, Oral, PRN    hydrALAZINE, 5 mg, Intravenous, Q6H PRN    HYDROcodone-acetaminophen, 1 tablet, Oral, Q4H PRN    hydrOXYzine HCL, 50 mg, Oral, TID PRN    hydrOXYzine pamoate, 25 mg, Oral, Q8H PRN    magnesium oxide, 800 mg, Oral, PRN    magnesium oxide, 800 mg, Oral, PRN    melatonin, 6 mg, Oral, Nightly PRN    morphine, 2 mg, Intravenous, Q10 Min PRN    naloxone, 0.02 mg, Intravenous, PRN    nicotine, 1 patch, Transdermal, Daily PRN    nitroGLYCERIN, , , Continuous PRN    ondansetron, 4 mg, Intravenous, Q8H PRN    potassium bicarbonate, 35 mEq, Oral, PRN    potassium bicarbonate, 50 mEq, Oral, PRN    potassium bicarbonate, 60 mEq, Oral, PRN    potassium, sodium phosphates, 2 packet, Oral, PRN    potassium, sodium phosphates, 2 packet, Oral, PRN    potassium, sodium phosphates, 2 packet, Oral, PRN    senna-docusate, 1 tablet, Oral, Daily PRN    simethicone, 1 tablet, Oral, QID PRN    sodium  chloride 0.9%, 250 mL, Intravenous, PRN    sodium chloride 0.9%, 10 mL, Intravenous, Q12H PRN    sodium chloride 0.9%, 10 mL, Intravenous, PRN    sodium chloride 0.9%, 3 mL, Intravenous, PRN    Laboratory (all labs reviewed):  CBC:  Recent Labs   Lab 04/16/24  1006 04/04/25 1929 04/05/25  0341 04/06/25  0514 04/07/25  0501   WBC 4.38 5.01 4.30 6.49 5.97   Hemoglobin 13.4  --   --   --   --    HGB  --  14.2 13.6 14.0 13.4   Hematocrit 42.7  --   --   --   --    HCT  --  42.4 40.9 43.2 40.9   Platelet Count  --  220 226 228 233   Platelets 258  --   --   --   --        CHEMISTRIES:  Recent Labs   Lab 08/12/22  1413 04/13/23  1044 10/13/23  1030 04/16/24  1006 04/04/25 1929 04/05/25  0341 04/06/25  0514 04/07/25  0501   Glucose 103 95 80 66 L  --   --   --   --    Sodium 137 141 137 141 134 L 130 L 130 L 132 L   Potassium 3.8 4.2 4.4 4.2 3.4 L 3.4 L 3.8 3.2 L   BUN 12 10 13 12 8 8 13 16   Creatinine 1.1 0.9 1.0 0.9 0.8 0.8 0.7 0.7   eGFR 54.7 A >60.0 >60.0 >60.0 >60 >60 >60 >60   Calcium 9.7 10.1 10.4 9.6 9.1 9.3 8.9 8.6 L   Magnesium   --   --   --   --   --  1.5 L 1.7 1.5 L       CARDIAC BIOMARKERS:  Recent Labs   Lab 04/04/25 1929 04/05/25  0341   Troponin-I <0.006 0.327 H       COAGS:        LIPIDS/LFTS:  Recent Labs   Lab 08/12/22  1413 04/13/23  1044 10/13/23  1030 04/16/24  1006 04/04/25 1929 04/05/25  0341   Cholesterol Total  --   --   --   --   --  139   Cholesterol 122  --  156 146  --   --    Triglycerides 38  --  43 53  --   --    Triglyceride  --   --   --   --   --  32   HDL 59  --  57 50  --   --    HDL Cholesterol  --   --   --   --   --  60   LDL Cholesterol 55.4 L  --  90.4 85.4  --   --    Non-HDL Cholesterol 63  --  99 96  --   --    Non HDL Cholesterol  --   --   --   --   --  79   AST 20 19 22 15 19  --    ALT 21 18 20 13 17  --        BNP:  Recent Labs   Lab 04/04/25  1929   BNP 41       TSH:  Recent Labs   Lab 08/12/22  1413 10/13/23  1030 04/16/24  1006   TSH 0.926 1.890 1.888       Free  T4:        Diagnostic Results:  ECG (personally reviewed and interpreted tracing(s)):  4/4/25 1750 SR 87, inflat ST abnl ?isch, new vs 6/17/21    Chest X-Ray (personally reviewed and interpreted image(s)): 4/4/25 NAD, aortic atherosclerosis    Cath 4/6/25 (images personally reviewed and interpreted)  LVEDP: 24mmHg  LVEF: 65% by echo  Dominance: Right  LM: normal  LAD: MLI  LCx: MLI              OM1 inf branch 40%  RCA: MLI, prox 20-30%  Hemostasis:  R Radial band  Impression:  NSTEMI, likely type 2  Minimal/nonobst CAD  Elev LVEDP  Normal LV fxn by echo  R rad TR band for hemostasis  Plan:  Cont med rx  Cont ASA   Stop Plavix  Change HCTZ to IV lasix 40mg bid for next 24 hrs  Cont rx HTN  Dispo planning appropriate    Echo: 4/5/25 (images prev personally reviewed and interpreted)    Left Ventricle: The left ventricle is normal in size. Normal wall thickness. There is normal systolic function with a visually estimated ejection fraction of 65 - 70%. Grade I diastolic dysfunction.    Right Ventricle: The right ventricle is normal in size. Systolic function is normal.    Mitral Valve: There is mild regurgitation.    Tricuspid Valve: There is mild regurgitation.    Pulmonary Artery: The estimated pulmonary artery systolic pressure is 55 mmHg.    Carotid US 1/6/21  There is 20-39% right Internal Carotid Stenosis. Vessel tortuosity may lead to overestimate of stenosis.  There is 20-39% left Internal Carotid Stenosis. Vessel tortuosity may lead to overestimate of stenosis.    Aortic US 1/6/21  No evidence of AAA.  Aortic atherosclerosis.  Increased flow velocity across bilateral iliac arteries suggesting >50% bilateral KATIA stenosis.    LE art US/BRITTNI 1/6/21  Diffuse SFA PAD bilaterally.  >50% mid R SFA stenosis.  >50% ost L SFA stenosis.  Mildly decreased BRITTNI bilaterally.    Ex BRITTNI 1/6/21  Mildly decreased resting BRITTNI bilaterally.  Moderately decreased exercise BRITTNI bilaterally.  Moderately dampened PVR waveforms  bilaterally.

## 2025-04-07 NOTE — NURSING
[facility], [facility]  Nurses Note -- 4 Eyes      4/7/2025       Skin assessed on: Q Shift      [x] No Pressure Injuries Present    []Prevention Measures Documented    [] Yes LDA  for Pressure Injury Previously documented     [] Yes New Pressure Injury Discovered   [] LDA for New Pressure Injury Added      Attending RN:  Torres Zavala, RN     Second RN:  Lianna

## 2025-04-07 NOTE — ASSESSMENT & PLAN NOTE
Patient with known CAD which is controlled Will continue ASA, Plavix, and Statin and monitor for S/Sx of angina/ACS. Continue to monitor on telemetry. Minimal non-obs CAD did not require stent placement

## 2025-04-07 NOTE — ASSESSMENT & PLAN NOTE
Concerning associated sxs with EKG changes and elev trop, ?type 1 vs 2 MI.  Cath 4/6/25: Minimal/nonobst CAD, likely NSTEMI, likely type 2  Elev LVEDP  Normal LV fxn by echo  R rad TR band for hemostasis  Plan:  Cont med rx  Cont ASA   Stop Plavix  Change IV lasix to po 40mg bid  Replete KCL prn  Cont rx HTN  Dispo planning appropriate

## 2025-04-07 NOTE — NURSING
[facility], [facility]  Nurses Note -- 4 Eyes      4/6/2025       Skin assessed on: Q Shift      [x] No Pressure Injuries Present    [x]Prevention Measures Documented    [] Yes LDA  for Pressure Injury Previously documented     [] Yes New Pressure Injury Discovered   [] LDA for New Pressure Injury Added      Attending RN:  Lianna Carvalho LPN     Second RN:  Sammie HAMEED

## 2025-04-07 NOTE — ASSESSMENT & PLAN NOTE
Patient's blood pressure range in the last 24 hours was: BP  Min: 126/79  Max: 205/103.The patient's inpatient anti-hypertensive regimen is listed below:  Current Antihypertensives  carvediloL tablet 3.125 mg, 2 times daily, Oral  lisinopriL tablet 40 mg, Daily, Oral  cloNIDine tablet 0.1 mg, Every 6 hours PRN, Oral  nitroGLYCERIN 50 mg in dextrose 5 % 250 mL infusion (NON-TITRATING), Intra-op continuous PRN,   hydrALAZINE injection 5 mg, Every 6 hours PRN, Intravenous  furosemide tablet 40 mg, Daily, Oral    Plan  - BP is controlled, no changes needed to their regimen

## 2025-04-07 NOTE — PLAN OF CARE
Problem: Adult Inpatient Plan of Care  Goal: Plan of Care Review  Outcome: Progressing  Goal: Patient-Specific Goal (Individualized)  Outcome: Progressing  Goal: Absence of Hospital-Acquired Illness or Injury  Outcome: Progressing  Goal: Optimal Comfort and Wellbeing  Outcome: Progressing  Goal: Readiness for Transition of Care  Outcome: Progressing     Problem: COPD (Chronic Obstructive Pulmonary Disease)  Goal: Optimal Chronic Illness Coping  Outcome: Progressing  Goal: Optimal Level of Functional [place]  Outcome: Progressing  Goal: Absence of Infection Signs and Symptoms  Outcome: Progressing  Goal: Improved Oral Intake  Outcome: Progressing  Goal: Effective Oxygenation and Ventilation  Outcome: Progressing     Problem: Infection  Goal: Absence of Infection Signs and Symptoms  Outcome: Progressing     Problem: Wound  Goal: Optimal Coping  Outcome: Progressing  Goal: Optimal Functional Ability  Outcome: Progressing  Goal: Absence of Infection Signs and Symptoms  Outcome: Progressing  Goal: Improved Oral Intake  Outcome: Progressing  Goal: Optimal Pain Control and Function  Outcome: Progressing  Goal: Skin Health and Integrity  Outcome: Progressing  Goal: Optimal Wound Healing  Outcome: Progressing     Problem: Fall Injury Risk  Goal: Absence of Fall and Fall-Related Injury  Outcome: Progressing

## 2025-04-07 NOTE — PROGRESS NOTES
[facility]  Cardiology  Progress Note    Patient Name: Hollie Ponce  MRN: [id number]  Admission Date: 2025  Hospital Length of Stay: 2 days  Code Status: Full Code   Attending Physician: Franky Peguero MD   Primary Care Physician: Ariadne Smith MD  Expected Discharge Date: 2025  Principal Problem:NSTEMI (non-ST elevated myocardial infarction)    Subjective:     Interval Hx: Cath yesterday with elev LVEDP, no sig CAD.  Still SOB, no cp.  No radial access site problems.    Tele: SR 80s (personally reviewed and interpreted)      Past Medical History:   Diagnosis Date    Asthma     COPD (chronic obstructive pulmonary disease)     Hypertension        Past Surgical History:   Procedure Laterality Date     SECTION      HERNIA REPAIR      LEFT HEART CATHETERIZATION Left 2020    Procedure: Left heart cath;  Surgeon: Shabnam Vernon MD;  Location: [facility] CATH LAB;  Service: Cardiology;  Laterality: Left;       Review of patient's allergies indicates:   Allergen Reactions    Flagyl [metronidazole hcl]      Hives      Sulfamethoxazole-trimethoprim Itching    Aspirin Nausea Only    Lipitor [atorvastatin]      Myalgia        No current facility-administered medications on file prior to encounter.     Current Outpatient Medications on File Prior to Encounter   Medication Sig    albuterol (PROVENTIL/VENTOLIN HFA) 90 mcg/actuation inhaler Inhale 2 puffs into the lungs every 6 (six) hours as needed for Wheezing or Shortness of Breath. Rescue    albuterol-ipratropium (DUO-NEB) 2.5 mg-0.5 mg/3 mL nebulizer solution Take 3 mLs by nebulization every 4 (four) hours as needed for Wheezing.    albuterol sulfate 2.5 mg/0.5 mL Nebu Inhale into the lungs.    aspirin (ECOTRIN) 81 MG EC tablet Take 81 mg by mouth.    carvediloL (COREG) 3.125 MG tablet Take 1 tablet (3.125 mg total) by mouth 2 (two) times daily. TAKE 1 TABLET(3.125 MG) BY MOUTH TWICE DAILY WITH MEALS    clopidogreL (PLAVIX) 75 mg tablet Take 1  tablet (75 mg total) by mouth once daily.    fluticasone propionate (FLONASE) 50 mcg/actuation nasal spray SHAKE LIQUID AND USE 2 SPRAYS(100 MCG) IN EACH NOSTRIL EVERY DAY    fluticasone-salmeterol 230-21 mcg/dose (ADVAIR HFA) 230-21 mcg/actuation HFAA inhaler INHALE 2 PUFFS INTO THE LUNGS TWICE DAILY    fluticasone-salmeterol diskus inhaler 100-50 mcg Inhale 1 puff into the lungs every 12 (twelve) hours.    hydroCHLOROthiazide (HYDRODIURIL) 25 MG tablet Take 1 tablet (25 mg total) by mouth once daily.    lisinopriL (PRINIVIL,ZESTRIL) 40 MG tablet Take 1 tablet (40 mg total) by mouth once daily.    loratadine (CLARITIN) 10 mg tablet TAKE 1 TABLET BY MOUTH DAILY AS NEEDED FOR ALLERGIES    miscellaneous medical supply (470V TWO WAY VALVE) Misc Disp nebulizer and tubing ,medicine reservoir,and mask  So as to use  Every 4-6 hrs for sob/wheeze    montelukast (SINGULAIR) 10 mg tablet Take 1 tablet (10 mg total) by mouth every evening.    nicotine polacrilex 2 MG Lozg Take 1 lozenge (2 mg total) by mouth as needed. Use 1-2 per hour in place of a cigarette. Limit to 6 a day.    rosuvastatin (CRESTOR) 20 MG tablet Take 1 tablet (20 mg total) by mouth every evening.    tiotropium (SPIRIVA WITH HANDIHALER) 18 mcg inhalation capsule Inhale 1 capsule (18 mcg total) into the lungs Daily. INHALE THE CONTENTS OF 1 CAPSULE(18 MCG) INTO THE LUNGS EVERY DAY     Family History       Problem Relation (Age of Onset)    Cancer Mother    Liver disease Father          Tobacco Use    Smoking status: Every Day     Current packs/day: 0.25     Average packs/day: 0.5 packs/day for 51.0 years (25.3 ttl pk-yrs)     Types: Cigarettes     Start date: 12/13/1970     Last attempt to quit: 12/13/2020    Smokeless tobacco: Never   Substance and Sexual Activity    Alcohol use: Never    Drug use: Never    Sexual activity: Not Currently     Review of Systems   Gastrointestinal:  Negative for melena.   Genitourinary:  Negative for hematuria.     Objective:      Vital Signs (Most Recent):  Temp: 98 °F (36.7 °C) (04/07/25 0422)  Pulse: 84 (04/07/25 0640)  Resp: 20 (04/07/25 0425)  BP: 126/79 (04/07/25 0422)  SpO2: 98 % (04/07/25 0422) Vital Signs (24h Range):  Temp:  [97.9 °F (36.6 °C)-98 °F (36.7 °C)] 98 °F (36.7 °C)  Pulse:  [] 84  Resp:  [18-41] 20  SpO2:  [92 %-99 %] 98 %  BP: (126-205)/() 126/79     Weight: 59 kg (130 lb)  Body mass index is 29.15 kg/m².    SpO2: 98 %         Intake/Output Summary (Last 24 hours) at 4/7/2025 0729  Last data filed at 4/7/2025 0422  Gross per 24 hour   Intake 420 ml   Output 2700 ml   Net -2280 ml       Lines/Drains/Airways       Drain  Duration             Female External Urinary Catheter w/ Suction 04/06/25 1300 <1 day              Peripheral Intravenous Line  Duration                  Peripheral IV - Single Lumen 04/04/25 1910 20 G Left Antecubital 2 days         Peripheral IV - Single Lumen 04/05/25 2327 20 G Posterior;Right Forearm 1 day                   Exam unchanged vs 4/5/25  Physical Exam  Constitutional:       General: She is not in acute distress.     Appearance: Normal appearance. She is well-developed. She is not ill-appearing, toxic-appearing or diaphoretic.   HENT:      Head: Normocephalic and atraumatic.   Eyes:      General: No scleral icterus.     Extraocular Movements: Extraocular movements intact.      Conjunctiva/sclera: Conjunctivae normal.      Pupils: Pupils are equal, round, and reactive to light.   Neck:      Vascular: No JVD.      Trachea: No tracheal deviation.   Cardiovascular:      Rate and Rhythm: Normal rate and regular rhythm.      Heart sounds: S1 normal and S2 normal. No murmur heard.     No friction rub. No gallop.      Comments: R rad access site c/d/i  Pulmonary:      Effort: Pulmonary effort is normal. No respiratory distress.      Breath sounds: Normal breath sounds. No stridor. No wheezing, rhonchi or rales.   Chest:      Chest wall: No tenderness.   Abdominal:      General: There  is no distension.      Palpations: Abdomen is soft.   Musculoskeletal:         General: No swelling or tenderness. Normal range of motion.      Cervical back: Normal range of motion and neck supple. No rigidity.      Right lower leg: No edema.      Left lower leg: No edema.   Skin:     General: Skin is warm and dry.      Coloration: Skin is not jaundiced.   Neurological:      General: No focal deficit present.      Mental Status: She is alert and oriented to person, place, and time.      Cranial Nerves: No cranial nerve deficit.   Psychiatric:         Mood and Affect: Mood normal.         Behavior: Behavior normal.          Current Medications:   albuterol-ipratropium  3 mL Nebulization Q4H    aspirin  81 mg Oral Daily    carvediloL  3.125 mg Oral BID    doxycycline  100 mg Oral Q12H    lisinopriL  40 mg Oral Daily    magnesium sulfate 2 g IVPB  2 g Intravenous Once    montelukast  10 mg Oral QHS    mupirocin   Nasal BID    potassium chloride  30 mEq Oral Once    predniSONE  40 mg Oral Daily      nitroGLYCERIN    Continuous PRN   Stopped at 04/06/25 1246       Current Facility-Administered Medications:     acetaminophen, 650 mg, Oral, Q8H PRN    acetaminophen, 650 mg, Oral, Q4H PRN    aluminum-magnesium hydroxide-simethicone, 30 mL, Oral, QID PRN    atropine, 0.5 mg, Intravenous, PRN    benzonatate, 100 mg, Oral, TID PRN    bisacodyL, 10 mg, Rectal, Daily PRN    cloNIDine, 0.1 mg, Oral, Q6H PRN    dextromethorphan-guaiFENesin  mg/5 ml, 10 mL, Oral, Q4H PRN    glucagon (human recombinant), 1 mg, Intramuscular, PRN    glucose, 16 g, Oral, PRN    glucose, 24 g, Oral, PRN    hydrALAZINE, 5 mg, Intravenous, Q6H PRN    HYDROcodone-acetaminophen, 1 tablet, Oral, Q4H PRN    hydrOXYzine HCL, 50 mg, Oral, TID PRN    hydrOXYzine pamoate, 25 mg, Oral, Q8H PRN    magnesium oxide, 800 mg, Oral, PRN    magnesium oxide, 800 mg, Oral, PRN    melatonin, 6 mg, Oral, Nightly PRN    morphine, 2 mg, Intravenous, Q10 Min PRN     naloxone, 0.02 mg, Intravenous, PRN    nicotine, 1 patch, Transdermal, Daily PRN    nitroGLYCERIN, , , Continuous PRN    ondansetron, 4 mg, Intravenous, Q8H PRN    potassium bicarbonate, 35 mEq, Oral, PRN    potassium bicarbonate, 50 mEq, Oral, PRN    potassium bicarbonate, 60 mEq, Oral, PRN    potassium, sodium phosphates, 2 packet, Oral, PRN    potassium, sodium phosphates, 2 packet, Oral, PRN    potassium, sodium phosphates, 2 packet, Oral, PRN    senna-docusate, 1 tablet, Oral, Daily PRN    simethicone, 1 tablet, Oral, QID PRN    sodium chloride 0.9%, 250 mL, Intravenous, PRN    sodium chloride 0.9%, 10 mL, Intravenous, Q12H PRN    sodium chloride 0.9%, 10 mL, Intravenous, PRN    sodium chloride 0.9%, 3 mL, Intravenous, PRN    Laboratory (all labs reviewed):  CBC:  Recent Labs   Lab 04/16/24  1006 04/04/25 1929 04/05/25  0341 04/06/25  0514 04/07/25  0501   WBC 4.38 5.01 4.30 6.49 5.97   Hemoglobin 13.4  --   --   --   --    HGB  --  14.2 13.6 14.0 13.4   Hematocrit 42.7  --   --   --   --    HCT  --  42.4 40.9 43.2 40.9   Platelet Count  --  220 226 228 233   Platelets 258  --   --   --   --        CHEMISTRIES:  Recent Labs   Lab 08/12/22  1413 04/13/23  1044 10/13/23  1030 04/16/24  1006 04/04/25  1929 04/05/25  0341 04/06/25  0514 04/07/25  0501   Glucose 103 95 80 66 L  --   --   --   --    Sodium 137 141 137 141 134 L 130 L 130 L 132 L   Potassium 3.8 4.2 4.4 4.2 3.4 L 3.4 L 3.8 3.2 L   BUN 12 10 13 12 8 8 13 16   Creatinine 1.1 0.9 1.0 0.9 0.8 0.8 0.7 0.7   eGFR 54.7 A >60.0 >60.0 >60.0 >60 >60 >60 >60   Calcium 9.7 10.1 10.4 9.6 9.1 9.3 8.9 8.6 L   Magnesium   --   --   --   --   --  1.5 L 1.7 1.5 L       CARDIAC BIOMARKERS:  Recent Labs   Lab 04/04/25 1929 04/05/25  0341   Troponin-I <0.006 0.327 H       COAGS:        LIPIDS/LFTS:  Recent Labs   Lab 08/12/22  1413 04/13/23  1044 10/13/23  1030 04/16/24  1006 04/04/25 1929 04/05/25  0341   Cholesterol Total  --   --   --   --   --  139   Cholesterol  122  --  156 146  --   --    Triglycerides 38  --  43 53  --   --    Triglyceride  --   --   --   --   --  32   HDL 59  --  57 50  --   --    HDL Cholesterol  --   --   --   --   --  60   LDL Cholesterol 55.4 L  --  90.4 85.4  --   --    Non-HDL Cholesterol 63  --  99 96  --   --    Non HDL Cholesterol  --   --   --   --   --  79   AST 20 19 22 15 19  --    ALT 21 18 20 13 17  --        BNP:  Recent Labs   Lab 04/04/25  1929   BNP 41       TSH:  Recent Labs   Lab 08/12/22  1413 10/13/23  1030 04/16/24  1006   TSH 0.926 1.890 1.888       Free T4:        Diagnostic Results:  ECG (personally reviewed and interpreted tracing(s)):  4/4/25 1750 SR 87, inflat ST abnl ?isch, new vs 6/17/21    Chest X-Ray (personally reviewed and interpreted image(s)): 4/4/25 NAD, aortic atherosclerosis    Cath 4/6/25 (images personally reviewed and interpreted)  LVEDP: 24mmHg  LVEF: 65% by echo  Dominance: Right  LM: normal  LAD: MLI  LCx: MLI              OM1 inf branch 40%  RCA: MLI, prox 20-30%  Hemostasis:  R Radial band  Impression:  NSTEMI, likely type 2  Minimal/nonobst CAD  Elev LVEDP  Normal LV fxn by echo  R rad TR band for hemostasis  Plan:  Cont med rx  Cont ASA   Stop Plavix  Change HCTZ to IV lasix 40mg bid for next 24 hrs  Cont rx HTN  Dispo planning appropriate    Echo: 4/5/25 (images prev personally reviewed and interpreted)    Left Ventricle: The left ventricle is normal in size. Normal wall thickness. There is normal systolic function with a visually estimated ejection fraction of 65 - 70%. Grade I diastolic dysfunction.    Right Ventricle: The right ventricle is normal in size. Systolic function is normal.    Mitral Valve: There is mild regurgitation.    Tricuspid Valve: There is mild regurgitation.    Pulmonary Artery: The estimated pulmonary artery systolic pressure is 55 mmHg.    Carotid US 1/6/21  There is 20-39% right Internal Carotid Stenosis. Vessel tortuosity may lead to overestimate of stenosis.  There is 20-39%  left Internal Carotid Stenosis. Vessel tortuosity may lead to overestimate of stenosis.    Aortic US 1/6/21  No evidence of AAA.  Aortic atherosclerosis.  Increased flow velocity across bilateral iliac arteries suggesting >50% bilateral KATIA stenosis.    LE art US/BRITTNI 1/6/21  Diffuse SFA PAD bilaterally.  >50% mid R SFA stenosis.  >50% ost L SFA stenosis.  Mildly decreased BRITTNI bilaterally.    Ex BRITTNI 1/6/21  Mildly decreased resting BRITTNI bilaterally.  Moderately decreased exercise BRITTNI bilaterally.  Moderately dampened PVR waveforms bilaterally.          Assessment and Plan:     * NSTEMI (non-ST elevated myocardial infarction)  Concerning associated sxs with EKG changes and elev trop, ?type 1 vs 2 MI.  Cath 4/6/25: Minimal/nonobst CAD, likely NSTEMI, likely type 2  Elev LVEDP  Normal LV fxn by echo  R rad TR band for hemostasis  Plan:  Cont med rx  Cont ASA   Stop Plavix  Change IV lasix to po 40mg bid  Replete KCL prn  Cont rx HTN  Dispo planning appropriate      COPD exacerbation  Mgmt per IM  Smoking cessation stressed to pt  Vol overload contributing to sxs    Dyslipidemia  Titrate rosuva to 40mg on discharge    Essential hypertension  Cont med rx    PAD (peripheral artery disease)  Noted on prior testing.  No immediate plan for revasc    Tobacco dependency  Smoking cessation strongly advised.    Carotid atherosclerosis, bilateral  Will repeat carotid US as outpatient.    Pulmonary HTN  Likely group 2        VTE Risk Mitigation (From admission, onward)           Ordered     enoxaparin injection 60 mg  Every 12 hours (non-standard times)         04/05/25 0946     Place sequential compression device  Until discontinued         04/04/25 2312     IP VTE HIGH RISK PATIENT  Once         04/04/25 2312     Place sequential compression device  Until discontinued         04/04/25 2312                  Cardiology will sign off, pls call with questions.  Pt to follow up with me after discharge.    Jhonny Schofield,  MD  Cardiology  [facility] - Telemetry

## 2025-04-07 NOTE — SUBJECTIVE & OBJECTIVE
Interval History: continued wheezing and SOB. When attempting to wean to RA developed SOB and worsened wheezing.    Review of Systems   Constitutional:  Negative for chills and fever.   Eyes:  Negative for photophobia and visual disturbance.   Respiratory:  Positive for shortness of breath and wheezing. Negative for chest tightness.    Cardiovascular:  Negative for chest pain and palpitations.   Gastrointestinal:  Negative for abdominal pain, nausea and vomiting.   Genitourinary:  Negative for dysuria and hematuria.     Objective:     Vital Signs (Most Recent):  Temp: 97.9 °F (36.6 °C) (04/07/25 0735)  Pulse: 102 (04/07/25 0855)  Resp: 18 (04/07/25 0855)  BP: 130/86 (04/07/25 0735)  SpO2: (!) 93 % (04/07/25 0855) Vital Signs (24h Range):  Temp:  [97.9 °F (36.6 °C)-98 °F (36.7 °C)] 97.9 °F (36.6 °C)  Pulse:  [] 102  Resp:  [18-41] 18  SpO2:  [92 %-99 %] 93 %  BP: (126-205)/() 130/86     Weight: 59 kg (130 lb)  Body mass index is 29.15 kg/m².    Intake/Output Summary (Last 24 hours) at 4/7/2025 0914  Last data filed at 4/7/2025 0422  Gross per 24 hour   Intake 420 ml   Output 2700 ml   Net -2280 ml         Physical Exam  Vitals and nursing note reviewed.   Constitutional:       General: She is not in acute distress.     Appearance: She is well-developed. She is not diaphoretic.   HENT:      Head: Normocephalic and atraumatic.      Right Ear: External ear normal.      Left Ear: External ear normal.   Eyes:      General:         Right eye: No discharge.         Left eye: No discharge.      Conjunctiva/sclera: Conjunctivae normal.   Neck:      Thyroid: No thyromegaly.   Cardiovascular:      Rate and Rhythm: Normal rate and regular rhythm.      Heart sounds: No murmur heard.  Pulmonary:      Effort: Pulmonary effort is normal. No respiratory distress.      Breath sounds: Wheezing present.   Abdominal:      General: Bowel sounds are normal. There is no distension.      Palpations: Abdomen is soft. There is no  "mass.      Tenderness: There is no abdominal tenderness.   Musculoskeletal:         General: No deformity.      Cervical back: Normal range of motion and neck supple.      Right lower leg: No edema.      Left lower leg: No edema.   Skin:     General: Skin is warm and dry.   Neurological:      Mental Status: She is alert and oriented to person, place, and time.      Sensory: No sensory deficit.   Psychiatric:         Mood and Affect: Mood normal.         Behavior: Behavior normal.               Significant Labs: CBC:   Recent Labs   Lab 04/06/25  0514 04/07/25  0501   WBC 6.49 5.97   HGB 14.0 13.4   HCT 43.2 40.9    233     CMP:   Recent Labs   Lab 04/06/25  0514 04/07/25  0501   * 132*   K 3.8 3.2*   CL 96 92*   CO2 24 30*   BUN 13 16   CREATININE 0.7 0.7   CALCIUM 8.9 8.6*   ANIONGAP 10 10     Cardiac Markers: No results for input(s): "CKMB", "MYOGLOBIN", "BNP", "TROPISTAT" in the last 48 hours.    Significant Imaging:   Imaging Results              X-Ray Chest PA And Lateral (Final result)  Result time 04/04/25 18:00:11      Final result by Dylon Pérez MD (04/04/25 18:00:11)                   Impression:      No acute cardiopulmonary process identified.      Electronically signed by: Dylon Pérez MD  Date:    04/04/2025  Time:    18:00               Narrative:    EXAMINATION:  XR CHEST PA AND LATERAL    CLINICAL HISTORY:  SOB;    TECHNIQUE:  PA and lateral views of the chest were performed.    COMPARISON:  November 2020.    FINDINGS:  Cardiac silhouette is normal in size.  Lungs are symmetrically expanded.  No evidence of focal consolidative process, pneumothorax, or significant pleural effusion.  No acute osseous abnormality identified.                                      "

## 2025-04-07 NOTE — PROGRESS NOTES
[facility] Medicine  Progress Note    Patient Name: Hollie Ponce  MRN: [id number]  Patient Class: IP- Inpatient   Admission Date: 4/4/2025  Length of Stay: 2 days  Attending Physician: Franky Peguero MD  Primary Care Provider: Ariadne Smith MD        Subjective     Principal Problem:COPD exacerbation        HPI:  This is a 69-year-old female with a past medical history of COPD (not on O2), nonobstructive CAD, hypertension, peripheral artery disease, tobacco use who presents with dyspnea.      Patient presents for evaluation of shortness of breath that started on the day of presentation.  She reports worsening exertional dyspnea, associated with a dry cough, and chest congestion.  She reports having rhinorrhea/sinus congestion.  Her daughter was sick with similar symptoms, but limited her exposure to the patient and was wearing a mask around her  Patient smokes 5 cigarettes daily.    In the ED, the patient was tachypneic (20s-40s), tachycardic (100s-110s), and mildly hypoxic (90%, requiring 3 L O2).  Labs were remarkable for hypokalemia (3.4), negative BNP (41), negative troponin (<0.006).  Chest x-ray showed no acute cardiopulmonary process.  Patient was given Solu-Medrol 125 mg IV, hydralazine 10 mg IV, DuoNeb x1, Ativan 0.5 mg p.o..  She was admitted for further management.    Overview/Hospital Course:  Mrs. Ponce was hospitalized for COPD exacerbation after presenting with dyspnea and chest tightness.  She was noted to be tachypneic, tachycardic, and hypoxic on room air.  Supplemental oxygen initiated.  She received corticosteroids and nebulizer treatments with some symptomatic improvement.  Initial troponin negative; however, repeat troponin significantly elevated.  She reports persistent and some mild chest tightness.  Cardiology consulted, appreciate recs.  Initiate treatment for NSTEMI. LHC without evidence of obstructive CAD. Continued on COPD treatment.     Interval History: continued  wheezing and SOB. When attempting to wean to RA developed SOB and worsened wheezing.    Review of Systems   Constitutional:  Negative for chills and fever.   Eyes:  Negative for photophobia and visual disturbance.   Respiratory:  Positive for shortness of breath and wheezing. Negative for chest tightness.    Cardiovascular:  Negative for chest pain and palpitations.   Gastrointestinal:  Negative for abdominal pain, nausea and vomiting.   Genitourinary:  Negative for dysuria and hematuria.     Objective:     Vital Signs (Most Recent):  Temp: 97.9 °F (36.6 °C) (04/07/25 0735)  Pulse: 102 (04/07/25 0855)  Resp: 18 (04/07/25 0855)  BP: 130/86 (04/07/25 0735)  SpO2: (!) 93 % (04/07/25 0855) Vital Signs (24h Range):  Temp:  [97.9 °F (36.6 °C)-98 °F (36.7 °C)] 97.9 °F (36.6 °C)  Pulse:  [] 102  Resp:  [18-41] 18  SpO2:  [92 %-99 %] 93 %  BP: (126-205)/() 130/86     Weight: 59 kg (130 lb)  Body mass index is 29.15 kg/m².    Intake/Output Summary (Last 24 hours) at 4/7/2025 0952  Last data filed at 4/7/2025 0422  Gross per 24 hour   Intake 420 ml   Output 2700 ml   Net -2280 ml         Physical Exam  Vitals and nursing note reviewed.   Constitutional:       General: She is not in acute distress.     Appearance: She is well-developed. She is not diaphoretic.   HENT:      Head: Normocephalic and atraumatic.      Right Ear: External ear normal.      Left Ear: External ear normal.   Eyes:      General:         Right eye: No discharge.         Left eye: No discharge.      Conjunctiva/sclera: Conjunctivae normal.   Neck:      Thyroid: No thyromegaly.   Cardiovascular:      Rate and Rhythm: Normal rate and regular rhythm.      Heart sounds: No murmur heard.  Pulmonary:      Effort: Pulmonary effort is normal. No respiratory distress.      Breath sounds: Wheezing present.   Abdominal:      General: Bowel sounds are normal. There is no distension.      Palpations: Abdomen is soft. There is no mass.      Tenderness: There  "is no abdominal tenderness.   Musculoskeletal:         General: No deformity.      Cervical back: Normal range of motion and neck supple.      Right lower leg: No edema.      Left lower leg: No edema.   Skin:     General: Skin is warm and dry.   Neurological:      Mental Status: She is alert and oriented to person, place, and time.      Sensory: No sensory deficit.   Psychiatric:         Mood and Affect: Mood normal.         Behavior: Behavior normal.               Significant Labs: CBC:   Recent Labs   Lab 04/06/25  0514 04/07/25  0501   WBC 6.49 5.97   HGB 14.0 13.4   HCT 43.2 40.9    233     CMP:   Recent Labs   Lab 04/06/25  0514 04/07/25  0501   * 132*   K 3.8 3.2*   CL 96 92*   CO2 24 30*   BUN 13 16   CREATININE 0.7 0.7   CALCIUM 8.9 8.6*   ANIONGAP 10 10     Cardiac Markers: No results for input(s): "CKMB", "MYOGLOBIN", "BNP", "TROPISTAT" in the last 48 hours.    Significant Imaging:   Imaging Results              X-Ray Chest PA And Lateral (Final result)  Result time 04/04/25 18:00:11      Final result by Dylon Pérez MD (04/04/25 18:00:11)                   Impression:      No acute cardiopulmonary process identified.      Electronically signed by: Dylon Pérez MD  Date:    04/04/2025  Time:    18:00               Narrative:    EXAMINATION:  XR CHEST PA AND LATERAL    CLINICAL HISTORY:  SOB;    TECHNIQUE:  PA and lateral views of the chest were performed.    COMPARISON:  November 2020.    FINDINGS:  Cardiac silhouette is normal in size.  Lungs are symmetrically expanded.  No evidence of focal consolidative process, pneumothorax, or significant pleural effusion.  No acute osseous abnormality identified.                                          Assessment & Plan  NSTEMI (non-ST elevated myocardial infarction)  4/6:  Plan is for left heart catheterization today.  Continue treatment for ACS and COPD.  Pending results    Patient presents with NSTEMI. Chest pain is currently controlled. " Patient is currently on NSTEMI Pathway.    EKG reviewed. Troponins reviewed and results noted-   Recent Labs   Lab 04/05/25  0341   TROPONINI 0.327*   .     Lipid panel reviewed and shows-     Lab Results   Component Value Date    LDLCALC 85.4 04/16/2024     Lab Results   Component Value Date    TRIG 32 04/05/2025         Medical management includes; Beta Blocker, Dual Anti-Platelet therapy, Anticoagulation, and High Intensity Stain Echo has been performed. Latest ECHO results are as follows- Results for orders placed during the hospital encounter of 04/04/25    Echo    Interpretation Summary    Left Ventricle: The left ventricle is normal in size. Normal wall thickness. There is normal systolic function with a visually estimated ejection fraction of 65 - 70%. Grade I diastolic dysfunction.    Right Ventricle: The right ventricle is normal in size. Systolic function is normal.    Mitral Valve: There is mild regurgitation.    Tricuspid Valve: There is mild regurgitation.    Pulmonary Artery: The estimated pulmonary artery systolic pressure is 55 mmHg.  .   Consult for cardiac rehab is not ordered. Patient counseled on lifestyle modifications- continue current medications. Cardiology is consulted. Plan of care reviewed with cardiology team. Continue to monitor patient closely and adjust therapy as needed.     COPD exacerbation  Presented with SOB wheezing and cough. Found to have an NSTEMI as well attributed to type 2 non-obs CAD on [facility]. Goal O2 sats 88 to 92% with severe COPD by previous PFTs. Dyspneic with sitting in bed with o2 off unable to ambulate  Started on steroids, duo-nebs, and doxy  Check sputum culture   IV diuresis with lasix stopped given contraction alkalosis-transitioned to oral  Add mucinex, flonase, and hypertonic saline nebulizers for airway clearance  Essential hypertension  Patient's blood pressure range in the last 24 hours was: BP  Min: 126/79  Max: 205/103.The patient's inpatient  anti-hypertensive regimen is listed below:  Current Antihypertensives  carvediloL tablet 3.125 mg, 2 times daily, Oral  lisinopriL tablet 40 mg, Daily, Oral  cloNIDine tablet 0.1 mg, Every 6 hours PRN, Oral  nitroGLYCERIN 50 mg in dextrose 5 % 250 mL infusion (NON-TITRATING), Intra-op continuous PRN,   hydrALAZINE injection 5 mg, Every 6 hours PRN, Intravenous  furosemide tablet 40 mg, Daily, Oral    Plan  - BP is controlled, no changes needed to their regimen    CAD (coronary artery disease)  Patient with known CAD which is controlled Will continue ASA, Plavix, and Statin and monitor for S/Sx of angina/ACS. Continue to monitor on telemetry. Minimal non-obs CAD did not require stent placement  PAD (peripheral artery disease)  History noted. Continue home medications.     Pulmonary HTN  Results for orders placed during the hospital encounter of 11/16/20    Echo Color Flow Doppler? Yes; Bubble Contrast? No    Interpretation Summary  · The left ventricle is normal in size with normal systolic function. The estimated ejection fraction is 55%.  · There is mild left ventricular concentric hypertrophy.  · Normal left ventricular diastolic function.  · Normal right ventricular size with normal right ventricular systolic function.  · Mild left atrial enlargement.  · Normal central venous pressure (3 mmHg).  · The estimated PA systolic pressure is 49 mmHg.  · There is pulmonary hypertension.    BNP  Recent Labs   Lab 04/04/25  1929   BNP 41       Optimize volume status     Tobacco dependency  Tobacco cessation not discussed with patient today. Nicotine replacement has been- prescribed  Acute respiratory failure with hypoxia  Patient with Hypoxic Respiratory failure which is Acute.  she is not on home oxygen. Supplemental oxygen was provided and noted-      .   Signs/symptoms of respiratory failure include- increased work of breathing and respiratory distress. Contributing diagnoses includes - COPD Labs and images were  reviewed. Patient Has not had a recent ABG. Will treat underlying causes and adjust management of respiratory failure as follows- Wean O2 as tolerated   Carotid atherosclerosis, bilateral  Continue ASA/plavix/statin  Dyslipidemia  Continue statin    DVT:  ANA MARIA/SCD      Discharge Planning   YOLA: 4/7/2025     Code Status: Full Code   Medical Readiness for Discharge Date:   Discharge Plan A: Home with family   Discharge Delays: None known at this time        Emmanuel Caldwell PA-C  Department of [facility] Medicine   [facility] [place]

## 2025-04-07 NOTE — ASSESSMENT & PLAN NOTE
Presented with SOB wheezing and cough. Found to have an NSTEMI as well attributed to type 2 non-obs CAD on [facility]. Goal O2 sats 88 to 92% with severe COPD by previous PFTs. Dyspneic with sitting in bed with o2 off unable to ambulate  Started on steroids, duo-nebs, and doxy  Check sputum culture   IV diuresis with lasix stopped given contraction alkalosis-transitioned to oral  Add mucinex, flonase, and hypertonic saline nebulizers for airway clearance

## 2025-04-08 PROBLEM — J44.1 ACUTE EXACERBATION OF COPD WITH ASTHMA: Status: ACTIVE | Noted: 2025-04-08

## 2025-04-08 LAB
ABSOLUTE EOSINOPHIL (OHS): 0 K/UL
ABSOLUTE MONOCYTE (OHS): 0.79 K/UL (ref 0.3–1)
ABSOLUTE NEUTROPHIL COUNT (OHS): 3.78 K/UL (ref 1.8–7.7)
ANION GAP (OHS): 9 MMOL/L (ref 8–16)
BASOPHILS # BLD AUTO: 0 K/UL
BASOPHILS NFR BLD AUTO: 0 %
BUN SERPL-MCNC: 14 MG/DL (ref 8–23)
CALCIUM SERPL-MCNC: 9.1 MG/DL (ref 8.7–10.5)
CHLORIDE SERPL-SCNC: 92 MMOL/L (ref 95–110)
CO2 SERPL-SCNC: 33 MMOL/L (ref 23–29)
CREAT SERPL-MCNC: 0.7 MG/DL (ref 0.5–1.4)
ERYTHROCYTE [DISTWIDTH] IN BLOOD BY AUTOMATED COUNT: 12.9 % (ref 11.5–14.5)
GFR SERPLBLD CREATININE-BSD FMLA CKD-EPI: >60 ML/MIN/1.73/M2
GLUCOSE SERPL-MCNC: 97 MG/DL (ref 70–110)
HCT VFR BLD AUTO: 39.8 % (ref 37–48.5)
HGB BLD-MCNC: 12.9 GM/DL (ref 12–16)
IMM GRANULOCYTES # BLD AUTO: 0.02 K/UL (ref 0–0.04)
IMM GRANULOCYTES NFR BLD AUTO: 0.3 % (ref 0–0.5)
LYMPHOCYTES # BLD AUTO: 1.84 K/UL (ref 1–4.8)
MAGNESIUM SERPL-MCNC: 1.9 MG/DL (ref 1.6–2.6)
MCH RBC QN AUTO: 29.4 PG (ref 27–31)
MCHC RBC AUTO-ENTMCNC: 32.4 G/DL (ref 32–36)
MCV RBC AUTO: 91 FL (ref 82–98)
NUCLEATED RBC (/100WBC) (OHS): 0 /100 WBC
PHOSPHATE SERPL-MCNC: 2.9 MG/DL (ref 2.7–4.5)
PLATELET # BLD AUTO: 227 K/UL (ref 150–450)
PMV BLD AUTO: 10.1 FL (ref 9.2–12.9)
POTASSIUM SERPL-SCNC: 3.7 MMOL/L (ref 3.5–5.1)
RBC # BLD AUTO: 4.39 M/UL (ref 4–5.4)
RELATIVE EOSINOPHIL (OHS): 0 %
RELATIVE LYMPHOCYTE (OHS): 28.6 % (ref 18–48)
RELATIVE MONOCYTE (OHS): 12.3 % (ref 4–15)
RELATIVE NEUTROPHIL (OHS): 58.8 % (ref 38–73)
SODIUM SERPL-SCNC: 134 MMOL/L (ref 136–145)
WBC # BLD AUTO: 6.43 K/UL (ref 3.9–12.7)

## 2025-04-08 PROCEDURE — 83735 ASSAY OF MAGNESIUM: CPT | Performed by: INTERNAL MEDICINE

## 2025-04-08 PROCEDURE — 63600175 PHARM REV CODE 636 W HCPCS: Performed by: HOSPITALIST

## 2025-04-08 PROCEDURE — 94761 N-INVAS EAR/PLS OXIMETRY MLT: CPT

## 2025-04-08 PROCEDURE — 25000003 PHARM REV CODE 250: Performed by: HOSPITALIST

## 2025-04-08 PROCEDURE — 36415 COLL VENOUS BLD VENIPUNCTURE: CPT | Performed by: STUDENT IN AN ORGANIZED HEALTH CARE EDUCATION/TRAINING PROGRAM

## 2025-04-08 PROCEDURE — 27000221 HC OXYGEN, UP TO 24 HOURS

## 2025-04-08 PROCEDURE — 25000003 PHARM REV CODE 250: Performed by: INTERNAL MEDICINE

## 2025-04-08 PROCEDURE — 80048 BASIC METABOLIC PNL TOTAL CA: CPT | Performed by: INTERNAL MEDICINE

## 2025-04-08 PROCEDURE — 94664 DEMO&/EVAL PT USE INHALER: CPT

## 2025-04-08 PROCEDURE — 25000242 PHARM REV CODE 250 ALT 637 W/ HCPCS: Performed by: INTERNAL MEDICINE

## 2025-04-08 PROCEDURE — 99223 1ST HOSP IP/OBS HIGH 75: CPT | Mod: ,,, | Performed by: INTERNAL MEDICINE

## 2025-04-08 PROCEDURE — 99900035 HC TECH TIME PER 15 MIN (STAT)

## 2025-04-08 PROCEDURE — 85025 COMPLETE CBC W/AUTO DIFF WBC: CPT | Performed by: STUDENT IN AN ORGANIZED HEALTH CARE EDUCATION/TRAINING PROGRAM

## 2025-04-08 PROCEDURE — 84100 ASSAY OF PHOSPHORUS: CPT | Performed by: INTERNAL MEDICINE

## 2025-04-08 PROCEDURE — 63600175 PHARM REV CODE 636 W HCPCS: Performed by: INTERNAL MEDICINE

## 2025-04-08 PROCEDURE — 25000242 PHARM REV CODE 250 ALT 637 W/ HCPCS: Performed by: PHYSICIAN ASSISTANT

## 2025-04-08 PROCEDURE — 11000001 HC ACUTE MED/SURG PRIVATE ROOM

## 2025-04-08 PROCEDURE — 94640 AIRWAY INHALATION TREATMENT: CPT

## 2025-04-08 PROCEDURE — 25000003 PHARM REV CODE 250: Performed by: PHYSICIAN ASSISTANT

## 2025-04-08 RX ORDER — LORAZEPAM 2 MG/ML
0.5 INJECTION INTRAMUSCULAR ONCE
Status: COMPLETED | OUTPATIENT
Start: 2025-04-08 | End: 2025-04-08

## 2025-04-08 RX ORDER — BUDESONIDE 0.5 MG/2ML
0.5 INHALANT ORAL EVERY 12 HOURS
Status: DISCONTINUED | OUTPATIENT
Start: 2025-04-08 | End: 2025-04-16 | Stop reason: HOSPADM

## 2025-04-08 RX ORDER — PREDNISONE 20 MG/1
40 TABLET ORAL 2 TIMES DAILY
Status: DISCONTINUED | OUTPATIENT
Start: 2025-04-08 | End: 2025-04-09

## 2025-04-08 RX ORDER — TALC
6 POWDER (GRAM) TOPICAL NIGHTLY
Status: DISCONTINUED | OUTPATIENT
Start: 2025-04-08 | End: 2025-04-10

## 2025-04-08 RX ORDER — AZELASTINE 1 MG/ML
1 SPRAY, METERED NASAL 2 TIMES DAILY
Status: DISCONTINUED | OUTPATIENT
Start: 2025-04-08 | End: 2025-04-16 | Stop reason: HOSPADM

## 2025-04-08 RX ORDER — ARFORMOTEROL TARTRATE 15 UG/2ML
15 SOLUTION RESPIRATORY (INHALATION) 2 TIMES DAILY
Status: DISCONTINUED | OUTPATIENT
Start: 2025-04-08 | End: 2025-04-16 | Stop reason: HOSPADM

## 2025-04-08 RX ORDER — MORPHINE SULFATE 4 MG/ML
1 INJECTION, SOLUTION INTRAMUSCULAR; INTRAVENOUS ONCE
Status: COMPLETED | OUTPATIENT
Start: 2025-04-08 | End: 2025-04-08

## 2025-04-08 RX ADMIN — CARVEDILOL 3.12 MG: 3.12 TABLET, FILM COATED ORAL at 08:04

## 2025-04-08 RX ADMIN — LORAZEPAM 0.5 MG: 2 INJECTION INTRAMUSCULAR; INTRAVENOUS at 10:04

## 2025-04-08 RX ADMIN — MONTELUKAST 10 MG: 10 TABLET, FILM COATED ORAL at 08:04

## 2025-04-08 RX ADMIN — FUROSEMIDE 40 MG: 40 TABLET ORAL at 09:04

## 2025-04-08 RX ADMIN — Medication 6 MG: at 08:04

## 2025-04-08 RX ADMIN — LISINOPRIL 40 MG: 20 TABLET ORAL at 09:04

## 2025-04-08 RX ADMIN — PREDNISONE 40 MG: 20 TABLET ORAL at 09:04

## 2025-04-08 RX ADMIN — GUAIFENESIN 600 MG: 600 TABLET, EXTENDED RELEASE ORAL at 09:04

## 2025-04-08 RX ADMIN — DOXYCYCLINE HYCLATE 100 MG: 100 TABLET, COATED ORAL at 08:04

## 2025-04-08 RX ADMIN — AZELASTINE HYDROCHLORIDE 137 MCG: 137 SPRAY, METERED NASAL at 08:04

## 2025-04-08 RX ADMIN — MUPIROCIN: 20 OINTMENT TOPICAL at 09:04

## 2025-04-08 RX ADMIN — MORPHINE SULFATE 1 MG: 4 INJECTION INTRAVENOUS at 10:04

## 2025-04-08 RX ADMIN — CARVEDILOL 3.12 MG: 3.12 TABLET, FILM COATED ORAL at 09:04

## 2025-04-08 RX ADMIN — ASPIRIN 81 MG: 81 TABLET, COATED ORAL at 09:04

## 2025-04-08 RX ADMIN — IPRATROPIUM BROMIDE AND ALBUTEROL SULFATE 3 ML: 2.5; .5 SOLUTION RESPIRATORY (INHALATION) at 11:04

## 2025-04-08 RX ADMIN — GUAIFENESIN 600 MG: 600 TABLET, EXTENDED RELEASE ORAL at 08:04

## 2025-04-08 RX ADMIN — IPRATROPIUM BROMIDE AND ALBUTEROL SULFATE 3 ML: 2.5; .5 SOLUTION RESPIRATORY (INHALATION) at 07:04

## 2025-04-08 RX ADMIN — METHYLPREDNISOLONE SODIUM SUCCINATE 20 MG: 40 INJECTION, POWDER, FOR SOLUTION INTRAMUSCULAR; INTRAVENOUS at 10:04

## 2025-04-08 RX ADMIN — IPRATROPIUM BROMIDE AND ALBUTEROL SULFATE 3 ML: 2.5; .5 SOLUTION RESPIRATORY (INHALATION) at 04:04

## 2025-04-08 RX ADMIN — BUDESONIDE 0.5 MG: 0.5 INHALANT RESPIRATORY (INHALATION) at 07:04

## 2025-04-08 RX ADMIN — ARFORMOTEROL TARTRATE 15 MCG: 15 SOLUTION RESPIRATORY (INHALATION) at 07:04

## 2025-04-08 RX ADMIN — PREDNISONE 40 MG: 20 TABLET ORAL at 08:04

## 2025-04-08 RX ADMIN — FLUTICASONE PROPIONATE 100 MCG: 50 SPRAY, METERED NASAL at 09:04

## 2025-04-08 RX ADMIN — DOXYCYCLINE HYCLATE 100 MG: 100 TABLET, COATED ORAL at 09:04

## 2025-04-08 NOTE — PROGRESS NOTES
[facility] Medicine  Progress Note    Patient Name: Hollie Ponce  MRN: [id number]  Patient Class: IP- Inpatient   Admission Date: 4/4/2025  Length of Stay: 3 days  Attending Physician: Franky Peguero MD  Primary Care Provider: Ariadne Smith MD        Subjective     Principal Problem:COPD exacerbation        HPI:  This is a 69-year-old female with a past medical history of COPD (not on O2), nonobstructive CAD, hypertension, peripheral artery disease, tobacco use who presents with dyspnea.      Patient presents for evaluation of shortness of breath that started on the day of presentation.  She reports worsening exertional dyspnea, associated with a dry cough, and chest congestion.  She reports having rhinorrhea/sinus congestion.  Her daughter was sick with similar symptoms, but limited her exposure to the patient and was wearing a mask around her  Patient smokes 5 cigarettes daily.    In the ED, the patient was tachypneic (20s-40s), tachycardic (100s-110s), and mildly hypoxic (90%, requiring 3 L O2).  Labs were remarkable for hypokalemia (3.4), negative BNP (41), negative troponin (<0.006).  Chest x-ray showed no acute cardiopulmonary process.  Patient was given Solu-Medrol 125 mg IV, hydralazine 10 mg IV, DuoNeb x1, Ativan 0.5 mg p.o..  She was admitted for further management.    Overview/Hospital Course:  Mrs. Ponce was hospitalized for COPD exacerbation after presenting with dyspnea and chest tightness.  She was noted to be tachypneic, tachycardic, and hypoxic on room air.  Supplemental oxygen initiated.  She received corticosteroids and nebulizer treatments with some symptomatic improvement.  Initial troponin negative; however, repeat troponin significantly elevated.  She reports persistent and some mild chest tightness.  Cardiology consulted, appreciate recs.  Initiate treatment for NSTEMI. LHC without evidence of obstructive CAD. Continued on COPD treatment.   4/8:  Still with  significantly poor respiratory status this morning, labored breathing and audible wheezing noted.  Will give a dose of IV steroids; however, will need to be premedicated.  Pulmonology consult, appreciate recs    Interval History:  Persistent dyspnea and wheezing    Review of Systems   Respiratory:  Positive for shortness of breath.    All other systems reviewed and are negative.    Objective:     Vital Signs (Most Recent):  Temp: 97.7 °F (36.5 °C) (04/08/25 0825)  Pulse: 91 (04/08/25 1037)  Resp: 18 (04/08/25 1011)  BP: (!) 154/81 (04/08/25 0910)  SpO2: 97 % (04/08/25 0825) Vital Signs (24h Range):  Temp:  [97.3 °F (36.3 °C)-98.5 °F (36.9 °C)] 97.7 °F (36.5 °C)  Pulse:  [] 91  Resp:  [18-22] 18  SpO2:  [94 %-99 %] 97 %  BP: (115-171)/(78-97) 154/81     Weight: 59.2 kg (130 lb 8 oz)  Body mass index is 29.26 kg/m².    Intake/Output Summary (Last 24 hours) at 4/8/2025 1052  Last data filed at 4/8/2025 0852  Gross per 24 hour   Intake 600 ml   Output 1500 ml   Net -900 ml         Physical Exam  Vitals and nursing note reviewed.   Constitutional:       General: She is not in acute distress.     Appearance: She is well-developed.   HENT:      Head: Normocephalic and atraumatic.      Right Ear: External ear normal.      Left Ear: External ear normal.   Cardiovascular:      Rate and Rhythm: Normal rate and regular rhythm.   Pulmonary:      Effort: Tachypnea and accessory muscle usage present. No respiratory distress.      Breath sounds: Wheezing present.   Skin:     General: Skin is warm and dry.   Neurological:      Mental Status: She is alert and oriented to person, place, and time.   Psychiatric:         Thought Content: Thought content normal.               Significant Labs: All pertinent labs within the past 24 hours have been reviewed.    Significant Imaging: I have reviewed all pertinent imaging results/findings within the past 24 hours.      Assessment & Plan  COPD exacerbation  4/8:  Still with significantly  poor respiratory status this morning, labored breathing and audible wheezing noted.  Will give a dose of IV steroids; however, will need to be premedicated.  Pulmonology consult, appreciate recs    Presented with SOB wheezing and cough. Found to have an NSTEMI as well attributed to type 2 non-obs CAD on [facility]. Goal O2 sats 88 to 92% with severe COPD by previous PFTs. Dyspneic with sitting in bed with o2 off unable to ambulate  Started on steroids, duo-nebs, and doxy  Check sputum culture   IV diuresis with lasix stopped given contraction alkalosis-transitioned to oral  Add mucinex, flonase, and hypertonic saline nebulizers for airway clearance  NSTEMI (non-ST elevated myocardial infarction)  4/6:  Plan is for left heart catheterization today.  Continue treatment for ACS and COPD.  Pending results    Patient presents with NSTEMI. Chest pain is currently controlled. Patient is currently on NSTEMI Pathway.    EKG reviewed. Troponins reviewed and results noted-   Recent Labs   Lab 04/05/25  0341   TROPONINI 0.327*   .     Lipid panel reviewed and shows-     Lab Results   Component Value Date    LDLCALC 85.4 04/16/2024     Lab Results   Component Value Date    TRIG 32 04/05/2025         Medical management includes; Beta Blocker, Dual Anti-Platelet therapy, Anticoagulation, and High Intensity Stain Echo has been performed. Latest ECHO results are as follows- Results for orders placed during the hospital encounter of 04/04/25    Echo    Interpretation Summary    Left Ventricle: The left ventricle is normal in size. Normal wall thickness. There is normal systolic function with a visually estimated ejection fraction of 65 - 70%. Grade I diastolic dysfunction.    Right Ventricle: The right ventricle is normal in size. Systolic function is normal.    Mitral Valve: There is mild regurgitation.    Tricuspid Valve: There is mild regurgitation.    Pulmonary Artery: The estimated pulmonary artery systolic pressure is 55 mmHg.  .    Consult for cardiac rehab is not ordered. Patient counseled on lifestyle modifications- continue current medications. Cardiology is consulted. Plan of care reviewed with cardiology team. Continue to monitor patient closely and adjust therapy as needed.     Essential hypertension  Patient's blood pressure range in the last 24 hours was: BP  Min: 115/78  Max: 171/82.The patient's inpatient anti-hypertensive regimen is listed below:  Current Antihypertensives  carvediloL tablet 3.125 mg, 2 times daily, Oral  lisinopriL tablet 40 mg, Daily, Oral  cloNIDine tablet 0.1 mg, Every 6 hours PRN, Oral  hydrALAZINE injection 5 mg, Every 6 hours PRN, Intravenous  furosemide tablet 40 mg, Daily, Oral    Plan  - BP is controlled, no changes needed to their regimen    CAD (coronary artery disease)  Patient with known CAD which is controlled Will continue ASA, Plavix, and Statin and monitor for S/Sx of angina/ACS. Continue to monitor on telemetry. Minimal non-obs CAD did not require stent placement  PAD (peripheral artery disease)  History noted. Continue home medications.     Pulmonary HTN  Results for orders placed during the hospital encounter of 11/16/20    Echo Color Flow Doppler? Yes; Bubble Contrast? No    Interpretation Summary  · The left ventricle is normal in size with normal systolic function. The estimated ejection fraction is 55%.  · There is mild left ventricular concentric hypertrophy.  · Normal left ventricular diastolic function.  · Normal right ventricular size with normal right ventricular systolic function.  · Mild left atrial enlargement.  · Normal central venous pressure (3 mmHg).  · The estimated PA systolic pressure is 49 mmHg.  · There is pulmonary hypertension.    BNP  Recent Labs   Lab 04/04/25  1929   BNP 41       Optimize volume status     Tobacco dependency  Tobacco cessation not discussed with patient today. Nicotine replacement has been- prescribed  Acute respiratory failure with hypoxia  Patient  with Hypoxic Respiratory failure which is Acute.  she is not on home oxygen. Supplemental oxygen was provided and noted-      .   Signs/symptoms of respiratory failure include- increased work of breathing and respiratory distress. Contributing diagnoses includes - COPD Labs and images were reviewed. Patient Has not had a recent ABG. Will treat underlying causes and adjust management of respiratory failure as follows- Wean O2 as tolerated   Carotid atherosclerosis, bilateral  Continue ASA/plavix/statin  Dyslipidemia  Continue statin  VTE Risk Mitigation (From admission, onward)           Ordered     enoxaparin injection 60 mg  Every 12 hours (non-standard times)         04/05/25 0946     Place sequential compression device  Until discontinued         04/04/25 2312     IP VTE HIGH RISK PATIENT  Once         04/04/25 2312     Place sequential compression device  Until discontinued         04/04/25 2312                    Discharge Planning   YOLA: 4/9/2025     Code Status: Full Code   Medical Readiness for Discharge Date:   Discharge Plan A: Home with family   Discharge Delays: None known at this time    Benito Blackburn Jr., APRN, AGACNP-BC  Hospitalist - Department of Hospital Medicine  [facility] [address] [place] [id number]  Office #: [phone]; Pager #: [phone]

## 2025-04-08 NOTE — CONSULTS
[facility] [place] [facility]  [facility]ulmonology  Consult Note    Patient Name: Hollie Ponce  MRN: [id number]  Admission Date: 4/4/2025  Hospital Length of Stay: 3 days  Code Status: Full Code  Attending Physician: Franky Peguero MD  Primary Care Provider: Ariadne Smith MD   Principal Problem: COPD exacerbation    Inpatient consult to Pulmonology  Consult performed by: Chun Grimm MD  Consult ordered by: Benito Blackburn Jr., NP        Subjective:     HPI:  Per HPI: This is a 69-year-old female admitted since 4/4/25 with a past medical history of COPD (not on O2), nonobstructive CAD, hypertension, peripheral artery disease, tobacco use who presents with dyspnea.       Patient presents for evaluation of shortness of breath that started on the day of presentation.  She reports worsening exertional dyspnea, associated with a dry cough, and chest congestion.  She reports having rhinorrhea/sinus congestion.  Her daughter was sick with similar symptoms, but limited her exposure to the patient and was wearing a mask around her  Patient smokes 5 cigarettes daily.     In the ED, the patient was tachypneic (20s-40s), tachycardic (100s-110s), and mildly hypoxic (90%, requiring 3 L O2).  Labs were remarkable for hypokalemia (3.4), negative BNP (41), negative troponin (<0.006).  Chest x-ray showed no acute cardiopulmonary process.  Patient was given Solu-Medrol 125 mg IV, hydralazine 10 mg IV, DuoNeb x1, Ativan 0.5 mg p.o..  She was admitted for further management.     She was admitted for COPDe and managed with steroids and nebulizer therapy. She had NSTEMI and and underwent LHC without evidence of obstructive CAD.     Pulmonary team was consulted for evaluation due to persistent respiratory symptoms on 4/8/25.     Social History     Tobacco Use    Smoking status: Every Day     Current packs/day: 0.25     Average packs/day: 0.5 packs/day for 51.0 years (25.3 ttl pk-yrs)     Types: Cigarettes     Start date: 12/13/1970     Last attempt to quit:  2020    Smokeless tobacco: Never   Substance Use Topics    Alcohol use: Never    Drug use: Never     Family History   Problem Relation Name Age of Onset    Cancer Mother          Bladder    Liver disease Father           PFT from 2021:  FEV1/FVC: 49%  FEV1: 0.52 L (34.7%) -> 0.56 L  FVC: 1.06 L (57.1%) -> 1.17 L  T.3%  DLCO: 53.6%    Past Medical History:   Diagnosis Date    Asthma     COPD (chronic obstructive pulmonary disease)     Hypertension        Past Surgical History:   Procedure Laterality Date    ANGIOGRAM, CORONARY, WITH LEFT HEART CATHETERIZATION  2025    Procedure: Angiogram, Coronary, with Left Heart Cath;  Surgeon: Jhonny Schofield MD;  Location: [facility] CATH LAB;  Service: Cardiology;;    CARDIAC CATHETERIZATION N/A 2025    Procedure: Cardiac catheterization;  Surgeon: Jhonny Schofield MD;  Location: [facility] CATH LAB;  Service: Cardiology;  Laterality: N/A;     SECTION      HERNIA REPAIR      LEFT HEART CATHETERIZATION Left 2020    Procedure: Left heart cath;  Surgeon: Shabnam Vernon MD;  Location: [facility] CATH LAB;  Service: Cardiology;  Laterality: Left;       Review of patient's allergies indicates:   Allergen Reactions    Flagyl [metronidazole hcl]      Hives      Sulfamethoxazole-trimethoprim Itching    Aspirin Nausea Only    Lipitor [atorvastatin]      Myalgia        Family History       Problem Relation (Age of Onset)    Cancer Mother    Liver disease Father          Tobacco Use    Smoking status: Every Day     Current packs/day: 0.25     Average packs/day: 0.5 packs/day for 51.0 years (25.3 ttl pk-yrs)     Types: Cigarettes     Start date: 1970     Last attempt to quit: 2020    Smokeless tobacco: Never   Substance and Sexual Activity    Alcohol use: Never    Drug use: Never    Sexual activity: Not Currently         Review of Systems   Constitutional:  Positive for activity change and fatigue.   Respiratory:  Positive for cough, chest  tightness, shortness of breath and wheezing.    Musculoskeletal:  Negative for arthralgias, back pain and joint swelling.   Neurological:  Negative for dizziness, light-headedness and headaches.     Objective:     Vital Signs (Most Recent):  Temp: 98.2 °F (36.8 °C) (04/08/25 1200)  Pulse: 97 (04/08/25 1444)  Resp: 18 (04/08/25 1200)  BP: 138/84 (04/08/25 1200)  SpO2: 97 % (04/08/25 1200) Vital Signs (24h Range):  Temp:  [97.3 °F (36.3 °C)-98.5 °F (36.9 °C)] 98.2 °F (36.8 °C)  Pulse:  [77-97] 97  Resp:  [18-22] 18  SpO2:  [95 %-99 %] 97 %  BP: (115-168)/(78-97) 138/84     Weight: 59.2 kg (130 lb 8 oz)  Body mass index is 29.26 kg/m².      Intake/Output Summary (Last 24 hours) at 4/8/2025 1637  Last data filed at 4/8/2025 1422  Gross per 24 hour   Intake 240 ml   Output 950 ml   Net -710 ml        Physical Exam  Vitals and nursing note reviewed.   Constitutional:       General: She is not in acute distress.     Appearance: She is well-developed.   HENT:      Head: Normocephalic and atraumatic.      Right Ear: External ear normal.      Left Ear: External ear normal.   Cardiovascular:      Rate and Rhythm: Normal rate and regular rhythm.   Pulmonary:      Effort: Tachypnea and accessory muscle usage present. No respiratory distress.      Breath sounds: Wheezing present.   Chest:      Chest wall: No tenderness.   Skin:     General: Skin is warm and dry.   Neurological:      Mental Status: She is alert and oriented to person, place, and time.   Psychiatric:         Thought Content: Thought content normal.              Lines/Drains/Airways       Drain  Duration             Female External Urinary Catheter w/ Suction 04/06/25 1300 2 days              Peripheral Intravenous Line  Duration                  Peripheral IV - Single Lumen 04/04/25 1910 20 G Left Antecubital 3 days                    Significant Labs:    CBC/Anemia Profile:  Recent Labs   Lab 04/07/25  0501 04/08/25  0538   WBC 5.97 6.43   HGB 13.4 12.9   HCT 40.9  "39.8    227   MCV 91 91   RDW 13.2 12.9        Chemistries:  Recent Labs   Lab 04/07/25  0501 04/08/25  0538   * 134*   K 3.2* 3.7   CL 92* 92*   CO2 30* 33*   BUN 16 14   CREATININE 0.7 0.7   CALCIUM 8.6* 9.1   GLUCOSE 111* 97   MG 1.5* 1.9   PHOS 3.5 2.9       All pertinent labs within the past 24 hours have been reviewed.    Significant Imaging:   I have reviewed all pertinent imaging results/findings within the past 24 hours.    ABG  No results for input(s): "PH", "PO2", "PCO2", "HCO3", "BE" in the last 168 hours.  Assessment/Plan:     Pulmonary  Acute exacerbation of COPD with asthma  Persistent wheezing b/l likely triggered by viral illness. Influenza/covid/rsv negative  Severe disease on spirometry from 2021    Plan:  Steroids - recommend increase to at least twice daily  Continue bronchodilator therapy q4h  Add arfometerol and budesonide neb  Continue fluticasone + azelastine nasal spray  Continue montelukast  Nasal cannula to keep SpO2 > 90%  Can use mild anxiolytic with steroids    Other  Tobacco dependency  Active smoker 5 cigarettes per day  Plan:  See section on asthma copd exacerbation      Thank you for your consult. I will follow-up with patient. Please contact us if you have any additional questions.     Chun Grimm MD  Pulmonology  [facility] - [place] - Telemetry    "

## 2025-04-08 NOTE — ASSESSMENT & PLAN NOTE
Persistent wheezing b/l likely triggered by viral illness. Influenza/covid/rsv negative  Severe disease on spirometry from 2021    Plan:  Steroids - recommend increase to at least twice daily  Continue bronchodilator therapy q4h  Add arfometerol and budesonide neb  Continue fluticasone + azelastine nasal spray  Continue montelukast  Nasal cannula to keep SpO2 > 90%  Can use mild anxiolytic with steroids

## 2025-04-08 NOTE — PLAN OF CARE
Problem: Adult Inpatient Plan of Care  Goal: Plan of Care Review  Outcome: Progressing  Goal: Patient-Specific Goal (Individualized)  Outcome: Progressing  Goal: Absence of Hospital-Acquired Illness or Injury  Outcome: Progressing  Goal: Optimal Comfort and Wellbeing  Outcome: Progressing  Goal: Readiness for Transition of Care  Outcome: Progressing     Problem: COPD (Chronic Obstructive Pulmonary Disease)  Goal: Optimal Chronic Illness Coping  Outcome: Progressing  Goal: Optimal Level of Functional [place]  Outcome: Progressing  Goal: Absence of Infection Signs and Symptoms  Outcome: Progressing  Goal: Improved Oral Intake  Outcome: Progressing  Goal: Effective Oxygenation and Ventilation  Outcome: Progressing     Problem: Infection  Goal: Absence of Infection Signs and Symptoms  Outcome: Progressing     Problem: Wound  Goal: Optimal Coping  Outcome: Progressing  Goal: Optimal Functional Ability  Outcome: Progressing  Goal: Absence of Infection Signs and Symptoms  Outcome: Progressing  Goal: Improved Oral Intake  Outcome: Progressing  Goal: Optimal Pain Control and Function  Outcome: Progressing  Goal: Skin Health and Integrity  Outcome: Progressing  Goal: Optimal Wound Healing  Outcome: Progressing     Problem: Fall Injury Risk  Goal: Absence of Fall and Fall-Related Injury  Outcome: Progressing

## 2025-04-08 NOTE — NURSING
[facility] [place]  Nurses Note -- 4 Eyes      4/7/2025       Skin assessed on: Q Shift      [x] No Pressure Injuries Present    []Prevention Measures Documented    [] Yes LDA  for Pressure Injury Previously documented     [] Yes New Pressure Injury Discovered   [] LDA for New Pressure Injury Added      Attending RN:  Lianna Carvalho LPN     Second RN:  Torres HAMEED

## 2025-04-08 NOTE — NURSING
[facility], [facility]  Nurses Note -- 4 Eyes      4/8/2025       Skin assessed on: Q Shift      [x] No Pressure Injuries Present    [x]Prevention Measures Documented    [] Yes LDA  for Pressure Injury Previously documented     [] Yes New Pressure Injury Discovered   [] LDA for New Pressure Injury Added      Attending RN:  Nika De Guzman RN     Second RN:  Layne Carvalho LPN

## 2025-04-08 NOTE — ASSESSMENT & PLAN NOTE
Patient's blood pressure range in the last 24 hours was: BP  Min: 115/78  Max: 171/82.The patient's inpatient anti-hypertensive regimen is listed below:  Current Antihypertensives  carvediloL tablet 3.125 mg, 2 times daily, Oral  lisinopriL tablet 40 mg, Daily, Oral  cloNIDine tablet 0.1 mg, Every 6 hours PRN, Oral  hydrALAZINE injection 5 mg, Every 6 hours PRN, Intravenous  furosemide tablet 40 mg, Daily, Oral    Plan  - BP is controlled, no changes needed to their regimen

## 2025-04-08 NOTE — HPI
Per HPI: This is a 69-year-old female admitted since 25 with a past medical history of COPD (not on O2), nonobstructive CAD, hypertension, peripheral artery disease, tobacco use who presents with dyspnea.       Patient presents for evaluation of shortness of breath that started on the day of presentation.  She reports worsening exertional dyspnea, associated with a dry cough, and chest congestion.  She reports having rhinorrhea/sinus congestion.  Her daughter was sick with similar symptoms, but limited her exposure to the patient and was wearing a mask around her  Patient smokes 5 cigarettes daily.     In the ED, the patient was tachypneic (20s-40s), tachycardic (100s-110s), and mildly hypoxic (90%, requiring 3 L O2).  Labs were remarkable for hypokalemia (3.4), negative BNP (41), negative troponin (<0.006).  Chest x-ray showed no acute cardiopulmonary process.  Patient was given Solu-Medrol 125 mg IV, hydralazine 10 mg IV, DuoNeb x1, Ativan 0.5 mg p.o..  She was admitted for further management.     She was admitted for COPDe and managed with steroids and nebulizer therapy. She had NSTEMI and and underwent LHC without evidence of obstructive CAD.     Pulmonary team was consulted for evaluation due to persistent respiratory symptoms on 25.     Social History     Tobacco Use    Smoking status: Every Day     Current packs/day: 0.25     Average packs/day: 0.5 packs/day for 51.0 years (25.3 ttl pk-yrs)     Types: Cigarettes     Start date: 1970     Last attempt to quit: 2020    Smokeless tobacco: Never   Substance Use Topics    Alcohol use: Never    Drug use: Never     Family History   Problem Relation Name Age of Onset    Cancer Mother          Bladder    Liver disease Father           PFT from 2021:  FEV1/FVC: 49%  FEV1: 0.52 L (34.7%) -> 0.56 L  FVC: 1.06 L (57.1%) -> 1.17 L  T.3%  DLCO: 53.6%

## 2025-04-08 NOTE — NURSING
PER handoff received from Lianna Carvalho LPN. Pt receiving respiratory care. NAD noted. No c/o pain. Fall and safety precautions maintained. Bed alarm activated and audible. Bed locked in lowest position, with side rails up x2. Call bell and personal items within reach.

## 2025-04-08 NOTE — PLAN OF CARE
Problem: Adult Inpatient Plan of Care  Goal: Plan of Care Review  Outcome: Progressing  Goal: Patient-Specific Goal (Individualized)  Outcome: Progressing  Goal: Absence of Hospital-Acquired Illness or Injury  Outcome: Progressing  Goal: Optimal Comfort and Wellbeing  Outcome: Progressing  Goal: Readiness for Transition of Care  Outcome: Progressing     Problem: COPD (Chronic Obstructive Pulmonary Disease)  Goal: Optimal Chronic Illness Coping  Outcome: Progressing  Goal: Optimal Level of Functional [place]  Outcome: Progressing  Goal: Absence of Infection Signs and Symptoms  Outcome: Progressing  Goal: Improved Oral Intake  Outcome: Progressing  Goal: Effective Oxygenation and Ventilation  Outcome: Progressing     Problem: Infection  Goal: Absence of Infection Signs and Symptoms  Outcome: Progressing     Problem: Wound  Goal: Optimal Coping  Outcome: Progressing  Goal: Optimal Functional Ability  Outcome: Progressing  Goal: Absence of Infection Signs and Symptoms  Outcome: Progressing  Goal: Improved Oral Intake  Outcome: Progressing  Goal: Optimal Pain Control and Function  Outcome: Progressing  Goal: Skin Health and Integrity  Outcome: Progressing  Goal: Optimal Wound Healing  Outcome: Progressing     Problem: Fall Injury Risk  Goal: Absence of Fall and Fall-Related Injury  Outcome: Progressing

## 2025-04-08 NOTE — ASSESSMENT & PLAN NOTE
4/8:  Still with significantly poor respiratory status this morning, labored breathing and audible wheezing noted.  Will give a dose of IV steroids; however, will need to be premedicated.  Pulmonology consult, chica saunderss    Presented with SOB wheezing and cough. Found to have an NSTEMI as well attributed to type 2 non-obs CAD on [facility]. Goal O2 sats 88 to 92% with severe COPD by previous PFTs. Dyspneic with sitting in bed with o2 off unable to ambulate  Started on steroids, duo-nebs, and doxy  Check sputum culture   IV diuresis with lasix stopped given contraction alkalosis-transitioned to oral  Add mucinex, flonase, and hypertonic saline nebulizers for airway clearance

## 2025-04-08 NOTE — SUBJECTIVE & OBJECTIVE
Interval History:  Persistent dyspnea and wheezing    Review of Systems   Respiratory:  Positive for shortness of breath.    All other systems reviewed and are negative.    Objective:     Vital Signs (Most Recent):  Temp: 97.7 °F (36.5 °C) (04/08/25 0825)  Pulse: 91 (04/08/25 1037)  Resp: 18 (04/08/25 1011)  BP: (!) 154/81 (04/08/25 0910)  SpO2: 97 % (04/08/25 0825) Vital Signs (24h Range):  Temp:  [97.3 °F (36.3 °C)-98.5 °F (36.9 °C)] 97.7 °F (36.5 °C)  Pulse:  [] 91  Resp:  [18-22] 18  SpO2:  [94 %-99 %] 97 %  BP: (115-171)/(78-97) 154/81     Weight: 59.2 kg (130 lb 8 oz)  Body mass index is 29.26 kg/m².    Intake/Output Summary (Last 24 hours) at 4/8/2025 1052  Last data filed at 4/8/2025 0852  Gross per 24 hour   Intake 600 ml   Output 1500 ml   Net -900 ml         Physical Exam  Vitals and nursing note reviewed.   Constitutional:       General: She is not in acute distress.     Appearance: She is well-developed.   HENT:      Head: Normocephalic and atraumatic.      Right Ear: External ear normal.      Left Ear: External ear normal.   Cardiovascular:      Rate and Rhythm: Normal rate and regular rhythm.   Pulmonary:      Effort: Tachypnea and accessory muscle usage present. No respiratory distress.      Breath sounds: Wheezing present.   Skin:     General: Skin is warm and dry.   Neurological:      Mental Status: She is alert and oriented to person, place, and time.   Psychiatric:         Thought Content: Thought content normal.               Significant Labs: All pertinent labs within the past 24 hours have been reviewed.    Significant Imaging: I have reviewed all pertinent imaging results/findings within the past 24 hours.

## 2025-04-09 LAB
ABSOLUTE EOSINOPHIL (OHS): 0 K/UL
ABSOLUTE MONOCYTE (OHS): 0.28 K/UL (ref 0.3–1)
ABSOLUTE NEUTROPHIL COUNT (OHS): 4.66 K/UL (ref 1.8–7.7)
ANION GAP (OHS): 6 MMOL/L (ref 8–16)
BACTERIA SPT CULT: NORMAL
BASOPHILS # BLD AUTO: 0.01 K/UL
BASOPHILS NFR BLD AUTO: 0.2 %
BUN SERPL-MCNC: 15 MG/DL (ref 8–23)
CALCIUM SERPL-MCNC: 9.2 MG/DL (ref 8.7–10.5)
CHLORIDE SERPL-SCNC: 92 MMOL/L (ref 95–110)
CO2 SERPL-SCNC: 34 MMOL/L (ref 23–29)
CREAT SERPL-MCNC: 0.7 MG/DL (ref 0.5–1.4)
ERYTHROCYTE [DISTWIDTH] IN BLOOD BY AUTOMATED COUNT: 12.8 % (ref 11.5–14.5)
GFR SERPLBLD CREATININE-BSD FMLA CKD-EPI: >60 ML/MIN/1.73/M2
GLUCOSE SERPL-MCNC: 127 MG/DL (ref 70–110)
GRAM STN SPEC: NORMAL
HCT VFR BLD AUTO: 40.7 % (ref 37–48.5)
HGB BLD-MCNC: 13.2 GM/DL (ref 12–16)
IMM GRANULOCYTES # BLD AUTO: 0.02 K/UL (ref 0–0.04)
IMM GRANULOCYTES NFR BLD AUTO: 0.3 % (ref 0–0.5)
LYMPHOCYTES # BLD AUTO: 1.02 K/UL (ref 1–4.8)
MAGNESIUM SERPL-MCNC: 1.9 MG/DL (ref 1.6–2.6)
MCH RBC QN AUTO: 29.7 PG (ref 27–31)
MCHC RBC AUTO-ENTMCNC: 32.4 G/DL (ref 32–36)
MCV RBC AUTO: 92 FL (ref 82–98)
NUCLEATED RBC (/100WBC) (OHS): 0 /100 WBC
PHOSPHATE SERPL-MCNC: 3.9 MG/DL (ref 2.7–4.5)
PLATELET # BLD AUTO: 234 K/UL (ref 150–450)
PMV BLD AUTO: 9.7 FL (ref 9.2–12.9)
POTASSIUM SERPL-SCNC: 4.1 MMOL/L (ref 3.5–5.1)
RBC # BLD AUTO: 4.44 M/UL (ref 4–5.4)
RELATIVE EOSINOPHIL (OHS): 0 %
RELATIVE LYMPHOCYTE (OHS): 17 % (ref 18–48)
RELATIVE MONOCYTE (OHS): 4.7 % (ref 4–15)
RELATIVE NEUTROPHIL (OHS): 77.8 % (ref 38–73)
SODIUM SERPL-SCNC: 132 MMOL/L (ref 136–145)
WBC # BLD AUTO: 5.99 K/UL (ref 3.9–12.7)

## 2025-04-09 PROCEDURE — 82310 ASSAY OF CALCIUM: CPT | Performed by: INTERNAL MEDICINE

## 2025-04-09 PROCEDURE — 99900035 HC TECH TIME PER 15 MIN (STAT)

## 2025-04-09 PROCEDURE — 25000003 PHARM REV CODE 250: Performed by: PHYSICIAN ASSISTANT

## 2025-04-09 PROCEDURE — 94761 N-INVAS EAR/PLS OXIMETRY MLT: CPT

## 2025-04-09 PROCEDURE — 99900031 HC PATIENT EDUCATION (STAT)

## 2025-04-09 PROCEDURE — 25000003 PHARM REV CODE 250: Performed by: INTERNAL MEDICINE

## 2025-04-09 PROCEDURE — 25000003 PHARM REV CODE 250: Performed by: HOSPITALIST

## 2025-04-09 PROCEDURE — 27000221 HC OXYGEN, UP TO 24 HOURS

## 2025-04-09 PROCEDURE — 25000242 PHARM REV CODE 250 ALT 637 W/ HCPCS: Performed by: PHYSICIAN ASSISTANT

## 2025-04-09 PROCEDURE — 83735 ASSAY OF MAGNESIUM: CPT | Performed by: INTERNAL MEDICINE

## 2025-04-09 PROCEDURE — 94664 DEMO&/EVAL PT USE INHALER: CPT

## 2025-04-09 PROCEDURE — 99233 SBSQ HOSP IP/OBS HIGH 50: CPT | Mod: ,,, | Performed by: INTERNAL MEDICINE

## 2025-04-09 PROCEDURE — 36415 COLL VENOUS BLD VENIPUNCTURE: CPT | Performed by: INTERNAL MEDICINE

## 2025-04-09 PROCEDURE — 85025 COMPLETE CBC W/AUTO DIFF WBC: CPT | Performed by: INTERNAL MEDICINE

## 2025-04-09 PROCEDURE — 84100 ASSAY OF PHOSPHORUS: CPT | Performed by: INTERNAL MEDICINE

## 2025-04-09 PROCEDURE — 63600175 PHARM REV CODE 636 W HCPCS: Performed by: HOSPITALIST

## 2025-04-09 PROCEDURE — 25000242 PHARM REV CODE 250 ALT 637 W/ HCPCS: Performed by: INTERNAL MEDICINE

## 2025-04-09 PROCEDURE — 94640 AIRWAY INHALATION TREATMENT: CPT

## 2025-04-09 PROCEDURE — 11000001 HC ACUTE MED/SURG PRIVATE ROOM

## 2025-04-09 RX ORDER — PREDNISONE 20 MG/1
40 TABLET ORAL 2 TIMES DAILY WITH MEALS
Status: DISCONTINUED | OUTPATIENT
Start: 2025-04-09 | End: 2025-04-10

## 2025-04-09 RX ORDER — DIAZEPAM 2 MG/1
2 TABLET ORAL EVERY 12 HOURS PRN
Status: DISCONTINUED | OUTPATIENT
Start: 2025-04-09 | End: 2025-04-10

## 2025-04-09 RX ADMIN — GUAIFENESIN 600 MG: 600 TABLET, EXTENDED RELEASE ORAL at 09:04

## 2025-04-09 RX ADMIN — IPRATROPIUM BROMIDE AND ALBUTEROL SULFATE 3 ML: 2.5; .5 SOLUTION RESPIRATORY (INHALATION) at 07:04

## 2025-04-09 RX ADMIN — LISINOPRIL 40 MG: 20 TABLET ORAL at 08:04

## 2025-04-09 RX ADMIN — IPRATROPIUM BROMIDE AND ALBUTEROL SULFATE 3 ML: 2.5; .5 SOLUTION RESPIRATORY (INHALATION) at 11:04

## 2025-04-09 RX ADMIN — IPRATROPIUM BROMIDE AND ALBUTEROL SULFATE 3 ML: 2.5; .5 SOLUTION RESPIRATORY (INHALATION) at 04:04

## 2025-04-09 RX ADMIN — FUROSEMIDE 40 MG: 40 TABLET ORAL at 08:04

## 2025-04-09 RX ADMIN — Medication 6 MG: at 09:04

## 2025-04-09 RX ADMIN — ASPIRIN 81 MG: 81 TABLET, COATED ORAL at 08:04

## 2025-04-09 RX ADMIN — DOXYCYCLINE HYCLATE 100 MG: 100 TABLET, COATED ORAL at 08:04

## 2025-04-09 RX ADMIN — GUAIFENESIN 600 MG: 600 TABLET, EXTENDED RELEASE ORAL at 08:04

## 2025-04-09 RX ADMIN — CARVEDILOL 3.12 MG: 3.12 TABLET, FILM COATED ORAL at 08:04

## 2025-04-09 RX ADMIN — PREDNISONE 40 MG: 20 TABLET ORAL at 08:04

## 2025-04-09 RX ADMIN — MONTELUKAST 10 MG: 10 TABLET, FILM COATED ORAL at 09:04

## 2025-04-09 RX ADMIN — BUDESONIDE 0.5 MG: 0.5 INHALANT RESPIRATORY (INHALATION) at 07:04

## 2025-04-09 RX ADMIN — AZELASTINE HYDROCHLORIDE 137 MCG: 137 SPRAY, METERED NASAL at 08:04

## 2025-04-09 RX ADMIN — PREDNISONE 40 MG: 20 TABLET ORAL at 05:04

## 2025-04-09 RX ADMIN — ARFORMOTEROL TARTRATE 15 MCG: 15 SOLUTION RESPIRATORY (INHALATION) at 07:04

## 2025-04-09 RX ADMIN — AZELASTINE HYDROCHLORIDE 137 MCG: 137 SPRAY, METERED NASAL at 10:04

## 2025-04-09 RX ADMIN — MUPIROCIN: 20 OINTMENT TOPICAL at 08:04

## 2025-04-09 RX ADMIN — DOXYCYCLINE HYCLATE 100 MG: 100 TABLET, COATED ORAL at 11:04

## 2025-04-09 RX ADMIN — FLUTICASONE PROPIONATE 100 MCG: 50 SPRAY, METERED NASAL at 08:04

## 2025-04-09 RX ADMIN — MUPIROCIN: 20 OINTMENT TOPICAL at 09:04

## 2025-04-09 RX ADMIN — CARVEDILOL 3.12 MG: 3.12 TABLET, FILM COATED ORAL at 09:04

## 2025-04-09 NOTE — PROGRESS NOTES
[facility] Medicine  Progress Note    Patient Name: Hollie Ponce  MRN: [id number]  Patient Class: IP- Inpatient   Admission Date: 4/4/2025  Length of Stay: 4 days  Attending Physician: Franky Peguero MD  Primary Care Provider: Ariadne Smith MD        Subjective     Principal Problem:COPD exacerbation        HPI:  This is a 69-year-old female with a past medical history of COPD (not on O2), nonobstructive CAD, hypertension, peripheral artery disease, tobacco use who presents with dyspnea.      Patient presents for evaluation of shortness of breath that started on the day of presentation.  She reports worsening exertional dyspnea, associated with a dry cough, and chest congestion.  She reports having rhinorrhea/sinus congestion.  Her daughter was sick with similar symptoms, but limited her exposure to the patient and was wearing a mask around her  Patient smokes 5 cigarettes daily.    In the ED, the patient was tachypneic (20s-40s), tachycardic (100s-110s), and mildly hypoxic (90%, requiring 3 L O2).  Labs were remarkable for hypokalemia (3.4), negative BNP (41), negative troponin (<0.006).  Chest x-ray showed no acute cardiopulmonary process.  Patient was given Solu-Medrol 125 mg IV, hydralazine 10 mg IV, DuoNeb x1, Ativan 0.5 mg p.o..  She was admitted for further management.    Overview/Hospital Course:  Mrs. Ponce was hospitalized for COPD exacerbation after presenting with dyspnea and chest tightness.  She was noted to be tachypneic, tachycardic, and hypoxic on room air.  Supplemental oxygen initiated.  She received corticosteroids and nebulizer treatments with some symptomatic improvement.  Initial troponin negative; however, repeat troponin significantly elevated.  She reports persistent and some mild chest tightness.  Cardiology consulted, appreciate recs.  Initiate treatment for NSTEMI. LHC without evidence of obstructive CAD. Continued on COPD treatment.   4/8:  Still with  significantly poor respiratory status this morning, labored breathing and audible wheezing noted.  Will give a dose of IV steroids; however, will need to be premedicated.  Pulmonology consult, appreciate recs  4/9:  Slight improvement with increased steroids.  Still with poor respiratory status requiring additional in-hospital treatment and monitoring.  Continue supplemental oxygen.    Interval History: no new complaints    Review of Systems   Respiratory:  Positive for shortness of breath.    Psychiatric/Behavioral:  The patient is nervous/anxious.    All other systems reviewed and are negative.    Objective:     Vital Signs (Most Recent):  Temp: 97.5 °F (36.4 °C) (04/09/25 1042)  Pulse: 90 (04/09/25 1109)  Resp: 20 (04/09/25 1109)  BP: (!) 152/86 (04/09/25 1042)  SpO2: 98 % (04/09/25 1109) Vital Signs (24h Range):  Temp:  [97.3 °F (36.3 °C)-97.8 °F (36.6 °C)] 97.5 °F (36.4 °C)  Pulse:  [] 90  Resp:  [18-20] 20  SpO2:  [94 %-99 %] 98 %  BP: (121-177)/(80-88) 152/86     Weight: 59.3 kg (130 lb 11.7 oz)  Body mass index is 29.31 kg/m².    Intake/Output Summary (Last 24 hours) at 4/9/2025 1249  Last data filed at 4/9/2025 1233  Gross per 24 hour   Intake 0 ml   Output 3050 ml   Net -3050 ml         Physical Exam  Vitals and nursing note reviewed.   Constitutional:       General: She is not in acute distress.     Appearance: She is well-developed.   HENT:      Head: Normocephalic and atraumatic.      Right Ear: External ear normal.      Left Ear: External ear normal.   Cardiovascular:      Rate and Rhythm: Normal rate and regular rhythm.   Pulmonary:      Effort: Tachypnea, accessory muscle usage and prolonged expiration present. No respiratory distress.      Breath sounds: Wheezing present.   Skin:     General: Skin is warm and dry.   Neurological:      Mental Status: She is alert and oriented to person, place, and time.   Psychiatric:         Thought Content: Thought content normal.               Significant  Labs: All pertinent labs within the past 24 hours have been reviewed.    Significant Imaging: I have reviewed all pertinent imaging results/findings within the past 24 hours.      Assessment & Plan  COPD exacerbation  4/9: Appreciate Pulm recs, continue increased steroids and neb treatments, add additional anxiolytic  4/8:  Still with significantly poor respiratory status this morning, labored breathing and audible wheezing noted.  Will give a dose of IV steroids; however, will need to be premedicated.  Pulmonology consult, appreciate recs    Presented with SOB wheezing and cough. Found to have an NSTEMI as well attributed to type 2 non-obs CAD on [facility]. Goal O2 sats 88 to 92% with severe COPD by previous PFTs. Dyspneic with sitting in bed with o2 off unable to ambulate  Started on steroids, duo-nebs, and doxy  Check sputum culture   IV diuresis with lasix stopped given contraction alkalosis-transitioned to oral  Add mucinex, flonase, and hypertonic saline nebulizers for airway clearance  NSTEMI (non-ST elevated myocardial infarction)  Nonobstructive CAD on cath, continue med mgmt  Plan is for left heart catheterization 4/6/25.  Continue treatment for ACS and COPD.  Pending results    Patient presents with NSTEMI. Chest pain is currently controlled. Patient is currently on NSTEMI Pathway.    EKG reviewed. Troponins reviewed and results noted-   Recent Labs   Lab 04/05/25  0341   TROPONINI 0.327*   .     Lipid panel reviewed and shows-     Lab Results   Component Value Date    LDLCALC 85.4 04/16/2024     Lab Results   Component Value Date    TRIG 32 04/05/2025         Medical management includes; Beta Blocker, Dual Anti-Platelet therapy, Anticoagulation, and High Intensity Stain Echo has been performed. Latest ECHO results are as follows- Results for orders placed during the hospital encounter of 04/04/25    Echo    Interpretation Summary    Left Ventricle: The left ventricle is normal in size. Normal wall thickness.  There is normal systolic function with a visually estimated ejection fraction of 65 - 70%. Grade I diastolic dysfunction.    Right Ventricle: The right ventricle is normal in size. Systolic function is normal.    Mitral Valve: There is mild regurgitation.    Tricuspid Valve: There is mild regurgitation.    Pulmonary Artery: The estimated pulmonary artery systolic pressure is 55 mmHg.  .   Consult for cardiac rehab is not ordered. Patient counseled on lifestyle modifications- continue current medications. Cardiology is consulted. Plan of care reviewed with cardiology team. Continue to monitor patient closely and adjust therapy as needed.     Essential hypertension  Patient's blood pressure range in the last 24 hours was: BP  Min: 121/86  Max: 177/88.The patient's inpatient anti-hypertensive regimen is listed below:  Current Antihypertensives  carvediloL tablet 3.125 mg, 2 times daily, Oral  lisinopriL tablet 40 mg, Daily, Oral  cloNIDine tablet 0.1 mg, Every 6 hours PRN, Oral  hydrALAZINE injection 5 mg, Every 6 hours PRN, Intravenous  furosemide tablet 40 mg, Daily, Oral    Plan  - BP is controlled, no changes needed to their regimen    CAD (coronary artery disease)  Patient with known CAD which is controlled Will continue ASA, Plavix, and Statin and monitor for S/Sx of angina/ACS. Continue to monitor on telemetry. Minimal non-obs CAD did not require stent placement  PAD (peripheral artery disease)  History noted. Continue home medications.     Pulmonary HTN  Results for orders placed during the hospital encounter of 11/16/20    Echo Color Flow Doppler? Yes; Bubble Contrast? No    Interpretation Summary  · The left ventricle is normal in size with normal systolic function. The estimated ejection fraction is 55%.  · There is mild left ventricular concentric hypertrophy.  · Normal left ventricular diastolic function.  · Normal right ventricular size with normal right ventricular systolic function.  · Mild left atrial  enlargement.  · Normal central venous pressure (3 mmHg).  · The estimated PA systolic pressure is 49 mmHg.  · There is pulmonary hypertension.    BNP  Recent Labs   Lab 04/04/25  1929   BNP 41       Optimize volume status     Tobacco dependency  Tobacco cessation not discussed with patient today. Nicotine replacement has been- prescribed  Acute respiratory failure with hypoxia  Patient with Hypoxic Respiratory failure which is Acute.  she is not on home oxygen. Supplemental oxygen was provided and noted- Oxygen Concentration (%):  [28] 28    .   Signs/symptoms of respiratory failure include- increased work of breathing and respiratory distress. Contributing diagnoses includes - COPD Labs and images were reviewed. Patient Has not had a recent ABG. Will treat underlying causes and adjust management of respiratory failure as follows- Wean O2 as tolerated   Carotid atherosclerosis, bilateral  Continue ASA/plavix/statin  Dyslipidemia  Continue statin  Acute exacerbation of COPD with asthma      VTE Risk Mitigation (From admission, onward)           Ordered     enoxaparin injection 60 mg  Every 12 hours (non-standard times)         04/05/25 0946     Place sequential compression device  Until discontinued         04/04/25 2312     IP VTE HIGH RISK PATIENT  Once         04/04/25 2312     Place sequential compression device  Until discontinued         04/04/25 2312                    Discharge Planning   YOLA: 4/9/2025     Code Status: Full Code   Medical Readiness for Discharge Date:   Discharge Plan A: Home with family   Discharge Delays: None known at this time    Benito Blackburn Jr., APRN, AGACNP-BC  Hospitalist - Department of Hospital Medicine  [facility] [address] [place] [address]  Office #: [phone]; Pager #: [phone]

## 2025-04-09 NOTE — PLAN OF CARE
Problem: Adult Inpatient Plan of Care  Goal: Plan of Care Review  Outcome: Progressing  Goal: Patient-Specific Goal (Individualized)  Outcome: Progressing  Goal: Absence of Hospital-Acquired Illness or Injury  Outcome: Progressing  Goal: Optimal Comfort and Wellbeing  Outcome: Progressing  Goal: Readiness for Transition of Care  Outcome: Progressing     Problem: COPD (Chronic Obstructive Pulmonary Disease)  Goal: Optimal Chronic Illness Coping  Outcome: Progressing  Goal: Optimal Level of Functional Banks  Outcome: Progressing  Goal: Absence of Infection Signs and Symptoms  Outcome: Progressing  Goal: Improved Oral Intake  Outcome: Progressing  Goal: Effective Oxygenation and Ventilation  Outcome: Progressing     Problem: Infection  Goal: Absence of Infection Signs and Symptoms  Outcome: Progressing     Problem: Wound  Goal: Optimal Coping  Outcome: Progressing  Goal: Optimal Functional Ability  Outcome: Progressing  Goal: Absence of Infection Signs and Symptoms  Outcome: Progressing  Goal: Improved Oral Intake  Outcome: Progressing  Goal: Optimal Pain Control and Function  Outcome: Progressing  Goal: Skin Health and Integrity  Outcome: Progressing  Goal: Optimal Wound Healing  Outcome: Progressing     Problem: Fall Injury Risk  Goal: Absence of Fall and Fall-Related Injury  Outcome: Progressing

## 2025-04-09 NOTE — ASSESSMENT & PLAN NOTE
Patient with Hypoxic Respiratory failure which is Acute.  she is not on home oxygen. Supplemental oxygen was provided and noted- Oxygen Concentration (%):  [28] 28    See asthma copd section

## 2025-04-09 NOTE — ASSESSMENT & PLAN NOTE
4/9: Appreciate Pulm recs, continue increased steroids and neb treatments, add additional anxiolytic  4/8:  Still with significantly poor respiratory status this morning, labored breathing and audible wheezing noted.  Will give a dose of IV steroids; however, will need to be premedicated.  Pulmonology consult, appreciate recs    Presented with SOB wheezing and cough. Found to have an NSTEMI as well attributed to type 2 non-obs CAD on [facility]. Goal O2 sats 88 to 92% with severe COPD by previous PFTs. Dyspneic with sitting in bed with o2 off unable to ambulate  Started on steroids, duo-nebs, and doxy  Check sputum culture   IV diuresis with lasix stopped given contraction alkalosis-transitioned to oral  Add mucinex, flonase, and hypertonic saline nebulizers for airway clearance

## 2025-04-09 NOTE — ASSESSMENT & PLAN NOTE
Patient's blood pressure range in the last 24 hours was: BP  Min: 121/86  Max: 177/88.The patient's inpatient anti-hypertensive regimen is listed below:  Current Antihypertensives  carvediloL tablet 3.125 mg, 2 times daily, Oral  lisinopriL tablet 40 mg, Daily, Oral  cloNIDine tablet 0.1 mg, Every 6 hours PRN, Oral  hydrALAZINE injection 5 mg, Every 6 hours PRN, Intravenous  furosemide tablet 40 mg, Daily, Oral    Plan  - BP is controlled, no changes needed to their regimen

## 2025-04-09 NOTE — SUBJECTIVE & OBJECTIVE
Interval History:  reports some improvement in breathing, but still with significant wheezing.      Objective:     Vital Signs (Most Recent):  Temp: 97.5 °F (36.4 °C) (04/09/25 1042)  Pulse: 90 (04/09/25 1109)  Resp: 20 (04/09/25 1109)  BP: (!) 152/86 (04/09/25 1042)  SpO2: 98 % (04/09/25 1109) Vital Signs (24h Range):  Temp:  [97.3 °F (36.3 °C)-97.8 °F (36.6 °C)] 97.5 °F (36.4 °C)  Pulse:  [] 90  Resp:  [18-20] 20  SpO2:  [94 %-99 %] 98 %  BP: (121-177)/(80-88) 152/86     Weight: 59.3 kg (130 lb 11.7 oz)  Body mass index is 29.31 kg/m².      Intake/Output Summary (Last 24 hours) at 4/9/2025 1442  Last data filed at 4/9/2025 1233  Gross per 24 hour   Intake 0 ml   Output 2200 ml   Net -2200 ml        Physical Exam  Vitals and nursing note reviewed.   Constitutional:       General: She is not in acute distress.     Appearance: She is well-developed.   HENT:      Head: Normocephalic and atraumatic.      Right Ear: External ear normal.      Left Ear: External ear normal.   Cardiovascular:      Rate and Rhythm: Normal rate and regular rhythm.   Pulmonary:      Effort: Tachypnea and accessory muscle usage present. No respiratory distress.      Breath sounds: Wheezing present.   Chest:      Chest wall: No tenderness.   Skin:     General: Skin is warm and dry.   Neurological:      Mental Status: She is alert and oriented to person, place, and time.   Psychiatric:         Thought Content: Thought content normal.           Review of Systems    Vents:  Oxygen Concentration (%): 28 (04/09/25 1109)    Lines/Drains/Airways       Drain  Duration             Female External Urinary Catheter w/ Suction 04/06/25 1300 3 days              Peripheral Intravenous Line  Duration                  Peripheral IV - Single Lumen 04/04/25 1910 20 G Left Antecubital 4 days                    Significant Labs:    CBC/Anemia Profile:  Recent Labs   Lab 04/08/25  0538 04/09/25  0447   WBC 6.43 5.99   HGB 12.9 13.2   HCT 39.8 40.7     234   MCV 91 92   RDW 12.9 12.8        Chemistries:  Recent Labs   Lab 04/08/25  0538 04/09/25  0447   * 132*   K 3.7 4.1   CL 92* 92*   CO2 33* 34*   BUN 14 15   CREATININE 0.7 0.7   CALCIUM 9.1 9.2   GLUCOSE 97 127*   MG 1.9 1.9   PHOS 2.9 3.9       All pertinent labs within the past 24 hours have been reviewed.    Significant Imaging:  I have reviewed all pertinent imaging results/findings within the past 24 hours.

## 2025-04-09 NOTE — SUBJECTIVE & OBJECTIVE
Interval History: no new complaints    Review of Systems   Respiratory:  Positive for shortness of breath.    Psychiatric/Behavioral:  The patient is nervous/anxious.    All other systems reviewed and are negative.    Objective:     Vital Signs (Most Recent):  Temp: 97.5 °F (36.4 °C) (04/09/25 1042)  Pulse: 90 (04/09/25 1109)  Resp: 20 (04/09/25 1109)  BP: (!) 152/86 (04/09/25 1042)  SpO2: 98 % (04/09/25 1109) Vital Signs (24h Range):  Temp:  [97.3 °F (36.3 °C)-97.8 °F (36.6 °C)] 97.5 °F (36.4 °C)  Pulse:  [] 90  Resp:  [18-20] 20  SpO2:  [94 %-99 %] 98 %  BP: (121-177)/(80-88) 152/86     Weight: 59.3 kg (130 lb 11.7 oz)  Body mass index is 29.31 kg/m².    Intake/Output Summary (Last 24 hours) at 4/9/2025 1249  Last data filed at 4/9/2025 1233  Gross per 24 hour   Intake 0 ml   Output 3050 ml   Net -3050 ml         Physical Exam  Vitals and nursing note reviewed.   Constitutional:       General: She is not in acute distress.     Appearance: She is well-developed.   HENT:      Head: Normocephalic and atraumatic.      Right Ear: External ear normal.      Left Ear: External ear normal.   Cardiovascular:      Rate and Rhythm: Normal rate and regular rhythm.   Pulmonary:      Effort: Tachypnea, accessory muscle usage and prolonged expiration present. No respiratory distress.      Breath sounds: Wheezing present.   Skin:     General: Skin is warm and dry.   Neurological:      Mental Status: She is alert and oriented to person, place, and time.   Psychiatric:         Thought Content: Thought content normal.               Significant Labs: All pertinent labs within the past 24 hours have been reviewed.    Significant Imaging: I have reviewed all pertinent imaging results/findings within the past 24 hours.

## 2025-04-09 NOTE — ASSESSMENT & PLAN NOTE
Persistent wheezing b/l likely triggered by viral illness. Influenza/covid/rsv negative  Severe airflow obstruction on spirometry from 2021    Plan:  Steroids - continue twice a day dosing. Patient reluctant to do IV due to jitters and anxiety  Continue bronchodilator therapy q4h  Continue arfometerol and budesonide neb  Continue fluticasone + azelastine nasal spray  Continue montelukast  Nasal cannula to keep SpO2 > 90%  Can use mild anxiolytic with steroids

## 2025-04-09 NOTE — ASSESSMENT & PLAN NOTE
Patient with Hypoxic Respiratory failure which is Acute.  she is not on home oxygen. Supplemental oxygen was provided and noted- Oxygen Concentration (%):  [28] 28    .   Signs/symptoms of respiratory failure include- increased work of breathing and respiratory distress. Contributing diagnoses includes - COPD Labs and images were reviewed. Patient Has not had a recent ABG. Will treat underlying causes and adjust management of respiratory failure as follows- Wean O2 as tolerated

## 2025-04-09 NOTE — NURSING
PER handoff received from Lianna Carvalho LPN. Pt resting in bed quietly. NAD noted. No c/o pain. Fall and safety precautions maintained. Bed alarm activated and audible. Bed locked in lowest position, with side rails up x2. Call bell and personal items within reach.

## 2025-04-09 NOTE — PLAN OF CARE
Changes in medical condition or discharge plan:    Pt still wheezing, on oxygen, pending improvement in respiratory status. Pt will dc home with family when medically stable.    Does patient need new DME? TBD    Follow up appts needed: PCP, Pulmonology    Medically stable: no    Estimated Discharge Date: 4/12 04/09/25 1309   Discharge Reassessment   Assessment Type Discharge Planning Reassessment   Did the patient's condition or plan change since previous assessment? No   Discharge Plan discussed with: Patient   Communicated YOLA with patient/caregiver Date not available/Unable to determine   Discharge Plan A Home with family   Discharge Plan B Home Health   DME Needed Upon Discharge  other (see comments)  (TBD)   Transition of Care Barriers None   Why the patient remains in the hospital Requires continued medical care   Post-Acute Status   Coverage [employer]   Hospital Resources/Appts/Education Provided Appointments scheduled and added to AVS;Provided patient/caregiver with written discharge plan information   Discharge Delays None known at this time

## 2025-04-09 NOTE — ASSESSMENT & PLAN NOTE
Nonobstructive CAD on cath, continue med mgmt  Plan is for left heart catheterization 4/6/25.  Continue treatment for ACS and COPD.  Pending results    Patient presents with NSTEMI. Chest pain is currently controlled. Patient is currently on NSTEMI Pathway.    EKG reviewed. Troponins reviewed and results noted-   Recent Labs   Lab 04/05/25  0341   TROPONINI 0.327*   .     Lipid panel reviewed and shows-     Lab Results   Component Value Date    LDLCALC 85.4 04/16/2024     Lab Results   Component Value Date    TRIG 32 04/05/2025         Medical management includes; Beta Blocker, Dual Anti-Platelet therapy, Anticoagulation, and High Intensity Stain Echo has been performed. Latest ECHO results are as follows- Results for orders placed during the hospital encounter of 04/04/25    Echo    Interpretation Summary    Left Ventricle: The left ventricle is normal in size. Normal wall thickness. There is normal systolic function with a visually estimated ejection fraction of 65 - 70%. Grade I diastolic dysfunction.    Right Ventricle: The right ventricle is normal in size. Systolic function is normal.    Mitral Valve: There is mild regurgitation.    Tricuspid Valve: There is mild regurgitation.    Pulmonary Artery: The estimated pulmonary artery systolic pressure is 55 mmHg.  .   Consult for cardiac rehab is not ordered. Patient counseled on lifestyle modifications- continue current medications. Cardiology is consulted. Plan of care reviewed with cardiology team. Continue to monitor patient closely and adjust therapy as needed.

## 2025-04-09 NOTE — NURSING
[facility] [place]es [place] -- 4 Eyes      4/9/2025       Skin assessed on: Q Shift      [x] No Pressure Injuries Present    [x]Prevention Measures Documented    [] Yes LDA  for Pressure Injury Previously documented     [] Yes New Pressure Injury Discovered   [] LDA for New Pressure Injury Added      Attending RN:  Nika De Guzman RN     Second RN:  Lianna Carvalho LPN

## 2025-04-09 NOTE — NURSING
[facility]  Nurses Note -- 4 Eyes      4/8/2025       Skin assessed on: Q Shift      [x] No Pressure Injuries Present    []Prevention Measures Documented    [] Yes LDA  for Pressure Injury Previously documented     [] Yes New Pressure Injury Discovered   [] LDA for New Pressure Injury Added      Attending RN:  Lianna Carvalho LPN     Second RN:  Nika HAMEED

## 2025-04-09 NOTE — PROGRESS NOTES
[facility]  [facility]ulmon[facility]  Progress Note    Patient Name: Hollie Ponce  MRN: [id number]  Admission Date: 4/4/2025  Hospital Length of Stay: 4 days  Code Status: Full Code  Attending Provider: Franky Peguero MD  Primary Care Provider: Ariadne Smith MD   Principal Problem: COPD exacerbation    Subjective:     Interval History:  reports some improvement in breathing, but still with significant wheezing.      Objective:     Vital Signs (Most Recent):  Temp: 97.5 °F (36.4 °C) (04/09/25 1042)  Pulse: 90 (04/09/25 1109)  Resp: 20 (04/09/25 1109)  BP: (!) 152/86 (04/09/25 1042)  SpO2: 98 % (04/09/25 1109) Vital Signs (24h Range):  Temp:  [97.3 °F (36.3 °C)-97.8 °F (36.6 °C)] 97.5 °F (36.4 °C)  Pulse:  [] 90  Resp:  [18-20] 20  SpO2:  [94 %-99 %] 98 %  BP: (121-177)/(80-88) 152/86     Weight: 59.3 kg (130 lb 11.7 oz)  Body mass index is 29.31 kg/m².      Intake/Output Summary (Last 24 hours) at 4/9/2025 1442  Last data filed at 4/9/2025 1233  Gross per 24 hour   Intake 0 ml   Output 2200 ml   Net -2200 ml        Physical Exam  Vitals and nursing note reviewed.   Constitutional:       General: She is not in acute distress.     Appearance: She is well-developed.   HENT:      Head: Normocephalic and atraumatic.      Right Ear: External ear normal.      Left Ear: External ear normal.   Cardiovascular:      Rate and Rhythm: Normal rate and regular rhythm.   Pulmonary:      Effort: Tachypnea and accessory muscle usage present. No respiratory distress.      Breath sounds: Wheezing present.   Chest:      Chest wall: No tenderness.   Skin:     General: Skin is warm and dry.   Neurological:      Mental Status: She is alert and oriented to person, place, and time.   Psychiatric:         Thought Content: Thought content normal.           Review of Systems    Vents:  Oxygen Concentration (%): 28 (04/09/25 1109)    Lines/Drains/Airways       Drain  Duration             Female External Urinary Catheter w/ Suction 04/06/25  "1300 3 days              Peripheral Intravenous Line  Duration                  Peripheral IV - Single Lumen 04/04/25 1910 20 G Left Antecubital 4 days                    Significant Labs:    CBC/Anemia Profile:  Recent Labs   Lab 04/08/25  0538 04/09/25  0447   WBC 6.43 5.99   HGB 12.9 13.2   HCT 39.8 40.7    234   MCV 91 92   RDW 12.9 12.8        Chemistries:  Recent Labs   Lab 04/08/25  0538 04/09/25  0447   * 132*   K 3.7 4.1   CL 92* 92*   CO2 33* 34*   BUN 14 15   CREATININE 0.7 0.7   CALCIUM 9.1 9.2   GLUCOSE 97 127*   MG 1.9 1.9   PHOS 2.9 3.9       All pertinent labs within the past 24 hours have been reviewed.    Significant Imaging:  I have reviewed all pertinent imaging results/findings within the past 24 hours.    ABG  No results for input(s): "PH", "PO2", "PCO2", "HCO3", "BE" in the last 168 hours.  Assessment/Plan:     Pulmonary  Acute exacerbation of COPD with asthma  Persistent wheezing b/l likely triggered by viral illness. Influenza/covid/rsv negative  Severe airflow obstruction on spirometry from 2021    Plan:  Steroids - continue twice a day dosing. Patient reluctant to do IV due to jitters and anxiety  Continue bronchodilator therapy q4h  Continue arfometerol and budesonide neb  Continue fluticasone + azelastine nasal spray  Continue montelukast  Nasal cannula to keep SpO2 > 90%  Can use mild anxiolytic with steroids    Acute respiratory failure with hypoxia  Patient with Hypoxic Respiratory failure which is Acute.  she is not on home oxygen. Supplemental oxygen was provided and noted- Oxygen Concentration (%):  [28] 28    See asthma copd section    Other  Tobacco dependency  Active smoker 5 cigarettes per day  Plan:  See section on asthma copd exacerbation           Chun Grimm MD  Pulmonology  [facility] - [place], [place] - Telemetry  "

## 2025-04-09 NOTE — PLAN OF CARE
Problem: Adult Inpatient Plan of Care  Goal: Plan of Care Review  Outcome: Progressing  Goal: Patient-Specific Goal (Individualized)  Outcome: Progressing  Goal: Absence of Hospital-Acquired Illness or Injury  Outcome: Progressing  Goal: Optimal Comfort and Wellbeing  Outcome: Progressing  Goal: Readiness for Transition of Care  Outcome: Progressing     Problem: COPD (Chronic Obstructive Pulmonary Disease)  Goal: Optimal Chronic Illness Coping  Outcome: Progressing  Goal: Optimal Level of Functional [place]  Outcome: Progressing  Goal: Absence of Infection Signs and Symptoms  Outcome: Progressing  Goal: Improved Oral Intake  Outcome: Progressing  Goal: Effective Oxygenation and Ventilation  Outcome: Progressing     Problem: Infection  Goal: Absence of Infection Signs and Symptoms  Outcome: Progressing

## 2025-04-10 LAB
ABSOLUTE EOSINOPHIL (OHS): 0 K/UL
ABSOLUTE MONOCYTE (OHS): 0.5 K/UL (ref 0.3–1)
ABSOLUTE NEUTROPHIL COUNT (OHS): 6.82 K/UL (ref 1.8–7.7)
ANION GAP (OHS): 8 MMOL/L (ref 8–16)
BASOPHILS # BLD AUTO: 0.01 K/UL
BASOPHILS NFR BLD AUTO: 0.1 %
BUN SERPL-MCNC: 14 MG/DL (ref 8–23)
CALCIUM SERPL-MCNC: 9.5 MG/DL (ref 8.7–10.5)
CHLORIDE SERPL-SCNC: 92 MMOL/L (ref 95–110)
CO2 SERPL-SCNC: 34 MMOL/L (ref 23–29)
CREAT SERPL-MCNC: 0.8 MG/DL (ref 0.5–1.4)
ERYTHROCYTE [DISTWIDTH] IN BLOOD BY AUTOMATED COUNT: 12.7 % (ref 11.5–14.5)
GFR SERPLBLD CREATININE-BSD FMLA CKD-EPI: >60 ML/MIN/1.73/M2
GLUCOSE SERPL-MCNC: 113 MG/DL (ref 70–110)
HCT VFR BLD AUTO: 44.9 % (ref 37–48.5)
HGB BLD-MCNC: 14.2 GM/DL (ref 12–16)
IMM GRANULOCYTES # BLD AUTO: 0.05 K/UL (ref 0–0.04)
IMM GRANULOCYTES NFR BLD AUTO: 0.6 % (ref 0–0.5)
LYMPHOCYTES # BLD AUTO: 1.3 K/UL (ref 1–4.8)
MAGNESIUM SERPL-MCNC: 1.9 MG/DL (ref 1.6–2.6)
MCH RBC QN AUTO: 29.2 PG (ref 27–31)
MCHC RBC AUTO-ENTMCNC: 31.6 G/DL (ref 32–36)
MCV RBC AUTO: 92 FL (ref 82–98)
NUCLEATED RBC (/100WBC) (OHS): 0 /100 WBC
PHOSPHATE SERPL-MCNC: 3.5 MG/DL (ref 2.7–4.5)
PLATELET # BLD AUTO: 266 K/UL (ref 150–450)
PMV BLD AUTO: 10.5 FL (ref 9.2–12.9)
POTASSIUM SERPL-SCNC: 4.5 MMOL/L (ref 3.5–5.1)
RBC # BLD AUTO: 4.87 M/UL (ref 4–5.4)
RELATIVE EOSINOPHIL (OHS): 0 %
RELATIVE LYMPHOCYTE (OHS): 15 % (ref 18–48)
RELATIVE MONOCYTE (OHS): 5.8 % (ref 4–15)
RELATIVE NEUTROPHIL (OHS): 78.5 % (ref 38–73)
SODIUM SERPL-SCNC: 134 MMOL/L (ref 136–145)
WBC # BLD AUTO: 8.68 K/UL (ref 3.9–12.7)

## 2025-04-10 PROCEDURE — 25000003 PHARM REV CODE 250: Performed by: PHYSICIAN ASSISTANT

## 2025-04-10 PROCEDURE — 11000001 HC ACUTE MED/SURG PRIVATE ROOM

## 2025-04-10 PROCEDURE — 25000003 PHARM REV CODE 250: Performed by: INTERNAL MEDICINE

## 2025-04-10 PROCEDURE — 82374 ASSAY BLOOD CARBON DIOXIDE: CPT | Performed by: INTERNAL MEDICINE

## 2025-04-10 PROCEDURE — 85025 COMPLETE CBC W/AUTO DIFF WBC: CPT | Performed by: INTERNAL MEDICINE

## 2025-04-10 PROCEDURE — 99233 SBSQ HOSP IP/OBS HIGH 50: CPT | Mod: ,,, | Performed by: INTERNAL MEDICINE

## 2025-04-10 PROCEDURE — 94664 DEMO&/EVAL PT USE INHALER: CPT

## 2025-04-10 PROCEDURE — 25000242 PHARM REV CODE 250 ALT 637 W/ HCPCS: Performed by: INTERNAL MEDICINE

## 2025-04-10 PROCEDURE — 27000646 HC AEROBIKA DEVICE

## 2025-04-10 PROCEDURE — 94761 N-INVAS EAR/PLS OXIMETRY MLT: CPT

## 2025-04-10 PROCEDURE — 36415 COLL VENOUS BLD VENIPUNCTURE: CPT | Performed by: INTERNAL MEDICINE

## 2025-04-10 PROCEDURE — 63600175 PHARM REV CODE 636 W HCPCS: Performed by: HOSPITALIST

## 2025-04-10 PROCEDURE — 99900035 HC TECH TIME PER 15 MIN (STAT)

## 2025-04-10 PROCEDURE — 83735 ASSAY OF MAGNESIUM: CPT | Performed by: INTERNAL MEDICINE

## 2025-04-10 PROCEDURE — 84100 ASSAY OF PHOSPHORUS: CPT | Performed by: INTERNAL MEDICINE

## 2025-04-10 PROCEDURE — 94640 AIRWAY INHALATION TREATMENT: CPT

## 2025-04-10 PROCEDURE — 25000003 PHARM REV CODE 250: Performed by: HOSPITALIST

## 2025-04-10 PROCEDURE — 25000242 PHARM REV CODE 250 ALT 637 W/ HCPCS: Performed by: PHYSICIAN ASSISTANT

## 2025-04-10 RX ORDER — DIAZEPAM 2 MG/1
2 TABLET ORAL NIGHTLY
Status: DISCONTINUED | OUTPATIENT
Start: 2025-04-10 | End: 2025-04-10

## 2025-04-10 RX ORDER — TALC
6 POWDER (GRAM) TOPICAL NIGHTLY PRN
Status: DISCONTINUED | OUTPATIENT
Start: 2025-04-10 | End: 2025-04-16 | Stop reason: HOSPADM

## 2025-04-10 RX ORDER — DIAZEPAM 2 MG/1
2 TABLET ORAL 2 TIMES DAILY WITH MEALS
Status: DISCONTINUED | OUTPATIENT
Start: 2025-04-10 | End: 2025-04-11

## 2025-04-10 RX ORDER — DIAZEPAM 2 MG/1
2 TABLET ORAL ONCE
Status: COMPLETED | OUTPATIENT
Start: 2025-04-10 | End: 2025-04-10

## 2025-04-10 RX ORDER — POLYETHYLENE GLYCOL 3350 17 G/17G
17 POWDER, FOR SOLUTION ORAL 2 TIMES DAILY
Status: DISCONTINUED | OUTPATIENT
Start: 2025-04-10 | End: 2025-04-16 | Stop reason: HOSPADM

## 2025-04-10 RX ADMIN — LISINOPRIL 40 MG: 20 TABLET ORAL at 10:04

## 2025-04-10 RX ADMIN — CLONIDINE HYDROCHLORIDE 0.1 MG: 0.1 TABLET ORAL at 04:04

## 2025-04-10 RX ADMIN — MONTELUKAST 10 MG: 10 TABLET, FILM COATED ORAL at 09:04

## 2025-04-10 RX ADMIN — GUAIFENESIN 600 MG: 600 TABLET, EXTENDED RELEASE ORAL at 09:04

## 2025-04-10 RX ADMIN — IPRATROPIUM BROMIDE AND ALBUTEROL SULFATE 3 ML: 2.5; .5 SOLUTION RESPIRATORY (INHALATION) at 07:04

## 2025-04-10 RX ADMIN — POLYETHYLENE GLYCOL 3350 17 G: 17 POWDER, FOR SOLUTION ORAL at 09:04

## 2025-04-10 RX ADMIN — DIAZEPAM 2 MG: 2 TABLET ORAL at 04:04

## 2025-04-10 RX ADMIN — ARFORMOTEROL TARTRATE 15 MCG: 15 SOLUTION RESPIRATORY (INHALATION) at 07:04

## 2025-04-10 RX ADMIN — FLUTICASONE PROPIONATE 100 MCG: 50 SPRAY, METERED NASAL at 10:04

## 2025-04-10 RX ADMIN — DIAZEPAM 2 MG: 2 TABLET ORAL at 10:04

## 2025-04-10 RX ADMIN — IPRATROPIUM BROMIDE AND ALBUTEROL SULFATE 3 ML: 2.5; .5 SOLUTION RESPIRATORY (INHALATION) at 11:04

## 2025-04-10 RX ADMIN — POLYETHYLENE GLYCOL 3350 17 G: 17 POWDER, FOR SOLUTION ORAL at 10:04

## 2025-04-10 RX ADMIN — CARVEDILOL 3.12 MG: 3.12 TABLET, FILM COATED ORAL at 10:04

## 2025-04-10 RX ADMIN — AZELASTINE HYDROCHLORIDE 137 MCG: 137 SPRAY, METERED NASAL at 10:04

## 2025-04-10 RX ADMIN — FUROSEMIDE 40 MG: 40 TABLET ORAL at 10:04

## 2025-04-10 RX ADMIN — IPRATROPIUM BROMIDE AND ALBUTEROL SULFATE 3 ML: 2.5; .5 SOLUTION RESPIRATORY (INHALATION) at 03:04

## 2025-04-10 RX ADMIN — METHYLPREDNISOLONE SODIUM SUCCINATE 40 MG: 40 INJECTION, POWDER, FOR SOLUTION INTRAMUSCULAR; INTRAVENOUS at 04:04

## 2025-04-10 RX ADMIN — PREDNISONE 40 MG: 20 TABLET ORAL at 10:04

## 2025-04-10 RX ADMIN — BUDESONIDE 0.5 MG: 0.5 INHALANT RESPIRATORY (INHALATION) at 07:04

## 2025-04-10 RX ADMIN — GUAIFENESIN 600 MG: 600 TABLET, EXTENDED RELEASE ORAL at 10:04

## 2025-04-10 RX ADMIN — CARVEDILOL 3.12 MG: 3.12 TABLET, FILM COATED ORAL at 09:04

## 2025-04-10 RX ADMIN — ASPIRIN 81 MG: 81 TABLET, COATED ORAL at 10:04

## 2025-04-10 RX ADMIN — MUPIROCIN: 20 OINTMENT TOPICAL at 09:04

## 2025-04-10 RX ADMIN — AZELASTINE HYDROCHLORIDE 137 MCG: 137 SPRAY, METERED NASAL at 09:04

## 2025-04-10 RX ADMIN — MUPIROCIN: 20 OINTMENT TOPICAL at 10:04

## 2025-04-10 NOTE — PROGRESS NOTES
[facility]  Progress Note    Patient Name: Hollie Ponce  MRN: [id number]  Admission Date: 4/4/2025  Hospital Length of Stay: 5 days  Code Status: Full Code  Attending Provider: Franky Peguero MD  Primary Care Provider: Ariadne Smith MD   Principal Problem: COPD exacerbation    Subjective:     Interval History: no significant change in condition, audible wheezing still persists.      Objective:     Vital Signs (Most Recent):  Temp: 97.9 °F (36.6 °C) (04/10/25 1125)  Pulse: 76 (04/10/25 1125)  Resp: 18 (04/10/25 1125)  BP: (!) 193/91 (04/10/25 1125)  SpO2: 99 % (04/10/25 1125) Vital Signs (24h Range):  Temp:  [97.7 °F (36.5 °C)-98.8 °F (37.1 °C)] 97.9 °F (36.6 °C)  Pulse:  [] 76  Resp:  [17-20] 18  SpO2:  [96 %-99 %] 99 %  BP: (152-193)/(80-96) 193/91     Weight: 59.3 kg (130 lb 11.7 oz)  Body mass index is 29.31 kg/m².      Intake/Output Summary (Last 24 hours) at 4/10/2025 1306  Last data filed at 4/10/2025 0800  Gross per 24 hour   Intake 120 ml   Output 2500 ml   Net -2380 ml        Physical Exam  Vitals and nursing note reviewed.   Constitutional:       General: She is not in acute distress.     Appearance: She is well-developed.   HENT:      Head: Normocephalic and atraumatic.      Right Ear: External ear normal.      Left Ear: External ear normal.      Nose:      Comments: Sinus fullness   Cardiovascular:      Rate and Rhythm: Normal rate and regular rhythm.   Pulmonary:      Effort: No respiratory distress.      Breath sounds: Wheezing present.   Chest:      Chest wall: No tenderness.   Skin:     General: Skin is warm and dry.   Neurological:      Mental Status: She is alert and oriented to person, place, and time.   Psychiatric:         Thought Content: Thought content normal.           Review of Systems    Vents:  Oxygen Concentration (%): 2 (04/09/25 1926)    Lines/Drains/Airways       Drain  Duration             Female External Urinary Catheter w/ Suction 04/06/25 1300 4 days  "             Peripheral Intravenous Line  Duration                  Peripheral IV - Single Lumen 04/04/25 1910 20 G Left Antecubital 5 days                    Significant Labs:    CBC/Anemia Profile:  Recent Labs   Lab 04/09/25  0447 04/10/25  0522   WBC 5.99 8.68   HGB 13.2 14.2   HCT 40.7 44.9    266   MCV 92 92   RDW 12.8 12.7        Chemistries:  Recent Labs   Lab 04/09/25 0447 04/10/25  0523   * 134*   K 4.1 4.5   CL 92* 92*   CO2 34* 34*   BUN 15 14   CREATININE 0.7 0.8   CALCIUM 9.2 9.5   GLUCOSE 127* 113*   MG 1.9 1.9   PHOS 3.9 3.5       All pertinent labs within the past 24 hours have been reviewed.    Significant Imaging:  I have reviewed all pertinent imaging results/findings within the past 24 hours.    ABG  No results for input(s): "PH", "PO2", "PCO2", "HCO3", "BE" in the last 168 hours.  Assessment/Plan:     Pulmonary  * COPD exacerbation  See asthma/COPD section    Acute exacerbation of COPD with asthma  Persistent wheezing b/l likely triggered by viral illness. Influenza/covid/rsv negative  Severe airflow obstruction on spirometry from 2021    Plan:  Steroids - continue twice a day dosing. Persistent wheezing -> recommend changing to IV methylprednisone 40 mg twice a day  Prn anxiolytic as needed for steroids  Continue bronchodilator therapy q4h  Continue arfometerol and budesonide neb  Continue fluticasone + azelastine nasal spray  Continue montelukast  Nasal cannula to keep SpO2 > 90%  Recommend reaching out to ENT to help evaluate upper airway as contributing factor to loud audible wheeze    Acute respiratory failure with hypoxia  Patient with Hypoxic Respiratory failure which is Acute.  she is not on home oxygen. Supplemental oxygen was provided and noted- Oxygen Concentration (%):  [2] 2    See asthma copd section    Other  Tobacco dependency  Active smoker 5 cigarettes per day  Plan:  See section on asthma copd exacerbation           Chun Grimm MD  Pulmonology  [facility] - " Telemetry

## 2025-04-10 NOTE — ASSESSMENT & PLAN NOTE
Persistent wheezing b/l likely triggered by viral illness. Influenza/covid/rsv negative  Severe airflow obstruction on spirometry from 2021    Plan:  Steroids - continue twice a day dosing. Persistent wheezing -> recommend changing to IV methylprednisone 40 mg twice a day  Prn anxiolytic as needed for steroids  Continue bronchodilator therapy q4h  Continue arfometerol and budesonide neb  Continue fluticasone + azelastine nasal spray  Continue montelukast  Nasal cannula to keep SpO2 > 90%  Recommend reaching out to ENT to help evaluate upper airway as contributing factor to loud audible wheeze

## 2025-04-10 NOTE — ASSESSMENT & PLAN NOTE
4/10: adjust anxiolytic to assist with tolerance of increased steroids  4/9: Appreciate Pulm recs, continue increased steroids and neb treatments, add additional anxiolytic  4/8:  Still with significantly poor respiratory status this morning, labored breathing and audible wheezing noted.  Will give a dose of IV steroids; however, will need to be premedicated.  Pulmonology consult, appreciate recs    Presented with SOB wheezing and cough. Found to have an NSTEMI as well attributed to type 2 non-obs CAD on C. Goal O2 sats 88 to 92% with severe COPD by previous PFTs. Dyspneic with sitting in bed with o2 off unable to ambulate  Started on steroids, duo-nebs, and doxy  Check sputum culture   IV diuresis with lasix stopped given contraction alkalosis-transitioned to oral  Add mucinex, flonase, and hypertonic saline nebulizers for airway clearance

## 2025-04-10 NOTE — ASSESSMENT & PLAN NOTE
Patient's blood pressure range in the last 24 hours was: BP  Min: 152/82  Max: 180/96.The patient's inpatient anti-hypertensive regimen is listed below:  Current Antihypertensives  carvediloL tablet 3.125 mg, 2 times daily, Oral  lisinopriL tablet 40 mg, Daily, Oral  cloNIDine tablet 0.1 mg, Every 6 hours PRN, Oral  hydrALAZINE injection 5 mg, Every 6 hours PRN, Intravenous  furosemide tablet 40 mg, Daily, Oral    Plan  - BP is controlled, no changes needed to their regimen

## 2025-04-10 NOTE — PROGRESS NOTES
[facility] Medicine  Progress Note    Patient Name: Hollie Ponce  MRN: [id number]  Patient Class: IP- Inpatient   Admission Date: 4/4/2025  Length of Stay: 5 days  Attending Physician: Franky Peguero MD  Primary Care Provider: Ariadne Smith MD        Subjective     Principal Problem:COPD exacerbation        HPI:  This is a 69-year-old female with a past medical history of COPD (not on O2), nonobstructive CAD, hypertension, peripheral artery disease, tobacco use who presents with dyspnea.      Patient presents for evaluation of shortness of breath that started on the day of presentation.  She reports worsening exertional dyspnea, associated with a dry cough, and chest congestion.  She reports having rhinorrhea/sinus congestion.  Her daughter was sick with similar symptoms, but limited her exposure to the patient and was wearing a mask around her  Patient smokes 5 cigarettes daily.    In the ED, the patient was tachypneic (20s-40s), tachycardic (100s-110s), and mildly hypoxic (90%, requiring 3 L O2).  Labs were remarkable for hypokalemia (3.4), negative BNP (41), negative troponin (<0.006).  Chest x-ray showed no acute cardiopulmonary process.  Patient was given Solu-Medrol 125 mg IV, hydralazine 10 mg IV, DuoNeb x1, Ativan 0.5 mg p.o..  She was admitted for further management.    Overview/Hospital Course:  Mrs. Ponce was hospitalized for COPD exacerbation after presenting with dyspnea and chest tightness.  She was noted to be tachypneic, tachycardic, and hypoxic on room air.  Supplemental oxygen initiated.  She received corticosteroids and nebulizer treatments with some symptomatic improvement.  Initial troponin negative; however, repeat troponin significantly elevated.  She reports persistent and some mild chest tightness.  Cardiology consulted, appreciate recs.  Initiate treatment for NSTEMI. LHC without evidence of obstructive CAD. Continued on COPD treatment.   4/8:  Still with  significantly poor respiratory status this morning, labored breathing and audible wheezing noted.  Will give a dose of IV steroids; however, will need to be premedicated.  Pulmonology consult, appreciate recs  4/9:  Slight improvement with increased steroids.  Still with poor respiratory status requiring additional in-hospital treatment and monitoring.  Continue supplemental oxygen.  4/10: not much improvement, increase bowel regimen, adjust anxiolytic regimen    Interval History: still with significant dyspnea, airway tightness, and anxiety    Review of Systems   Respiratory:  Positive for shortness of breath.    Gastrointestinal:  Positive for constipation.   Psychiatric/Behavioral:  The patient is nervous/anxious.    All other systems reviewed and are negative.    Objective:     Vital Signs (Most Recent):  Temp: 98.1 °F (36.7 °C) (04/10/25 0907)  Pulse: 84 (04/10/25 1103)  Resp: 20 (04/10/25 1103)  BP: (!) 169/81 (04/10/25 0907)  SpO2: 96 % (04/10/25 1103) Vital Signs (24h Range):  Temp:  [97.7 °F (36.5 °C)-98.8 °F (37.1 °C)] 98.1 °F (36.7 °C)  Pulse:  [] 84  Resp:  [17-20] 20  SpO2:  [96 %-99 %] 96 %  BP: (152-180)/(80-96) 169/81     Weight: 59.3 kg (130 lb 11.7 oz)  Body mass index is 29.31 kg/m².    Intake/Output Summary (Last 24 hours) at 4/10/2025 1105  Last data filed at 4/10/2025 0800  Gross per 24 hour   Intake 120 ml   Output 3400 ml   Net -3280 ml         Physical Exam  Vitals and nursing note reviewed.   Constitutional:       General: She is not in acute distress.     Appearance: She is well-developed.   HENT:      Head: Normocephalic and atraumatic.      Right Ear: External ear normal.      Left Ear: External ear normal.   Cardiovascular:      Rate and Rhythm: Normal rate and regular rhythm.   Pulmonary:      Effort: Tachypnea, accessory muscle usage and prolonged expiration present. No respiratory distress.      Breath sounds: Wheezing present.   Skin:     General: Skin is warm and dry.    Neurological:      Mental Status: She is alert and oriented to person, place, and time.   Psychiatric:         Thought Content: Thought content normal.               Significant Labs: All pertinent labs within the past 24 hours have been reviewed.    Significant Imaging: I have reviewed all pertinent imaging results/findings within the past 24 hours.      Assessment & Plan  COPD exacerbation  4/10: adjust anxiolytic to assist with tolerance of increased steroids  4/9: Appreciate Pulm recs, continue increased steroids and neb treatments, add additional anxiolytic  4/8:  Still with significantly poor respiratory status this morning, labored breathing and audible wheezing noted.  Will give a dose of IV steroids; however, will need to be premedicated.  Pulmonology consult, appreciate recs    Presented with SOB wheezing and cough. Found to have an NSTEMI as well attributed to type 2 non-obs CAD on [facility]. Goal O2 sats 88 to 92% with severe COPD by previous PFTs. Dyspneic with sitting in bed with o2 off unable to ambulate  Started on steroids, duo-nebs, and doxy  Check sputum culture   IV diuresis with lasix stopped given contraction alkalosis-transitioned to oral  Add mucinex, flonase, and hypertonic saline nebulizers for airway clearance  NSTEMI (non-ST elevated myocardial infarction)  Nonobstructive CAD on cath, continue med mgmt  Plan is for left heart catheterization 4/6/25.  Continue treatment for ACS and COPD.  Pending results    Patient presents with NSTEMI. Chest pain is currently controlled. Patient is currently on NSTEMI Pathway.    EKG reviewed. Troponins reviewed and results noted-   Recent Labs   Lab 04/05/25  0341   TROPONINI 0.327*   .     Lipid panel reviewed and shows-     Lab Results   Component Value Date    LDLCALC 85.4 04/16/2024     Lab Results   Component Value Date    TRIG 32 04/05/2025         Medical management includes; Beta Blocker, Dual Anti-Platelet therapy, Anticoagulation, and High  Intensity Stain Echo has been performed. Latest ECHO results are as follows- Results for orders placed during the hospital encounter of 04/04/25    Echo    Interpretation Summary    Left Ventricle: The left ventricle is normal in size. Normal wall thickness. There is normal systolic function with a visually estimated ejection fraction of 65 - 70%. Grade I diastolic dysfunction.    Right Ventricle: The right ventricle is normal in size. Systolic function is normal.    Mitral Valve: There is mild regurgitation.    Tricuspid Valve: There is mild regurgitation.    Pulmonary Artery: The estimated pulmonary artery systolic pressure is 55 mmHg.  .   Consult for cardiac rehab is not ordered. Patient counseled on lifestyle modifications- continue current medications. Cardiology is consulted. Plan of care reviewed with cardiology team. Continue to monitor patient closely and adjust therapy as needed.     Essential hypertension  Patient's blood pressure range in the last 24 hours was: BP  Min: 152/82  Max: 180/96.The patient's inpatient anti-hypertensive regimen is listed below:  Current Antihypertensives  carvediloL tablet 3.125 mg, 2 times daily, Oral  lisinopriL tablet 40 mg, Daily, Oral  cloNIDine tablet 0.1 mg, Every 6 hours PRN, Oral  hydrALAZINE injection 5 mg, Every 6 hours PRN, Intravenous  furosemide tablet 40 mg, Daily, Oral    Plan  - BP is controlled, no changes needed to their regimen    CAD (coronary artery disease)  Patient with known CAD which is controlled Will continue ASA, Plavix, and Statin and monitor for S/Sx of angina/ACS. Continue to monitor on telemetry. Minimal non-obs CAD did not require stent placement  PAD (peripheral artery disease)  History noted. Continue home medications.     Pulmonary HTN  Results for orders placed during the hospital encounter of 11/16/20    Echo Color Flow Doppler? Yes; Bubble Contrast? No    Interpretation Summary  · The left ventricle is normal in size with normal  systolic function. The estimated ejection fraction is 55%.  · There is mild left ventricular concentric hypertrophy.  · Normal left ventricular diastolic function.  · Normal right ventricular size with normal right ventricular systolic function.  · Mild left atrial enlargement.  · Normal central venous pressure (3 mmHg).  · The estimated PA systolic pressure is 49 mmHg.  · There is pulmonary hypertension.    BNP  Recent Labs   Lab 04/04/25 1929   BNP 41       Optimize volume status     Tobacco dependency  Tobacco cessation not discussed with patient today. Nicotine replacement has been- prescribed  Acute respiratory failure with hypoxia  Patient with Hypoxic Respiratory failure which is Acute.  she is not on home oxygen. Supplemental oxygen was provided and noted- Oxygen Concentration (%):  [2-28] 2    .   Signs/symptoms of respiratory failure include- increased work of breathing and respiratory distress. Contributing diagnoses includes - COPD Labs and images were reviewed. Patient Has not had a recent ABG. Will treat underlying causes and adjust management of respiratory failure as follows- Wean O2 as tolerated   Carotid atherosclerosis, bilateral  Continue ASA/plavix/statin  Dyslipidemia  Continue statin  Acute exacerbation of COPD with asthma      VTE Risk Mitigation (From admission, onward)           Ordered     enoxaparin injection 60 mg  Every 12 hours (non-standard times)         04/05/25 0946     Place sequential compression device  Until discontinued         04/04/25 2312     IP VTE HIGH RISK PATIENT  Once         04/04/25 2312     Place sequential compression device  Until discontinued         04/04/25 2312                    Discharge Planning   YOLA: 4/12/2025     Code Status: Full Code   Medical Readiness for Discharge Date:   Discharge Plan A: Home with family   Discharge Delays: None known at this time    Benito Blackburn Jr., APRN, FRANSICOP-BC  Hospitalist - Department of Hospital Medicine  [facility]  [address] [facility] [place] [address]  Office #: [phone]; Pager #: [phone]

## 2025-04-10 NOTE — ASSESSMENT & PLAN NOTE
Patient with Hypoxic Respiratory failure which is Acute.  she is not on home oxygen. Supplemental oxygen was provided and noted- Oxygen Concentration (%):  [2] 2    See asthma copd section

## 2025-04-10 NOTE — ASSESSMENT & PLAN NOTE
Patient with Hypoxic Respiratory failure which is Acute.  she is not on home oxygen. Supplemental oxygen was provided and noted- Oxygen Concentration (%):  [2-28] 2    .   Signs/symptoms of respiratory failure include- increased work of breathing and respiratory distress. Contributing diagnoses includes - COPD Labs and images were reviewed. Patient Has not had a recent ABG. Will treat underlying causes and adjust management of respiratory failure as follows- Wean O2 as tolerated

## 2025-04-10 NOTE — PLAN OF CARE
Problem: Adult Inpatient Plan of Care  Goal: Plan of Care Review  Outcome: Progressing  Goal: Patient-Specific Goal (Individualized)  Outcome: Progressing  Goal: Absence of Hospital-Acquired Illness or Injury  Outcome: Progressing  Goal: Optimal Comfort and Wellbeing  Outcome: Progressing  Goal: Readiness for Transition of Care  Outcome: Progressing     Problem: COPD (Chronic Obstructive Pulmonary Disease)  Goal: Optimal Chronic Illness Coping  Outcome: Progressing  Goal: Optimal Level of Functional [place]  Outcome: Progressing  Goal: Absence of Infection Signs and Symptoms  Outcome: Progressing  Goal: Improved Oral Intake  Outcome: Progressing  Goal: Effective Oxygenation and Ventilation  Outcome: Progressing     Problem: Infection  Goal: Absence of Infection Signs and Symptoms  Outcome: Progressing     Problem: Wound  Goal: Optimal Coping  Outcome: Progressing  Goal: Optimal Functional Ability  Outcome: Progressing  Goal: Absence of Infection Signs and Symptoms  Outcome: Progressing  Goal: Improved Oral Intake  Outcome: Progressing  Goal: Optimal Pain Control and Function  Outcome: Progressing  Goal: Skin Health and Integrity  Outcome: Progressing  Goal: Optimal Wound Healing  Outcome: Progressing     Problem: Fall Injury Risk  Goal: Absence of Fall and Fall-Related Injury  Outcome: Progressing

## 2025-04-10 NOTE — NURSING
PER handoff received from RIKI Domingo. Pt resting in bed quietly. NAD noted. No c/o pain.  Fall and safety precautions maintained. Bed alarm activated and audible.. Bed locked in lowest position, with side rails up x2. Call bell and personal items within reach

## 2025-04-10 NOTE — SUBJECTIVE & OBJECTIVE
Interval History: still with significant dyspnea, airway tightness, and anxiety    Review of Systems   Respiratory:  Positive for shortness of breath.    Gastrointestinal:  Positive for constipation.   Psychiatric/Behavioral:  The patient is nervous/anxious.    All other systems reviewed and are negative.    Objective:     Vital Signs (Most Recent):  Temp: 98.1 °F (36.7 °C) (04/10/25 0907)  Pulse: 84 (04/10/25 1103)  Resp: 20 (04/10/25 1103)  BP: (!) 169/81 (04/10/25 0907)  SpO2: 96 % (04/10/25 1103) Vital Signs (24h Range):  Temp:  [97.7 °F (36.5 °C)-98.8 °F (37.1 °C)] 98.1 °F (36.7 °C)  Pulse:  [] 84  Resp:  [17-20] 20  SpO2:  [96 %-99 %] 96 %  BP: (152-180)/(80-96) 169/81     Weight: 59.3 kg (130 lb 11.7 oz)  Body mass index is 29.31 kg/m².    Intake/Output Summary (Last 24 hours) at 4/10/2025 1105  Last data filed at 4/10/2025 0800  Gross per 24 hour   Intake 120 ml   Output 3400 ml   Net -3280 ml         Physical Exam  Vitals and nursing note reviewed.   Constitutional:       General: She is not in acute distress.     Appearance: She is well-developed.   HENT:      Head: Normocephalic and atraumatic.      Right Ear: External ear normal.      Left Ear: External ear normal.   Cardiovascular:      Rate and Rhythm: Normal rate and regular rhythm.   Pulmonary:      Effort: Tachypnea, accessory muscle usage and prolonged expiration present. No respiratory distress.      Breath sounds: Wheezing present.   Skin:     General: Skin is warm and dry.   Neurological:      Mental Status: She is alert and oriented to person, place, and time.   Psychiatric:         Thought Content: Thought content normal.               Significant Labs: All pertinent labs within the past 24 hours have been reviewed.    Significant Imaging: I have reviewed all pertinent imaging results/findings within the past 24 hours.   The patient is a 62y Male complaining of see chief complaint quote.

## 2025-04-10 NOTE — SUBJECTIVE & OBJECTIVE
Interval History: no significant change in condition, audible wheezing still persists.      Objective:     Vital Signs (Most Recent):  Temp: 97.9 °F (36.6 °C) (04/10/25 1125)  Pulse: 76 (04/10/25 1125)  Resp: 18 (04/10/25 1125)  BP: (!) 193/91 (04/10/25 1125)  SpO2: 99 % (04/10/25 1125) Vital Signs (24h Range):  Temp:  [97.7 °F (36.5 °C)-98.8 °F (37.1 °C)] 97.9 °F (36.6 °C)  Pulse:  [] 76  Resp:  [17-20] 18  SpO2:  [96 %-99 %] 99 %  BP: (152-193)/(80-96) 193/91     Weight: 59.3 kg (130 lb 11.7 oz)  Body mass index is 29.31 kg/m².      Intake/Output Summary (Last 24 hours) at 4/10/2025 1306  Last data filed at 4/10/2025 0800  Gross per 24 hour   Intake 120 ml   Output 2500 ml   Net -2380 ml        Physical Exam  Vitals and nursing note reviewed.   Constitutional:       General: She is not in acute distress.     Appearance: She is well-developed.   HENT:      Head: Normocephalic and atraumatic.      Right Ear: External ear normal.      Left Ear: External ear normal.      Nose:      Comments: Sinus fullness   Cardiovascular:      Rate and Rhythm: Normal rate and regular rhythm.   Pulmonary:      Effort: No respiratory distress.      Breath sounds: Wheezing present.   Chest:      Chest wall: No tenderness.   Skin:     General: Skin is warm and dry.   Neurological:      Mental Status: She is alert and oriented to person, place, and time.   Psychiatric:         Thought Content: Thought content normal.           Review of Systems    Vents:  Oxygen Concentration (%): 2 (04/09/25 1926)    Lines/Drains/Airways       Drain  Duration             Female External Urinary Catheter w/ Suction 04/06/25 1300 4 days              Peripheral Intravenous Line  Duration                  Peripheral IV - Single Lumen 04/04/25 1910 20 G Left Antecubital 5 days                    Significant Labs:    CBC/Anemia Profile:  Recent Labs   Lab 04/09/25  0447 04/10/25  0522   WBC 5.99 8.68   HGB 13.2 14.2   HCT 40.7 44.9    266   MCV  92 92   RDW 12.8 12.7        Chemistries:  Recent Labs   Lab 04/09/25  0447 04/10/25  0523   * 134*   K 4.1 4.5   CL 92* 92*   CO2 34* 34*   BUN 15 14   CREATININE 0.7 0.8   CALCIUM 9.2 9.5   GLUCOSE 127* 113*   MG 1.9 1.9   PHOS 3.9 3.5       All pertinent labs within the past 24 hours have been reviewed.    Significant Imaging:  I have reviewed all pertinent imaging results/findings within the past 24 hours.

## 2025-04-10 NOTE — PLAN OF CARE
Problem: Adult Inpatient Plan of Care  Goal: Plan of Care Review  Outcome: Progressing  Goal: Patient-Specific Goal (Individualized)  Outcome: Progressing  Goal: Absence of Hospital-Acquired Illness or Injury  Outcome: Progressing  Goal: Optimal Comfort and Wellbeing  Outcome: Progressing  Goal: Readiness for Transition of Care  Outcome: Progressing     Problem: COPD (Chronic Obstructive Pulmonary Disease)  Goal: Optimal Chronic Illness Coping  Outcome: Progressing  Goal: Optimal Level of Functional [place]  Outcome: Progressing  Goal: Absence of Infection Signs and Symptoms  Outcome: Progressing  Goal: Improved Oral Intake  Outcome: Progressing  Goal: Effective Oxygenation and Ventilation  Outcome: Progressing     Problem: Infection  Goal: Absence of Infection Signs and Symptoms  Outcome: Progressing     Problem: Wound  Goal: Optimal Coping  Outcome: Progressing  Goal: Optimal Functional Ability  Outcome: Progressing  Goal: Absence of Infection Signs and Symptoms  Outcome: Progressing  Goal: Improved Oral Intake  Outcome: Progressing  Goal: Optimal Pain Control and Function  Outcome: Progressing  Goal: Skin Health and Integrity  Outcome: Progressing  Goal: Optimal Wound Healing  Outcome: Progressing

## 2025-04-11 LAB
ABSOLUTE EOSINOPHIL (OHS): 0 K/UL
ABSOLUTE MONOCYTE (OHS): 0.67 K/UL (ref 0.3–1)
ABSOLUTE NEUTROPHIL COUNT (OHS): 5.23 K/UL (ref 1.8–7.7)
ANION GAP (OHS): 6 MMOL/L (ref 8–16)
BASOPHILS # BLD AUTO: 0.01 K/UL
BASOPHILS NFR BLD AUTO: 0.1 %
BUN SERPL-MCNC: 13 MG/DL (ref 8–23)
CALCIUM SERPL-MCNC: 9.2 MG/DL (ref 8.7–10.5)
CHLORIDE SERPL-SCNC: 94 MMOL/L (ref 95–110)
CO2 SERPL-SCNC: 35 MMOL/L (ref 23–29)
CREAT SERPL-MCNC: 0.7 MG/DL (ref 0.5–1.4)
ERYTHROCYTE [DISTWIDTH] IN BLOOD BY AUTOMATED COUNT: 12.8 % (ref 11.5–14.5)
GFR SERPLBLD CREATININE-BSD FMLA CKD-EPI: >60 ML/MIN/1.73/M2
GLUCOSE SERPL-MCNC: 111 MG/DL (ref 70–110)
HCT VFR BLD AUTO: 42.6 % (ref 37–48.5)
HGB BLD-MCNC: 13.2 GM/DL (ref 12–16)
IMM GRANULOCYTES # BLD AUTO: 0.04 K/UL (ref 0–0.04)
IMM GRANULOCYTES NFR BLD AUTO: 0.5 % (ref 0–0.5)
LYMPHOCYTES # BLD AUTO: 1.66 K/UL (ref 1–4.8)
MAGNESIUM SERPL-MCNC: 2 MG/DL (ref 1.6–2.6)
MCH RBC QN AUTO: 28.7 PG (ref 27–31)
MCHC RBC AUTO-ENTMCNC: 31 G/DL (ref 32–36)
MCV RBC AUTO: 93 FL (ref 82–98)
NUCLEATED RBC (/100WBC) (OHS): 0 /100 WBC
PHOSPHATE SERPL-MCNC: 3.6 MG/DL (ref 2.7–4.5)
PLATELET # BLD AUTO: 241 K/UL (ref 150–450)
PMV BLD AUTO: 9.6 FL (ref 9.2–12.9)
POTASSIUM SERPL-SCNC: 4.2 MMOL/L (ref 3.5–5.1)
RBC # BLD AUTO: 4.6 M/UL (ref 4–5.4)
RELATIVE EOSINOPHIL (OHS): 0 %
RELATIVE LYMPHOCYTE (OHS): 21.8 % (ref 18–48)
RELATIVE MONOCYTE (OHS): 8.8 % (ref 4–15)
RELATIVE NEUTROPHIL (OHS): 68.8 % (ref 38–73)
SODIUM SERPL-SCNC: 135 MMOL/L (ref 136–145)
WBC # BLD AUTO: 7.61 K/UL (ref 3.9–12.7)

## 2025-04-11 PROCEDURE — 94640 AIRWAY INHALATION TREATMENT: CPT

## 2025-04-11 PROCEDURE — 85025 COMPLETE CBC W/AUTO DIFF WBC: CPT | Performed by: INTERNAL MEDICINE

## 2025-04-11 PROCEDURE — 99233 SBSQ HOSP IP/OBS HIGH 50: CPT | Mod: ,,, | Performed by: INTERNAL MEDICINE

## 2025-04-11 PROCEDURE — 25000242 PHARM REV CODE 250 ALT 637 W/ HCPCS: Performed by: PHYSICIAN ASSISTANT

## 2025-04-11 PROCEDURE — 80048 BASIC METABOLIC PNL TOTAL CA: CPT | Performed by: INTERNAL MEDICINE

## 2025-04-11 PROCEDURE — 94761 N-INVAS EAR/PLS OXIMETRY MLT: CPT

## 2025-04-11 PROCEDURE — 63600175 PHARM REV CODE 636 W HCPCS: Mod: JZ,TB | Performed by: HOSPITALIST

## 2025-04-11 PROCEDURE — 84100 ASSAY OF PHOSPHORUS: CPT | Performed by: INTERNAL MEDICINE

## 2025-04-11 PROCEDURE — 25000003 PHARM REV CODE 250: Performed by: INTERNAL MEDICINE

## 2025-04-11 PROCEDURE — 25000242 PHARM REV CODE 250 ALT 637 W/ HCPCS: Performed by: INTERNAL MEDICINE

## 2025-04-11 PROCEDURE — 25000003 PHARM REV CODE 250: Performed by: PHYSICIAN ASSISTANT

## 2025-04-11 PROCEDURE — 94664 DEMO&/EVAL PT USE INHALER: CPT

## 2025-04-11 PROCEDURE — 99900035 HC TECH TIME PER 15 MIN (STAT)

## 2025-04-11 PROCEDURE — 36415 COLL VENOUS BLD VENIPUNCTURE: CPT | Performed by: INTERNAL MEDICINE

## 2025-04-11 PROCEDURE — 11000001 HC ACUTE MED/SURG PRIVATE ROOM

## 2025-04-11 PROCEDURE — 83735 ASSAY OF MAGNESIUM: CPT | Performed by: INTERNAL MEDICINE

## 2025-04-11 PROCEDURE — 25000003 PHARM REV CODE 250: Performed by: HOSPITALIST

## 2025-04-11 PROCEDURE — 27000221 HC OXYGEN, UP TO 24 HOURS

## 2025-04-11 RX ORDER — HYDROXYZINE HYDROCHLORIDE 25 MG/1
25 TABLET, FILM COATED ORAL DAILY
Status: DISCONTINUED | OUTPATIENT
Start: 2025-04-12 | End: 2025-04-16 | Stop reason: HOSPADM

## 2025-04-11 RX ORDER — DIAZEPAM 2 MG/1
2 TABLET ORAL
Status: DISCONTINUED | OUTPATIENT
Start: 2025-04-12 | End: 2025-04-16 | Stop reason: HOSPADM

## 2025-04-11 RX ORDER — IPRATROPIUM BROMIDE AND ALBUTEROL SULFATE 2.5; .5 MG/3ML; MG/3ML
3 SOLUTION RESPIRATORY (INHALATION) EVERY 6 HOURS
Status: DISCONTINUED | OUTPATIENT
Start: 2025-04-11 | End: 2025-04-16 | Stop reason: HOSPADM

## 2025-04-11 RX ADMIN — CARVEDILOL 3.12 MG: 3.12 TABLET, FILM COATED ORAL at 08:04

## 2025-04-11 RX ADMIN — FUROSEMIDE 40 MG: 40 TABLET ORAL at 08:04

## 2025-04-11 RX ADMIN — MUPIROCIN: 20 OINTMENT TOPICAL at 08:04

## 2025-04-11 RX ADMIN — GUAIFENESIN 600 MG: 600 TABLET, EXTENDED RELEASE ORAL at 08:04

## 2025-04-11 RX ADMIN — IPRATROPIUM BROMIDE AND ALBUTEROL SULFATE 3 ML: 2.5; .5 SOLUTION RESPIRATORY (INHALATION) at 07:04

## 2025-04-11 RX ADMIN — AZELASTINE HYDROCHLORIDE 137 MCG: 137 SPRAY, METERED NASAL at 08:04

## 2025-04-11 RX ADMIN — ARFORMOTEROL TARTRATE 15 MCG: 15 SOLUTION RESPIRATORY (INHALATION) at 07:04

## 2025-04-11 RX ADMIN — FLUTICASONE PROPIONATE 100 MCG: 50 SPRAY, METERED NASAL at 08:04

## 2025-04-11 RX ADMIN — METHYLPREDNISOLONE SODIUM SUCCINATE 40 MG: 40 INJECTION, POWDER, FOR SOLUTION INTRAMUSCULAR; INTRAVENOUS at 08:04

## 2025-04-11 RX ADMIN — HYDROXYZINE PAMOATE 25 MG: 25 CAPSULE ORAL at 09:04

## 2025-04-11 RX ADMIN — IPRATROPIUM BROMIDE AND ALBUTEROL SULFATE 3 ML: 2.5; .5 SOLUTION RESPIRATORY (INHALATION) at 11:04

## 2025-04-11 RX ADMIN — LISINOPRIL 40 MG: 20 TABLET ORAL at 08:04

## 2025-04-11 RX ADMIN — MONTELUKAST 10 MG: 10 TABLET, FILM COATED ORAL at 08:04

## 2025-04-11 RX ADMIN — ASPIRIN 81 MG: 81 TABLET, COATED ORAL at 08:04

## 2025-04-11 RX ADMIN — BUDESONIDE 0.5 MG: 0.5 INHALANT RESPIRATORY (INHALATION) at 07:04

## 2025-04-11 RX ADMIN — POLYETHYLENE GLYCOL 3350 17 G: 17 POWDER, FOR SOLUTION ORAL at 08:04

## 2025-04-11 RX ADMIN — DIAZEPAM 2 MG: 2 TABLET ORAL at 08:04

## 2025-04-11 RX ADMIN — METHYLPREDNISOLONE SODIUM SUCCINATE 40 MG: 40 INJECTION, POWDER, FOR SOLUTION INTRAMUSCULAR; INTRAVENOUS at 04:04

## 2025-04-11 NOTE — SUBJECTIVE & OBJECTIVE
Interval History:   Changed to IV steroids since last evening. less wheezing when seen this morning. Had diazepam this morning and felt drowsy    Objective:     Vital Signs (Most Recent):  Temp: 97.5 °F (36.4 °C) (04/11/25 1116)  Pulse: 84 (04/11/25 1119)  Resp: 18 (04/11/25 1119)  BP: (!) 168/90 (04/11/25 1116)  SpO2: 98 % (04/11/25 1119) Vital Signs (24h Range):  Temp:  [97.5 °F (36.4 °C)-98.4 °F (36.9 °C)] 97.5 °F (36.4 °C)  Pulse:  [68-89] 84  Resp:  [18-20] 18  SpO2:  [94 %-100 %] 98 %  BP: (135-197)/(67-96) 168/90     Weight: 59.3 kg (130 lb 11.7 oz)  Body mass index is 29.31 kg/m².      Intake/Output Summary (Last 24 hours) at 4/11/2025 1338  Last data filed at 4/11/2025 0648  Gross per 24 hour   Intake 240 ml   Output 1150 ml   Net -910 ml        Physical Exam  Vitals and nursing note reviewed.   Constitutional:       General: She is not in acute distress.     Appearance: She is well-developed.   HENT:      Head: Normocephalic and atraumatic.      Right Ear: External ear normal.      Left Ear: External ear normal.      Nose:      Comments: Sinus fullness   Cardiovascular:      Rate and Rhythm: Normal rate and regular rhythm.   Pulmonary:      Effort: No respiratory distress.      Breath sounds: Wheezing present.   Chest:      Chest wall: No tenderness.   Skin:     General: Skin is warm and dry.   Neurological:      Mental Status: She is alert and oriented to person, place, and time.   Psychiatric:         Thought Content: Thought content normal.               Vents:  Oxygen Concentration (%): 2 (04/09/25 1926)    Lines/Drains/Airways       Drain  Duration             Female External Urinary Catheter w/ Suction 04/06/25 1300 5 days              Peripheral Intravenous Line  Duration                  Peripheral IV - Single Lumen 04/04/25 1910 20 G Left Antecubital 6 days                    Significant Labs:    CBC/Anemia Profile:  Recent Labs   Lab 04/10/25  0522 04/11/25  0536   WBC 8.68 7.61   HGB 14.2 13.2    HCT 44.9 42.6    241   MCV 92 93   RDW 12.7 12.8        Chemistries:  Recent Labs   Lab 04/10/25  0523 04/11/25  0536   * 135*   K 4.5 4.2   CL 92* 94*   CO2 34* 35*   BUN 14 13   CREATININE 0.8 0.7   CALCIUM 9.5 9.2   GLUCOSE 113* 111*   MG 1.9 2.0   PHOS 3.5 3.6       All pertinent labs within the past 24 hours have been reviewed.    Significant Imaging:  I have reviewed all pertinent imaging results/findings within the past 24 hours.

## 2025-04-11 NOTE — ASSESSMENT & PLAN NOTE
Patient with Hypoxic Respiratory failure which is Acute.  she is not on home oxygen. Supplemental oxygen was provided and noted-      See asthma copd section

## 2025-04-11 NOTE — PLAN OF CARE
Problem: Adult Inpatient Plan of Care  Goal: Plan of Care Review  Outcome: Progressing  Goal: Patient-Specific Goal (Individualized)  Outcome: Progressing  Goal: Absence of Hospital-Acquired Illness or Injury  Outcome: Progressing  Goal: Optimal Comfort and Wellbeing  Outcome: Progressing  Goal: Readiness for Transition of Care  Outcome: Progressing     Problem: COPD (Chronic Obstructive Pulmonary Disease)  Goal: Optimal Chronic Illness Coping  Outcome: Progressing  Goal: Optimal Level of Functional [place]  Outcome: Progressing  Goal: Absence of Infection Signs and Symptoms  Outcome: Progressing  Goal: Improved Oral Intake  Outcome: Progressing  Goal: Effective Oxygenation and Ventilation  Outcome: Progressing     Problem: Infection  Goal: Absence of Infection Signs and Symptoms  Outcome: Progressing     Problem: Wound  Goal: Optimal Coping  Outcome: Progressing  Goal: Optimal Functional Ability  Outcome: Progressing  Goal: Absence of Infection Signs and Symptoms  Outcome: Progressing  Goal: Improved Oral Intake  Outcome: Progressing  Goal: Optimal Pain Control and Function  Outcome: Progressing  Goal: Skin Health and Integrity  Outcome: Progressing  Goal: Optimal Wound Healing  Outcome: Progressing     Problem: Fall Injury Risk  Goal: Absence of Fall and Fall-Related Injury  Outcome: Progressing

## 2025-04-11 NOTE — PROGRESS NOTES
[facility] Medicine  Progress Note    Patient Name: Hollie Ponce  MRN: [id number]  Patient Class: IP- Inpatient   Admission Date: 4/4/2025  Length of Stay: 6 days  Attending Physician: Sylvain Miranda DO  Primary Care Provider: Ariadne Smith MD        Subjective     Principal Problem:COPD exacerbation        HPI:  This is a 69-year-old female with a past medical history of COPD (not on O2), nonobstructive CAD, hypertension, peripheral artery disease, tobacco use who presents with dyspnea.      Patient presents for evaluation of shortness of breath that started on the day of presentation.  She reports worsening exertional dyspnea, associated with a dry cough, and chest congestion.  She reports having rhinorrhea/sinus congestion.  Her daughter was sick with similar symptoms, but limited her exposure to the patient and was wearing a mask around her  Patient smokes 5 cigarettes daily.    In the ED, the patient was tachypneic (20s-40s), tachycardic (100s-110s), and mildly hypoxic (90%, requiring 3 L O2).  Labs were remarkable for hypokalemia (3.4), negative BNP (41), negative troponin (<0.006).  Chest x-ray showed no acute cardiopulmonary process.  Patient was given Solu-Medrol 125 mg IV, hydralazine 10 mg IV, DuoNeb x1, Ativan 0.5 mg p.o..  She was admitted for further management.    Overview/Hospital Course:  Mrs. Ponce was hospitalized for COPD exacerbation after presenting with dyspnea and chest tightness.  She was noted to be tachypneic, tachycardic, and hypoxic on room air.  Supplemental oxygen initiated.  She received corticosteroids and nebulizer treatments with some symptomatic improvement.  Initial troponin negative; however, repeat troponin significantly elevated.  She reports persistent and some mild chest tightness.  Cardiology consulted, appreciate recs.  Initiate treatment for NSTEMI. LHC without evidence of obstructive CAD. Continued on COPD treatment.   4/8:  Still with  significantly poor respiratory status this morning, labored breathing and audible wheezing noted.  Will give a dose of IV steroids; however, will need to be premedicated.  Pulmonology consult, appreciate recs  4/9:  Slight improvement with increased steroids.  Still with poor respiratory status requiring additional in-hospital treatment and monitoring.  Continue supplemental oxygen.  4/10: not much improvement, increase bowel regimen, adjust anxiolytic regimen  4/11: slight improvement, feels oversedated with benzo BID, will change to nightly     Interval History: no new complaints    Review of Systems   Respiratory:  Positive for cough and shortness of breath.    All other systems reviewed and are negative.    Objective:     Vital Signs (Most Recent):  Temp: 98.2 °F (36.8 °C) (04/11/25 0745)  Pulse: 71 (04/11/25 0745)  Resp: 19 (04/11/25 0745)  BP: (!) 179/96 (04/11/25 0745)  SpO2: 100 % (04/11/25 0745) Vital Signs (24h Range):  Temp:  [97.9 °F (36.6 °C)-98.4 °F (36.9 °C)] 98.2 °F (36.8 °C)  Pulse:  [68-89] 71  Resp:  [18-20] 19  SpO2:  [94 %-100 %] 100 %  BP: (135-197)/(67-96) 179/96     Weight: 59.3 kg (130 lb 11.7 oz)  Body mass index is 29.31 kg/m².    Intake/Output Summary (Last 24 hours) at 4/11/2025 1100  Last data filed at 4/11/2025 0648  Gross per 24 hour   Intake 480 ml   Output 1150 ml   Net -670 ml         Physical Exam  Vitals and nursing note reviewed.   Constitutional:       General: She is not in acute distress.     Appearance: She is well-developed.   HENT:      Head: Normocephalic and atraumatic.      Right Ear: External ear normal.      Left Ear: External ear normal.   Cardiovascular:      Rate and Rhythm: Normal rate and regular rhythm.   Pulmonary:      Effort: Pulmonary effort is normal. Prolonged expiration present. No respiratory distress.      Breath sounds: Wheezing present.   Skin:     General: Skin is warm and dry.   Neurological:      Mental Status: She is alert and oriented to person,  place, and time.   Psychiatric:         Thought Content: Thought content normal.               Significant Labs: All pertinent labs within the past 24 hours have been reviewed.    Significant Imaging: I have reviewed all pertinent imaging results/findings within the past 24 hours.      Assessment & Plan  COPD exacerbation  4/11: slight improvement, feels oversedated with benzo BID, will change to nightly   4/10: adjust anxiolytic to assist with tolerance of increased steroids  4/9: Appreciate Pulm recs, continue increased steroids and neb treatments, add additional anxiolytic  4/8:  Still with significantly poor respiratory status this morning, labored breathing and audible wheezing noted.  Will give a dose of IV steroids; however, will need to be premedicated.  Pulmonology consult, appreciate recs    Presented with SOB wheezing and cough. Found to have an NSTEMI as well attributed to type 2 non-obs CAD on [facility]. Goal O2 sats 88 to 92% with severe COPD by previous PFTs. Dyspneic with sitting in bed with o2 off unable to ambulate  Started on steroids, duo-nebs, and doxy  Check sputum culture   IV diuresis with lasix stopped given contraction alkalosis-transitioned to oral  Add mucinex, flonase, and hypertonic saline nebulizers for airway clearance  NSTEMI (non-ST elevated myocardial infarction)  Nonobstructive CAD on cath, continue med mgmt  Plan is for left heart catheterization 4/6/25.  Continue treatment for ACS and COPD.  Pending results    Patient presents with NSTEMI. Chest pain is currently controlled. Patient is currently on NSTEMI Pathway.    EKG reviewed. Troponins reviewed and results noted-   Recent Labs   Lab 04/05/25  0341   TROPONINI 0.327*   .     Lipid panel reviewed and shows-     Lab Results   Component Value Date    LDLCALC 85.4 04/16/2024     Lab Results   Component Value Date    TRIG 32 04/05/2025         Medical management includes; Beta Blocker, Dual Anti-Platelet therapy, Anticoagulation, and  High Intensity Stain Echo has been performed. Latest ECHO results are as follows- Results for orders placed during the hospital encounter of 04/04/25    Echo    Interpretation Summary    Left Ventricle: The left ventricle is normal in size. Normal wall thickness. There is normal systolic function with a visually estimated ejection fraction of 65 - 70%. Grade I diastolic dysfunction.    Right Ventricle: The right ventricle is normal in size. Systolic function is normal.    Mitral Valve: There is mild regurgitation.    Tricuspid Valve: There is mild regurgitation.    Pulmonary Artery: The estimated pulmonary artery systolic pressure is 55 mmHg.  .   Consult for cardiac rehab is not ordered. Patient counseled on lifestyle modifications- continue current medications. Cardiology is consulted. Plan of care reviewed with cardiology team. Continue to monitor patient closely and adjust therapy as needed.     Essential hypertension  Patient's blood pressure range in the last 24 hours was: BP  Min: 135/82  Max: 197/91.The patient's inpatient anti-hypertensive regimen is listed below:  Current Antihypertensives  carvediloL tablet 3.125 mg, 2 times daily, Oral  lisinopriL tablet 40 mg, Daily, Oral  cloNIDine tablet 0.1 mg, Every 6 hours PRN, Oral  hydrALAZINE injection 5 mg, Every 6 hours PRN, Intravenous  furosemide tablet 40 mg, Daily, Oral    Plan  - BP is controlled, no changes needed to their regimen    CAD (coronary artery disease)  Patient with known CAD which is controlled Will continue ASA, Plavix, and Statin and monitor for S/Sx of angina/ACS. Continue to monitor on telemetry. Minimal non-obs CAD did not require stent placement  PAD (peripheral artery disease)  History noted. Continue home medications.     Pulmonary HTN  Results for orders placed during the hospital encounter of 11/16/20    Echo Color Flow Doppler? Yes; Bubble Contrast? No    Interpretation Summary  · The left ventricle is normal in size with normal  systolic function. The estimated ejection fraction is 55%.  · There is mild left ventricular concentric hypertrophy.  · Normal left ventricular diastolic function.  · Normal right ventricular size with normal right ventricular systolic function.  · Mild left atrial enlargement.  · Normal central venous pressure (3 mmHg).  · The estimated PA systolic pressure is 49 mmHg.  · There is pulmonary hypertension.    BNP  Recent Labs   Lab 04/04/25  1929   BNP 41       Optimize volume status     Tobacco dependency  Tobacco cessation not discussed with patient today. Nicotine replacement has been- prescribed  Acute respiratory failure with hypoxia  Patient with Hypoxic Respiratory failure which is Acute.  she is not on home oxygen. Supplemental oxygen was provided and noted-      .   Signs/symptoms of respiratory failure include- increased work of breathing and respiratory distress. Contributing diagnoses includes - COPD Labs and images were reviewed. Patient Has not had a recent ABG. Will treat underlying causes and adjust management of respiratory failure as follows- Wean O2 as tolerated   Carotid atherosclerosis, bilateral  Continue ASA/plavix/statin  Dyslipidemia  Continue statin  Acute exacerbation of COPD with asthma      VTE Risk Mitigation (From admission, onward)           Ordered     enoxaparin injection 60 mg  Every 12 hours (non-standard times)         04/05/25 0946     Place sequential compression device  Until discontinued         04/04/25 2312     IP VTE HIGH RISK PATIENT  Once         04/04/25 2312     Place sequential compression device  Until discontinued         04/04/25 2312                    Discharge Planning   YOLA: 4/12/2025     Code Status: Full Code   Medical Readiness for Discharge Date:   Discharge Plan A: Home with family   Discharge Delays: None known at this time    Benito Blackburn Jr., APRN, AGACNP-BC  Hospitalist - Department of Hospital Medicine  [facility] [address]  [place] [place] [id number]  Office #: [phone]; Pager #: [phone]

## 2025-04-11 NOTE — SUBJECTIVE & OBJECTIVE
Interval History: no new complaints    Review of Systems   Respiratory:  Positive for cough and shortness of breath.    All other systems reviewed and are negative.    Objective:     Vital Signs (Most Recent):  Temp: 98.2 °F (36.8 °C) (04/11/25 0745)  Pulse: 71 (04/11/25 0745)  Resp: 19 (04/11/25 0745)  BP: (!) 179/96 (04/11/25 0745)  SpO2: 100 % (04/11/25 0745) Vital Signs (24h Range):  Temp:  [97.9 °F (36.6 °C)-98.4 °F (36.9 °C)] 98.2 °F (36.8 °C)  Pulse:  [68-89] 71  Resp:  [18-20] 19  SpO2:  [94 %-100 %] 100 %  BP: (135-197)/(67-96) 179/96     Weight: 59.3 kg (130 lb 11.7 oz)  Body mass index is 29.31 kg/m².    Intake/Output Summary (Last 24 hours) at 4/11/2025 1100  Last data filed at 4/11/2025 0648  Gross per 24 hour   Intake 480 ml   Output 1150 ml   Net -670 ml         Physical Exam  Vitals and nursing note reviewed.   Constitutional:       General: She is not in acute distress.     Appearance: She is well-developed.   HENT:      Head: Normocephalic and atraumatic.      Right Ear: External ear normal.      Left Ear: External ear normal.   Cardiovascular:      Rate and Rhythm: Normal rate and regular rhythm.   Pulmonary:      Effort: Pulmonary effort is normal. Prolonged expiration present. No respiratory distress.      Breath sounds: Wheezing present.   Skin:     General: Skin is warm and dry.   Neurological:      Mental Status: She is alert and oriented to person, place, and time.   Psychiatric:         Thought Content: Thought content normal.               Significant Labs: All pertinent labs within the past 24 hours have been reviewed.    Significant Imaging: I have reviewed all pertinent imaging results/findings within the past 24 hours.

## 2025-04-11 NOTE — PLAN OF CARE
04/11/25 1325   Medicare Message   Important Message from Medicare regarding Discharge Appeal Rights Given to patient/caregiver;Explained to patient/caregiver;Signed/date by patient/caregiver   Date IMM was signed 04/11/25   Time IMM was signed [id number]

## 2025-04-11 NOTE — ASSESSMENT & PLAN NOTE
Patient's blood pressure range in the last 24 hours was: BP  Min: 135/82  Max: 197/91.The patient's inpatient anti-hypertensive regimen is listed below:  Current Antihypertensives  carvediloL tablet 3.125 mg, 2 times daily, Oral  lisinopriL tablet 40 mg, Daily, Oral  cloNIDine tablet 0.1 mg, Every 6 hours PRN, Oral  hydrALAZINE injection 5 mg, Every 6 hours PRN, Intravenous  furosemide tablet 40 mg, Daily, Oral    Plan  - BP is controlled, no changes needed to their regimen

## 2025-04-11 NOTE — PROGRESS NOTES
[facility]  [facility]  [facility] Note    Patient Name: Hollie Ponce  MRN: [id number]  Admission Date: 4/4/2025  Hospital Length of Stay: 6 days  Code Status: Full Code  Attending Provider: Sylvain Miranda DO  Primary Care Provider: Ariadne Smith MD   Principal Problem: COPD exacerbation    Subjective:     Interval History:   Changed to IV steroids since last evening. less wheezing when seen this morning. Had diazepam this morning and felt drowsy    Objective:     Vital Signs (Most Recent):  Temp: 97.5 °F (36.4 °C) (04/11/25 1116)  Pulse: 84 (04/11/25 1119)  Resp: 18 (04/11/25 1119)  BP: (!) 168/90 (04/11/25 1116)  SpO2: 98 % (04/11/25 1119) Vital Signs (24h Range):  Temp:  [97.5 °F (36.4 °C)-98.4 °F (36.9 °C)] 97.5 °F (36.4 °C)  Pulse:  [68-89] 84  Resp:  [18-20] 18  SpO2:  [94 %-100 %] 98 %  BP: (135-197)/(67-96) 168/90     Weight: 59.3 kg (130 lb 11.7 oz)  Body mass index is 29.31 kg/m².      Intake/Output Summary (Last 24 hours) at 4/11/2025 1338  Last data filed at 4/11/2025 0648  Gross per 24 hour   Intake 240 ml   Output 1150 ml   Net -910 ml        Physical Exam  Vitals and nursing note reviewed.   Constitutional:       General: She is not in acute distress.     Appearance: She is well-developed.   HENT:      Head: Normocephalic and atraumatic.      Right Ear: External ear normal.      Left Ear: External ear normal.      Nose:      Comments: Sinus fullness   Cardiovascular:      Rate and Rhythm: Normal rate and regular rhythm.   Pulmonary:      Effort: No respiratory distress.      Breath sounds: Wheezing present.   Chest:      Chest wall: No tenderness.   Skin:     General: Skin is warm and dry.   Neurological:      Mental Status: She is alert and oriented to person, place, and time.   Psychiatric:         Thought Content: Thought content normal.               Vents:  Oxygen Concentration (%): 2 (04/09/25 1926)    Lines/Drains/Airways       Drain  Duration             Female External Urinary  "Catheter w/ Suction 04/06/25 1300 5 days              Peripheral Intravenous Line  Duration                  Peripheral IV - Single Lumen 04/04/25 1910 20 G Left Antecubital 6 days                    Significant Labs:    CBC/Anemia Profile:  Recent Labs   Lab 04/10/25  0522 04/11/25  0536   WBC 8.68 7.61   HGB 14.2 13.2   HCT 44.9 42.6    241   MCV 92 93   RDW 12.7 12.8        Chemistries:  Recent Labs   Lab 04/10/25  0523 04/11/25  0536   * 135*   K 4.5 4.2   CL 92* 94*   CO2 34* 35*   BUN 14 13   CREATININE 0.8 0.7   CALCIUM 9.5 9.2   GLUCOSE 113* 111*   MG 1.9 2.0   PHOS 3.5 3.6       All pertinent labs within the past 24 hours have been reviewed.    Significant Imaging:  I have reviewed all pertinent imaging results/findings within the past 24 hours.    ABG  No results for input(s): "PH", "PO2", "PCO2", "HCO3", "BE" in the last 168 hours.  Assessment/Plan:     Pulmonary  * COPD exacerbation  See asthma/COPD section    Acute exacerbation of COPD with asthma  Persistent wheezing b/l likely triggered by viral illness. Influenza/covid/rsv negative  Severe airflow obstruction on spirometry from 2021    Plan:  Steroids - changed to IV methylprednisone 40 mg twice a day on 4/10/25 - sounds a bit better on 4/11  Prn anxiolytic as needed for steroids  Continue bronchodilator therapy q6h  Continue arfometerol and budesonide neb  Continue fluticasone + azelastine nasal spray  Continue montelukast  Nasal cannula to keep SpO2 > 90%  Recommend reaching out to ENT to help evaluate upper airway as contributing factor to loud audible wheeze    Acute respiratory failure with hypoxia  Patient with Hypoxic Respiratory failure which is Acute.  she is not on home oxygen. Supplemental oxygen was provided and noted-      See asthma copd section    Other  Tobacco dependency  Active smoker 5 cigarettes per day  Plan:  See section on asthma copd exacerbation           Chun Grimm MD  Pulmonology  [facility] - Telemetry  "

## 2025-04-11 NOTE — ASSESSMENT & PLAN NOTE
Persistent wheezing b/l likely triggered by viral illness. Influenza/covid/rsv negative  Severe airflow obstruction on spirometry from 2021    Plan:  Steroids - changed to IV methylprednisone 40 mg twice a day on 4/10/25 - sounds a bit better on 4/11  Prn anxiolytic as needed for steroids  Continue bronchodilator therapy q6h  Continue arfometerol and budesonide neb  Continue fluticasone + azelastine nasal spray  Continue montelukast  Nasal cannula to keep SpO2 > 90%  Recommend reaching out to ENT to help evaluate upper airway as contributing factor to loud audible wheeze

## 2025-04-11 NOTE — NURSING
Reached out to ENT provider regarding consult. Dr. Janiya Joya on treatment team but no note found. ENT office states Dr. Joya is not in the office today. Advised to contact the transfer center. Spoke with Austyn at  [phone]. Call being forwarded to current provider.

## 2025-04-11 NOTE — ASSESSMENT & PLAN NOTE
4/11: slight improvement, feels oversedated with benzo BID, will change to nightly   4/10: adjust anxiolytic to assist with tolerance of increased steroids  4/9: Appreciate Pulm recs, continue increased steroids and neb treatments, add additional anxiolytic  4/8:  Still with significantly poor respiratory status this morning, labored breathing and audible wheezing noted.  Will give a dose of IV steroids; however, will need to be premedicated.  Pulmonology consult, appreciate recs    Presented with SOB wheezing and cough. Found to have an NSTEMI as well attributed to type 2 non-obs CAD on C. Goal O2 sats 88 to 92% with severe COPD by previous PFTs. Dyspneic with sitting in bed with o2 off unable to ambulate  Started on steroids, duo-nebs, and doxy  Check sputum culture   IV diuresis with lasix stopped given contraction alkalosis-transitioned to oral  Add mucinex, flonase, and hypertonic saline nebulizers for airway clearance

## 2025-04-12 PROCEDURE — 25000003 PHARM REV CODE 250: Performed by: INTERNAL MEDICINE

## 2025-04-12 PROCEDURE — 94761 N-INVAS EAR/PLS OXIMETRY MLT: CPT

## 2025-04-12 PROCEDURE — 63600175 PHARM REV CODE 636 W HCPCS: Mod: JZ,TB | Performed by: HOSPITALIST

## 2025-04-12 PROCEDURE — 25000003 PHARM REV CODE 250: Performed by: HOSPITALIST

## 2025-04-12 PROCEDURE — 25000242 PHARM REV CODE 250 ALT 637 W/ HCPCS: Performed by: INTERNAL MEDICINE

## 2025-04-12 PROCEDURE — 94760 N-INVAS EAR/PLS OXIMETRY 1: CPT

## 2025-04-12 PROCEDURE — 11000001 HC ACUTE MED/SURG PRIVATE ROOM

## 2025-04-12 PROCEDURE — 99233 SBSQ HOSP IP/OBS HIGH 50: CPT | Mod: ,,, | Performed by: INTERNAL MEDICINE

## 2025-04-12 PROCEDURE — 25000003 PHARM REV CODE 250: Performed by: PHYSICIAN ASSISTANT

## 2025-04-12 PROCEDURE — 94640 AIRWAY INHALATION TREATMENT: CPT

## 2025-04-12 PROCEDURE — 27000221 HC OXYGEN, UP TO 24 HOURS

## 2025-04-12 RX ADMIN — ARFORMOTEROL TARTRATE 15 MCG: 15 SOLUTION RESPIRATORY (INHALATION) at 07:04

## 2025-04-12 RX ADMIN — ASPIRIN 81 MG: 81 TABLET, COATED ORAL at 08:04

## 2025-04-12 RX ADMIN — MONTELUKAST 10 MG: 10 TABLET, FILM COATED ORAL at 08:04

## 2025-04-12 RX ADMIN — HYDROXYZINE HYDROCHLORIDE 25 MG: 25 TABLET ORAL at 08:04

## 2025-04-12 RX ADMIN — CARVEDILOL 3.12 MG: 3.12 TABLET, FILM COATED ORAL at 08:04

## 2025-04-12 RX ADMIN — BUDESONIDE 0.5 MG: 0.5 INHALANT RESPIRATORY (INHALATION) at 07:04

## 2025-04-12 RX ADMIN — GUAIFENESIN 600 MG: 600 TABLET, EXTENDED RELEASE ORAL at 08:04

## 2025-04-12 RX ADMIN — METHYLPREDNISOLONE SODIUM SUCCINATE 40 MG: 40 INJECTION, POWDER, FOR SOLUTION INTRAMUSCULAR; INTRAVENOUS at 04:04

## 2025-04-12 RX ADMIN — FLUTICASONE PROPIONATE 100 MCG: 50 SPRAY, METERED NASAL at 08:04

## 2025-04-12 RX ADMIN — FUROSEMIDE 40 MG: 40 TABLET ORAL at 08:04

## 2025-04-12 RX ADMIN — LISINOPRIL 40 MG: 20 TABLET ORAL at 08:04

## 2025-04-12 RX ADMIN — IPRATROPIUM BROMIDE AND ALBUTEROL SULFATE 3 ML: 2.5; .5 SOLUTION RESPIRATORY (INHALATION) at 07:04

## 2025-04-12 RX ADMIN — IPRATROPIUM BROMIDE AND ALBUTEROL SULFATE 3 ML: 2.5; .5 SOLUTION RESPIRATORY (INHALATION) at 01:04

## 2025-04-12 RX ADMIN — AZELASTINE HYDROCHLORIDE 137 MCG: 137 SPRAY, METERED NASAL at 08:04

## 2025-04-12 RX ADMIN — CLONIDINE HYDROCHLORIDE 0.1 MG: 0.1 TABLET ORAL at 09:04

## 2025-04-12 RX ADMIN — HYDROXYZINE PAMOATE 25 MG: 25 CAPSULE ORAL at 09:04

## 2025-04-12 RX ADMIN — DIAZEPAM 2 MG: 2 TABLET ORAL at 04:04

## 2025-04-12 RX ADMIN — METHYLPREDNISOLONE SODIUM SUCCINATE 40 MG: 40 INJECTION, POWDER, FOR SOLUTION INTRAMUSCULAR; INTRAVENOUS at 08:04

## 2025-04-12 RX ADMIN — CLONIDINE HYDROCHLORIDE 0.1 MG: 0.1 TABLET ORAL at 12:04

## 2025-04-12 NOTE — PROGRESS NOTES
[facility]  [facility]    Patient Name: Hollie Ponce  MRN: [id number]  Admission Date: 4/4/2025  Hospital Length of Stay: 7 days  Code Status: Full Code  Attending Provider: Sylvain Miranda DO  Primary Care Provider: Ariadne Smith MD   Principal Problem: COPD exacerbation    Subjective:     Interval History: less audible wheeze today. Right nasal sinus drained thick mucus, left still congested.      Objective:     Vital Signs (Most Recent):  Temp: 98.3 °F (36.8 °C) (04/12/25 1113)  Pulse: 79 (04/12/25 1459)  Resp: 18 (04/12/25 1330)  BP: (!) 153/83 (04/12/25 1113)  SpO2: 96 % (04/12/25 1330) Vital Signs (24h Range):  Temp:  [97.4 °F (36.3 °C)-98.5 °F (36.9 °C)] 98.3 °F (36.8 °C)  Pulse:  [68-90] 79  Resp:  [16-20] 18  SpO2:  [91 %-99 %] 96 %  BP: (145-182)/(73-97) 153/83     Weight: 59.3 kg (130 lb 11.7 oz)  Body mass index is 29.31 kg/m².      Intake/Output Summary (Last 24 hours) at 4/12/2025 1519  Last data filed at 4/12/2025 1217  Gross per 24 hour   Intake 240 ml   Output 2900 ml   Net -2660 ml        Physical Exam  Vitals and nursing note reviewed.   Constitutional:       General: She is not in acute distress.     Appearance: She is well-developed.   HENT:      Head: Normocephalic and atraumatic.      Right Ear: External ear normal.      Left Ear: External ear normal.      Nose:      Comments: Sinus fullness   Cardiovascular:      Rate and Rhythm: Normal rate and regular rhythm.   Pulmonary:      Effort: No respiratory distress.      Breath sounds: Wheezing present.   Chest:      Chest wall: No tenderness.   Skin:     General: Skin is warm and dry.   Neurological:      Mental Status: She is alert and oriented to person, place, and time.   Psychiatric:         Thought Content: Thought content normal.               Vents:  Oxygen Concentration (%): 2 (04/09/25 1926)    Lines/Drains/Airways       Drain  Duration             Female External Urinary Catheter w/ Suction 04/06/25 1300 6  "days              Peripheral Intravenous Line  Duration                  Peripheral IV - Single Lumen 04/04/25 1910 20 G Left Antecubital 7 days                    Significant Labs:    CBC/Anemia Profile:  Recent Labs   Lab 04/11/25  0536   WBC 7.61   HGB 13.2   HCT 42.6      MCV 93   RDW 12.8        Chemistries:  Recent Labs   Lab 04/11/25  0536   *   K 4.2   CL 94*   CO2 35*   BUN 13   CREATININE 0.7   CALCIUM 9.2   GLUCOSE 111*   MG 2.0   PHOS 3.6       All pertinent labs within the past 24 hours have been reviewed.    Significant Imaging:  I have reviewed all pertinent imaging results/findings within the past 24 hours.    ABG  No results for input(s): "PH", "PO2", "PCO2", "HCO3", "BE" in the last 168 hours.  Assessment/Plan:     Pulmonary  * COPD exacerbation  See asthma/COPD section    Acute exacerbation of COPD with asthma  Persistent wheezing b/l likely triggered by viral illness. Influenza/covid/rsv negative  Severe airflow obstruction on spirometry from 2021    Plan:  Steroids - changed to IV methylprednisone 40 mg twice a day on 4/10/25 - improving day by day. Recommend changing to oral tomorrow  Prn anxiolytic as needed for steroids  Continue bronchodilator therapy q6h  Continue arfometerol and budesonide neb  Continue fluticasone + azelastine nasal spray  Continue montelukast  Nasal cannula to keep SpO2 > 90%  If audible wheezing persists by Monday, consider ENT evaluation of upper airway as contributing factor to loud audible wheeze    Acute respiratory failure with hypoxia  Patient with Hypoxic Respiratory failure which is Acute.  she is not on home oxygen. Supplemental oxygen was provided and noted-      See asthma copd section           Chun Grimm MD  Pulmonology  [facility] - Telemetry  "

## 2025-04-12 NOTE — ASSESSMENT & PLAN NOTE
"Results for orders placed during the hospital encounter of 11/16/20    Echo Color Flow Doppler? Yes; Bubble Contrast? No    Interpretation Summary  · The left ventricle is normal in size with normal systolic function. The estimated ejection fraction is 55%.  · There is mild left ventricular concentric hypertrophy.  · Normal left ventricular diastolic function.  · Normal right ventricular size with normal right ventricular systolic function.  · Mild left atrial enlargement.  · Normal central venous pressure (3 mmHg).  · The estimated PA systolic pressure is 49 mmHg.  · There is pulmonary hypertension.    BNP  No results for input(s): "BNP", "BNPTRIAGEBLO" in the last 168 hours.      Optimize volume status     "

## 2025-04-12 NOTE — PLAN OF CARE
Problem: Adult Inpatient Plan of Care  Goal: Plan of Care Review  Outcome: Progressing  Flowsheets (Taken 4/12/2025 1811)  Plan of Care Reviewed With: patient     Problem: COPD (Chronic Obstructive Pulmonary Disease)  Goal: Optimal Chronic Illness Coping  Outcome: Progressing  Intervention: Support and Optimize Psychosocial Response  Flowsheets (Taken 4/12/2025 1811)  Supportive Measures: active listening utilized     Problem: Fall Injury Risk  Goal: Absence of Fall and Fall-Related Injury  Outcome: Progressing     Problem: Skin Injury Risk Increased  Goal: Skin Health and Integrity  Outcome: Progressing     Problem: COPD (Chronic Obstructive Pulmonary Disease)  Goal: Effective Oxygenation and Ventilation  Intervention: Promote Airway Secretion Clearance  Flowsheets (Taken 4/12/2025 1811)  Breathing Techniques/Airway Clearance: deep/controlled cough encouraged  Cough And Deep Breathing: done independently per patient  Activity Management: Arm raise - L1

## 2025-04-12 NOTE — PROGRESS NOTES
[facility] Medicine  Progress Note    Patient Name: Hollie Ponce  MRN: [id number]  Patient Class: IP- Inpatient   Admission Date: 4/4/2025  Length of Stay: 7 days  Attending Physician: Sylvain Miranda DO  Primary Care Provider: Ariadne Smith MD        Subjective     Principal Problem:COPD exacerbation        HPI:  This is a 69-year-old female with a past medical history of COPD (not on O2), nonobstructive CAD, hypertension, peripheral artery disease, tobacco use who presents with dyspnea.      Patient presents for evaluation of shortness of breath that started on the day of presentation.  She reports worsening exertional dyspnea, associated with a dry cough, and chest congestion.  She reports having rhinorrhea/sinus congestion.  Her daughter was sick with similar symptoms, but limited her exposure to the patient and was wearing a mask around her  Patient smokes 5 cigarettes daily.    In the ED, the patient was tachypneic (20s-40s), tachycardic (100s-110s), and mildly hypoxic (90%, requiring 3 L O2).  Labs were remarkable for hypokalemia (3.4), negative BNP (41), negative troponin (<0.006).  Chest x-ray showed no acute cardiopulmonary process.  Patient was given Solu-Medrol 125 mg IV, hydralazine 10 mg IV, DuoNeb x1, Ativan 0.5 mg p.o..  She was admitted for further management.    Overview/Hospital Course:  Mrs. Ponce was hospitalized for COPD exacerbation after presenting with dyspnea and chest tightness.  She was noted to be tachypneic, tachycardic, and hypoxic on room air.  Supplemental oxygen initiated.  She received corticosteroids and nebulizer treatments with some symptomatic improvement.  Initial troponin negative; however, repeat troponin significantly elevated.  She reports persistent and some mild chest tightness.  Cardiology consulted, appreciate recs.  Initiate treatment for NSTEMI. LHC without evidence of obstructive CAD. Continued on COPD treatment.  Without much improvement  initially, Pulmonology was consulted and steroids increased frequency and changed to IV.  Now making better progress.  Still wheezing and on oxygen.  Continue IV steroids and nebs.    Interval History: no new complaints    Review of Systems   Respiratory:  Positive for shortness of breath and wheezing.    All other systems reviewed and are negative.    Objective:     Vital Signs (Most Recent):  Temp: 97.7 °F (36.5 °C) (04/12/25 0822)  Pulse: 78 (04/12/25 1041)  Resp: 18 (04/12/25 0822)  BP: (!) 151/83 (04/12/25 0822)  SpO2: 97 % (04/12/25 0822) Vital Signs (24h Range):  Temp:  [97.4 °F (36.3 °C)-98.5 °F (36.9 °C)] 97.7 °F (36.5 °C)  Pulse:  [68-90] 78  Resp:  [18-20] 18  SpO2:  [91 %-99 %] 97 %  BP: (145-182)/(73-97) 151/83     Weight: 59.3 kg (130 lb 11.7 oz)  Body mass index is 29.31 kg/m².    Intake/Output Summary (Last 24 hours) at 4/12/2025 1053  Last data filed at 4/12/2025 1010  Gross per 24 hour   Intake 480 ml   Output 2400 ml   Net -1920 ml         Physical Exam  Vitals and nursing note reviewed.   Constitutional:       General: She is not in acute distress.     Appearance: She is well-developed.   HENT:      Head: Normocephalic and atraumatic.      Right Ear: External ear normal.      Left Ear: External ear normal.   Cardiovascular:      Rate and Rhythm: Normal rate and regular rhythm.   Pulmonary:      Effort: Pulmonary effort is normal. No respiratory distress.      Breath sounds: Wheezing present.   Skin:     General: Skin is warm and dry.   Neurological:      Mental Status: She is alert and oriented to person, place, and time.   Psychiatric:         Thought Content: Thought content normal.               Significant Labs: All pertinent labs within the past 24 hours have been reviewed.    Significant Imaging: I have reviewed all pertinent imaging results/findings within the past 24 hours.      Assessment & Plan  COPD exacerbation  4/12: starting to show some improvement, still wheezing and on oxygen,  "continue IV steroids and nebs    Pulmonology consult, appreciate recs.  Steroids changed to IV with increased frequency.  Anxiolytic to assist with tolerance of treatment.     Presented with SOB wheezing and cough. Found to have an NSTEMI as well attributed to type 2 non-obs CAD on [facility]. Goal O2 sats 88 to 92% with severe COPD by previous PFTs. Dyspneic with sitting in bed with o2 off unable to ambulate  Started on steroids, duo-nebs, and doxy  Check sputum culture   IV diuresis with lasix stopped given contraction alkalosis-transitioned to oral  Add mucinex, flonase, and hypertonic saline nebulizers for airway clearance  NSTEMI (non-ST elevated myocardial infarction)  Nonobstructive CAD on cath, continue med mgmt  Plan is for left heart catheterization 4/6/25.  Continue treatment for ACS and COPD.  Pending results    Patient presents with NSTEMI. Chest pain is currently controlled. Patient is currently on NSTEMI Pathway.    EKG reviewed. Troponins reviewed and results noted-   No results for input(s): "TROPONINI", "TROPONINIHS" in the last 168 hours.  .     Lipid panel reviewed and shows-     Lab Results   Component Value Date    LDLCALC 85.4 04/16/2024     Lab Results   Component Value Date    TRIG 32 04/05/2025         Medical management includes; Beta Blocker, Dual Anti-Platelet therapy, Anticoagulation, and High Intensity Stain Echo has been performed. Latest ECHO results are as follows- Results for orders placed during the hospital encounter of 04/04/25    Echo    Interpretation Summary    Left Ventricle: The left ventricle is normal in size. Normal wall thickness. There is normal systolic function with a visually estimated ejection fraction of 65 - 70%. Grade I diastolic dysfunction.    Right Ventricle: The right ventricle is normal in size. Systolic function is normal.    Mitral Valve: There is mild regurgitation.    Tricuspid Valve: There is mild regurgitation.    Pulmonary Artery: The estimated pulmonary " "artery systolic pressure is 55 mmHg.  .   Consult for cardiac rehab is not ordered. Patient counseled on lifestyle modifications- continue current medications. Cardiology is consulted. Plan of care reviewed with cardiology team. Continue to monitor patient closely and adjust therapy as needed.     Essential hypertension  Patient's blood pressure range in the last 24 hours was: BP  Min: 145/86  Max: 182/73.The patient's inpatient anti-hypertensive regimen is listed below:  Current Antihypertensives  carvediloL tablet 3.125 mg, 2 times daily, Oral  lisinopriL tablet 40 mg, Daily, Oral  cloNIDine tablet 0.1 mg, Every 6 hours PRN, Oral  hydrALAZINE injection 5 mg, Every 6 hours PRN, Intravenous  furosemide tablet 40 mg, Daily, Oral    Plan  - BP is controlled, no changes needed to their regimen    CAD (coronary artery disease)  Patient with known CAD which is controlled Will continue ASA, Plavix, and Statin and monitor for S/Sx of angina/ACS. Continue to monitor on telemetry. Minimal non-obs CAD did not require stent placement  PAD (peripheral artery disease)  History noted. Continue home medications.     Pulmonary HTN  Results for orders placed during the hospital encounter of 11/16/20    Echo Color Flow Doppler? Yes; Bubble Contrast? No    Interpretation Summary  · The left ventricle is normal in size with normal systolic function. The estimated ejection fraction is 55%.  · There is mild left ventricular concentric hypertrophy.  · Normal left ventricular diastolic function.  · Normal right ventricular size with normal right ventricular systolic function.  · Mild left atrial enlargement.  · Normal central venous pressure (3 mmHg).  · The estimated PA systolic pressure is 49 mmHg.  · There is pulmonary hypertension.    BNP  No results for input(s): "BNP", "BNPTRIAGEBLO" in the last 168 hours.      Optimize volume status     Tobacco dependency  Tobacco cessation not discussed with patient today. Nicotine replacement has " been- prescribed  Acute respiratory failure with hypoxia  Patient with Hypoxic Respiratory failure which is Acute.  she is not on home oxygen. Supplemental oxygen was provided and noted-      .   Signs/symptoms of respiratory failure include- increased work of breathing and respiratory distress. Contributing diagnoses includes - COPD Labs and images were reviewed. Patient Has not had a recent ABG. Will treat underlying causes and adjust management of respiratory failure as follows- Wean O2 as tolerated   Carotid atherosclerosis, bilateral  Continue ASA/plavix/statin  Dyslipidemia  Continue statin  Acute exacerbation of COPD with asthma      VTE Risk Mitigation (From admission, onward)           Ordered     enoxaparin injection 60 mg  Every 12 hours (non-standard times)         04/05/25 0946     Place sequential compression device  Until discontinued         04/04/25 2312     IP VTE HIGH RISK PATIENT  Once         04/04/25 2312     Place sequential compression device  Until discontinued         04/04/25 2312                    Discharge Planning   YOLA: 4/12/2025     Code Status: Full Code   Medical Readiness for Discharge Date:   Discharge Plan A: Home with family   Discharge Delays: None known at this time    Benito Blackburn Jr., APRN, AGACNP-BC  Hospitalist - Department of Hospital Medicine  [facility] [address] [place] [address]  Office #: [phone]; Pager #: [phone]

## 2025-04-12 NOTE — ASSESSMENT & PLAN NOTE
"Nonobstructive CAD on cath, continue med mgmt  Plan is for left heart catheterization 4/6/25.  Continue treatment for ACS and COPD.  Pending results    Patient presents with NSTEMI. Chest pain is currently controlled. Patient is currently on NSTEMI Pathway.    EKG reviewed. Troponins reviewed and results noted-   No results for input(s): "TROPONINI", "TROPONINIHS" in the last 168 hours.  .     Lipid panel reviewed and shows-     Lab Results   Component Value Date    LDLCALC 85.4 04/16/2024     Lab Results   Component Value Date    TRIG 32 04/05/2025         Medical management includes; Beta Blocker, Dual Anti-Platelet therapy, Anticoagulation, and High Intensity Stain Echo has been performed. Latest ECHO results are as follows- Results for orders placed during the hospital encounter of 04/04/25    Echo    Interpretation Summary    Left Ventricle: The left ventricle is normal in size. Normal wall thickness. There is normal systolic function with a visually estimated ejection fraction of 65 - 70%. Grade I diastolic dysfunction.    Right Ventricle: The right ventricle is normal in size. Systolic function is normal.    Mitral Valve: There is mild regurgitation.    Tricuspid Valve: There is mild regurgitation.    Pulmonary Artery: The estimated pulmonary artery systolic pressure is 55 mmHg.  .   Consult for cardiac rehab is not ordered. Patient counseled on lifestyle modifications- continue current medications. Cardiology is consulted. Plan of care reviewed with cardiology team. Continue to monitor patient closely and adjust therapy as needed.     "

## 2025-04-12 NOTE — PLAN OF CARE
Problem: Adult Inpatient Plan of Care  Goal: Optimal Comfort and Wellbeing  Outcome: Progressing  Intervention: Monitor Pain and Promote Comfort  Flowsheets (Taken 4/12/2025 0330)  Pain Management Interventions:   care clustered   quiet environment facilitated  Intervention: Provide Person-Centered Care  Flowsheets (Taken 4/12/2025 0330)  Trust Relationship/Rapport:   care explained   thoughts/feelings acknowledged     Problem: COPD (Chronic Obstructive Pulmonary Disease)  Goal: Effective Oxygenation and Ventilation  Outcome: Progressing  Intervention: Promote Airway Secretion Clearance  Flowsheets (Taken 4/12/2025 0330)  Cough And Deep Breathing: done independently per patient  Activity Management: Arm raise - L1     Patient is AAOx4. Weaned O2 to 1L via NC. Patient is a bit anxious last night, PRN medication given. Tele maintained. No falls or injuries reported during shift, safety precautions maintained.

## 2025-04-12 NOTE — SUBJECTIVE & OBJECTIVE
Interval History: no new complaints    Review of Systems   Respiratory:  Positive for shortness of breath and wheezing.    All other systems reviewed and are negative.    Objective:     Vital Signs (Most Recent):  Temp: 97.7 °F (36.5 °C) (04/12/25 0822)  Pulse: 78 (04/12/25 1041)  Resp: 18 (04/12/25 0822)  BP: (!) 151/83 (04/12/25 0822)  SpO2: 97 % (04/12/25 0822) Vital Signs (24h Range):  Temp:  [97.4 °F (36.3 °C)-98.5 °F (36.9 °C)] 97.7 °F (36.5 °C)  Pulse:  [68-90] 78  Resp:  [18-20] 18  SpO2:  [91 %-99 %] 97 %  BP: (145-182)/(73-97) 151/83     Weight: 59.3 kg (130 lb 11.7 oz)  Body mass index is 29.31 kg/m².    Intake/Output Summary (Last 24 hours) at 4/12/2025 1053  Last data filed at 4/12/2025 1010  Gross per 24 hour   Intake 480 ml   Output 2400 ml   Net -1920 ml         Physical Exam  Vitals and nursing note reviewed.   Constitutional:       General: She is not in acute distress.     Appearance: She is well-developed.   HENT:      Head: Normocephalic and atraumatic.      Right Ear: External ear normal.      Left Ear: External ear normal.   Cardiovascular:      Rate and Rhythm: Normal rate and regular rhythm.   Pulmonary:      Effort: Pulmonary effort is normal. No respiratory distress.      Breath sounds: Wheezing present.   Skin:     General: Skin is warm and dry.   Neurological:      Mental Status: She is alert and oriented to person, place, and time.   Psychiatric:         Thought Content: Thought content normal.               Significant Labs: All pertinent labs within the past 24 hours have been reviewed.    Significant Imaging: I have reviewed all pertinent imaging results/findings within the past 24 hours.

## 2025-04-12 NOTE — ASSESSMENT & PLAN NOTE
Patient's blood pressure range in the last 24 hours was: BP  Min: 145/86  Max: 182/73.The patient's inpatient anti-hypertensive regimen is listed below:  Current Antihypertensives  carvediloL tablet 3.125 mg, 2 times daily, Oral  lisinopriL tablet 40 mg, Daily, Oral  cloNIDine tablet 0.1 mg, Every 6 hours PRN, Oral  hydrALAZINE injection 5 mg, Every 6 hours PRN, Intravenous  furosemide tablet 40 mg, Daily, Oral    Plan  - BP is controlled, no changes needed to their regimen

## 2025-04-12 NOTE — SUBJECTIVE & OBJECTIVE
Interval History: less audible wheeze today. Right nasal sinus drained thick mucus, left still congested.      Objective:     Vital Signs (Most Recent):  Temp: 98.3 °F (36.8 °C) (04/12/25 1113)  Pulse: 79 (04/12/25 1459)  Resp: 18 (04/12/25 1330)  BP: (!) 153/83 (04/12/25 1113)  SpO2: 96 % (04/12/25 1330) Vital Signs (24h Range):  Temp:  [97.4 °F (36.3 °C)-98.5 °F (36.9 °C)] 98.3 °F (36.8 °C)  Pulse:  [68-90] 79  Resp:  [16-20] 18  SpO2:  [91 %-99 %] 96 %  BP: (145-182)/(73-97) 153/83     Weight: 59.3 kg (130 lb 11.7 oz)  Body mass index is 29.31 kg/m².      Intake/Output Summary (Last 24 hours) at 4/12/2025 1519  Last data filed at 4/12/2025 1217  Gross per 24 hour   Intake 240 ml   Output 2900 ml   Net -2660 ml        Physical Exam  Vitals and nursing note reviewed.   Constitutional:       General: She is not in acute distress.     Appearance: She is well-developed.   HENT:      Head: Normocephalic and atraumatic.      Right Ear: External ear normal.      Left Ear: External ear normal.      Nose:      Comments: Sinus fullness   Cardiovascular:      Rate and Rhythm: Normal rate and regular rhythm.   Pulmonary:      Effort: No respiratory distress.      Breath sounds: Wheezing present.   Chest:      Chest wall: No tenderness.   Skin:     General: Skin is warm and dry.   Neurological:      Mental Status: She is alert and oriented to person, place, and time.   Psychiatric:         Thought Content: Thought content normal.               Vents:  Oxygen Concentration (%): 2 (04/09/25 1926)    Lines/Drains/Airways       Drain  Duration             Female External Urinary Catheter w/ Suction 04/06/25 1300 6 days              Peripheral Intravenous Line  Duration                  Peripheral IV - Single Lumen 04/04/25 1910 20 G Left Antecubital 7 days                    Significant Labs:    CBC/Anemia Profile:  Recent Labs   Lab 04/11/25  0536   WBC 7.61   HGB 13.2   HCT 42.6      MCV 93   RDW 12.8         Chemistries:  Recent Labs   Lab 04/11/25  0536   *   K 4.2   CL 94*   CO2 35*   BUN 13   CREATININE 0.7   CALCIUM 9.2   GLUCOSE 111*   MG 2.0   PHOS 3.6       All pertinent labs within the past 24 hours have been reviewed.    Significant Imaging:  I have reviewed all pertinent imaging results/findings within the past 24 hours.

## 2025-04-12 NOTE — ASSESSMENT & PLAN NOTE
4/12: starting to show some improvement, still wheezing and on oxygen, continue IV steroids and nebs    Pulmonology consult, appreciate recs.  Steroids changed to IV with increased frequency.  Anxiolytic to assist with tolerance of treatment.     Presented with SOB wheezing and cough. Found to have an NSTEMI as well attributed to type 2 non-obs CAD on LHC. Goal O2 sats 88 to 92% with severe COPD by previous PFTs. Dyspneic with sitting in bed with o2 off unable to ambulate  Started on steroids, duo-nebs, and doxy  Check sputum culture   IV diuresis with lasix stopped given contraction alkalosis-transitioned to oral  Add mucinex, flonase, and hypertonic saline nebulizers for airway clearance

## 2025-04-12 NOTE — NURSING
[facility] [place]  Nurses Note -- 4 Eyes      4/12/2025       Skin assessed on: Q Shift      [x] No Pressure Injuries Present    [x]Prevention Measures Documented    [] Yes LDA  for Pressure Injury Previously documented     [] Yes New Pressure Injury Discovered   [] LDA for New Pressure Injury Added      Attending RN:  Ramandeep Guadalupe LPN     Second RN:  Melvin HAMEED

## 2025-04-12 NOTE — NURSING
Handoff report received from morning shift Nurse, patient is awake and alert, on O2 at 2L via NC, daughter at the bedside, no distress noted, bed on lowest position, wheels locked, bed alarm set, call light in reach. Instructed to call for assistance.        [facility] - [place]  Nurses Note -- 4 Eyes      4/11/2025       Skin assessed on: Q Shift      [x] No Pressure Injuries Present    [x]Prevention Measures Documented    [] Yes LDA  for Pressure Injury Previously documented     [] Yes New Pressure Injury Discovered   [] LDA for New Pressure Injury Added      Attending RN:  Melvin Negron, RN     Second RN:  Violeta Gautam, RN

## 2025-04-12 NOTE — ASSESSMENT & PLAN NOTE
Persistent wheezing b/l likely triggered by viral illness. Influenza/covid/rsv negative  Severe airflow obstruction on spirometry from 2021    Plan:  Steroids - changed to IV methylprednisone 40 mg twice a day on 4/10/25 - improving day by day. Recommend changing to oral tomorrow  Prn anxiolytic as needed for steroids  Continue bronchodilator therapy q6h  Continue arfometerol and budesonide neb  Continue fluticasone + azelastine nasal spray  Continue montelukast  Nasal cannula to keep SpO2 > 90%  If audible wheezing persists by Monday, consider ENT evaluation of upper airway as contributing factor to loud audible wheeze

## 2025-04-13 PROBLEM — E87.3 ALKALOSIS: Status: ACTIVE | Noted: 2025-04-13

## 2025-04-13 LAB
ABSOLUTE EOSINOPHIL (OHS): 0 K/UL
ABSOLUTE MONOCYTE (OHS): 0.55 K/UL (ref 0.3–1)
ABSOLUTE NEUTROPHIL COUNT (OHS): 6.2 K/UL (ref 1.8–7.7)
ANION GAP (OHS): 7 MMOL/L (ref 8–16)
BASOPHILS # BLD AUTO: 0.01 K/UL
BASOPHILS NFR BLD AUTO: 0.1 %
BUN SERPL-MCNC: 14 MG/DL (ref 8–23)
CALCIUM SERPL-MCNC: 9.5 MG/DL (ref 8.7–10.5)
CHLORIDE SERPL-SCNC: 93 MMOL/L (ref 95–110)
CO2 SERPL-SCNC: 39 MMOL/L (ref 23–29)
CREAT SERPL-MCNC: 0.7 MG/DL (ref 0.5–1.4)
ERYTHROCYTE [DISTWIDTH] IN BLOOD BY AUTOMATED COUNT: 12.7 % (ref 11.5–14.5)
GFR SERPLBLD CREATININE-BSD FMLA CKD-EPI: >60 ML/MIN/1.73/M2
GLUCOSE SERPL-MCNC: 128 MG/DL (ref 70–110)
HCT VFR BLD AUTO: 43.8 % (ref 37–48.5)
HGB BLD-MCNC: 13.9 GM/DL (ref 12–16)
IMM GRANULOCYTES # BLD AUTO: 0.07 K/UL (ref 0–0.04)
IMM GRANULOCYTES NFR BLD AUTO: 0.8 % (ref 0–0.5)
LYMPHOCYTES # BLD AUTO: 1.5 K/UL (ref 1–4.8)
MCH RBC QN AUTO: 28.7 PG (ref 27–31)
MCHC RBC AUTO-ENTMCNC: 31.7 G/DL (ref 32–36)
MCV RBC AUTO: 91 FL (ref 82–98)
NUCLEATED RBC (/100WBC) (OHS): 0 /100 WBC
PLATELET # BLD AUTO: 269 K/UL (ref 150–450)
PMV BLD AUTO: 10.1 FL (ref 9.2–12.9)
POTASSIUM SERPL-SCNC: 4.2 MMOL/L (ref 3.5–5.1)
RBC # BLD AUTO: 4.84 M/UL (ref 4–5.4)
RELATIVE EOSINOPHIL (OHS): 0 %
RELATIVE LYMPHOCYTE (OHS): 18 % (ref 18–48)
RELATIVE MONOCYTE (OHS): 6.6 % (ref 4–15)
RELATIVE NEUTROPHIL (OHS): 74.5 % (ref 38–73)
SODIUM SERPL-SCNC: 139 MMOL/L (ref 136–145)
WBC # BLD AUTO: 8.33 K/UL (ref 3.9–12.7)

## 2025-04-13 PROCEDURE — 25000242 PHARM REV CODE 250 ALT 637 W/ HCPCS: Performed by: INTERNAL MEDICINE

## 2025-04-13 PROCEDURE — 25000003 PHARM REV CODE 250: Performed by: INTERNAL MEDICINE

## 2025-04-13 PROCEDURE — 27000221 HC OXYGEN, UP TO 24 HOURS

## 2025-04-13 PROCEDURE — 94640 AIRWAY INHALATION TREATMENT: CPT

## 2025-04-13 PROCEDURE — 36415 COLL VENOUS BLD VENIPUNCTURE: CPT | Performed by: HOSPITALIST

## 2025-04-13 PROCEDURE — 11000001 HC ACUTE MED/SURG PRIVATE ROOM

## 2025-04-13 PROCEDURE — 94761 N-INVAS EAR/PLS OXIMETRY MLT: CPT

## 2025-04-13 PROCEDURE — 85025 COMPLETE CBC W/AUTO DIFF WBC: CPT | Performed by: HOSPITALIST

## 2025-04-13 PROCEDURE — 25000003 PHARM REV CODE 250: Performed by: HOSPITALIST

## 2025-04-13 PROCEDURE — 94664 DEMO&/EVAL PT USE INHALER: CPT

## 2025-04-13 PROCEDURE — 25000003 PHARM REV CODE 250: Performed by: PHYSICIAN ASSISTANT

## 2025-04-13 PROCEDURE — 99900035 HC TECH TIME PER 15 MIN (STAT)

## 2025-04-13 PROCEDURE — 63600175 PHARM REV CODE 636 W HCPCS: Mod: JZ,TB | Performed by: HOSPITALIST

## 2025-04-13 PROCEDURE — 84295 ASSAY OF SERUM SODIUM: CPT | Performed by: HOSPITALIST

## 2025-04-13 PROCEDURE — 99233 SBSQ HOSP IP/OBS HIGH 50: CPT | Mod: ,,, | Performed by: INTERNAL MEDICINE

## 2025-04-13 RX ORDER — PREDNISONE 20 MG/1
40 TABLET ORAL 2 TIMES DAILY
Status: DISCONTINUED | OUTPATIENT
Start: 2025-04-14 | End: 2025-04-14

## 2025-04-13 RX ADMIN — CARVEDILOL 3.12 MG: 3.12 TABLET, FILM COATED ORAL at 08:04

## 2025-04-13 RX ADMIN — MONTELUKAST 10 MG: 10 TABLET, FILM COATED ORAL at 08:04

## 2025-04-13 RX ADMIN — ARFORMOTEROL TARTRATE 15 MCG: 15 SOLUTION RESPIRATORY (INHALATION) at 07:04

## 2025-04-13 RX ADMIN — METHYLPREDNISOLONE SODIUM SUCCINATE 40 MG: 40 INJECTION, POWDER, FOR SOLUTION INTRAMUSCULAR; INTRAVENOUS at 04:04

## 2025-04-13 RX ADMIN — FUROSEMIDE 40 MG: 40 TABLET ORAL at 08:04

## 2025-04-13 RX ADMIN — ASPIRIN 81 MG: 81 TABLET, COATED ORAL at 08:04

## 2025-04-13 RX ADMIN — CLONIDINE HYDROCHLORIDE 0.1 MG: 0.1 TABLET ORAL at 03:04

## 2025-04-13 RX ADMIN — METHYLPREDNISOLONE SODIUM SUCCINATE 40 MG: 40 INJECTION, POWDER, FOR SOLUTION INTRAMUSCULAR; INTRAVENOUS at 08:04

## 2025-04-13 RX ADMIN — BUDESONIDE 0.5 MG: 0.5 INHALANT RESPIRATORY (INHALATION) at 07:04

## 2025-04-13 RX ADMIN — AZELASTINE HYDROCHLORIDE 137 MCG: 137 SPRAY, METERED NASAL at 08:04

## 2025-04-13 RX ADMIN — DIAZEPAM 2 MG: 2 TABLET ORAL at 04:04

## 2025-04-13 RX ADMIN — IPRATROPIUM BROMIDE AND ALBUTEROL SULFATE 3 ML: 2.5; .5 SOLUTION RESPIRATORY (INHALATION) at 12:04

## 2025-04-13 RX ADMIN — GUAIFENESIN 600 MG: 600 TABLET, EXTENDED RELEASE ORAL at 08:04

## 2025-04-13 RX ADMIN — IPRATROPIUM BROMIDE AND ALBUTEROL SULFATE 3 ML: 2.5; .5 SOLUTION RESPIRATORY (INHALATION) at 07:04

## 2025-04-13 RX ADMIN — FLUTICASONE PROPIONATE 100 MCG: 50 SPRAY, METERED NASAL at 08:04

## 2025-04-13 RX ADMIN — POLYETHYLENE GLYCOL 3350 17 G: 17 POWDER, FOR SOLUTION ORAL at 08:04

## 2025-04-13 RX ADMIN — HYDROXYZINE HYDROCHLORIDE 25 MG: 25 TABLET ORAL at 08:04

## 2025-04-13 RX ADMIN — LISINOPRIL 40 MG: 20 TABLET ORAL at 08:04

## 2025-04-13 RX ADMIN — HYDROXYZINE PAMOATE 25 MG: 25 CAPSULE ORAL at 11:04

## 2025-04-13 RX ADMIN — CLONIDINE HYDROCHLORIDE 0.1 MG: 0.1 TABLET ORAL at 11:04

## 2025-04-13 NOTE — SUBJECTIVE & OBJECTIVE
Interval History: no new complaints    Review of Systems   Respiratory:  Positive for cough and shortness of breath.    All other systems reviewed and are negative.    Objective:     Vital Signs (Most Recent):  Temp: 97.8 °F (36.6 °C) (04/13/25 1119)  Pulse: 94 (04/13/25 1242)  Resp: 18 (04/13/25 1242)  BP: (!) 166/84 (04/13/25 1119)  SpO2: 95 % (04/13/25 1242) Vital Signs (24h Range):  Temp:  [97.7 °F (36.5 °C)-98.3 °F (36.8 °C)] 97.8 °F (36.6 °C)  Pulse:  [64-94] 94  Resp:  [16-20] 18  SpO2:  [92 %-99 %] 95 %  BP: (134-183)/(69-93) 166/84     Weight: 64 kg (141 lb 1.5 oz)  Body mass index is 31.63 kg/m².    Intake/Output Summary (Last 24 hours) at 4/13/2025 1501  Last data filed at 4/13/2025 1258  Gross per 24 hour   Intake 720 ml   Output 2200 ml   Net -1480 ml         Physical Exam  Vitals and nursing note reviewed.   Constitutional:       General: She is not in acute distress.     Appearance: She is well-developed.   HENT:      Head: Normocephalic and atraumatic.      Right Ear: External ear normal.      Left Ear: External ear normal.   Cardiovascular:      Rate and Rhythm: Normal rate and regular rhythm.   Pulmonary:      Effort: Pulmonary effort is normal. No respiratory distress.      Breath sounds: Wheezing present.   Skin:     General: Skin is warm and dry.   Neurological:      Mental Status: She is alert and oriented to person, place, and time.   Psychiatric:         Thought Content: Thought content normal.               Significant Labs: All pertinent labs within the past 24 hours have been reviewed.    Significant Imaging: I have reviewed all pertinent imaging results/findings within the past 24 hours.

## 2025-04-13 NOTE — SUBJECTIVE & OBJECTIVE
Interval History: doing better today, no audible wheezing. Noted increasing bicarbonate.      Objective:     Vital Signs (Most Recent):  Temp: 97.8 °F (36.6 °C) (04/13/25 1119)  Pulse: 76 (04/13/25 1119)  Resp: 18 (04/13/25 1119)  BP: (!) 166/84 (04/13/25 1119)  SpO2: 97 % (04/13/25 1119) Vital Signs (24h Range):  Temp:  [97.7 °F (36.5 °C)-98.3 °F (36.8 °C)] 97.8 °F (36.6 °C)  Pulse:  [64-90] 76  Resp:  [16-20] 18  SpO2:  [92 %-99 %] 97 %  BP: (134-183)/(69-93) 166/84     Weight: 64 kg (141 lb 1.5 oz)  Body mass index is 31.63 kg/m².      Intake/Output Summary (Last 24 hours) at 4/13/2025 1240  Last data filed at 4/13/2025 1003  Gross per 24 hour   Intake 600 ml   Output 2200 ml   Net -1600 ml        Physical Exam  Vitals and nursing note reviewed.   Constitutional:       General: She is not in acute distress.     Appearance: She is well-developed.   HENT:      Head: Normocephalic and atraumatic.      Right Ear: External ear normal.      Left Ear: External ear normal.      Nose:      Comments: Sinus fullness   Cardiovascular:      Rate and Rhythm: Normal rate and regular rhythm.   Pulmonary:      Effort: No respiratory distress.      Breath sounds: No wheezing.   Chest:      Chest wall: No tenderness.   Skin:     General: Skin is warm and dry.   Neurological:      Mental Status: She is alert and oriented to person, place, and time.   Psychiatric:         Thought Content: Thought content normal.           Review of Systems    Vents:  Oxygen Concentration (%): 2 (04/09/25 1926)    Lines/Drains/Airways       Drain  Duration             Female External Urinary Catheter w/ Suction 04/06/25 1300 6 days              Peripheral Intravenous Line  Duration                  Peripheral IV - Single Lumen 04/04/25 1910 20 G Left Antecubital 8 days                    Significant Labs:    CBC/Anemia Profile:  Recent Labs   Lab 04/13/25  0256   WBC 8.33   HGB 13.9   HCT 43.8      MCV 91   RDW 12.7        Chemistries:  Recent  Labs   Lab 04/13/25  0256      K 4.2   CL 93*   CO2 39*   BUN 14   CREATININE 0.7   CALCIUM 9.5   GLUCOSE 128*       All pertinent labs within the past 24 hours have been reviewed.  None    Significant Imaging:  I have reviewed all pertinent imaging results/findings within the past 24 hours.

## 2025-04-13 NOTE — PROGRESS NOTES
[facility] - [place] - Telemetry  Pulmonology  Progress Note    Patient Name: Hollie Ponce  MRN: [id number]  Admission Date: 4/4/2025  Hospital Length of Stay: 8 days  Code Status: Full Code  Attending Provider: Sylvain Miranda DO  Primary Care Provider: Ariadne Smith MD   Principal Problem: COPD exacerbation    Subjective:     Interval History: doing better today, no audible wheezing. Noted increasing bicarbonate.      Objective:     Vital Signs (Most Recent):  Temp: 97.8 °F (36.6 °C) (04/13/25 1119)  Pulse: 76 (04/13/25 1119)  Resp: 18 (04/13/25 1119)  BP: (!) 166/84 (04/13/25 1119)  SpO2: 97 % (04/13/25 1119) Vital Signs (24h Range):  Temp:  [97.7 °F (36.5 °C)-98.3 °F (36.8 °C)] 97.8 °F (36.6 °C)  Pulse:  [64-90] 76  Resp:  [16-20] 18  SpO2:  [92 %-99 %] 97 %  BP: (134-183)/(69-93) 166/84     Weight: 64 kg (141 lb 1.5 oz)  Body mass index is 31.63 kg/m².      Intake/Output Summary (Last 24 hours) at 4/13/2025 1240  Last data filed at 4/13/2025 1003  Gross per 24 hour   Intake 600 ml   Output 2200 ml   Net -1600 ml        Physical Exam  Vitals and nursing note reviewed.   Constitutional:       General: She is not in acute distress.     Appearance: She is well-developed.   HENT:      Head: Normocephalic and atraumatic.      Right Ear: External ear normal.      Left Ear: External ear normal.      Nose:      Comments: Sinus fullness   Cardiovascular:      Rate and Rhythm: Normal rate and regular rhythm.   Pulmonary:      Effort: No respiratory distress.      Breath sounds: No wheezing.   Chest:      Chest wall: No tenderness.   Skin:     General: Skin is warm and dry.   Neurological:      Mental Status: She is alert and oriented to person, place, and time.   Psychiatric:         Thought Content: Thought content normal.           Review of Systems    Vents:  Oxygen Concentration (%): 2 (04/09/25 1926)    Lines/Drains/Airways       Drain  Duration             Female External Urinary Catheter w/ Suction 04/06/25 1300 6 days       "        Peripheral Intravenous Line  Duration                  Peripheral IV - Single Lumen 04/04/25 1910 20 G Left Antecubital 8 days                    Significant Labs:    CBC/Anemia Profile:  Recent Labs   Lab 04/13/25  0256   WBC 8.33   HGB 13.9   HCT 43.8      MCV 91   RDW 12.7        Chemistries:  Recent Labs   Lab 04/13/25  0256      K 4.2   CL 93*   CO2 39*   BUN 14   CREATININE 0.7   CALCIUM 9.5   GLUCOSE 128*       All pertinent labs within the past 24 hours have been reviewed.  None    Significant Imaging:  I have reviewed all pertinent imaging results/findings within the past 24 hours.    ABG  No results for input(s): "PH", "PO2", "PCO2", "HCO3", "BE" in the last 168 hours.  Assessment/Plan:     Pulmonary  * COPD exacerbation  See asthma/COPD section    Acute exacerbation of COPD with asthma  Persistent wheezing b/l likely triggered by viral illness. Influenza/covid/rsv negative  Severe airflow obstruction on spirometry from 2021    Plan:  Significant improvement in wheezing - change to prednisone (oral) and taper over the course the next 5-7 days  Prn anxiolytic as needed for steroids  Continue bronchodilator therapy q6h  Continue arfometerol and budesonide neb  Continue fluticasone + azelastine nasal spray  Continue montelukast  Nasal cannula to keep SpO2 > 90%  If audible wheezing persists by Monday, consider ENT evaluation of upper airway as contributing factor to loud audible wheeze    Acute respiratory failure with hypoxia  Patient with Hypoxic Respiratory failure which is Acute.  she is not on home oxygen. Supplemental oxygen was provided and noted-      See asthma copd section  improving    Renal/  Alkalosis  Likely from over diuresis  Informed primary team -> please stop diuretic for now    Other  Tobacco dependency  Active smoker 5 cigarettes per day  Plan:  See section on asthma copd exacerbation      Pulmonary team will sign off at this time. Please call with any questions.   "   Chun Grimm MD  Pulmonology  [facility] [place]

## 2025-04-13 NOTE — ASSESSMENT & PLAN NOTE
Patient with Hypoxic Respiratory failure which is Acute.  she is not on home oxygen. Supplemental oxygen was provided and noted-      See asthma copd section  improving

## 2025-04-13 NOTE — PLAN OF CARE
Problem: Adult Inpatient Plan of Care  Goal: Plan of Care Review  Flowsheets (Taken 4/13/2025 1833)  Plan of Care Reviewed With: patient     Problem: COPD (Chronic Obstructive Pulmonary Disease)  Goal: Optimal Chronic Illness Coping  Outcome: Progressing  Intervention: Support and Optimize Psychosocial Response  Flowsheets (Taken 4/13/2025 1833)  Supportive Measures: active listening utilized     Problem: Fall Injury Risk  Goal: Absence of Fall and Fall-Related Injury  Outcome: Progressing  Intervention: Identify and Manage Contributors  Flowsheets (Taken 4/13/2025 1833)  Self-Care Promotion:   independence encouraged   BADL personal objects within reach   BADL personal routines maintained  Medication Review/Management: medications reviewed  Intervention: Promote Injury-Free Environment  Flowsheets (Taken 4/13/2025 1833)  Safety Promotion/Fall Prevention:   assistive device/personal item within reach   bed alarm set   Fall Risk signage in place   Fall Risk reviewed with patient/family

## 2025-04-13 NOTE — ASSESSMENT & PLAN NOTE
Persistent wheezing b/l likely triggered by viral illness. Influenza/covid/rsv negative  Severe airflow obstruction on spirometry from 2021    Plan:  Significant improvement in wheezing - change to prednisone (oral) and taper over the course the next 5-7 days  Prn anxiolytic as needed for steroids  Continue bronchodilator therapy q6h  Continue arfometerol and budesonide neb  Continue fluticasone + azelastine nasal spray  Continue montelukast  Nasal cannula to keep SpO2 > 90%  If audible wheezing persists by Monday, consider ENT evaluation of upper airway as contributing factor to loud audible wheeze

## 2025-04-13 NOTE — PROGRESS NOTES
[facility]  Progress Note    Patient Name: Hollie Ponce  MRN: [id number]  Patient Class: IP- Inpatient   Admission Date: 4/4/2025  Length of Stay: 8 days  Attending Physician: Sylvain Miranda DO  Primary Care Provider: Ariadne Smith MD        Subjective     Principal Problem:COPD exacerbation        HPI:  This is a 69-year-old female with a past medical history of COPD (not on O2), nonobstructive CAD, hypertension, peripheral artery disease, tobacco use who presents with dyspnea.      Patient presents for evaluation of shortness of breath that started on the day of presentation.  She reports worsening exertional dyspnea, associated with a dry cough, and chest congestion.  She reports having rhinorrhea/sinus congestion.  Her daughter was sick with similar symptoms, but limited her exposure to the patient and was wearing a mask around her  Patient smokes 5 cigarettes daily.    In the ED, the patient was tachypneic (20s-40s), tachycardic (100s-110s), and mildly hypoxic (90%, requiring 3 L O2).  Labs were remarkable for hypokalemia (3.4), negative BNP (41), negative troponin (<0.006).  Chest x-ray showed no acute cardiopulmonary process.  Patient was given Solu-Medrol 125 mg IV, hydralazine 10 mg IV, DuoNeb x1, Ativan 0.5 mg p.o..  She was admitted for further management.    Overview/Hospital Course:  Mrs. Ponce was hospitalized for COPD exacerbation after presenting with dyspnea and chest tightness.  She was noted to be tachypneic, tachycardic, and hypoxic on room air.  Supplemental oxygen initiated.  She received corticosteroids and nebulizer treatments with some symptomatic improvement.  Initial troponin negative; however, repeat troponin significantly elevated.  She reports persistent and some mild chest tightness.  Cardiology consulted, appreciate recs.  Initiate treatment for NSTEMI. LHC without evidence of obstructive CAD. Continued on COPD treatment.  Without much improvement  initially, Pulmonology was consulted and steroids increased frequency and changed to IV.  Showing signs of improvement today, will transition to PO steroids and begin to taper.     Interval History: no new complaints    Review of Systems   Respiratory:  Positive for cough and shortness of breath.    All other systems reviewed and are negative.    Objective:     Vital Signs (Most Recent):  Temp: 97.8 °F (36.6 °C) (04/13/25 1119)  Pulse: 94 (04/13/25 1242)  Resp: 18 (04/13/25 1242)  BP: (!) 166/84 (04/13/25 1119)  SpO2: 95 % (04/13/25 1242) Vital Signs (24h Range):  Temp:  [97.7 °F (36.5 °C)-98.3 °F (36.8 °C)] 97.8 °F (36.6 °C)  Pulse:  [64-94] 94  Resp:  [16-20] 18  SpO2:  [92 %-99 %] 95 %  BP: (134-183)/(69-93) 166/84     Weight: 64 kg (141 lb 1.5 oz)  Body mass index is 31.63 kg/m².    Intake/Output Summary (Last 24 hours) at 4/13/2025 1501  Last data filed at 4/13/2025 1258  Gross per 24 hour   Intake 720 ml   Output 2200 ml   Net -1480 ml         Physical Exam  Vitals and nursing note reviewed.   Constitutional:       General: She is not in acute distress.     Appearance: She is well-developed.   HENT:      Head: Normocephalic and atraumatic.      Right Ear: External ear normal.      Left Ear: External ear normal.   Cardiovascular:      Rate and Rhythm: Normal rate and regular rhythm.   Pulmonary:      Effort: Pulmonary effort is normal. No respiratory distress.      Breath sounds: Wheezing present.   Skin:     General: Skin is warm and dry.   Neurological:      Mental Status: She is alert and oriented to person, place, and time.   Psychiatric:         Thought Content: Thought content normal.               Significant Labs: All pertinent labs within the past 24 hours have been reviewed.    Significant Imaging: I have reviewed all pertinent imaging results/findings within the past 24 hours.      Assessment & Plan  COPD exacerbation  starting to show some improvement, transition to oral steroids and begin to  "taper  Hold lasix for now    Pulmonology consult, appreciate recs.  Steroids changed to IV with increased frequency.  Anxiolytic to assist with tolerance of treatment.     Presented with SOB wheezing and cough. Found to have an NSTEMI as well attributed to type 2 non-obs CAD on [facility]. Goal O2 sats 88 to 92% with severe COPD by previous PFTs. Dyspneic with sitting in bed with o2 off unable to ambulate  Started on steroids, duo-nebs, and doxy  Check sputum culture   IV diuresis with lasix stopped given contraction alkalosis-transitioned to oral  Add mucinex, flonase, and hypertonic saline nebulizers for airway clearance  NSTEMI (non-ST elevated myocardial infarction)  Nonobstructive CAD on cath, continue med mgmt  Plan is for left heart catheterization 4/6/25.  Continue treatment for ACS and COPD.  Pending results    Patient presents with NSTEMI. Chest pain is currently controlled. Patient is currently on NSTEMI Pathway.    EKG reviewed. Troponins reviewed and results noted-   No results for input(s): "TROPONINI", "TROPONINIHS" in the last 168 hours.  .     Lipid panel reviewed and shows-     Lab Results   Component Value Date    LDLCALC 85.4 04/16/2024     Lab Results   Component Value Date    TRIG 32 04/05/2025         Medical management includes; Beta Blocker, Dual Anti-Platelet therapy, Anticoagulation, and High Intensity Stain Echo has been performed. Latest ECHO results are as follows- Results for orders placed during the hospital encounter of 04/04/25    Echo    Interpretation Summary    Left Ventricle: The left ventricle is normal in size. Normal wall thickness. There is normal systolic function with a visually estimated ejection fraction of 65 - 70%. Grade I diastolic dysfunction.    Right Ventricle: The right ventricle is normal in size. Systolic function is normal.    Mitral Valve: There is mild regurgitation.    Tricuspid Valve: There is mild regurgitation.    Pulmonary Artery: The estimated pulmonary artery " "systolic pressure is 55 mmHg.  .   Consult for cardiac rehab is not ordered. Patient counseled on lifestyle modifications- continue current medications. Cardiology is consulted. Plan of care reviewed with cardiology team. Continue to monitor patient closely and adjust therapy as needed.     Essential hypertension  Patient's blood pressure range in the last 24 hours was: BP  Min: 134/79  Max: 183/86.The patient's inpatient anti-hypertensive regimen is listed below:  Current Antihypertensives  carvediloL tablet 3.125 mg, 2 times daily, Oral  lisinopriL tablet 40 mg, Daily, Oral  cloNIDine tablet 0.1 mg, Every 6 hours PRN, Oral  hydrALAZINE injection 5 mg, Every 6 hours PRN, Intravenous    Plan  - BP is controlled, no changes needed to their regimen    CAD (coronary artery disease)  Patient with known CAD which is controlled Will continue ASA, Plavix, and Statin and monitor for S/Sx of angina/ACS. Continue to monitor on telemetry. Minimal non-obs CAD did not require stent placement  PAD (peripheral artery disease)  History noted. Continue home medications.     Pulmonary HTN  Results for orders placed during the hospital encounter of 11/16/20    Echo Color Flow Doppler? Yes; Bubble Contrast? No    Interpretation Summary  · The left ventricle is normal in size with normal systolic function. The estimated ejection fraction is 55%.  · There is mild left ventricular concentric hypertrophy.  · Normal left ventricular diastolic function.  · Normal right ventricular size with normal right ventricular systolic function.  · Mild left atrial enlargement.  · Normal central venous pressure (3 mmHg).  · The estimated PA systolic pressure is 49 mmHg.  · There is pulmonary hypertension.    BNP  No results for input(s): "BNP", "BNPTRIAGEBLO" in the last 168 hours.      Optimize volume status     Tobacco dependency  Tobacco cessation not discussed with patient today. Nicotine replacement has been- prescribed  Acute respiratory failure " with hypoxia  Patient with Hypoxic Respiratory failure which is Acute.  she is not on home oxygen. Supplemental oxygen was provided and noted-      .   Signs/symptoms of respiratory failure include- increased work of breathing and respiratory distress. Contributing diagnoses includes - COPD Labs and images were reviewed. Patient Has not had a recent ABG. Will treat underlying causes and adjust management of respiratory failure as follows- Wean O2 as tolerated   Carotid atherosclerosis, bilateral  Continue ASA/plavix/statin  Dyslipidemia  Continue statin  Acute exacerbation of COPD with asthma      Alkalosis      VTE Risk Mitigation (From admission, onward)           Ordered     enoxaparin injection 60 mg  Every 12 hours (non-standard times)         04/05/25 0946     Place sequential compression device  Until discontinued         04/04/25 2312     IP VTE HIGH RISK PATIENT  Once         04/04/25 2312     Place sequential compression device  Until discontinued         04/04/25 2312                    Discharge Planning   YOLA: 4/12/2025     Code Status: Full Code   Medical Readiness for Discharge Date:   Discharge Plan A: Home with family   Discharge Delays: None known at this time    Benito Blackburn Jr., APRN, AGACNP-BC  Hospitalist - Department of Hospital Medicine  [facility] [address] [place] [address]  Office #: [phone]; Pager #: [phone]

## 2025-04-13 NOTE — ASSESSMENT & PLAN NOTE
Patient's blood pressure range in the last 24 hours was: BP  Min: 134/79  Max: 183/86.The patient's inpatient anti-hypertensive regimen is listed below:  Current Antihypertensives  carvediloL tablet 3.125 mg, 2 times daily, Oral  lisinopriL tablet 40 mg, Daily, Oral  cloNIDine tablet 0.1 mg, Every 6 hours PRN, Oral  hydrALAZINE injection 5 mg, Every 6 hours PRN, Intravenous    Plan  - BP is controlled, no changes needed to their regimen

## 2025-04-13 NOTE — NURSING
Handoff report received from morning shift Nurse, patient is awake and alert, on O2 at 1L via NC, daughter at the bedside,  no distress noted, bed on lowest position, wheels locked, bed alarm set, call light in reach. Instructed to call for assistance.      [facility] [place]  Nurses Note -- 4 Eyes      4/12/2025       Skin assessed on: Q Shift      [x] No Pressure Injuries Present    [x]Prevention Measures Documented    [] Yes LDA  for Pressure Injury Previously documented     [] Yes New Pressure Injury Discovered   [] LDA for New Pressure Injury Added      Attending RN:  Melvin Negron, ZARI     Second RN:  RIKI Sabillon

## 2025-04-13 NOTE — ASSESSMENT & PLAN NOTE
starting to show some improvement, transition to oral steroids and begin to taper  Hold lasix for now    Pulmonology consult, appreciate recs.  Steroids changed to IV with increased frequency.  Anxiolytic to assist with tolerance of treatment.     Presented with SOB wheezing and cough. Found to have an NSTEMI as well attributed to type 2 non-obs CAD on [facility]. Goal O2 sats 88 to 92% with severe COPD by previous PFTs. Dyspneic with sitting in bed with o2 off unable to ambulate  Started on steroids, duo-nebs, and doxy  Check sputum culture   IV diuresis with lasix stopped given contraction alkalosis-transitioned to oral  Add mucinex, flonase, and hypertonic saline nebulizers for airway clearance

## 2025-04-13 NOTE — NURSING
[facility] [place]  Nurses Note -- 4 Eyes      4/13/2025       Skin assessed on: Q Shift      [x] No Pressure Injuries Present    [x]Prevention Measures Documented    [] Yes LDA  for Pressure Injury Previously documented     [] Yes New Pressure Injury Discovered   [] LDA for New Pressure Injury Added      Attending RN:  Ramandeep Guadalupe LPN     Second RN:  Melvin HAMEED

## 2025-04-13 NOTE — PLAN OF CARE
Problem: Adult Inpatient Plan of Care  Goal: Absence of Hospital-Acquired Illness or Injury  Outcome: Progressing  Intervention: Identify and Manage Fall Risk  Flowsheets (Taken 4/13/2025 0308)  Safety Promotion/Fall Prevention:   assistive device/personal item within reach   bed alarm set   Fall Risk reviewed with patient/family   nonskid shoes/socks when out of bed   room near unit station   side rails raised x 2     Problem: COPD (Chronic Obstructive Pulmonary Disease)  Goal: Effective Oxygenation and Ventilation  Outcome: Progressing  Intervention: Promote Airway Secretion Clearance  Flowsheets (Taken 4/13/2025 0308)  Breathing Techniques/Airway Clearance: deep/controlled cough encouraged  Cough And Deep Breathing: done independently per patient  Activity Management: Rolling - L1     Patient is AAOx4. On O2 at 1L via NC. No falls or injuries reported during shift, safety precautions maintained.

## 2025-04-14 ENCOUNTER — PATIENT MESSAGE (OUTPATIENT)
Dept: OTOLARYNGOLOGY | Facility: CLINIC | Age: 70
End: 2025-04-14
Payer: MEDICARE

## 2025-04-14 LAB
ABSOLUTE EOSINOPHIL (OHS): 0 K/UL
ABSOLUTE MONOCYTE (OHS): 0.77 K/UL (ref 0.3–1)
ABSOLUTE NEUTROPHIL COUNT (OHS): 6.83 K/UL (ref 1.8–7.7)
ANION GAP (OHS): 5 MMOL/L (ref 8–16)
BASOPHILS # BLD AUTO: 0.01 K/UL
BASOPHILS NFR BLD AUTO: 0.1 %
BUN SERPL-MCNC: 16 MG/DL (ref 8–23)
CALCIUM SERPL-MCNC: 8.5 MG/DL (ref 8.7–10.5)
CHLORIDE SERPL-SCNC: 94 MMOL/L (ref 95–110)
CO2 SERPL-SCNC: 36 MMOL/L (ref 23–29)
CREAT SERPL-MCNC: 0.7 MG/DL (ref 0.5–1.4)
ERYTHROCYTE [DISTWIDTH] IN BLOOD BY AUTOMATED COUNT: 12.9 % (ref 11.5–14.5)
GFR SERPLBLD CREATININE-BSD FMLA CKD-EPI: >60 ML/MIN/1.73/M2
GLUCOSE SERPL-MCNC: 112 MG/DL (ref 70–110)
HCT VFR BLD AUTO: 42.2 % (ref 37–48.5)
HGB BLD-MCNC: 13.3 GM/DL (ref 12–16)
IMM GRANULOCYTES # BLD AUTO: 0.11 K/UL (ref 0–0.04)
IMM GRANULOCYTES NFR BLD AUTO: 1.1 % (ref 0–0.5)
LYMPHOCYTES # BLD AUTO: 2.05 K/UL (ref 1–4.8)
MCH RBC QN AUTO: 29.1 PG (ref 27–31)
MCHC RBC AUTO-ENTMCNC: 31.5 G/DL (ref 32–36)
MCV RBC AUTO: 92 FL (ref 82–98)
NUCLEATED RBC (/100WBC) (OHS): 0 /100 WBC
PLATELET # BLD AUTO: 244 K/UL (ref 150–450)
PMV BLD AUTO: 10.6 FL (ref 9.2–12.9)
POTASSIUM SERPL-SCNC: 4.2 MMOL/L (ref 3.5–5.1)
RBC # BLD AUTO: 4.57 M/UL (ref 4–5.4)
RELATIVE EOSINOPHIL (OHS): 0 %
RELATIVE LYMPHOCYTE (OHS): 21 % (ref 18–48)
RELATIVE MONOCYTE (OHS): 7.9 % (ref 4–15)
RELATIVE NEUTROPHIL (OHS): 69.9 % (ref 38–73)
SODIUM SERPL-SCNC: 135 MMOL/L (ref 136–145)
WBC # BLD AUTO: 9.77 K/UL (ref 3.9–12.7)

## 2025-04-14 PROCEDURE — 25000003 PHARM REV CODE 250: Performed by: OTOLARYNGOLOGY

## 2025-04-14 PROCEDURE — 94760 N-INVAS EAR/PLS OXIMETRY 1: CPT

## 2025-04-14 PROCEDURE — 94761 N-INVAS EAR/PLS OXIMETRY MLT: CPT

## 2025-04-14 PROCEDURE — 25000003 PHARM REV CODE 250: Performed by: HOSPITALIST

## 2025-04-14 PROCEDURE — 25000003 PHARM REV CODE 250: Performed by: INTERNAL MEDICINE

## 2025-04-14 PROCEDURE — 31575 DIAGNOSTIC LARYNGOSCOPY: CPT | Mod: ,,, | Performed by: OTOLARYNGOLOGY

## 2025-04-14 PROCEDURE — 11000001 HC ACUTE MED/SURG PRIVATE ROOM

## 2025-04-14 PROCEDURE — 80048 BASIC METABOLIC PNL TOTAL CA: CPT | Performed by: HOSPITALIST

## 2025-04-14 PROCEDURE — 85025 COMPLETE CBC W/AUTO DIFF WBC: CPT | Performed by: HOSPITALIST

## 2025-04-14 PROCEDURE — 63600175 PHARM REV CODE 636 W HCPCS: Performed by: HOSPITALIST

## 2025-04-14 PROCEDURE — 94640 AIRWAY INHALATION TREATMENT: CPT

## 2025-04-14 PROCEDURE — 94664 DEMO&/EVAL PT USE INHALER: CPT

## 2025-04-14 PROCEDURE — 25000003 PHARM REV CODE 250: Performed by: PHYSICIAN ASSISTANT

## 2025-04-14 PROCEDURE — 99900035 HC TECH TIME PER 15 MIN (STAT)

## 2025-04-14 PROCEDURE — 97110 THERAPEUTIC EXERCISES: CPT

## 2025-04-14 PROCEDURE — 0CJS8ZZ INSPECTION OF LARYNX, VIA NATURAL OR ARTIFICIAL OPENING ENDOSCOPIC: ICD-10-PCS | Performed by: OTOLARYNGOLOGY

## 2025-04-14 PROCEDURE — 25000242 PHARM REV CODE 250 ALT 637 W/ HCPCS: Performed by: INTERNAL MEDICINE

## 2025-04-14 PROCEDURE — 36415 COLL VENOUS BLD VENIPUNCTURE: CPT | Performed by: HOSPITALIST

## 2025-04-14 PROCEDURE — 27000221 HC OXYGEN, UP TO 24 HOURS

## 2025-04-14 PROCEDURE — 99223 1ST HOSP IP/OBS HIGH 75: CPT | Mod: 25,,, | Performed by: OTOLARYNGOLOGY

## 2025-04-14 PROCEDURE — 97161 PT EVAL LOW COMPLEX 20 MIN: CPT

## 2025-04-14 RX ORDER — MUPIROCIN 20 MG/G
OINTMENT TOPICAL 2 TIMES DAILY
Status: DISCONTINUED | OUTPATIENT
Start: 2025-04-14 | End: 2025-04-16 | Stop reason: HOSPADM

## 2025-04-14 RX ORDER — NYSTATIN 100000 [USP'U]/ML
500000 SUSPENSION ORAL 4 TIMES DAILY
Status: DISCONTINUED | OUTPATIENT
Start: 2025-04-14 | End: 2025-04-16 | Stop reason: HOSPADM

## 2025-04-14 RX ORDER — PREDNISONE 20 MG/1
40 TABLET ORAL 2 TIMES DAILY WITH MEALS
Status: COMPLETED | OUTPATIENT
Start: 2025-04-14 | End: 2025-04-15

## 2025-04-14 RX ADMIN — GUAIFENESIN 600 MG: 600 TABLET, EXTENDED RELEASE ORAL at 09:04

## 2025-04-14 RX ADMIN — BENZONATATE 100 MG: 100 CAPSULE ORAL at 08:04

## 2025-04-14 RX ADMIN — AZELASTINE HYDROCHLORIDE 137 MCG: 137 SPRAY, METERED NASAL at 08:04

## 2025-04-14 RX ADMIN — GUAIFENESIN 600 MG: 600 TABLET, EXTENDED RELEASE ORAL at 08:04

## 2025-04-14 RX ADMIN — HYDROXYZINE PAMOATE 25 MG: 25 CAPSULE ORAL at 10:04

## 2025-04-14 RX ADMIN — LISINOPRIL 40 MG: 20 TABLET ORAL at 09:04

## 2025-04-14 RX ADMIN — ARFORMOTEROL TARTRATE 15 MCG: 15 SOLUTION RESPIRATORY (INHALATION) at 07:04

## 2025-04-14 RX ADMIN — MONTELUKAST 10 MG: 10 TABLET, FILM COATED ORAL at 08:04

## 2025-04-14 RX ADMIN — CLONIDINE HYDROCHLORIDE 0.1 MG: 0.1 TABLET ORAL at 05:04

## 2025-04-14 RX ADMIN — BUDESONIDE 0.5 MG: 0.5 INHALANT RESPIRATORY (INHALATION) at 07:04

## 2025-04-14 RX ADMIN — CARVEDILOL 3.12 MG: 3.12 TABLET, FILM COATED ORAL at 09:04

## 2025-04-14 RX ADMIN — PREDNISONE 40 MG: 20 TABLET ORAL at 09:04

## 2025-04-14 RX ADMIN — AZELASTINE HYDROCHLORIDE 137 MCG: 137 SPRAY, METERED NASAL at 09:04

## 2025-04-14 RX ADMIN — MUPIROCIN: 20 OINTMENT TOPICAL at 08:04

## 2025-04-14 RX ADMIN — CARVEDILOL 3.12 MG: 3.12 TABLET, FILM COATED ORAL at 08:04

## 2025-04-14 RX ADMIN — ASPIRIN 81 MG: 81 TABLET, COATED ORAL at 09:04

## 2025-04-14 RX ADMIN — IPRATROPIUM BROMIDE AND ALBUTEROL SULFATE 3 ML: 2.5; .5 SOLUTION RESPIRATORY (INHALATION) at 07:04

## 2025-04-14 RX ADMIN — NYSTATIN 500000 UNITS: 100000 SUSPENSION ORAL at 08:04

## 2025-04-14 RX ADMIN — NYSTATIN 500000 UNITS: 100000 SUSPENSION ORAL at 02:04

## 2025-04-14 RX ADMIN — HYDROXYZINE HYDROCHLORIDE 25 MG: 25 TABLET ORAL at 09:04

## 2025-04-14 RX ADMIN — DIAZEPAM 2 MG: 2 TABLET ORAL at 04:04

## 2025-04-14 RX ADMIN — IPRATROPIUM BROMIDE AND ALBUTEROL SULFATE 3 ML: 2.5; .5 SOLUTION RESPIRATORY (INHALATION) at 01:04

## 2025-04-14 RX ADMIN — NYSTATIN 500000 UNITS: 100000 SUSPENSION ORAL at 04:04

## 2025-04-14 RX ADMIN — PREDNISONE 40 MG: 20 TABLET ORAL at 04:04

## 2025-04-14 RX ADMIN — IPRATROPIUM BROMIDE AND ALBUTEROL SULFATE 3 ML: 2.5; .5 SOLUTION RESPIRATORY (INHALATION) at 12:04

## 2025-04-14 RX ADMIN — FLUTICASONE PROPIONATE 100 MCG: 50 SPRAY, METERED NASAL at 09:04

## 2025-04-14 NOTE — PLAN OF CARE
Changes in medical condition or discharge plan: No    Does patient need new DME? TBD    Follow up appts needed: PCP and Pulmonology     Medically stable: No    Estimated Discharge Date: TBD    Per attending, patient not medically stable.  Per chart review, patient transitioned to PO steroids on 4/13 and will taper over 5-7 days.  At baseline, patient is independent with ADLs and does not use any DME.  CM to continue to assess for and until discharge.    04/14/25 1600   Discharge Reassessment   Assessment Type Discharge Planning Reassessment   Did the patient's condition or plan change since previous assessment? No   Communicated YOLA with patient/caregiver Date not available/Unable to determine   Discharge Plan A Home with family   Discharge Plan B Home Health   DME Needed Upon Discharge  other (see comments)  (TBD)   Transition of Care Barriers None   Why the patient remains in the hospital Requires continued medical care   Post-Acute Status   Coverage [employer] MANAGED MEDICARE - [facility]   Hospital Resources/Appts/Education Provided Provided patient/caregiver with written discharge plan information   Discharge Delays None known at this time

## 2025-04-14 NOTE — CARE UPDATE
04/13/25 1924   Patient Assessment/Suction   Level of Consciousness (AVPU) alert   Respiratory Effort Normal;Unlabored   Expansion/Accessory Muscles/Retractions no use of accessory muscles;no retractions   All Lung Fields Breath Sounds Anterior:;wheezes, expiratory;diminished   Rhythm/Pattern, Respiratory unlabored;depth regular;pattern regular;no shortness of breath reported   Cough Frequency infrequent   Cough Type no productive sputum   PRE-TX-O2   Device (Oxygen Therapy) nasal cannula with humidification   $ Is the patient on Low Flow Oxygen? Yes   Oxygen Concentration (%) 2   SpO2 96 %   Pulse Oximetry Type Intermittent   $ Pulse Oximetry - Multiple Charge Pulse Oximetry - Multiple   Pulse 87   Resp 20   Aerosol Therapy   $ Aerosol Therapy Charges Aerosol Treatment   Daily Review of Necessity (SVN) completed   Respiratory Treatment Status (SVN) given   Treatment Route (SVN) mask;oxygen   Patient Position HOB elevated   Post Treatment Assessment (SVN) breath sounds unchanged   Signs of Intolerance (SVN) none   Breath Sounds Post-Respiratory Treatment   Throughout All Fields Post-Treatment All Fields   Throughout All Fields Post-Treatment no change   Post-treatment Heart Rate (beats/min) 87   Post-treatment Resp Rate (breaths/min) 18   Ready to Wean/Extubation Screen   FIO2<=50 (chart decimal) 0.02

## 2025-04-14 NOTE — PLAN OF CARE
Problem: Adult Inpatient Plan of Care  Goal: Plan of Care Review  Outcome: Progressing     Problem: COPD (Chronic Obstructive Pulmonary Disease)  Goal: Optimal Chronic Illness Coping  Outcome: Progressing     Problem: Infection  Goal: Absence of Infection Signs and Symptoms  Outcome: Progressing     Problem: Wound  Goal: Optimal Coping  Outcome: Progressing     Problem: Fall Injury Risk  Goal: Absence of Fall and Fall-Related Injury  Outcome: Progressing     Problem: Skin Injury Risk Increased  Goal: Skin Health and Integrity  Outcome: Progressing

## 2025-04-14 NOTE — PLAN OF CARE
Problem: Adult Inpatient Plan of Care  Goal: Absence of Hospital-Acquired Illness or Injury  Outcome: Progressing  Intervention: Identify and Manage Fall Risk  Flowsheets (Taken 4/14/2025 0352)  Safety Promotion/Fall Prevention:   assistive device/personal item within reach   bed alarm set   Fall Risk reviewed with patient/family   medications reviewed   room near unit station   side rails raised x 2     Problem: COPD (Chronic Obstructive Pulmonary Disease)  Goal: Effective Oxygenation and Ventilation  Outcome: Progressing  Intervention: Promote Airway Secretion Clearance  Flowsheets (Taken 4/14/2025 0352)  Breathing Techniques/Airway Clearance: deep/controlled cough encouraged  Cough And Deep Breathing: done independently per patient     Patient is AAOx4. On O2 at 1L via NC. No falls or injuries reported during shift, safety precautions maintained.

## 2025-04-14 NOTE — SUBJECTIVE & OBJECTIVE
Interval History: no new complaints    Review of Systems   HENT:  Positive for congestion.    Respiratory:  Positive for shortness of breath.    All other systems reviewed and are negative.    Objective:     Vital Signs (Most Recent):  Temp: 98.1 °F (36.7 °C) (04/14/25 0735)  Pulse: 72 (04/14/25 0735)  Resp: 18 (04/14/25 0735)  BP: (!) 162/96 (04/14/25 0735)  SpO2: 99 % (04/14/25 0735) Vital Signs (24h Range):  Temp:  [97.8 °F (36.6 °C)-98.7 °F (37.1 °C)] 98.1 °F (36.7 °C)  Pulse:  [67-94] 72  Resp:  [18-20] 18  SpO2:  [93 %-99 %] 99 %  BP: (129-187)/(60-96) 162/96     Weight: 65 kg (143 lb 4.8 oz)  Body mass index is 32.13 kg/m².    Intake/Output Summary (Last 24 hours) at 4/14/2025 1113  Last data filed at 4/14/2025 0936  Gross per 24 hour   Intake 720 ml   Output 3400 ml   Net -2680 ml         Physical Exam  Vitals and nursing note reviewed.   Constitutional:       General: She is not in acute distress.     Appearance: She is well-developed.   HENT:      Head: Normocephalic and atraumatic.      Right Ear: External ear normal.      Left Ear: External ear normal.   Cardiovascular:      Rate and Rhythm: Normal rate and regular rhythm.   Pulmonary:      Effort: Pulmonary effort is normal. No respiratory distress.      Breath sounds: Wheezing present.   Skin:     General: Skin is warm and dry.   Neurological:      Mental Status: She is alert and oriented to person, place, and time.   Psychiatric:         Thought Content: Thought content normal.               Significant Labs: All pertinent labs within the past 24 hours have been reviewed.    Significant Imaging: I have reviewed all pertinent imaging results/findings within the past 24 hours.

## 2025-04-14 NOTE — NURSING
Report received from night nurse ZARI Charles. Visualized patient and assessed patient's overall condition and appearance. No acute distress noted. Plan of care ongoing.    [facility]  Nurses Note -- 4 Eyes      4/14/2025       Skin assessed on: Q Shift      [x] No Pressure Injuries Present    [x]Prevention Measures Documented    [] Yes LDA  for Pressure Injury Previously documented     [] Yes New Pressure Injury Discovered   [] LDA for New Pressure Injury Added      Attending RN:  Bonita Naqvi LPN     Second RN:  Melvin Negron RN

## 2025-04-14 NOTE — ASSESSMENT & PLAN NOTE
Continue nebs, supplemental oxygen, and oral steroids and begin to taper  Hold lasix for now  Wean oxygen as tolerated  ENT consult appreciate recs    Initially presented with SOB wheezing and cough. Found to have an NSTEMI as well attributed to type 2 non-obs CAD on [facility]. Goal O2 sats 88 to 92% with severe COPD by previous PFTs. Dyspneic with sitting in bed with o2 off unable to ambulate  Started on steroids, duo-nebs, and doxy  Check sputum culture   IV diuresis with lasix stopped given contraction alkalosis-transitioned to oral  Add mucinex, flonase, and hypertonic saline nebulizers for airway clearance

## 2025-04-14 NOTE — CONSULTS
[facility] - [place], [place] 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 Recommend nystatin swish and gargle qid for 10-14 days    No acute bacterial sinus infection on exam but does have some mild edema of sinuses and turbinate hypertrophy as well as some nasal crusting.     Administer saline each time prior to medication nasal sprays . Do flonase and astelin one after the other at the same time to have additive effect ( does not matter which is first)    Mupirocin bid for 14 days for nasal crusting    Ct neck without contrast to evaluated for tracheal stenosis- suspect less likely since has been improving but would like to ensure no pathology below the area that I can see on flex scope as findings on vocal folds would produce dysphonia but not stridor. Rarely people with bad copd exacerbation can get stridor but atypical    Subjective:     Chief Complaint/Reason for Admission: shortness of breath    Reason for consult: stridor, dysphonia, sinus complaints    History of Present Illness: 69 year old female admitted for above. Denies having similar episodes in the past. no prior intubations other than for surgery  Stridor has improved per patient - states that when came to hospital you could hear her in the hallway  No throat pain. She is a smoker. She has been getting nebulizers including budesonide while in hospital.   She also feels congested and like something is stuck in the nose that she can't get out  No throat pain  Does have some dysphagia to solids-intermittent and  feels like stuck in chest/epigastric area    She has hoarse voice as well. Initially stated it had been since she was in the hospital and maybe a little before but she is not sure if it was also before she came into the hospital  Right ear feels full     Medications:  Continuous Infusions:  Scheduled Meds:   albuterol-ipratropium  3 mL Nebulization Q6H    arformoteroL  15 mcg Nebulization BID    aspirin  81 mg Oral Daily    azelastine  1 spray Nasal BID    budesonide  0.5 mg Nebulization Q12H    carvediloL  3.125 mg Oral BID    diazePAM  2 mg Oral Daily with dinner    fluticasone propionate  2 spray Each Nostril Daily    guaiFENesin  600 mg Oral BID    hydrOXYzine HCL  25 mg Oral Daily    lisinopriL  40 mg Oral Daily    montelukast  10 mg Oral QHS    polyethylene glycol  17 g Oral BID    predniSONE  40 mg Oral BID WM     PRN Meds:  Current Facility-Administered Medications:     acetaminophen, 650 mg, Oral, Q8H PRN    acetaminophen, 650 mg, Oral, Q4H PRN    albuterol sulfate, 2.5 mg, Nebulization, Q4H PRN    aluminum-magnesium hydroxide-simethicone, 30 mL, Oral, QID PRN    atropine, 0.5 mg, Intravenous, PRN    benzonatate, 100 mg, Oral, TID PRN    bisacodyL, 10 mg, Rectal, Daily PRN    cloNIDine, 0.1 mg, Oral, Q6H PRN    glucagon (human recombinant), 1 mg, Intramuscular, PRN    glucose, 16 g, Oral, PRN    glucose, 24 g, Oral, PRN    hydrALAZINE, 5 mg, Intravenous, Q6H PRN    HYDROcodone-acetaminophen, 1 tablet, Oral, Q4H PRN    hydrOXYzine pamoate, 25 mg, Oral, Q8H PRN    magnesium oxide, 800 mg, Oral, PRN    magnesium oxide, 800 mg, Oral, PRN    melatonin, 6 mg, Oral, Nightly PRN    naloxone, 0.02 mg, Intravenous, PRN    nicotine, 1 patch, Transdermal, Daily PRN    ondansetron, 4 mg, Intravenous, Q8H PRN    potassium bicarbonate, 35 mEq, Oral, PRN    potassium bicarbonate, 50 mEq, Oral, PRN    potassium bicarbonate, 60 mEq, Oral, PRN    potassium, sodium phosphates, 2 packet, Oral, PRN    potassium, sodium  phosphates, 2 packet, Oral, PRN    potassium, sodium phosphates, 2 packet, Oral, PRN    senna-docusate, 1 tablet, Oral, Daily PRN    simethicone, 1 tablet, Oral, QID PRN    sodium chloride 0.9%, 250 mL, Intravenous, PRN    sodium chloride 0.9%, 10 mL, Intravenous, Q12H PRN    sodium chloride 0.9%, 10 mL, Intravenous, PRN    sodium chloride 0.9%, 3 mL, Intravenous, PRN     No current facility-administered medications on file prior to encounter.     Current Outpatient Medications on File Prior to Encounter   Medication Sig    albuterol (PROVENTIL/VENTOLIN HFA) 90 mcg/actuation inhaler Inhale 2 puffs into the lungs every 6 (six) hours as needed for Wheezing or Shortness of Breath. Rescue    albuterol-ipratropium (DUO-NEB) 2.5 mg-0.5 mg/3 mL nebulizer solution Take 3 mLs by nebulization every 4 (four) hours as needed for Wheezing.    albuterol sulfate 2.5 mg/0.5 mL Nebu Inhale into the lungs.    aspirin (ECOTRIN) 81 MG EC tablet Take 81 mg by mouth.    carvediloL (COREG) 3.125 MG tablet Take 1 tablet (3.125 mg total) by mouth 2 (two) times daily. TAKE 1 TABLET(3.125 MG) BY MOUTH TWICE DAILY WITH MEALS    clopidogreL (PLAVIX) 75 mg tablet Take 1 tablet (75 mg total) by mouth once daily.    fluticasone propionate (FLONASE) 50 mcg/actuation nasal spray SHAKE LIQUID AND USE 2 SPRAYS(100 MCG) IN EACH NOSTRIL EVERY DAY    fluticasone-salmeterol 230-21 mcg/dose (ADVAIR HFA) 230-21 mcg/actuation HFAA inhaler INHALE 2 PUFFS INTO THE LUNGS TWICE DAILY    fluticasone-salmeterol diskus inhaler 100-50 mcg Inhale 1 puff into the lungs every 12 (twelve) hours.    hydroCHLOROthiazide (HYDRODIURIL) 25 MG tablet Take 1 tablet (25 mg total) by mouth once daily.    lisinopriL (PRINIVIL,ZESTRIL) 40 MG tablet Take 1 tablet (40 mg total) by mouth once daily.    loratadine (CLARITIN) 10 mg tablet TAKE 1 TABLET BY MOUTH DAILY AS NEEDED FOR ALLERGIES    miscellaneous medical supply (470V TWO WAY VALVE) Misc Disp nebulizer and tubing ,medicine  reservoir,and mask  So as to use  Every 4-6 hrs for sob/wheeze    montelukast (SINGULAIR) 10 mg tablet Take 1 tablet (10 mg total) by mouth every evening.    nicotine polacrilex 2 MG Lozg Take 1 lozenge (2 mg total) by mouth as needed. Use 1-2 per hour in place of a cigarette. Limit to 6 a day.    rosuvastatin (CRESTOR) 20 MG tablet Take 1 tablet (20 mg total) by mouth every evening.    tiotropium (SPIRIVA WITH HANDIHALER) 18 mcg inhalation capsule Inhale 1 capsule (18 mcg total) into the lungs Daily. INHALE THE CONTENTS OF 1 CAPSULE(18 MCG) INTO THE LUNGS EVERY DAY       Review of patient's allergies indicates:   Allergen Reactions    Flagyl [metronidazole hcl]      Hives      Sulfamethoxazole-trimethoprim Itching    Aspirin Nausea Only    Lipitor [atorvastatin]      Myalgia        Past Medical History:   Diagnosis Date    Asthma     COPD (chronic obstructive pulmonary disease)     Hypertension      Past Surgical History:   Procedure Laterality Date    ANGIOGRAM, CORONARY, WITH LEFT HEART CATHETERIZATION  2025    Procedure: Angiogram, Coronary, with Left Heart Cath;  Surgeon: Jhonny Schofield MD;  Location: [facility] CATH LAB;  Service: Cardiology;;    CARDIAC CATHETERIZATION N/A 2025    Procedure: Cardiac catheterization;  Surgeon: Jhonny Schofield MD;  Location: [facility] CATH LAB;  Service: Cardiology;  Laterality: N/A;     SECTION      HERNIA REPAIR      LEFT HEART CATHETERIZATION Left 2020    Procedure: Left heart cath;  Surgeon: Shabnam Vernon MD;  Location: [facility] CATH LAB;  Service: Cardiology;  Laterality: Left;     Family History       Problem Relation (Age of Onset)    Cancer Mother    Liver disease Father          Tobacco Use    Smoking status: Every Day     Current packs/day: 0.25     Average packs/day: 0.5 packs/day for 51.0 years (25.3 ttl pk-yrs)     Types: Cigarettes     Start date: 1970     Last attempt to quit: 2020    Smokeless tobacco: Never   Substance and Sexual  Activity    Alcohol use: Never    Drug use: Never    Sexual activity: Not Currently     Review of Systems   Constitutional:  Negative for fever.   HENT:  Positive for congestion, trouble swallowing and voice change. Negative for sore throat.    Respiratory:  Positive for shortness of breath and stridor.    Musculoskeletal:  Negative for neck pain.   Neurological:  Negative for speech difficulty.   Psychiatric/Behavioral:  Negative for agitation.      Objective:     Vital Signs (Most Recent):  Temp: 98 °F (36.7 °C) (04/14/25 1123)  Pulse: 76 (04/14/25 1123)  Resp: 18 (04/14/25 1123)  BP: (!) 157/83 (04/14/25 1123)  SpO2: 96 % (04/14/25 1123) Vital Signs (24h Range):  Temp:  [97.8 °F (36.6 °C)-98.7 °F (37.1 °C)] 98 °F (36.7 °C)  Pulse:  [67-94] 76  Resp:  [18-20] 18  SpO2:  [93 %-99 %] 96 %  BP: (129-187)/(60-96) 157/83     Weight: 65 kg (143 lb 4.8 oz)  Body mass index is 32.13 kg/m².    Date 04/14/25 0700 - 04/15/25 0659   Shift 9923-5211 1307-3632 2661-0588 24 Hour Total   INTAKE   P.O. 120   120   Shift Total(mL/kg) 120(1.8)   120(1.8)   OUTPUT   Urine(mL/kg/hr) 500   500   Shift Total(mL/kg) 500(7.7)   500(7.7)   Weight (kg) 65 65 65 65       Physical Exam  HENT:      Head: Normocephalic.      Jaw: No trismus.      Right Ear: External ear normal.      Left Ear: External ear normal.      Mouth/Throat:      Mouth: Mucous membranes are moist. No angioedema.      Pharynx: Uvula midline. No pharyngeal swelling or oropharyngeal exudate.   Eyes:      General: No scleral icterus.  Neck:      Thyroid: No thyroid mass.      Trachea: No tracheal tenderness or tracheal deviation.   Pulmonary:      Effort: Pulmonary effort is normal.      Breath sounds: Stridor (very soft and intermittent with inspiration) present.      Comments: No supraclavicular retractions    Voice raspy  Musculoskeletal:      Cervical back: No edema, erythema or crepitus.   Lymphadenopathy:      Cervical: No cervical adenopathy.   Neurological:       Mental Status: She is alert.   Psychiatric:         Mood and Affect: Mood normal.       PROCEDURE NOTE  NAME OF PROCEDURE: Flexible Laryngoscopy, diagnostic  INDICATIONS: gag reflex precludes mirror exam, dysphonia   FINDINGS: white plaques bilateral vocal folds left more than right; normal motion; no subglottic stenosis    Consent: After procedure was explained in detail and all questions answered, verbal consent was obtained for performing flexible laryngoscopy.  Anesthesia: topical 4% lidocaine and neosynephrine  Procedure: With patient in seated position, the scope was inserted into the bilateral nasal passageway and advanced atraumatically into the nasopharynx to examine the following structures:  Nasal cavity: Turbinates with  hypertrophy. moderate middle meatal edema. No purulent drainage nor polyps. Mild crusting head of inferior turbinates  Nasopharynx: no mass or lesion noted in nasopharynx.   Oropharynx: base of tongue without  mass or ulceration. Lingual tonsils normal in appearance  Hypopharynx: posterior pharyngeal wall without mass or lesion. No pooling of secretions. Pyriform sinuses visible without mass or lesion  Larynx: epiglottis normal without lesion. False vocal folds without edema/erythema/lesion. True vocal folds mobile and with white plaques bilateral vocal folds left more than right.no mass Mild interarytenoid edema no erythema . Postcricoid region without edema no lesion   Subglottis: visualized portion of subglottis normal in appearance    After examination performed, the scope was removed atraumatically . The patient tolerated the procedure well.     Significant Labs:  CBC:   Recent Labs   Lab 04/14/25 0418   WBC 9.77   RBC 4.57   HGB 13.3   HCT 42.2      MCV 92   MCH 29.1   MCHC 31.5*     CMP:   Recent Labs   Lab 04/14/25 0418   CALCIUM 8.5*   *   K 4.2   CO2 36*   CL 94*   BUN 16   CREATININE 0.7       Significant Diagnostics:  None    Assessment/Plan:     Active  Diagnoses:    Diagnosis Date Noted POA    PRINCIPAL PROBLEM:  COPD exacerbation [J44.1] 04/04/2025 Yes    Alkalosis [E87.3] 04/13/2025 Yes    Acute exacerbation of COPD with asthma [J44.1] 04/08/2025 Yes    NSTEMI (non-ST elevated myocardial infarction) [I21.4] 04/05/2025 Yes    Carotid atherosclerosis, bilateral [I65.23] 04/05/2025 Yes    Dyslipidemia [E78.5] 04/05/2025 Yes    Tobacco dependency [F17.200] 04/04/2025 Yes    Acute respiratory failure with hypoxia [J96.01] 04/04/2025 Yes    Pulmonary HTN [I27.20] 12/14/2021 Yes    PAD (peripheral artery disease) [I73.9] 03/25/2021 Yes    CAD (coronary artery disease) [I25.10] 11/16/2020 Yes    Essential hypertension [I10] 02/04/2020 Yes      Problems Resolved During this Admission:     VTE Risk Mitigation (From admission, onward)           Ordered     enoxaparin injection 60 mg  Every 12 hours (non-standard times)         04/05/25 0946     Place sequential compression device  Until discontinued         04/04/25 2312     IP VTE HIGH RISK PATIENT  Once         04/04/25 2312     Place sequential compression device  Until discontinued         04/04/25 2312                    Thank you for your consult.     Janiya Joya MD  Otorhinolaryngology-Head & Neck Surgery  [facility]

## 2025-04-14 NOTE — ASSESSMENT & PLAN NOTE
Patient's blood pressure range in the last 24 hours was: BP  Min: 129/60  Max: 187/90.The patient's inpatient anti-hypertensive regimen is listed below:  Current Antihypertensives  carvediloL tablet 3.125 mg, 2 times daily, Oral  lisinopriL tablet 40 mg, Daily, Oral  cloNIDine tablet 0.1 mg, Every 6 hours PRN, Oral  hydrALAZINE injection 5 mg, Every 6 hours PRN, Intravenous    Plan  - BP is controlled, no changes needed to their regimen

## 2025-04-14 NOTE — PLAN OF CARE
Problem: Physical Therapy  Goal: Physical Therapy Goal  Description: Goals to be met by: 25     Patient will increase functional independence with mobility by performin. Supine to sit with Modified [place]  2. Rolling to Left and Right with Modified [place]  3. Sit to stand transfer with Modified [place]  4. Bed to chair transfer with Modified [place]  5. Gait x250 feet with Modified [place] using Rolling Walker   6. Ascend/descend ~6 steps with one Handrail Modified [place] using No Assistive Device  7. Upper/Lower extremity exercise program 2 sets x15 reps per handout, with [place]    Outcome: Progressing     Pt ambulatory within room and on 2L O2 NC, CGA for transfers and ambulation.

## 2025-04-14 NOTE — ASSESSMENT & PLAN NOTE
History noted. Continue home medications.      [de-identified] : obesity  [FreeTextEntry1] : 14 yr old female with hx of complicated ovarian cyst removal and resolved LIO presents to establish care. At this time PE remarkable for acanthosis nigricans of neck as well as wart on right wrist, scarring from IV infiltrate on right wrist, and wart on forehead. Vitals stable, normotensive, BMI 99%. Pt concerned over poor balance since post op and requests pt/ot referrals.  - complex gyn referral - pt referral - ot referral  - Endo referral for PCOS and obesity  -Derm referral  -Blood work: CBC, Lipid, A1C, CMP   -HPV Vaccine # 1 given - Post vaccine care discussed & potential side effects reviewed - Tylenol every 4 hours prn for fever or pain and or Motrin every 6 hours prn for fever or pain -Optho referral for failed vision screen - RTC for 16 yo WCC and prn   Caretaker expressed understanding of the plan and agrees. All questions were answered.

## 2025-04-14 NOTE — NURSING
Handoff report received from morning shift Nurse, with O2 at 1L via NC, patient is awake and alert, no distress noted, bed on lowest position, wheels locked, bed alarm set, call light in reach. Instructed to call for assistance.      [facility]  Nurses Note -- 4 Eyes      4/13/2025       Skin assessed on: Q Shift      [x] No Pressure Injuries Present    [x]Prevention Measures Documented    [] Yes LDA  for Pressure Injury Previously documented     [] Yes New Pressure Injury Discovered   [] LDA for New Pressure Injury Added      Attending RN:  Melvin Negron RN     Second RN:  RIKI Sabillon

## 2025-04-14 NOTE — PROGRESS NOTES
[facility] - [place] - Telemetry  Adult Nutrition  Progress Note    SUMMARY       Recommendations    1. Continue on heart healthy oral diet    2. Monitor labs and weights    Goals: Patient to consume >75% of EEN prior to RD follow up  Nutrition Goal Status: progressing towards goal  Communication of RD Recs: other (comment) (LOS)    Nutrition Discharge Planning    Nutrition Discharge Planning: Therapeutic diet (comments)  Therapeutic diet (comments): Heart healthy    Assessment and Plan    Nutrition Problem  Increased energy expenditure    Related to (etiology):   Physiological causes increasing nutrient needs    Signs and Symptoms (as evidenced by):   Condition associated with diagnosis: COPD    Interventions (treatment strategy):  Decreased sodium and fat modified diet  2.  Collaboration by nutrition professional with other providers     Nutrition Diagnosis Status:   New         Malnutrition Assessment           Patient does not meet positive criteria of NFPE at this time.  RD to continue to monitor for nutrition risks at follow up visits.                                   Reason for Assessment    Reason For Assessment: length of stay    Diagnosis: pulmonary disease  Patient Active Problem List   Diagnosis    Essential hypertension    Centrilobular emphysema    CAD (coronary artery disease)    Smoking greater than 25 pack years    PAD (peripheral artery disease)    Pulmonary HTN    Prediabetes    Ventral hernia without obstruction or gangrene    Osteopenia    Tobacco dependency    COPD exacerbation    Acute respiratory failure with hypoxia    NSTEMI (non-ST elevated myocardial infarction)    Carotid atherosclerosis, bilateral    Dyslipidemia    Acute exacerbation of COPD with asthma    Alkalosis     Past Medical History:   Diagnosis Date    Asthma     COPD (chronic obstructive pulmonary disease)     Hypertension        General Information Comments: Patient is on a heart healthy diet with fair to good po intake noted between  "%.  Labs reviewed.  NKFA.  LBM: 4/13/25.  RD to continue to monitor po intake and tolerance.  No significant weight loss found at this time.    Nutrition/Diet History    Spiritual, Cultural Beliefs, Christian Practices, Values that Affect Care: no  Food Allergies: NKFA  Factors Affecting Nutritional Intake: None identified at this time    Nutrition Related Social Determinants of Health: SDOH: Adequate food in home environment and None Identified    Anthropometrics    Height: 4' 8" (142.2 cm)  Height (inches): 56 in  Height Method: Stated  Weight: 65 kg (143 lb 4.8 oz)  Weight (lb): 143.3 lb  Weight Method: Bed Scale  Ideal Body Weight (IBW), Female: 80 lb  % Ideal Body Weight, Female (lb): 179.13 %  BMI (Calculated): 32.1  BMI Grade: 30 - 34.9- obesity - grade I       Lab/Procedures/Meds    Pertinent Labs Reviewed: reviewed  BMP  Lab Results   Component Value Date     (L) 04/14/2025    K 4.2 04/14/2025    CL 94 (L) 04/14/2025    CO2 36 (H) 04/14/2025    BUN 16 04/14/2025    CREATININE 0.7 04/14/2025    CALCIUM 8.5 (L) 04/14/2025    ANIONGAP 5 (L) 04/14/2025    EGFRNORACEVR >60 04/14/2025     Lab Results   Component Value Date    HGBA1C 5.8 (H) 04/16/2024     Lab Results   Component Value Date    CALCIUM 8.5 (L) 04/14/2025    PHOS 3.6 04/11/2025     Glucose   Date Value Ref Range Status   04/16/2024 66 (L) 70 - 110 mg/dL Final       Pertinent Medications Reviewed: reviewed  Scheduled Meds:   albuterol-ipratropium  3 mL Nebulization Q6H    arformoteroL  15 mcg Nebulization BID    aspirin  81 mg Oral Daily    azelastine  1 spray Nasal BID    budesonide  0.5 mg Nebulization Q12H    carvediloL  3.125 mg Oral BID    diazePAM  2 mg Oral Daily with dinner    fluticasone propionate  2 spray Each Nostril Daily    guaiFENesin  600 mg Oral BID    hydrOXYzine HCL  25 mg Oral Daily    lisinopriL  40 mg Oral Daily    montelukast  10 mg Oral QHS    polyethylene glycol  17 g Oral BID    predniSONE  40 mg Oral BID "     Continuous Infusions:  PRN Meds:.  Current Facility-Administered Medications:     acetaminophen, 650 mg, Oral, Q8H PRN    acetaminophen, 650 mg, Oral, Q4H PRN    albuterol sulfate, 2.5 mg, Nebulization, Q4H PRN    aluminum-magnesium hydroxide-simethicone, 30 mL, Oral, QID PRN    atropine, 0.5 mg, Intravenous, PRN    benzonatate, 100 mg, Oral, TID PRN    bisacodyL, 10 mg, Rectal, Daily PRN    cloNIDine, 0.1 mg, Oral, Q6H PRN    glucagon (human recombinant), 1 mg, Intramuscular, PRN    glucose, 16 g, Oral, PRN    glucose, 24 g, Oral, PRN    hydrALAZINE, 5 mg, Intravenous, Q6H PRN    HYDROcodone-acetaminophen, 1 tablet, Oral, Q4H PRN    hydrOXYzine pamoate, 25 mg, Oral, Q8H PRN    magnesium oxide, 800 mg, Oral, PRN    magnesium oxide, 800 mg, Oral, PRN    melatonin, 6 mg, Oral, Nightly PRN    naloxone, 0.02 mg, Intravenous, PRN    nicotine, 1 patch, Transdermal, Daily PRN    ondansetron, 4 mg, Intravenous, Q8H PRN    potassium bicarbonate, 35 mEq, Oral, PRN    potassium bicarbonate, 50 mEq, Oral, PRN    potassium bicarbonate, 60 mEq, Oral, PRN    potassium, sodium phosphates, 2 packet, Oral, PRN    potassium, sodium phosphates, 2 packet, Oral, PRN    potassium, sodium phosphates, 2 packet, Oral, PRN    senna-docusate, 1 tablet, Oral, Daily PRN    simethicone, 1 tablet, Oral, QID PRN    sodium chloride 0.9%, 250 mL, Intravenous, PRN    sodium chloride 0.9%, 10 mL, Intravenous, Q12H PRN    sodium chloride 0.9%, 10 mL, Intravenous, PRN    sodium chloride 0.9%, 3 mL, Intravenous, PRN    Estimated/Assessed Needs    Weight Used For Calorie Calculations: 65 kg (143 lb 4.8 oz)  Energy Calorie Requirements (kcal): 25-35kcal/kg (1625-2275kcals/day)  Energy Need Method: Kcal/kg  Protein Requirements: 1.2g/kg (78g/day)  Weight Used For Protein Calculations: 65 kg (143 lb 4.8 oz)  Fluid Requirements (mL): 1ml/kcal  Estimated Fluid Requirement Method: RDA Method  RDA Method (mL): 25  CHO Requirement: 250      Nutrition  Prescription Ordered    Current Diet Order: Heart healthy    Evaluation of Received Nutrient/Fluid Intake    % Kcal Needs: %  % Protein Needs: %  I/O: 4/14/25: -05900fsd since admit  Energy Calories Required: meeting needs  Protein Required: meeting needs  Fluid Required: meeting needs  Comments: LBM:  4/13/25  Tolerance: tolerating  % Intake of Estimated Energy Needs: 50 - 75 %  % Meal Intake: 75 - 100 %    Nutrition Risk    Level of Risk/Frequency of Follow-up: low - moderate (Follow up: 1x per week)     Monitor and Evaluation    Monitor and Evaluation: Food and beverage intake, Protein intake, Weight, Beliefs and attitudes, Electrolyte and renal panel, Gastrointestinal profile, Glucose/endocrine profile, Inflammatory profile, Lipid profile     Nutrition Follow-Up    RD Follow-up?: Yes  Janiya Hamilton, MS, RDN, LDN

## 2025-04-14 NOTE — PLAN OF CARE
Nutrition Plan of Care:    Recommendations     1. Continue on heart healthy oral diet    2. Monitor labs and weights     Goals: Patient to consume >75% of EEN prior to RD follow up  Nutrition Goal Status: progressing towards goal  Communication of RD Recs: other (comment) (LOS)     Nutrition Discharge Planning     Nutrition Discharge Planning: Therapeutic diet (comments)  Therapeutic diet (comments): Heart healthy     Assessment and Plan     Nutrition Problem  Increased energy expenditure     Related to (etiology):   Physiological causes increasing nutrient needs     Signs and Symptoms (as evidenced by):   Condition associated with diagnosis: COPD     Interventions (treatment strategy):  Decreased sodium and fat modified diet  2.  Collaboration by nutrition professional with other providers      Nutrition Diagnosis Status:   Jimbo Hamilton MS, RDN, LDN

## 2025-04-14 NOTE — PROGRESS NOTES
[facility] Medicine  Progress Note    Patient Name: Hollie Ponce  MRN: [id number]  Patient Class: IP- Inpatient   Admission Date: 4/4/2025  Length of Stay: 9 days  Attending Physician: Sylvain Miranda DO  Primary Care Provider: Ariadne Smith MD        Subjective     Principal Problem:COPD exacerbation        HPI:  This is a 69-year-old female with a past medical history of COPD (not on O2), nonobstructive CAD, hypertension, peripheral artery disease, tobacco use who presents with dyspnea.      Patient presents for evaluation of shortness of breath that started on the day of presentation.  She reports worsening exertional dyspnea, associated with a dry cough, and chest congestion.  She reports having rhinorrhea/sinus congestion.  Her daughter was sick with similar symptoms, but limited her exposure to the patient and was wearing a mask around her  Patient smokes 5 cigarettes daily.    In the ED, the patient was tachypneic (20s-40s), tachycardic (100s-110s), and mildly hypoxic (90%, requiring 3 L O2).  Labs were remarkable for hypokalemia (3.4), negative BNP (41), negative troponin (<0.006).  Chest x-ray showed no acute cardiopulmonary process.  Patient was given Solu-Medrol 125 mg IV, hydralazine 10 mg IV, DuoNeb x1, Ativan 0.5 mg p.o..  She was admitted for further management.    Overview/Hospital Course:  Mrs. Ponce was hospitalized for COPD exacerbation after presenting with dyspnea and chest tightness.  She was noted to be tachypneic, tachycardic, and hypoxic on room air.  Supplemental oxygen initiated.  She received corticosteroids and nebulizer treatments with some symptomatic improvement.  Initial troponin negative; however, repeat troponin significantly elevated.  She reports persistent and some mild chest tightness.  Cardiology consulted, appreciate recs.  Initiate treatment for NSTEMI. LHC without evidence of obstructive CAD. Continued on COPD treatment.  Without much improvement  initially, Pulmonology was consulted and steroids increased frequency and changed to IV.  Showing signs of improvement, will transition to PO steroids and begin to taper.     Interval History: no new complaints    Review of Systems   HENT:  Positive for congestion.    Respiratory:  Positive for shortness of breath.    All other systems reviewed and are negative.    Objective:     Vital Signs (Most Recent):  Temp: 98.1 °F (36.7 °C) (04/14/25 0735)  Pulse: 72 (04/14/25 0735)  Resp: 18 (04/14/25 0735)  BP: (!) 162/96 (04/14/25 0735)  SpO2: 99 % (04/14/25 0735) Vital Signs (24h Range):  Temp:  [97.8 °F (36.6 °C)-98.7 °F (37.1 °C)] 98.1 °F (36.7 °C)  Pulse:  [67-94] 72  Resp:  [18-20] 18  SpO2:  [93 %-99 %] 99 %  BP: (129-187)/(60-96) 162/96     Weight: 65 kg (143 lb 4.8 oz)  Body mass index is 32.13 kg/m².    Intake/Output Summary (Last 24 hours) at 4/14/2025 1113  Last data filed at 4/14/2025 0936  Gross per 24 hour   Intake 720 ml   Output 3400 ml   Net -2680 ml         Physical Exam  Vitals and nursing note reviewed.   Constitutional:       General: She is not in acute distress.     Appearance: She is well-developed.   HENT:      Head: Normocephalic and atraumatic.      Right Ear: External ear normal.      Left Ear: External ear normal.   Cardiovascular:      Rate and Rhythm: Normal rate and regular rhythm.   Pulmonary:      Effort: Pulmonary effort is normal. No respiratory distress.      Breath sounds: Wheezing present.   Skin:     General: Skin is warm and dry.   Neurological:      Mental Status: She is alert and oriented to person, place, and time.   Psychiatric:         Thought Content: Thought content normal.               Significant Labs: All pertinent labs within the past 24 hours have been reviewed.    Significant Imaging: I have reviewed all pertinent imaging results/findings within the past 24 hours.      Assessment & Plan  COPD exacerbation  Continue nebs, supplemental oxygen, and oral steroids and begin  "to taper  Hold lasix for now  Wean oxygen as tolerated  ENT consult appreciate recs    Initially presented with SOB wheezing and cough. Found to have an NSTEMI as well attributed to type 2 non-obs CAD on [facility]. Goal O2 sats 88 to 92% with severe COPD by previous PFTs. Dyspneic with sitting in bed with o2 off unable to ambulate  Started on steroids, duo-nebs, and doxy  Check sputum culture   IV diuresis with lasix stopped given contraction alkalosis-transitioned to oral  Add mucinex, flonase, and hypertonic saline nebulizers for airway clearance  NSTEMI (non-ST elevated myocardial infarction)  Nonobstructive CAD on cath, continue med mgmt  Plan is for left heart catheterization 4/6/25.  Continue treatment for ACS and COPD.  Pending results    Patient presents with NSTEMI. Chest pain is currently controlled. Patient is currently on NSTEMI Pathway.    EKG reviewed. Troponins reviewed and results noted-   No results for input(s): "TROPONINI", "TROPONINIHS" in the last 168 hours.  .     Lipid panel reviewed and shows-     Lab Results   Component Value Date    LDLCALC 85.4 04/16/2024     Lab Results   Component Value Date    TRIG 32 04/05/2025         Medical management includes; Beta Blocker, Dual Anti-Platelet therapy, Anticoagulation, and High Intensity Stain Echo has been performed. Latest ECHO results are as follows- Results for orders placed during the hospital encounter of 04/04/25    Echo    Interpretation Summary    Left Ventricle: The left ventricle is normal in size. Normal wall thickness. There is normal systolic function with a visually estimated ejection fraction of 65 - 70%. Grade I diastolic dysfunction.    Right Ventricle: The right ventricle is normal in size. Systolic function is normal.    Mitral Valve: There is mild regurgitation.    Tricuspid Valve: There is mild regurgitation.    Pulmonary Artery: The estimated pulmonary artery systolic pressure is 55 mmHg.  .   Consult for cardiac rehab is not " "ordered. Patient counseled on lifestyle modifications- continue current medications. Cardiology is consulted. Plan of care reviewed with cardiology team. Continue to monitor patient closely and adjust therapy as needed.     Essential hypertension  Patient's blood pressure range in the last 24 hours was: BP  Min: 129/60  Max: 187/90.The patient's inpatient anti-hypertensive regimen is listed below:  Current Antihypertensives  carvediloL tablet 3.125 mg, 2 times daily, Oral  lisinopriL tablet 40 mg, Daily, Oral  cloNIDine tablet 0.1 mg, Every 6 hours PRN, Oral  hydrALAZINE injection 5 mg, Every 6 hours PRN, Intravenous    Plan  - BP is controlled, no changes needed to their regimen    CAD (coronary artery disease)  Patient with known CAD which is controlled Will continue ASA, Plavix, and Statin and monitor for S/Sx of angina/ACS. Continue to monitor on telemetry. Minimal non-obs CAD did not require stent placement  PAD (peripheral artery disease)  History noted. Continue home medications.     Pulmonary HTN  Results for orders placed during the hospital encounter of 11/16/20    Echo Color Flow Doppler? Yes; Bubble Contrast? No    Interpretation Summary  · The left ventricle is normal in size with normal systolic function. The estimated ejection fraction is 55%.  · There is mild left ventricular concentric hypertrophy.  · Normal left ventricular diastolic function.  · Normal right ventricular size with normal right ventricular systolic function.  · Mild left atrial enlargement.  · Normal central venous pressure (3 mmHg).  · The estimated PA systolic pressure is 49 mmHg.  · There is pulmonary hypertension.    BNP  No results for input(s): "BNP", "BNPTRIAGEBLO" in the last 168 hours.      Optimize volume status     Tobacco dependency  Tobacco cessation not discussed with patient today. Nicotine replacement has been- prescribed  Acute respiratory failure with hypoxia  Patient with Hypoxic Respiratory failure which is " Acute.  she is not on home oxygen. Supplemental oxygen was provided and noted-      .   Signs/symptoms of respiratory failure include- increased work of breathing and respiratory distress. Contributing diagnoses includes - COPD Labs and images were reviewed. Patient Has not had a recent ABG. Will treat underlying causes and adjust management of respiratory failure as follows- Wean O2 as tolerated   Carotid atherosclerosis, bilateral  Continue ASA/plavix/statin  Dyslipidemia  Continue statin  Acute exacerbation of COPD with asthma      Alkalosis      VTE Risk Mitigation (From admission, onward)           Ordered     enoxaparin injection 60 mg  Every 12 hours (non-standard times)         04/05/25 0946     Place sequential compression device  Until discontinued         04/04/25 2312     IP VTE HIGH RISK PATIENT  Once         04/04/25 2312     Place sequential compression device  Until discontinued         04/04/25 2312                    Discharge Planning   YOLA: 4/12/2025     Code Status: Full Code   Medical Readiness for Discharge Date:   Discharge Plan A: Home with family   Discharge Delays: None known at this time    Benito Blackburn Jr., APRN, AGACNP-BC  Hospitalist - Department of Hospital Medicine  [facility] [address] [place] [address]  Office #: [phone]; Pager #: [phone]

## 2025-04-14 NOTE — PT/OT/SLP EVAL
Physical Therapy Evaluation    Patient Name:  Hollie Ponce   MRN:  [id number]    Recommendations:     Discharge Recommendations: Low Intensity Therapy   Discharge Equipment Recommendations: walker, rolling, oxygen, shower chair   Barriers to discharge: Inaccessible home    Assessment:     Hollie Ponce is a 69 y.o. female admitted with a medical diagnosis of COPD exacerbation.  She presents with the following impairments/functional limitations: weakness, impaired endurance, impaired functional mobility, gait instability, impaired balance, impaired cardiopulmonary response to activity.    Rehab Prognosis: Good; patient would benefit from acute skilled PT services to address these deficits and reach maximum level of function.    Recent Surgery: Procedure(s) (LRB):  Cardiac catheterization (N/A)  Angiogram, Coronary, with Left Heart Cath 8 Days Post-Op    Plan:     During this hospitalization, patient to be seen 3 x/week to address the identified rehab impairments via gait training, therapeutic activities, therapeutic exercises and progress toward the following goals:    Plan of Care Expires:  04/28/25    Subjective     Chief Complaint: SOB and dizziness with standing   Patient/Family Comments/goals: Pt agreeable to therapy.   Pain/Comfort:  Pain Rating 1: 0/10      Living Environment:  Pt lives with dtr in a 2SH, bedroom on the 2nd floor.   Prior to admission, patients level of function was independent, driving, and retired.  Pt enjoys watching [employer] at home.  Equipment used at home: none.  Upon discharge, patient will have assistance from dtr.    Objective:     Patient found up in chair with peripheral IV, oxygen (telesitter)  upon PT entry to room.    General Precautions: Standard, fall, respiratory  Orthopedic Precautions:N/A   Braces: N/A  Respiratory Status: Nasal cannula, flow 2 L/min    Exams:  Cognitive Exam:  Patient was able to follow multiple commands.   Gross Motor Coordination:  WFL  Postural Exam:   Patient presented with the following abnormalities:    -       No postural abnormalities identified  Sensation:    -       Intact  light/touch BUE/BLE  Skin Integrity/Edema:      -       Skin integrity: Visible skin intact  -       Edema: None noted BUE/BLE  BUE ROM: WFL  BUE Strength: WFL  BLE ROM: WFL  BLE Strength: WFL    Functional Mobility:  Transfers:     Sit to Stand: contact guard assistance with hand-held assist  Bed to Chair: contact guard assistance with  hand-held assist  using  Step Transfer  Toilet Transfer: contact guard assistance with  hand-held assist  using  Step Transfer (from chair to BSC); Pt declined to ambulate into the bathroom.  Pt was supervision with toilet hygiene.    Gait: Pt ambulated ~48 ft and ~80 ft within room with L hand-held assistance and on 2L O2 NC.  spO2 on 2L ~95% and HR 86 bpm.  Pt with mild unsteady gait, reaching out for furniture.  Pt with decreased step length and amximiliano.   Balance: Pt with fair dynamic standing balance.       AM-PAC 6 CLICK MOBILITY  Total Score:20       Treatment & Education:  spO2 on RA ~88-91% and HR ~89-92 bpm during therex.  Pt placed back on 2L O2 NC.   BLE seated therex x10 reps: hip flex, hip abd/add, LAQ, and AP    BUE seated therex x10 reps: shoulder flex, butterfly, and forward punches    Pt educated on acute skilled PT services and goals.  Pt encouraged pursed lip breathing.  Pt to call for nursing assistance with OOB activities while in the hospital.  Pt verbalized good understanding.      Patient left up in chair with all lines intact, call button in reach, and telesitter present.  Tray table close by.     GOALS:   Multidisciplinary Problems       Physical Therapy Goals          Problem: Physical Therapy    Goal Priority Disciplines Outcome Interventions   Physical Therapy Goal     PT, PT/OT Progressing    Description: Goals to be met by: 25     Patient will increase functional independence with mobility by performin. Supine  to sit with Modified [place]  2. Rolling to Left and Right with Modified [place]  3. Sit to stand transfer with Modified [place]  4. Bed to chair transfer with Modified [place]  5. Gait x250 feet with Modified [place] using Rolling Walker   6. Ascend/descend ~6 steps with one Handrail Modified [place] using No Assistive Device  7. Upper/Lower extremity exercise program 2 sets x15 reps per handout, with [place]                         DME Justifications:  Hollie's mobility limitation cannot be sufficiently resolved by the use of a cane. Her functional mobility deficit can be sufficiently resolved with the use of a Rolling Walker. Patient's mobility limitation significantly impairs their ability to participate in one of more activities of daily living.  The use of a RW will significantly improve the patient's ability to participate in MRADLS and the patient will use it on regular basis in the home.    History:     Past Medical History:   Diagnosis Date    Asthma     COPD (chronic obstructive pulmonary disease)     Hypertension        Past Surgical History:   Procedure Laterality Date    ANGIOGRAM, CORONARY, WITH LEFT HEART CATHETERIZATION  2025    Procedure: Angiogram, Coronary, with Left Heart Cath;  Surgeon: Jhonny Schofield MD;  Location: [facility] CATH LAB;  Service: Cardiology;;    CARDIAC CATHETERIZATION N/A 2025    Procedure: Cardiac catheterization;  Surgeon: Jhonny Schofield MD;  Location: [facility] CATH LAB;  Service: Cardiology;  Laterality: N/A;     SECTION      HERNIA REPAIR      LEFT HEART CATHETERIZATION Left 2020    Procedure: Left heart cath;  Surgeon: Shabnam Vernon MD;  Location: [facility] CATH LAB;  Service: Cardiology;  Laterality: Left;       Time Tracking:     PT Received On: 25  PT Start Time: 1501     PT Stop Time: 1524  PT Total Time (min): 23 min     Billable Minutes: Evaluation 12 min and Therapeutic Exercise 11 min      2025

## 2025-04-15 LAB
ABSOLUTE EOSINOPHIL (OHS): 0 K/UL
ABSOLUTE MONOCYTE (OHS): 0.9 K/UL (ref 0.3–1)
ABSOLUTE NEUTROPHIL COUNT (OHS): 8.32 K/UL (ref 1.8–7.7)
ANION GAP (OHS): 6 MMOL/L (ref 8–16)
BASOPHILS # BLD AUTO: 0.02 K/UL
BASOPHILS NFR BLD AUTO: 0.2 %
BUN SERPL-MCNC: 19 MG/DL (ref 8–23)
CALCIUM SERPL-MCNC: 9 MG/DL (ref 8.7–10.5)
CHLORIDE SERPL-SCNC: 94 MMOL/L (ref 95–110)
CO2 SERPL-SCNC: 37 MMOL/L (ref 23–29)
CREAT SERPL-MCNC: 0.7 MG/DL (ref 0.5–1.4)
ERYTHROCYTE [DISTWIDTH] IN BLOOD BY AUTOMATED COUNT: 12.8 % (ref 11.5–14.5)
GFR SERPLBLD CREATININE-BSD FMLA CKD-EPI: >60 ML/MIN/1.73/M2
GLUCOSE SERPL-MCNC: 107 MG/DL (ref 70–110)
HCT VFR BLD AUTO: 42 % (ref 37–48.5)
HGB BLD-MCNC: 13.3 GM/DL (ref 12–16)
IMM GRANULOCYTES # BLD AUTO: 0.18 K/UL (ref 0–0.04)
IMM GRANULOCYTES NFR BLD AUTO: 1.6 % (ref 0–0.5)
LYMPHOCYTES # BLD AUTO: 2.01 K/UL (ref 1–4.8)
MCH RBC QN AUTO: 29.2 PG (ref 27–31)
MCHC RBC AUTO-ENTMCNC: 31.7 G/DL (ref 32–36)
MCV RBC AUTO: 92 FL (ref 82–98)
NUCLEATED RBC (/100WBC) (OHS): 0 /100 WBC
PLATELET # BLD AUTO: 262 K/UL (ref 150–450)
PMV BLD AUTO: 10.1 FL (ref 9.2–12.9)
POTASSIUM SERPL-SCNC: 4.1 MMOL/L (ref 3.5–5.1)
RBC # BLD AUTO: 4.55 M/UL (ref 4–5.4)
RELATIVE EOSINOPHIL (OHS): 0 %
RELATIVE LYMPHOCYTE (OHS): 17.6 % (ref 18–48)
RELATIVE MONOCYTE (OHS): 7.9 % (ref 4–15)
RELATIVE NEUTROPHIL (OHS): 72.7 % (ref 38–73)
SODIUM SERPL-SCNC: 137 MMOL/L (ref 136–145)
WBC # BLD AUTO: 11.43 K/UL (ref 3.9–12.7)

## 2025-04-15 PROCEDURE — 94640 AIRWAY INHALATION TREATMENT: CPT

## 2025-04-15 PROCEDURE — 25000003 PHARM REV CODE 250: Performed by: INTERNAL MEDICINE

## 2025-04-15 PROCEDURE — 94761 N-INVAS EAR/PLS OXIMETRY MLT: CPT

## 2025-04-15 PROCEDURE — 25000003 PHARM REV CODE 250: Performed by: HOSPITALIST

## 2025-04-15 PROCEDURE — 36415 COLL VENOUS BLD VENIPUNCTURE: CPT | Performed by: HOSPITALIST

## 2025-04-15 PROCEDURE — 11000001 HC ACUTE MED/SURG PRIVATE ROOM

## 2025-04-15 PROCEDURE — 63600175 PHARM REV CODE 636 W HCPCS: Performed by: HOSPITALIST

## 2025-04-15 PROCEDURE — 27000646 HC AEROBIKA DEVICE

## 2025-04-15 PROCEDURE — 27000221 HC OXYGEN, UP TO 24 HOURS

## 2025-04-15 PROCEDURE — 80048 BASIC METABOLIC PNL TOTAL CA: CPT | Performed by: HOSPITALIST

## 2025-04-15 PROCEDURE — 25000003 PHARM REV CODE 250: Performed by: OTOLARYNGOLOGY

## 2025-04-15 PROCEDURE — 85025 COMPLETE CBC W/AUTO DIFF WBC: CPT | Performed by: HOSPITALIST

## 2025-04-15 PROCEDURE — 97110 THERAPEUTIC EXERCISES: CPT | Mod: CQ

## 2025-04-15 PROCEDURE — 25000003 PHARM REV CODE 250: Performed by: PHYSICIAN ASSISTANT

## 2025-04-15 PROCEDURE — 25000242 PHARM REV CODE 250 ALT 637 W/ HCPCS: Performed by: INTERNAL MEDICINE

## 2025-04-15 PROCEDURE — 99900035 HC TECH TIME PER 15 MIN (STAT)

## 2025-04-15 PROCEDURE — 94664 DEMO&/EVAL PT USE INHALER: CPT

## 2025-04-15 PROCEDURE — 97530 THERAPEUTIC ACTIVITIES: CPT | Mod: CQ

## 2025-04-15 PROCEDURE — 97116 GAIT TRAINING THERAPY: CPT | Mod: CQ

## 2025-04-15 PROCEDURE — 63600175 PHARM REV CODE 636 W HCPCS: Performed by: PHYSICIAN ASSISTANT

## 2025-04-15 PROCEDURE — 94760 N-INVAS EAR/PLS OXIMETRY 1: CPT

## 2025-04-15 RX ORDER — ENOXAPARIN SODIUM 100 MG/ML
40 INJECTION SUBCUTANEOUS EVERY 24 HOURS
Status: DISCONTINUED | OUTPATIENT
Start: 2025-04-15 | End: 2025-04-16 | Stop reason: HOSPADM

## 2025-04-15 RX ADMIN — MUPIROCIN: 20 OINTMENT TOPICAL at 10:04

## 2025-04-15 RX ADMIN — HYDROXYZINE HYDROCHLORIDE 25 MG: 25 TABLET ORAL at 08:04

## 2025-04-15 RX ADMIN — ARFORMOTEROL TARTRATE 15 MCG: 15 SOLUTION RESPIRATORY (INHALATION) at 07:04

## 2025-04-15 RX ADMIN — NYSTATIN 500000 UNITS: 100000 SUSPENSION ORAL at 10:04

## 2025-04-15 RX ADMIN — BUDESONIDE 0.5 MG: 0.5 INHALANT RESPIRATORY (INHALATION) at 07:04

## 2025-04-15 RX ADMIN — IPRATROPIUM BROMIDE AND ALBUTEROL SULFATE 3 ML: 2.5; .5 SOLUTION RESPIRATORY (INHALATION) at 07:04

## 2025-04-15 RX ADMIN — LISINOPRIL 40 MG: 20 TABLET ORAL at 08:04

## 2025-04-15 RX ADMIN — IPRATROPIUM BROMIDE AND ALBUTEROL SULFATE 3 ML: 2.5; .5 SOLUTION RESPIRATORY (INHALATION) at 12:04

## 2025-04-15 RX ADMIN — HYDROXYZINE PAMOATE 25 MG: 25 CAPSULE ORAL at 10:04

## 2025-04-15 RX ADMIN — CLONIDINE HYDROCHLORIDE 0.1 MG: 0.1 TABLET ORAL at 04:04

## 2025-04-15 RX ADMIN — AZELASTINE HYDROCHLORIDE 137 MCG: 137 SPRAY, METERED NASAL at 10:04

## 2025-04-15 RX ADMIN — ASPIRIN 81 MG: 81 TABLET, COATED ORAL at 08:04

## 2025-04-15 RX ADMIN — MONTELUKAST 10 MG: 10 TABLET, FILM COATED ORAL at 10:04

## 2025-04-15 RX ADMIN — NYSTATIN 500000 UNITS: 100000 SUSPENSION ORAL at 01:04

## 2025-04-15 RX ADMIN — NYSTATIN 500000 UNITS: 100000 SUSPENSION ORAL at 08:04

## 2025-04-15 RX ADMIN — ENOXAPARIN SODIUM 40 MG: 40 INJECTION SUBCUTANEOUS at 04:04

## 2025-04-15 RX ADMIN — GUAIFENESIN 600 MG: 600 TABLET, EXTENDED RELEASE ORAL at 08:04

## 2025-04-15 RX ADMIN — NYSTATIN 500000 UNITS: 100000 SUSPENSION ORAL at 04:04

## 2025-04-15 RX ADMIN — DIAZEPAM 2 MG: 2 TABLET ORAL at 04:04

## 2025-04-15 RX ADMIN — CARVEDILOL 3.12 MG: 3.12 TABLET, FILM COATED ORAL at 10:04

## 2025-04-15 RX ADMIN — FLUTICASONE PROPIONATE 100 MCG: 50 SPRAY, METERED NASAL at 10:04

## 2025-04-15 RX ADMIN — PREDNISONE 40 MG: 20 TABLET ORAL at 04:04

## 2025-04-15 RX ADMIN — PREDNISONE 40 MG: 20 TABLET ORAL at 08:04

## 2025-04-15 RX ADMIN — CARVEDILOL 3.12 MG: 3.12 TABLET, FILM COATED ORAL at 08:04

## 2025-04-15 RX ADMIN — GUAIFENESIN 600 MG: 600 TABLET, EXTENDED RELEASE ORAL at 10:04

## 2025-04-15 NOTE — NURSING
Testing for Home O2     O2 Sats on RA at rest =98%    O2 sats on RA with exercise  =83%    O2 sats on _2_L O2 with exercise =95%

## 2025-04-15 NOTE — PLAN OF CARE
04/15/25 1347   Medicare Message   Important Message from Medicare regarding Discharge Appeal Rights Given to patient/caregiver;Explained to patient/caregiver;Other (comments)  (CM spoke to patient's daughter,  Ramesh Ponce (Daughter)  [phone], via telephone. Explained IMM and she verbalized understanding.)   Date IMM was signed 04/15/25   Time IMM was signed [id number]

## 2025-04-15 NOTE — NURSING
Bedside Report given to night nurse ZARI Cope. Walking rounds completed. Visualized and assessed patient NAD noted. Safety precautions maintained and call light within reach.     Chart check completed.

## 2025-04-15 NOTE — ASSESSMENT & PLAN NOTE
Continue nebs, supplemental oxygen, and oral steroids and begin to taper  Hold lasix for now  Wean oxygen as tolerated  ENT consult appreciate recs

## 2025-04-15 NOTE — PT/OT/SLP PROGRESS
Physical Therapy Treatment    Patient Name:  Hollie Ponce   MRN:  [id number]    Recommendations:     Discharge Recommendations: Low Intensity Therapy  Discharge Equipment Recommendations: walker, rolling, oxygen, shower chair  Barriers to discharge: None    Assessment:     Hollie Ponce is a 69 y.o. female admitted with a medical diagnosis of Acute exacerbation of COPD with asthma.  She presents with the following impairments/functional limitations: weakness, impaired endurance, gait instability, decreased lower extremity function, decreased safety awareness, decreased ROM, impaired cardiopulmonary response to activity .    Rehab Prognosis: Good; patient would benefit from acute skilled PT services to address these deficits and reach maximum level of function.    Recent Surgery: Procedure(s) (LRB):  Cardiac catheterization (N/A)  Angiogram, Coronary, with Left Heart Cath 9 Days Post-Op    Plan:     During this hospitalization, patient to be seen 3 x/week to address the identified rehab impairments via gait training, therapeutic activities, therapeutic exercises and progress toward the following goals:    Plan of Care Expires:  04/28/25    Subjective     Chief Complaint: weakness and admitted being anxious about going home. She worries about her daughter has to take care of her.   Patient/Family Comments/goals: pt is pleasant and agreeable to therapy   Pain/Comfort:  Pain Rating 1: 0/10  Pain Rating Post-Intervention 1: 0/10      Objective:     Communicated with nurse prior to session.  Patient found HOB elevated with oxygen, bed alarm, peripheral IV, PureWick, telemetry upon PT entry to room.     General Precautions: Standard, fall, respiratory  Orthopedic Precautions: N/A  Braces: N/A  Respiratory Status: Nasal cannula, flow 2 L/min   SpO2 : 98%-99% on RA at rest however decreased to 83% on exertions, pt required seated rest breaks , VC's for pursed lip breathing technique and 2L O2NC to recover to 99% and  maintain from 93%-98% for the rest of treatment on 2L , HR stable  nurse notified.   Functional Mobility:  Bed Mobility:     Rolling Right: modified independence  Scooting: modified independence  Supine to Sit: modified independence, HOB elevated, bedside rail   Transfers:  VC's for safety technique and walker management .    Sit to Stand:  x 2 trials from bed and 1 trial from chair stand by assistance with rolling walker  Bed to Chair: stand by assistance with  rolling walker  using  Step Transfer  Gait: Patient ambulated 20 ft(w/o O2 ) and ~ 140 ft , 6 ft  on level tile with Rolling Walker with S ( 2L O2NC)  .  Pt required 1 seated rest break and 1 standing rest break 2* to fatigue and  c/o anxious and SOB (SpO2: 93%-98% on 2L O2NC) . Noted with decreased maximiliano, decreased velocity of limb motion, decreased step length,  decreased toe-to-floor clearance and decreased weight-shifting ability.Impairments contributing to gait deviations include impaired balance, decreased flexibility, decreased ROM and decreased strength. V/T cues for safety technique and walker management.  VC's for pursed lip breathing technique.   Balance: good in sitting, fair+ in standing.        AM-PAC 6 CLICK MOBILITY  Turning over in bed (including adjusting bedclothes, sheets and blankets)?: 4  Sitting down on and standing up from a chair with arms (e.g., wheelchair, bedside commode, etc.): 3  Moving from lying on back to sitting on the side of the bed?: 3  Moving to and from a bed to a chair (including a wheelchair)?: 3  Need to walk in hospital room?: 3  Climbing 3-5 steps with a railing?: 3  Basic Mobility Total Score: 19       Treatment & Education:  Lower Extremity Exercises.    Patient educated on: Purpose and Benefits of Therapeutic Exercise(s).    Patient verbalized acceptance/understanding of instruction(s), expectation(s), and limitation(s) (for safety).  Patient performed: 10 reps (each) of B LE Ther Ex: AP, LAQ, Hip flexion  while sitting up on EOB.       Patient required  verbal cues/tactile cues to ensure correct sequence, to maintain proper form, and to allow for self-correction.  Pt reported no complaints or problems with exercise activity.     Patient required increase Reaction Time to initiate movements and a Sitting Rest Break between set(s) to recover.    Pt performed standing BLE x 14 reps hip flexion with RW, SBA .   Educated pt on pursed lip breathing technique and energy conservation techniques. Pt verbalized understanding.     Patient left up in chair with all lines intact, call button in reach, bed alarm on, and nurse notified..    GOALS:   Multidisciplinary Problems       Physical Therapy Goals          Problem: Physical Therapy    Goal Priority Disciplines Outcome Interventions   Physical Therapy Goal     PT, PT/OT Progressing    Description: Goals to be met by: 25     Patient will increase functional independence with mobility by performin. Supine to sit with Modified [place]  2. Rolling to Left and Right with Modified [place]  3. Sit to stand transfer with Modified [place]  4. Bed to chair transfer with Modified [place]  5. Gait x250 feet with Modified [place] using Rolling Walker   6. Ascend/descend ~6 steps with one Handrail Modified [place] using No Assistive Device  7. Upper/Lower extremity exercise program 2 sets x15 reps per handout, with [place]                         DME Justifications:   Hollie's mobility limitation cannot be sufficiently resolved by the use of a cane. Her functional mobility deficit can be sufficiently resolved with the use of a Rolling Walker. Patient's mobility limitation significantly impairs their ability to participate in one of more activities of daily living.  The use of a RW will significantly improve the patient's ability to participate in MRADLS and the patient will use it on regular basis in the home.    Time Tracking:     PT Received  On: 04/15/25  PT Start Time: 1030     PT Stop Time: 1120  PT Total Time (min): 50 min     Billable Minutes: Gait Training 23, Therapeutic Activity 12, and Therapeutic Exercise 10    Treatment Type: Treatment  PT/PTA: PTA     Number of PTA visits since last PT visit: 1     04/15/2025

## 2025-04-15 NOTE — ASSESSMENT & PLAN NOTE
Patient with Hypoxic Respiratory failure which is Acute.  she is not on home oxygen. Supplemental oxygen was provided and noted- Oxygen Concentration (%):  [2] 2    .   Signs/symptoms of respiratory failure include- increased work of breathing and respiratory distress. Contributing diagnoses includes - COPD Labs and images were reviewed. Patient Has not had a recent ABG. Will treat underlying causes and adjust management of respiratory failure as follows- Wean O2 as tolerated   Testing for home oxygen today

## 2025-04-15 NOTE — PLAN OF CARE
TANISHA Mgr called patient's daughter to discuss discharge planning. CM Mgr explained that the patient is discharging tomorrow with home health services, oxygen, a rolling walker, and a hospital bed. We discussed home health preferences, and explained that home health will call to schedule the first visit. TANISHA Mgr reviewed the equipment delivery process and informed her that there may be a copay associated with the equipment. CM Mgr explained that the oxygen tanks will be delivered to the patients hospital room, while the concentrator and hospital bed (if approved) will be delivered to the home. TANISHA Mgr instructed her to begin moving furniture today to prepare for the equipment delivery. CM Mgr also inquired if the patient will need transportation home tomorrow. Daughter stated no--she will come to the hospital because she does not want the patient to be home alone.    I provided the patient a choice of post acute providers and offered a list of CMS rated  home health.    Patient has declined to select a preferred provider and elects placement with the first [facility] in network provider that is available to provide services as ordered by the physician.     Referral will be sent to [facility] upon discharge.

## 2025-04-15 NOTE — SUBJECTIVE & OBJECTIVE
Interval History: continued dyspnea and nasal congestion. Wheezing resolved. Will ambulate and do formal evaluation for home oxygen today to D/C tomorrow. Plan of care discussed with patient's daughter.     Review of Systems   Constitutional:  Negative for chills and fever.   HENT:  Positive for congestion.    Eyes:  Negative for photophobia and visual disturbance.   Respiratory:  Positive for shortness of breath. Negative for chest tightness.    Cardiovascular:  Negative for chest pain and palpitations.   Gastrointestinal:  Negative for abdominal pain, nausea and vomiting.   Genitourinary:  Negative for dysuria and hematuria.     Objective:     Vital Signs (Most Recent):  Temp: 97.6 °F (36.4 °C) (04/15/25 0736)  Pulse: 70 (04/15/25 0755)  Resp: 18 (04/15/25 0755)  BP: 133/82 (04/15/25 0736)  SpO2: 100 % (04/15/25 0736) Vital Signs (24h Range):  Temp:  [97.6 °F (36.4 °C)-98.2 °F (36.8 °C)] 97.6 °F (36.4 °C)  Pulse:  [70-89] 70  Resp:  [16-18] 18  SpO2:  [96 %-100 %] 100 %  BP: (119-191)/(62-97) 133/82     Weight: 67.6 kg (149 lb 0.5 oz)  Body mass index is 33.41 kg/m².    Intake/Output Summary (Last 24 hours) at 4/15/2025 1005  Last data filed at 4/15/2025 0927  Gross per 24 hour   Intake 120 ml   Output 850 ml   Net -730 ml         Physical Exam  Vitals and nursing note reviewed.   Constitutional:       General: She is not in acute distress.     Appearance: She is well-developed. She is not diaphoretic.   HENT:      Head: Normocephalic and atraumatic.      Right Ear: External ear normal.      Left Ear: External ear normal.   Eyes:      General:         Right eye: No discharge.         Left eye: No discharge.      Conjunctiva/sclera: Conjunctivae normal.   Neck:      Thyroid: No thyromegaly.   Cardiovascular:      Rate and Rhythm: Normal rate and regular rhythm.      Heart sounds: No murmur heard.  Pulmonary:      Effort: Pulmonary effort is normal. No respiratory distress.      Breath sounds: Normal breath sounds.       Comments: On nasal cannula speaking in full sentences  Abdominal:      General: Bowel sounds are normal. There is no distension.      Palpations: Abdomen is soft. There is no mass.      Tenderness: There is no abdominal tenderness.   Musculoskeletal:         General: No deformity.      Cervical back: Normal range of motion and neck supple.      Right lower leg: No edema.      Left lower leg: No edema.   Skin:     General: Skin is warm and dry.   Neurological:      Mental Status: She is alert and oriented to person, place, and time.      Sensory: No sensory deficit.   Psychiatric:         Mood and Affect: Mood normal.         Behavior: Behavior normal.               Significant Labs: CBC:   Recent Labs   Lab 04/14/25  0418 04/15/25  0536   WBC 9.77 11.43   HGB 13.3 13.3   HCT 42.2 42.0    262     CMP:   Recent Labs   Lab 04/14/25  0418 04/15/25  0536   * 137   K 4.2 4.1   CL 94* 94*   CO2 36* 37*   BUN 16 19   CREATININE 0.7 0.7   CALCIUM 8.5* 9.0   ANIONGAP 5* 6*       Significant Imaging:   Imaging Results              X-Ray Chest PA And Lateral (Final result)  Result time 04/04/25 18:00:11      Final result by Dylon Pérez MD (04/04/25 18:00:11)                   Impression:      No acute cardiopulmonary process identified.      Electronically signed by: Dylon Pérez MD  Date:    04/04/2025  Time:    18:00               Narrative:    EXAMINATION:  XR CHEST PA AND LATERAL    CLINICAL HISTORY:  SOB;    TECHNIQUE:  PA and lateral views of the chest were performed.    COMPARISON:  November 2020.    FINDINGS:  Cardiac silhouette is normal in size.  Lungs are symmetrically expanded.  No evidence of focal consolidative process, pneumothorax, or significant pleural effusion.  No acute osseous abnormality identified.

## 2025-04-15 NOTE — ASSESSMENT & PLAN NOTE
Patient's blood pressure range in the last 24 hours was: BP  Min: 119/62  Max: 191/86.The patient's inpatient anti-hypertensive regimen is listed below:  Current Antihypertensives  carvediloL tablet 3.125 mg, 2 times daily, Oral  lisinopriL tablet 40 mg, Daily, Oral  cloNIDine tablet 0.1 mg, Every 6 hours PRN, Oral  hydrALAZINE injection 5 mg, Every 6 hours PRN, Intravenous    Plan  - BP is controlled, no changes needed to their regimen

## 2025-04-15 NOTE — PLAN OF CARE
Problem: COPD (Chronic Obstructive Pulmonary Disease)  Goal: Optimal Chronic Illness Coping  Outcome: Progressing  Goal: Optimal Level of Functional [place]  Outcome: Progressing  Goal: Absence of Infection Signs and Symptoms  Outcome: Progressing  Goal: Improved Oral Intake  Outcome: Progressing  Goal: Effective Oxygenation and Ventilation  Outcome: Progressing     Problem: Infection  Goal: Absence of Infection Signs and Symptoms  Outcome: Progressing

## 2025-04-15 NOTE — CARE UPDATE
Went to evaluate the patient at bedside.  She is sitting up in the chair, currently on nasal cannula.  States she is feeling much better, especially compared to her presentation on admission.  Admits she still has some congestion and some shortness a breath that is worse with ambulation, but overall improving.  On exam, appears in no acute distress, congestion noted.  Lungs with faint expiratory wheezes.  Patient states she feels better after breathing treatments.  Called and spoke with the patient's daughter Ms. Chandler after worse to answer all questions and addressed any concerns.  Daughter states she was overwhelmed this morning because it was a lot of information in regards to discharge planning.  Daughter states she was at work and was not prepared for the patient's discharge and wanted to make sure logistics were taken care of.  I reassured her that patient's provider and  will arrange for DME to be set up accordingly.  States that the patient will stay overnight for continue monitoring.  If patient continues to improve, discussed that patient would likely be discharge tomorrow if all DME has been set up and patient remained stable.  Daughter verbalized understanding and agrees with treatment plan.  She had no other questions or concerns at this time.  Updated patient's provider.

## 2025-04-15 NOTE — PROGRESS NOTES
[facility]  Progress Note    Patient Name: Hollie Ponce  MRN: [id number]  Patient Class: IP- Inpatient   Admission Date: 4/4/2025  Length of Stay: 10 days  Attending Physician: Sylvain Miranda DO  Primary Care Provider: Ariadne Smith MD        Subjective     Principal Problem:COPD exacerbation        HPI:  This is a 69-year-old female with a past medical history of COPD (not on O2), nonobstructive CAD, hypertension, peripheral artery disease, tobacco use who presents with dyspnea.      Patient presents for evaluation of shortness of breath that started on the day of presentation.  She reports worsening exertional dyspnea, associated with a dry cough, and chest congestion.  She reports having rhinorrhea/sinus congestion.  Her daughter was sick with similar symptoms, but limited her exposure to the patient and was wearing a mask around her  Patient smokes 5 cigarettes daily.    In the ED, the patient was tachypneic (20s-40s), tachycardic (100s-110s), and mildly hypoxic (90%, requiring 3 L O2).  Labs were remarkable for hypokalemia (3.4), negative BNP (41), negative troponin (<0.006).  Chest x-ray showed no acute cardiopulmonary process.  Patient was given Solu-Medrol 125 mg IV, hydralazine 10 mg IV, DuoNeb x1, Ativan 0.5 mg p.o..  She was admitted for further management.    Overview/Hospital Course:  Mrs. Ponce was hospitalized for COPD exacerbation after presenting with dyspnea and chest tightness.  She was noted to be tachypneic, tachycardic, and hypoxic on room air.  Supplemental oxygen initiated.  She received corticosteroids and nebulizer treatments with some symptomatic improvement.  Initial troponin negative; however, repeat troponin significantly elevated.  She reports persistent and some mild chest tightness.  Cardiology consulted, appreciate recs.  Initiate treatment for NSTEMI. LHC without evidence of obstructive CAD. Continued on COPD treatment.  Without much improvement  initially, Pulmonology was consulted and steroids increased frequency and changed to IV.  Showing signs of improvement, will transition to PO steroids and begin to taper. Seen by ENT for nasal congestion, without signs of sinus infection though concerns for thrush. Started on nasal rinse, flonase, asteline, and nystatin. Outpatient ENT F/U.     Interval History: continued dyspnea and nasal congestion. Wheezing resolved. Will ambulate and do formal evaluation for home oxygen today to D/C tomorrow. Plan of care discussed with patient's daughter.     Review of Systems   Constitutional:  Negative for chills and fever.   HENT:  Positive for congestion.    Eyes:  Negative for photophobia and visual disturbance.   Respiratory:  Positive for shortness of breath. Negative for chest tightness.    Cardiovascular:  Negative for chest pain and palpitations.   Gastrointestinal:  Negative for abdominal pain, nausea and vomiting.   Genitourinary:  Negative for dysuria and hematuria.     Objective:     Vital Signs (Most Recent):  Temp: 97.6 °F (36.4 °C) (04/15/25 0736)  Pulse: 70 (04/15/25 0755)  Resp: 18 (04/15/25 0755)  BP: 133/82 (04/15/25 0736)  SpO2: 100 % (04/15/25 0736) Vital Signs (24h Range):  Temp:  [97.6 °F (36.4 °C)-98.2 °F (36.8 °C)] 97.6 °F (36.4 °C)  Pulse:  [70-89] 70  Resp:  [16-18] 18  SpO2:  [96 %-100 %] 100 %  BP: (119-191)/(62-97) 133/82     Weight: 67.6 kg (149 lb 0.5 oz)  Body mass index is 33.41 kg/m².    Intake/Output Summary (Last 24 hours) at 4/15/2025 1005  Last data filed at 4/15/2025 0927  Gross per 24 hour   Intake 120 ml   Output 850 ml   Net -730 ml         Physical Exam  Vitals and nursing note reviewed.   Constitutional:       General: She is not in acute distress.     Appearance: She is well-developed. She is not diaphoretic.   HENT:      Head: Normocephalic and atraumatic.      Right Ear: External ear normal.      Left Ear: External ear normal.   Eyes:      General:         Right eye: No  discharge.         Left eye: No discharge.      Conjunctiva/sclera: Conjunctivae normal.   Neck:      Thyroid: No thyromegaly.   Cardiovascular:      Rate and Rhythm: Normal rate and regular rhythm.      Heart sounds: No murmur heard.  Pulmonary:      Effort: Pulmonary effort is normal. No respiratory distress.      Breath sounds: Normal breath sounds.      Comments: On nasal cannula speaking in full sentences  Abdominal:      General: Bowel sounds are normal. There is no distension.      Palpations: Abdomen is soft. There is no mass.      Tenderness: There is no abdominal tenderness.   Musculoskeletal:         General: No deformity.      Cervical back: Normal range of motion and neck supple.      Right lower leg: No edema.      Left lower leg: No edema.   Skin:     General: Skin is warm and dry.   Neurological:      Mental Status: She is alert and oriented to person, place, and time.      Sensory: No sensory deficit.   Psychiatric:         Mood and Affect: Mood normal.         Behavior: Behavior normal.               Significant Labs: CBC:   Recent Labs   Lab 04/14/25 0418 04/15/25  0536   WBC 9.77 11.43   HGB 13.3 13.3   HCT 42.2 42.0    262     CMP:   Recent Labs   Lab 04/14/25 0418 04/15/25  0536   * 137   K 4.2 4.1   CL 94* 94*   CO2 36* 37*   BUN 16 19   CREATININE 0.7 0.7   CALCIUM 8.5* 9.0   ANIONGAP 5* 6*       Significant Imaging:   Imaging Results              X-Ray Chest PA And Lateral (Final result)  Result time 04/04/25 18:00:11      Final result by Dylon Pérez MD (04/04/25 18:00:11)                   Impression:      No acute cardiopulmonary process identified.      Electronically signed by: Dylon Pérez MD  Date:    04/04/2025  Time:    18:00               Narrative:    EXAMINATION:  XR CHEST PA AND LATERAL    CLINICAL HISTORY:  SOB;    TECHNIQUE:  PA and lateral views of the chest were performed.    COMPARISON:  November 2020.    FINDINGS:  Cardiac silhouette is normal in  "size.  Lungs are symmetrically expanded.  No evidence of focal consolidative process, pneumothorax, or significant pleural effusion.  No acute osseous abnormality identified.                                          Assessment & Plan  COPD exacerbation  Continue nebs, supplemental oxygen, and oral steroids and begin to taper  Hold lasix for now  Wean oxygen as tolerated  ENT consult appreciate recs    NSTEMI (non-ST elevated myocardial infarction)  Nonobstructive CAD on cath, continue med mgmt  Plan is for left heart catheterization 4/6/25.  Continue treatment for ACS and COPD.  Pending results    Patient presents with NSTEMI. Chest pain is currently controlled. Patient is currently on NSTEMI Pathway.    EKG reviewed. Troponins reviewed and results noted-   No results for input(s): "TROPONINI", "TROPONINIHS" in the last 168 hours.  .     Lipid panel reviewed and shows-     Lab Results   Component Value Date    LDLCALC 85.4 04/16/2024     Lab Results   Component Value Date    TRIG 32 04/05/2025         Medical management includes; Beta Blocker, Dual Anti-Platelet therapy, Anticoagulation, and High Intensity Stain Echo has been performed. Latest ECHO results are as follows- Results for orders placed during the hospital encounter of 04/04/25    Echo    Interpretation Summary    Left Ventricle: The left ventricle is normal in size. Normal wall thickness. There is normal systolic function with a visually estimated ejection fraction of 65 - 70%. Grade I diastolic dysfunction.    Right Ventricle: The right ventricle is normal in size. Systolic function is normal.    Mitral Valve: There is mild regurgitation.    Tricuspid Valve: There is mild regurgitation.    Pulmonary Artery: The estimated pulmonary artery systolic pressure is 55 mmHg.  .   Consult for cardiac rehab is not ordered. Patient counseled on lifestyle modifications- continue current medications. Cardiology is consulted. Plan of care reviewed with cardiology " "team. Continue to monitor patient closely and adjust therapy as needed.     Essential hypertension  Patient's blood pressure range in the last 24 hours was: BP  Min: 119/62  Max: 191/86.The patient's inpatient anti-hypertensive regimen is listed below:  Current Antihypertensives  carvediloL tablet 3.125 mg, 2 times daily, Oral  lisinopriL tablet 40 mg, Daily, Oral  cloNIDine tablet 0.1 mg, Every 6 hours PRN, Oral  hydrALAZINE injection 5 mg, Every 6 hours PRN, Intravenous    Plan  - BP is controlled, no changes needed to their regimen    CAD (coronary artery disease)  Patient with known CAD which is controlled Will continue ASA, Plavix, and Statin and monitor for S/Sx of angina/ACS. Continue to monitor on telemetry. Minimal non-obs CAD did not require stent placement  PAD (peripheral artery disease)  History noted. Continue home medications.     Pulmonary HTN  Results for orders placed during the hospital encounter of 11/16/20    Echo Color Flow Doppler? Yes; Bubble Contrast? No    Interpretation Summary  · The left ventricle is normal in size with normal systolic function. The estimated ejection fraction is 55%.  · There is mild left ventricular concentric hypertrophy.  · Normal left ventricular diastolic function.  · Normal right ventricular size with normal right ventricular systolic function.  · Mild left atrial enlargement.  · Normal central venous pressure (3 mmHg).  · The estimated PA systolic pressure is 49 mmHg.  · There is pulmonary hypertension.    BNP  No results for input(s): "BNP", "BNPTRIAGEBLO" in the last 168 hours.      Optimize volume status     Tobacco dependency  Tobacco cessation not discussed with patient today. Nicotine replacement has been- prescribed  Acute respiratory failure with hypoxia  Patient with Hypoxic Respiratory failure which is Acute.  she is not on home oxygen. Supplemental oxygen was provided and noted- Oxygen Concentration (%):  [2] 2    .   Signs/symptoms of respiratory " failure include- increased work of breathing and respiratory distress. Contributing diagnoses includes - COPD Labs and images were reviewed. Patient Has not had a recent ABG. Will treat underlying causes and adjust management of respiratory failure as follows- Wean O2 as tolerated   Testing for home oxygen today  Carotid atherosclerosis, bilateral  Continue ASA/plavix/statin  Dyslipidemia  Continue statin  Acute exacerbation of COPD with asthma  Initially presented with SOB wheezing and cough. Found to have an NSTEMI as well attributed to type 2 non-obs CAD on [facility]. Goal O2 sats 88 to 92% with severe COPD by previous PFTs.   Completed doxy, transitioned to oral prednisone  Seen by pulmonary-and signed off  ENT consulted scope exam without signs of sinus infection though thrust present  Saline rinse followed by flonase/asteline. Continue nystatin  Formal testing for home oxygen today with plans to D/C 4/16    Alkalosis  As above. Diuresis stopped due to increasing bicarb    VTE Risk Mitigation (From admission, onward)           Ordered     enoxaparin injection 40 mg  Every 24 hours         04/15/25 1010      04/05/25 0946     Place sequential compression device  Until discontinued         04/04/25 2312     IP VTE HIGH RISK PATIENT  Once         04/04/25 2312     Place sequential compression device  Until discontinued         04/04/25 2312                    Discharge Planning   YOLA: 4/12/2025     Code Status: Full Code   Medical Readiness for Discharge Date:   Discharge Plan A: Home with family   Discharge Delays: None known at this time       Hollie's mobility limitation cannot be sufficiently resolved by the use of a cane. Her functional mobility deficit can be sufficiently resolved with the use of a Rolling Walker. Patient's mobility limitation significantly impairs their ability to participate in one of more activities of daily living.  The use of a RW will significantly improve the patient's ability to participate  in MRADLS and the patient will use it on regular basis in the home.     Emmanuel Caldwell PA-C  Department of [facility] Medicine   [facility] - Telemetry

## 2025-04-15 NOTE — NURSING
[facility]  Nurses Note -- 4 Eyes      4/14/2025       Skin assessed on: {4EYES A T PAULA WW Qshift:07023}      [] No Pressure Injuries Present    []Prevention Measures Documented    [] Yes LDA  for Pressure Injury Previously documented     [] Yes New Pressure Injury Discovered   [] LDA for New Pressure Injury Added      Attending RN:  Zafar Renteria RN     Second RN:  ***

## 2025-04-15 NOTE — PROGRESS NOTES
Hollie requires the head of the bed to be elevated more than 30 degrees most of the time due to CHF, Chromic pulmonary disease, or problems with aspiration.The positioning of the body cannot be sufficiently resolved by the use of pillows and wedges.      Emmanuel Caldwell PA-C  Department of Hospital Medicine   [facility]

## 2025-04-15 NOTE — PT/OT/SLP PROGRESS
Occupational Therapy      Patient Name:  Hollie Ponce   MRN:  [id number]    Patient not seen today secondary to Other (Comment)  secondary to PT completing a full evaluation and there are no acute care needs for OT.  4/15/2025

## 2025-04-15 NOTE — ASSESSMENT & PLAN NOTE
Initially presented with SOB wheezing and cough. Found to have an NSTEMI as well attributed to type 2 non-obs CAD on [facility]. Goal O2 sats 88 to 92% with severe COPD by previous PFTs.   Completed doxy, transitioned to oral prednisone  Seen by pulmonary-and signed off  ENT consulted scope exam without signs of sinus infection though thrust present  Saline rinse followed by flonase/asteline. Continue nystatin  Formal testing for home oxygen today with plans to D/C 4/16

## 2025-04-15 NOTE — PLAN OF CARE
Problem: Adult Inpatient Plan of Care  Goal: Plan of Care Review  Outcome: Progressing     Problem: COPD (Chronic Obstructive Pulmonary Disease)  Goal: Optimal Chronic Illness Coping  Outcome: Progressing  Goal: Effective Oxygenation and Ventilation  Outcome: Progressing     Problem: Fall Injury Risk  Goal: Absence of Fall and Fall-Related Injury  Outcome: Progressing

## 2025-04-16 VITALS
TEMPERATURE: 98 F | DIASTOLIC BLOOD PRESSURE: 63 MMHG | SYSTOLIC BLOOD PRESSURE: 122 MMHG | HEART RATE: 83 BPM | BODY MASS INDEX: 33.53 KG/M2 | HEIGHT: 56 IN | OXYGEN SATURATION: 100 % | WEIGHT: 149.06 LBS | RESPIRATION RATE: 18 BRPM

## 2025-04-16 LAB
ABSOLUTE EOSINOPHIL (OHS): 0 K/UL
ABSOLUTE MONOCYTE (OHS): 0.72 K/UL (ref 0.3–1)
ABSOLUTE NEUTROPHIL COUNT (OHS): 9.21 K/UL (ref 1.8–7.7)
ANION GAP (OHS): 7 MMOL/L (ref 8–16)
BASOPHILS # BLD AUTO: 0.02 K/UL
BASOPHILS NFR BLD AUTO: 0.2 %
BUN SERPL-MCNC: 16 MG/DL (ref 8–23)
CALCIUM SERPL-MCNC: 9.3 MG/DL (ref 8.7–10.5)
CHLORIDE SERPL-SCNC: 97 MMOL/L (ref 95–110)
CO2 SERPL-SCNC: 33 MMOL/L (ref 23–29)
CREAT SERPL-MCNC: 0.7 MG/DL (ref 0.5–1.4)
ERYTHROCYTE [DISTWIDTH] IN BLOOD BY AUTOMATED COUNT: 13 % (ref 11.5–14.5)
GFR SERPLBLD CREATININE-BSD FMLA CKD-EPI: >60 ML/MIN/1.73/M2
GLUCOSE SERPL-MCNC: 117 MG/DL (ref 70–110)
HCT VFR BLD AUTO: 40.1 % (ref 37–48.5)
HGB BLD-MCNC: 12.5 GM/DL (ref 12–16)
IMM GRANULOCYTES # BLD AUTO: 0.2 K/UL (ref 0–0.04)
IMM GRANULOCYTES NFR BLD AUTO: 1.7 % (ref 0–0.5)
LYMPHOCYTES # BLD AUTO: 1.74 K/UL (ref 1–4.8)
MCH RBC QN AUTO: 28.8 PG (ref 27–31)
MCHC RBC AUTO-ENTMCNC: 31.2 G/DL (ref 32–36)
MCV RBC AUTO: 92 FL (ref 82–98)
NUCLEATED RBC (/100WBC) (OHS): 0 /100 WBC
PLATELET # BLD AUTO: 260 K/UL (ref 150–450)
PMV BLD AUTO: 10 FL (ref 9.2–12.9)
POTASSIUM SERPL-SCNC: 4.2 MMOL/L (ref 3.5–5.1)
RBC # BLD AUTO: 4.34 M/UL (ref 4–5.4)
RELATIVE EOSINOPHIL (OHS): 0 %
RELATIVE LYMPHOCYTE (OHS): 14.6 % (ref 18–48)
RELATIVE MONOCYTE (OHS): 6.1 % (ref 4–15)
RELATIVE NEUTROPHIL (OHS): 77.4 % (ref 38–73)
SODIUM SERPL-SCNC: 137 MMOL/L (ref 136–145)
WBC # BLD AUTO: 11.89 K/UL (ref 3.9–12.7)

## 2025-04-16 PROCEDURE — 27000221 HC OXYGEN, UP TO 24 HOURS

## 2025-04-16 PROCEDURE — 36415 COLL VENOUS BLD VENIPUNCTURE: CPT | Performed by: HOSPITALIST

## 2025-04-16 PROCEDURE — 94640 AIRWAY INHALATION TREATMENT: CPT

## 2025-04-16 PROCEDURE — 25000242 PHARM REV CODE 250 ALT 637 W/ HCPCS: Performed by: INTERNAL MEDICINE

## 2025-04-16 PROCEDURE — 85025 COMPLETE CBC W/AUTO DIFF WBC: CPT | Performed by: HOSPITALIST

## 2025-04-16 PROCEDURE — 25000003 PHARM REV CODE 250: Performed by: OTOLARYNGOLOGY

## 2025-04-16 PROCEDURE — 94761 N-INVAS EAR/PLS OXIMETRY MLT: CPT

## 2025-04-16 PROCEDURE — 94664 DEMO&/EVAL PT USE INHALER: CPT

## 2025-04-16 PROCEDURE — 97116 GAIT TRAINING THERAPY: CPT | Mod: CQ

## 2025-04-16 PROCEDURE — 97530 THERAPEUTIC ACTIVITIES: CPT | Mod: CQ

## 2025-04-16 PROCEDURE — 99900035 HC TECH TIME PER 15 MIN (STAT)

## 2025-04-16 PROCEDURE — 25000003 PHARM REV CODE 250: Performed by: INTERNAL MEDICINE

## 2025-04-16 PROCEDURE — 82310 ASSAY OF CALCIUM: CPT | Performed by: HOSPITALIST

## 2025-04-16 PROCEDURE — 25000003 PHARM REV CODE 250: Performed by: PHYSICIAN ASSISTANT

## 2025-04-16 PROCEDURE — 25000003 PHARM REV CODE 250: Performed by: HOSPITALIST

## 2025-04-16 PROCEDURE — 63600175 PHARM REV CODE 636 W HCPCS: Performed by: PHYSICIAN ASSISTANT

## 2025-04-16 RX ORDER — ROSUVASTATIN CALCIUM 20 MG/1
40 TABLET, COATED ORAL NIGHTLY
Qty: 180 TABLET | Refills: 3 | Status: SHIPPED | OUTPATIENT
Start: 2025-04-16 | End: 2026-04-16

## 2025-04-16 RX ORDER — NYSTATIN 100000 [USP'U]/ML
500000 SUSPENSION ORAL 4 TIMES DAILY
Qty: 100 ML | Refills: 0 | Status: SHIPPED | OUTPATIENT
Start: 2025-04-16 | End: 2025-04-21

## 2025-04-16 RX ORDER — PREDNISONE 50 MG/1
50 TABLET ORAL ONCE
Status: COMPLETED | OUTPATIENT
Start: 2025-04-16 | End: 2025-04-16

## 2025-04-16 RX ORDER — PREDNISONE 20 MG/1
TABLET ORAL
Qty: 10 TABLET | Refills: 0 | Status: SHIPPED | OUTPATIENT
Start: 2025-04-17 | End: 2025-04-25

## 2025-04-16 RX ORDER — HYDROXYZINE HYDROCHLORIDE 25 MG/1
25 TABLET, FILM COATED ORAL DAILY PRN
Qty: 20 TABLET | Refills: 0 | Status: SHIPPED | OUTPATIENT
Start: 2025-04-16

## 2025-04-16 RX ORDER — VITAMIN A 3000 MCG
1 CAPSULE ORAL
Qty: 44 ML | Refills: 1 | Status: SHIPPED | OUTPATIENT
Start: 2025-04-16

## 2025-04-16 RX ORDER — AZELASTINE 1 MG/ML
1 SPRAY, METERED NASAL 2 TIMES DAILY
Qty: 30 ML | Refills: 0 | Status: SHIPPED | OUTPATIENT
Start: 2025-04-16 | End: 2025-04-28 | Stop reason: SDUPTHER

## 2025-04-16 RX ORDER — PREDNISONE 20 MG/1
40 TABLET ORAL DAILY
Status: DISCONTINUED | OUTPATIENT
Start: 2025-04-17 | End: 2025-04-16 | Stop reason: HOSPADM

## 2025-04-16 RX ADMIN — IPRATROPIUM BROMIDE AND ALBUTEROL SULFATE 3 ML: 2.5; .5 SOLUTION RESPIRATORY (INHALATION) at 12:04

## 2025-04-16 RX ADMIN — NYSTATIN 500000 UNITS: 100000 SUSPENSION ORAL at 08:04

## 2025-04-16 RX ADMIN — PREDNISONE 50 MG: 50 TABLET ORAL at 08:04

## 2025-04-16 RX ADMIN — ASPIRIN 81 MG: 81 TABLET, COATED ORAL at 08:04

## 2025-04-16 RX ADMIN — HYDROXYZINE HYDROCHLORIDE 25 MG: 25 TABLET ORAL at 08:04

## 2025-04-16 RX ADMIN — BUDESONIDE 0.5 MG: 0.5 INHALANT RESPIRATORY (INHALATION) at 07:04

## 2025-04-16 RX ADMIN — IPRATROPIUM BROMIDE AND ALBUTEROL SULFATE 3 ML: 2.5; .5 SOLUTION RESPIRATORY (INHALATION) at 07:04

## 2025-04-16 RX ADMIN — GUAIFENESIN 600 MG: 600 TABLET, EXTENDED RELEASE ORAL at 08:04

## 2025-04-16 RX ADMIN — CARVEDILOL 3.12 MG: 3.12 TABLET, FILM COATED ORAL at 08:04

## 2025-04-16 RX ADMIN — FLUTICASONE PROPIONATE 100 MCG: 50 SPRAY, METERED NASAL at 08:04

## 2025-04-16 RX ADMIN — AZELASTINE HYDROCHLORIDE 137 MCG: 137 SPRAY, METERED NASAL at 08:04

## 2025-04-16 RX ADMIN — LISINOPRIL 40 MG: 20 TABLET ORAL at 08:04

## 2025-04-16 RX ADMIN — MUPIROCIN: 20 OINTMENT TOPICAL at 08:04

## 2025-04-16 RX ADMIN — ARFORMOTEROL TARTRATE 15 MCG: 15 SOLUTION RESPIRATORY (INHALATION) at 07:04

## 2025-04-16 NOTE — PT/OT/SLP PROGRESS
Physical Therapy Treatment    Patient Name:  Hollie Ponce   MRN:  [id number]    Recommendations:     Discharge Recommendations: Low Intensity Therapy  Discharge Equipment Recommendations: walker, rolling, oxygen, shower chair  Barriers to discharge: None    Assessment:     Hollie Ponce is a 69 y.o. female admitted with a medical diagnosis of Acute exacerbation of COPD with asthma.  She presents with the following impairments/functional limitations: weakness, impaired endurance, gait instability, impaired balance, decreased lower extremity function, pain, decreased safety awareness, decreased ROM, impaired cardiopulmonary response to activity .    Rehab Prognosis: Good; patient would benefit from acute skilled PT services to address these deficits and reach maximum level of function.    Recent Surgery: Procedure(s) (LRB):  Cardiac catheterization (N/A)  Angiogram, Coronary, with Left Heart Cath 10 Days Post-Op    Plan:     During this hospitalization, patient to be seen 3 x/week to address the identified rehab impairments via gait training, therapeutic activities, therapeutic exercises and progress toward the following goals:    Plan of Care Expires:  04/28/25    Subjective     Chief Complaint: weakness   Patient/Family Comments/goals: pt is pleasant and agreeable to therapy   Pain/Comfort:  Pain Rating 1: 0/10  Pain Rating Post-Intervention 1: 0/10      Objective:     Communicated with nurse prior to session.  Patient found up in chair with telemetry, peripheral IV, oxygen upon PT entry to room.     General Precautions: Standard, fall, respiratory  Orthopedic Precautions: N/A  Braces: N/A  Respiratory Status: Nasal cannula, flow 2 L/min     Functional Mobility:  Transfers:  VC's for safety technique and walker management .    Sit to Stand:  x 6  trials from chair stand by assistance/ supervision  with rolling walker  Gait: Patient ambulated 200 ft  on level tile with Rolling Walker with S ( 2L O2NC)  .  Pt  required  1 standing rest break 2* to fatigude (SpO2: 93%-98% on 2L O2NC) . Noted with decreased maximiliano, decreased velocity of limb motion, decreased step length,  decreased toe-to-floor clearance and decreased weight-shifting ability.Impairments contributing to gait deviations include impaired balance, decreased flexibility, decreased ROM and decreased strength. V/T cues for safety technique and walker management.  VC's for pursed lip breathing technique.   Balance: good in sitting, fair+ in standing.        AM-PAC 6 CLICK MOBILITY  Turning over in bed (including adjusting bedclothes, sheets and blankets)?: 4  Sitting down on and standing up from a chair with arms (e.g., wheelchair, bedside commode, etc.): 4  Moving from lying on back to sitting on the side of the bed?: 4  Moving to and from a bed to a chair (including a wheelchair)?: 3  Need to walk in hospital room?: 3  Climbing 3-5 steps with a railing?: 3  Basic Mobility Total Score: 21       Treatment & Education:  Re-educated pt on pursed lip breathing technique and energy conservation techniques. Pt verbalized understanding.   Instructed and encouraged pt to perform BLE AP, QS , GS while in bed or reclined chair throughout the day, pt verbalized understanding.     Patient left up in chair with all lines intact, call button in reach, and nurse notified..    GOALS:   Multidisciplinary Problems       Physical Therapy Goals       Not on file              Multidisciplinary Problems (Resolved)          Problem: Physical Therapy    Goal Priority Disciplines Outcome Interventions   Physical Therapy Goal   (Resolved)     PT, PT/OT Met    Description: Goals to be met by: 25     Patient will increase functional independence with mobility by performin. Supine to sit with Modified [place]  2. Rolling to Left and Right with Modified [place]  3. Sit to stand transfer with Modified [place]  4. Bed to chair transfer with Modified [place]  5.  Gait x250 feet with Modified [place] using Rolling Walker   6. Ascend/descend ~6 steps with one Handrail Modified [place] using No Assistive Device  7. Upper/Lower extremity exercise program 2 sets x15 reps per handout, with [place]                         DME Justifications:      Time Tracking:     PT Received On: 04/16/25  PT Start Time: 1030     PT Stop Time: 1059  PT Total Time (min): 29 min     Billable Minutes: Gait Training 19 and Therapeutic Activity 10    Treatment Type: Treatment  PT/PTA: PTA     Number of PTA visits since last PT visit: 1 04/16/2025

## 2025-04-16 NOTE — NURSING
Pt being discharged home. Pt showed readiness to be discharged. All questions answered. NAD noted.

## 2025-04-16 NOTE — PLAN OF CARE
Problem: Adult Inpatient Plan of Care  Goal: Plan of Care Review  Outcome: Met  Goal: Patient-Specific Goal (Individualized)  Outcome: Met  Goal: Absence of Hospital-Acquired Illness or Injury  Outcome: Met  Goal: Optimal Comfort and Wellbeing  Outcome: Met  Goal: Readiness for Transition of Care  Outcome: Met     Problem: COPD (Chronic Obstructive Pulmonary Disease)  Goal: Optimal Chronic Illness Coping  Outcome: Met  Goal: Optimal Level of Functional [place]  Outcome: Met  Goal: Absence of Infection Signs and Symptoms  Outcome: Met  Goal: Improved Oral Intake  Outcome: Met  Goal: Effective Oxygenation and Ventilation  Outcome: Met     Problem: Infection  Goal: Absence of Infection Signs and Symptoms  Outcome: Met     Problem: Fall Injury Risk  Goal: Absence of Fall and Fall-Related Injury  Outcome: Met     Problem: Skin Injury Risk Increased  Goal: Skin Health and Integrity  Outcome: Met     Problem: Physical Therapy  Goal: Physical Therapy Goal  Description: Goals to be met by: 25     Patient will increase functional independence with mobility by performin. Supine to sit with Modified [place]  2. Rolling to Left and Right with Modified [place]  3. Sit to stand transfer with Modified [place]  4. Bed to chair transfer with Modified [place]  5. Gait x250 feet with Modified [place] using Rolling Walker   6. Ascend/descend ~6 steps with one Handrail Modified [place] using No Assistive Device  7. Upper/Lower extremity exercise program 2 sets x15 reps per handout, with [place]    Outcome: Met

## 2025-04-16 NOTE — PLAN OF CARE
AVS virtually reviewed with patient in its entirety with emphasis on diet, medications, follow-up appointments and reasons to return to the ED. Patient also encouraged to utilize their patient portal. Ease and convenience of use reiterated. Education complete and patient voiced understanding. All questions answered. Discharge teaching complete.

## 2025-04-16 NOTE — DISCHARGE INSTRUCTIONS
Our goal at [facility] is to always give you outstanding care and exceptional service. You may receive a survey from [employer] by mail, text or e-mail in the next 24-48 hours asking about the care you received with us. The survey should only take 5-10 minutes to complete and is very important to us.     Your feedback provides us with a way to recognize our staff who work tirelessly to provide the best care! Also, your responses help us learn how to improve when your experience was below our aspiration of excellence. We are always looking for ways to improve your stay. We WILL use your feedback to continue making improvements to help us provide the highest quality care. We keep your personal information and feedback confidential. We appreciate your time completing this survey and can't wait to hear from you!!!    We look forward to your continued care with us! Thanks so much for choosing [facility] for your healthcare needs!

## 2025-04-16 NOTE — PLAN OF CARE
Case Management Final Discharge Note    [facility]charge [facility] [phone]     New DME ordered / company name: [facility] rolling walker, Oxygen, and hospital bed delivered to patient home    Relevant SDOH / Transition of Care Barriers:  None    Person available to provide assistance at home when needed and their contact information: Ramesh Ponce (Daughter) [phone]     Scheduled followup appointment: Follow up appointments with PCP, Cardiology, and Otorhinolaryngology scheduled and added to patient AVS    Referrals placed: Referrals placed to ENT, Ophthalmology, and Smoking Cessation Program    Transportation: Wheelchair van requested to transport patient to facility         Patient and family educated on discharge services and updated on DC plan.  Patient to discharge with home health from [facility]; she lives with her daughter who is able to assist her as needed.  Bedside RN notified, patient clear to discharge from Case Management Perspective.    04/16/25 1036   Final Note   Assessment Type Final Discharge Note   Anticipated Discharge Disposition Home-Health   Hospital Resources/Appts/Education Provided Provided patient/caregiver with written discharge plan information;Appointments scheduled and added to AVS;Post-Acute resouces added to AVS   Post-Acute Status   Post-Acute Authorization Home Health   Home Health Status Set-up Complete/Auth obtained   Coverage [employer] MANAGED MEDICARE - [employer] [facility] HMO PPO SPECIAL NEEDS -   Discharge Delays None known at this time

## 2025-04-16 NOTE — PLAN OF CARE
[facility]  HOME HEALTH ORDERS  FACE TO FACE ENCOUNTER    Patient Name: Hollie Ponce  YOB: 1955    PCP: Ariadne Smith MD   PCP Address: [address] / [place] [address]  PCP Phone Number: [phone]  PCP Fax: [phone]    Encounter Date: 4/4/25    Admit to Home Health    Diagnoses:  Active Hospital Problems    Diagnosis  POA    *Acute exacerbation of COPD with asthma [J44.1]  Yes    Alkalosis [E87.3]  Yes    NSTEMI (non-ST elevated myocardial infarction) [I21.4]  Yes    Carotid atherosclerosis, bilateral [I65.23]  Yes    Dyslipidemia [E78.5]  Yes    Tobacco dependency [F17.200]  Yes    COPD exacerbation [J44.1]  Yes    Acute respiratory failure with hypoxia [J96.01]  Yes    Pulmonary HTN [I27.20]  Yes     pasp of 49 mmHg.  group III with severe copd.      PAD (peripheral artery disease) [I73.9]  Yes    CAD (coronary artery disease) [I25.10]  Yes    Essential hypertension [I10]  Yes      Resolved Hospital Problems   No resolved problems to display.       Follow Up Appointments:  Future Appointments   Date Time Provider Department Center   4/23/2025  9:40 AM Ariadne Smith MD ALG FAM MED [place]   4/28/2025  9:45 AM Janiya Joya MD [facility]   5/28/2025  9:00 AM Jhonny Schofield MD [facility] CARDIO [place]   10/3/2025  9:00 AM LAB, [facility] DRAW STATION [facility] LAB [facility]   10/7/2025 11:40 AM Ariadne Smith MD [facility] FAM MED [place]       Allergies:  Review of patient's allergies indicates:   Allergen Reactions    Flagyl [metronidazole hcl]      Hives      Sulfamethoxazole-trimethoprim Itching    Aspirin Nausea Only    Lipitor [atorvastatin]      Myalgia        Medications: Review discharge medications with patient and family and provide education.    Current Medications[1]     Medication List        START taking these medications      azelastine 137 mcg (0.1 %) nasal spray  Commonly known as: ASTELIN  1 spray (137 mcg total) by Nasal route 2 (two) times daily.      hydrOXYzine HCL 25 MG tablet  Commonly known as: ATARAX  Take 1 tablet (25 mg total) by mouth daily as needed for Anxiety.     nystatin 100,000 unit/mL suspension  Commonly known as: MYCOSTATIN  Take 5 mLs (500,000 Units total) by mouth 4 (four) times daily. for 5 days     predniSONE 20 MG tablet  Commonly known as: DELTASONE  Take 2 tablets (40 mg total) by mouth once daily for 2 days, THEN 1.5 tablets (30 mg total) once daily for 2 days, THEN 1 tablet (20 mg total) once daily for 2 days, THEN 0.5 tablets (10 mg total) once daily for 2 days.  Start taking on: April 17, 2025            CHANGE how you take these medications      rosuvastatin 20 MG tablet  Commonly known as: CRESTOR  Take 2 tablets (40 mg total) by mouth every evening.  What changed: how much to take            CONTINUE taking these medications      470V TWO WAY VALVE Misc  Generic drug: miscellaneous medical supply  Disp nebulizer and tubing ,medicine reservoir,and mask  So as to use  Every 4-6 hrs for sob/wheeze     * fluticasone-salmeterol 100-50 mcg/dose 100-50 mcg/dose diskus inhaler  Commonly known as: ADVAIR  Inhale 1 puff into the lungs every 12 (twelve) hours.     * ADVAIR -21 mcg/actuation Hfaa inhaler  Generic drug: fluticasone-salmeterol 230-21 mcg/dose  INHALE 2 PUFFS INTO THE LUNGS TWICE DAILY     * albuterol sulfate 2.5 mg/0.5 mL Nebu  Inhale into the lungs.     * albuterol 90 mcg/actuation inhaler  Commonly known as: PROVENTIL/VENTOLIN HFA  Inhale 2 puffs into the lungs every 6 (six) hours as needed for Wheezing or Shortness of Breath. Rescue     albuterol-ipratropium 2.5 mg-0.5 mg/3 mL nebulizer solution  Commonly known as: DUO-NEB  Take 3 mLs by nebulization every 4 (four) hours as needed for Wheezing.     aspirin 81 MG EC tablet  Commonly known as: ECOTRIN  Take 81 mg by mouth.     carvediloL 3.125 MG tablet  Commonly known as: COREG  Take 1 tablet (3.125 mg total) by mouth 2 (two) times daily. TAKE 1 TABLET(3.125 MG) BY  MOUTH TWICE DAILY WITH MEALS     fluticasone propionate 50 mcg/actuation nasal spray  Commonly known as: FLONASE  SHAKE LIQUID AND USE 2 SPRAYS(100 MCG) IN EACH NOSTRIL EVERY DAY     hydroCHLOROthiazide 25 MG tablet  Commonly known as: HYDRODIURIL  Take 1 tablet (25 mg total) by mouth once daily.     lisinopriL 40 MG tablet  Commonly known as: PRINIVIL,ZESTRIL  Take 1 tablet (40 mg total) by mouth once daily.     montelukast 10 mg tablet  Commonly known as: SINGULAIR  Take 1 tablet (10 mg total) by mouth every evening.     nicotine polacrilex 2 MG Lozg  Take 1 lozenge (2 mg total) by mouth as needed. Use 1-2 per hour in place of a cigarette. Limit to 6 a day.     tiotropium 18 mcg inhalation capsule  Commonly known as: SPIRIVA WITH HANDIHALER  Inhale 1 capsule (18 mcg total) into the lungs Daily. INHALE THE CONTENTS OF 1 CAPSULE(18 MCG) INTO THE LUNGS EVERY DAY           * This list has 4 medication(s) that are the same as other medications prescribed for you. Read the directions carefully, and ask your doctor or other care provider to review them with you.                STOP taking these medications      clopidogreL 75 mg tablet  Commonly known as: PLAVIX     loratadine 10 mg tablet  Commonly known as: CLARITIN                I have seen and examined this patient within the last 30 days. My clinical findings that support the need for the home health skilled services and home bound status are the following:no   Weakness/numbness causing balance and gait disturbance due to COPD Exacerbation and Weakness/Debility making it taxing to leave home.     Diet:   cardiac diet    Labs:  Report Lab results to PCP.    Referrals/ Consults  Physical Therapy to evaluate and treat. Evaluate for home safety and equipment needs; Establish/upgrade home exercise program. Perform / instruct on therapeutic exercises, gait training, transfer training, and Range of Motion.  Occupational Therapy to evaluate and treat. Evaluate home  environment for safety and equipment needs. Perform/Instruct on transfers, ADL training, ROM, and therapeutic exercises.   to evaluate for community resources/long-range planning.  Aide to provide assistance with personal care, ADLs, and vital signs.    Activities:   activity as tolerated    Nursing:   Agency to admit patient within 24 hours of hospital discharge unless specified on physician order or at patient request    SN to complete comprehensive assessment including routine vital signs. Instruct on disease process and s/s of complications to report to MD. Review/verify medication list sent home with the patient at time of discharge  and instruct patient/caregiver as needed. Frequency may be adjusted depending on start of care date.     Skilled nurse to perform up to 3 visits PRN for symptoms related to diagnosis    Notify MD if SBP > 160 or < 90; DBP > 90 or < 50; HR > 120 or < 50; Temp > 101; O2 < 88%; Other:       Ok to schedule additional visits based on staff availability and patient request on consecutive days within the home health episode.    When multiple disciplines ordered:    Start of Care occurs on Sunday - Wednesday schedule remaining discipline evaluations as ordered on separate consecutive days following the start of care.    Thursday SOC -schedule subsequent evaluations Friday and Monday the following week.     Friday - Saturday SOC - schedule subsequent discipline evaluations on consecutive days starting Monday of the following week.    For all post-discharge communication and subsequent orders please contact patient's primary care physician.     Miscellaneous   Routine Skin for Bedridden Patients: Instruct patient/caregiver to apply moisture barrier cream to all skin folds and wet areas in perineal area daily and after baths and all bowel movements.  Home Oxygen:  Oxygen at 2 L/min nasal canula to be used:  Continuously. and Assess oxygen saturation via pulse oximeter as needed  for increase in SOB.    Home Health Aide:  Nursing Three times weekly, Physical Therapy Three times weekly, Occupational Therapy Three times weekly, Medical Social Work Three times weekly, and Home Health Aide Three times weekly    Wound Care Orders  no    I certify that this patient is confined to her home and needs intermittent skilled nursing care, physical therapy, and occupational therapy.              [1]   Current Facility-Administered Medications   Medication Dose Route Frequency Provider Last Rate Last Admin    acetaminophen tablet 650 mg  650 mg Oral Q8H PRN Jhonny Schofield MD        acetaminophen tablet 650 mg  650 mg Oral Q4H PRN Jhonny Schofield MD   650 mg at 04/06/25 0622    albuterol sulfate nebulizer solution 2.5 mg  2.5 mg Nebulization Q4H PRN Franky Peguero MD        albuterol-ipratropium 2.5 mg-0.5 mg/3 mL nebulizer solution 3 mL  3 mL Nebulization Q6H Chun Grimm MD   3 mL at 04/16/25 0035    aluminum-magnesium hydroxide-simethicone 200-200-20 mg/5 mL suspension 30 mL  30 mL Oral QID PRN Jhonny Schofield MD        arformoteroL nebulizer solution 15 mcg  15 mcg Nebulization BID Chun Grimm MD   15 mcg at 04/15/25 1937    aspirin EC tablet 81 mg  81 mg Oral Daily Jhonny Schofield MD   81 mg at 04/15/25 0850    atropine injection 0.5 mg  0.5 mg Intravenous PRN Jhonny Schofield MD        azelastine 137 mcg (0.1 %) nasal spray 137 mcg  1 spray Nasal BID Janiya Joya MD   137 mcg at 04/15/25 2225    benzonatate capsule 100 mg  100 mg Oral TID PRN Jhonny Schofield MD   100 mg at 04/14/25 0840    bisacodyL suppository 10 mg  10 mg Rectal Daily PRN Jhonny Schofield MD        budesonide nebulizer solution 0.5 mg  0.5 mg Nebulization Q12H Chun Grimm MD   0.5 mg at 04/15/25 1937    carvediloL tablet 3.125 mg  3.125 mg Oral BID Jhonny Schofield MD   3.125 mg at 04/15/25 2223    cloNIDine tablet 0.1 mg  0.1 mg Oral Q6H PRN Jhonny Schofield MD   0.1 mg at 04/15/25 1617     diazePAM tablet 2 mg  2 mg Oral Daily with dinner Benito Blackburn Jr., NP   2 mg at 04/15/25 1616    enoxaparin injection 40 mg  40 mg Subcutaneous Q24H (prophylaxis, 1700) Emmanuel Caldwell PA-C   40 mg at 04/15/25 1616    fluticasone propionate 50 mcg/actuation nasal spray 100 mcg  2 spray Each Nostril Daily Janiya Joya MD   100 mcg at 04/15/25 1042    glucagon (human recombinant) injection 1 mg  1 mg Intramuscular PRN Jhonny Schofield MD        glucose chewable tablet 16 g  16 g Oral PRN Jhonny Schofield MD        glucose chewable tablet 24 g  24 g Oral PRN Jhonny Schofield MD        guaiFENesin 12 hr tablet 600 mg  600 mg Oral BID Emmanuel Caldwell PA-C   600 mg at 04/15/25 2223    hydrALAZINE injection 5 mg  5 mg Intravenous Q6H PRN Benito Blackburn Jr., NP        HYDROcodone-acetaminophen 5-325 mg per tablet 1 tablet  1 tablet Oral Q4H PRN Jhonny Schofield MD        hydrOXYzine HCL tablet 25 mg  25 mg Oral Daily Benito Blackburn Jr., NP   25 mg at 04/15/25 0850    hydrOXYzine pamoate capsule 25 mg  25 mg Oral Q8H PRN Jhonny Schofield MD   25 mg at 04/15/25 2223    lisinopriL tablet 40 mg  40 mg Oral Daily Jhonny Schofield MD   40 mg at 04/15/25 0850    magnesium oxide tablet 800 mg  800 mg Oral PRN Jhonny Schofield MD        magnesium oxide tablet 800 mg  800 mg Oral PRN Jhonny Schofield MD        melatonin tablet 6 mg  6 mg Oral Nightly PRN Benito Blackburn Jr., NP        montelukast tablet 10 mg  10 mg Oral QHS Jhonny Schofield MD   10 mg at 04/15/25 2223    mupirocin 2 % ointment   Topical (Top) BID Janiya Joya MD   Given at 04/15/25 2223    naloxone 0.4 mg/mL injection 0.02 mg  0.02 mg Intravenous PRN Jhonny Schofield MD        nicotine 21 mg/24 hr 1 patch  1 patch Transdermal Daily PRN Jhonny Schofield MD        nystatin 100,000 unit/mL suspension 500,000 Units  500,000 Units Oral QID Janiya Joya MD   500,000 Units at 04/15/25 8062     ondansetron injection 4 mg  4 mg Intravenous Q8H PRN Jhonny Schofield MD        polyethylene glycol packet 17 g  17 g Oral BID Benito Blackburn Jr., NP   17 g at 04/13/25 0804    potassium bicarbonate disintegrating tablet 35 mEq  35 mEq Oral PRN Jhonny Schofield MD        potassium bicarbonate disintegrating tablet 50 mEq  50 mEq Oral PRN Jhonny Schofield MD        potassium bicarbonate disintegrating tablet 60 mEq  60 mEq Oral PRN Jhonny Schofield MD        potassium, sodium phosphates 280-160-250 mg packet 2 packet  2 packet Oral PRN Jhonny Schofield MD        potassium, sodium phosphates 280-160-250 mg packet 2 packet  2 packet Oral PRN [place]Jhonny clarke MD        potassium, sodium phosphates 280-160-250 mg packet 2 packet  2 packet Oral PRN Jhonny Schofield MD        [START ON 4/17/2025] predniSONE tablet 40 mg  40 mg Oral Daily ShowEmmanuel delvalle PA-C        predniSONE tablet 50 mg  50 mg Oral Once ShowEmmanuel delvalle PA-C        senna-docusate 8.6-50 mg per tablet 1 tablet  1 tablet Oral Daily PRN Jhonny Schofield MD        simethicone chewable tablet 80 mg  1 tablet Oral QID PRN Jhonny Schofield MD        sodium chloride 0.9% bolus 250 mL 250 mL  250 mL Intravenous PRN Jhonny Schofield MD        sodium chloride 0.9% flush 10 mL  10 mL Intravenous Q12H PRN Jhonny Schofield MD        sodium chloride 0.9% flush 10 mL  10 mL Intravenous PRN Jhonny Schofield MD        sodium chloride 0.9% flush 3 mL  3 mL Intravenous PRN Jhonny Schofield MD

## 2025-04-16 NOTE — PLAN OF CARE
Problem: COPD (Chronic Obstructive Pulmonary Disease)  Goal: Optimal Level of Functional [place]  Outcome: Progressing  Goal: Effective Oxygenation and Ventilation  Outcome: Progressing     Problem: Fall Injury Risk  Goal: Absence of Fall and Fall-Related Injury  Outcome: Progressing

## 2025-04-16 NOTE — ASSESSMENT & PLAN NOTE
Initially presented with SOB wheezing and cough. Found to have an NSTEMI as well attributed to type 2 non-obs CAD on [facility]. Goal O2 sats 88 to 92% with severe COPD by previous PFTs.   Completed doxy, transitioned to oral prednisone  Seen by pulmonary-and signed off  ENT consulted scope exam without signs of sinus infection though thrust present  Saline rinse followed by flonase/asteline. Continue nystatin  Formal testing for home oxygen today with plans to D/C 4/16

## 2025-04-16 NOTE — DISCHARGE SUMMARY
[facility] Medicine  Discharge Summary      Patient Name: Hollie Ponce  MRN: [id number]  [facility]: [id number]  Patient Class: IP- Inpatient  Admission Date: 4/4/2025  Hospital Length of Stay: 11 days  Discharge Date and Time: 04/16/2025 11:15 AM  Attending Physician: Sylvain Miranda DO   Discharging Provider: Antoinette Caldwell PA-C  Primary Care Provider: Ariadne Smith MD    Primary Care Team: ANTOINETTE CALDWELL    HPI:   This is a 69-year-old female with a past medical history of COPD (not on O2), nonobstructive CAD, hypertension, peripheral artery disease, tobacco use who presents with dyspnea.      Patient presents for evaluation of shortness of breath that started on the day of presentation.  She reports worsening exertional dyspnea, associated with a dry cough, and chest congestion.  She reports having rhinorrhea/sinus congestion.  Her daughter was sick with similar symptoms, but limited her exposure to the patient and was wearing a mask around her  Patient smokes 5 cigarettes daily.    In the ED, the patient was tachypneic (20s-40s), tachycardic (100s-110s), and mildly hypoxic (90%, requiring 3 L O2).  Labs were remarkable for hypokalemia (3.4), negative BNP (41), negative troponin (<0.006).  Chest x-ray showed no acute cardiopulmonary process.  Patient was given Solu-Medrol 125 mg IV, hydralazine 10 mg IV, DuoNeb x1, Ativan 0.5 mg p.o..  She was admitted for further management.    Procedure(s) (LRB):  Cardiac catheterization (N/A)  Angiogram, Coronary, with Left Heart Cath      Hospital Course:   Mrs. Ponce was hospitalized for COPD exacerbation after presenting with dyspnea and chest tightness.  She was noted to be tachypneic, tachycardic, and hypoxic on room air.  Supplemental oxygen initiated.  She received corticosteroids and nebulizer treatments with some symptomatic improvement.  Initial troponin negative; however, repeat troponin significantly elevated.  She reports persistent and some  mild chest tightness.  Cardiology consulted, appreciate recs.  Initiate treatment for NSTEMI. [facility] without evidence of obstructive CAD. Continued on COPD treatment.  Without much improvement initially, Pulmonology was consulted and steroids increased frequency and changed to IV.  Showing signs of improvement, will transition to PO steroids and begin to taper. Seen by ENT for nasal congestion, without signs of sinus infection though concerns for thrush. Started on nasal rinse, flonase, asteline, and nystatin. Outpatient ENT F/U.  Continued improvement in symptoms.  Ambulatory at 140 ft with physical therapy the day prior to discharge.  She qualified for home oxygen with ambulatory hypoxemia, but was not hypoxic at rest.  She will discharge home to complete a steroid taper with associated nasal rinse and antihistamines as per ENT.  She will follow up with the ENT and Pulmonary.  DME was set up with case management, and plan of care was discussed with the patient's daughter.  At discharge she has right without hypoxemia on home oxygen as provided.  Plavix was stopped at discharge as recommended by Cardiology while inpatient. Discussed CT of neck with ENT and trachea patent on CT neck still pending official radiology read.      Goals of Care Treatment Preferences:  Code Status: Full Code         Consults:   Consults (From admission, onward)          Status Ordering Provider     Inpatient consult to ENT  Once        Provider:  Janiya Joya MD    Acknowledged VASYL SCHWAB JR     Inpatient consult to Pulmonology  Once        Provider:  Mart Sharma MD    Completed VASYL SCHWAB JR     Inpatient consult to Social Work  Once        Provider:  (Not yet assigned)    Completed JHONNY ESCOBAR     Inpatient consult to Cardiology  Once        Provider:  Jhonny Escobar MD    Completed VASYL SCHWAB JR            Assessment & Plan  COPD exacerbation  Continue nebs, supplemental oxygen, and oral  "steroids and begin to taper  Hold lasix for now  Wean oxygen as tolerated  ENT consult appreciate recs    NSTEMI (non-ST elevated myocardial infarction)  Nonobstructive CAD on cath, continue med mgmt  Plan is for left heart catheterization 4/6/25.  Continue treatment for ACS and COPD.  Pending results    Patient presents with NSTEMI. Chest pain is currently controlled. Patient is currently on NSTEMI Pathway.    EKG reviewed. Troponins reviewed and results noted-   No results for input(s): "TROPONINI", "TROPONINIHS" in the last 168 hours.  .     Lipid panel reviewed and shows-     Lab Results   Component Value Date    LDLCALC 85.4 04/16/2024     Lab Results   Component Value Date    TRIG 32 04/05/2025         Medical management includes; Beta Blocker, Dual Anti-Platelet therapy, Anticoagulation, and High Intensity Stain Echo has been performed. Latest ECHO results are as follows- Results for orders placed during the hospital encounter of 04/04/25    Echo    Interpretation Summary    Left Ventricle: The left ventricle is normal in size. Normal wall thickness. There is normal systolic function with a visually estimated ejection fraction of 65 - 70%. Grade I diastolic dysfunction.    Right Ventricle: The right ventricle is normal in size. Systolic function is normal.    Mitral Valve: There is mild regurgitation.    Tricuspid Valve: There is mild regurgitation.    Pulmonary Artery: The estimated pulmonary artery systolic pressure is 55 mmHg.  .   Consult for cardiac rehab is not ordered. Patient counseled on lifestyle modifications- continue current medications. Cardiology is consulted. Plan of care reviewed with cardiology team. Continue to monitor patient closely and adjust therapy as needed.     Essential hypertension  Patient's blood pressure range in the last 24 hours was: BP  Min: 122/63  Max: 174/92.The patient's inpatient anti-hypertensive regimen is listed below:  Current Antihypertensives  carvediloL tablet " "3.125 mg, 2 times daily, Oral  lisinopriL tablet 40 mg, Daily, Oral  cloNIDine tablet 0.1 mg, Every 6 hours PRN, Oral  hydrALAZINE injection 5 mg, Every 6 hours PRN, Intravenous    Plan  - BP is controlled, no changes needed to their regimen    CAD (coronary artery disease)  Patient with known CAD which is controlled Will continue ASA, Plavix, and Statin and monitor for S/Sx of angina/ACS. Continue to monitor on telemetry. Minimal non-obs CAD did not require stent placement  PAD (peripheral artery disease)  History noted. Continue home medications.     Pulmonary HTN  Results for orders placed during the hospital encounter of 11/16/20    Echo Color Flow Doppler? Yes; Bubble Contrast? No    Interpretation Summary  · The left ventricle is normal in size with normal systolic function. The estimated ejection fraction is 55%.  · There is mild left ventricular concentric hypertrophy.  · Normal left ventricular diastolic function.  · Normal right ventricular size with normal right ventricular systolic function.  · Mild left atrial enlargement.  · Normal central venous pressure (3 mmHg).  · The estimated PA systolic pressure is 49 mmHg.  · There is pulmonary hypertension.    BNP  No results for input(s): "BNP", "BNPTRIAGEBLO" in the last 168 hours.      Optimize volume status     Tobacco dependency  Tobacco cessation not discussed with patient today. Nicotine replacement has been- prescribed  Acute respiratory failure with hypoxia  Patient with Hypoxic Respiratory failure which is Acute.  she is not on home oxygen. Supplemental oxygen was provided and noted-      .   Signs/symptoms of respiratory failure include- increased work of breathing and respiratory distress. Contributing diagnoses includes - COPD Labs and images were reviewed. Patient Has not had a recent ABG. Will treat underlying causes and adjust management of respiratory failure as follows- Wean O2 as tolerated   Testing for home oxygen today  Carotid " atherosclerosis, bilateral  Continue ASA/plavix/statin  Dyslipidemia  Continue statin  Acute exacerbation of COPD with asthma  Initially presented with SOB wheezing and cough. Found to have an NSTEMI as well attributed to type 2 non-obs CAD on [facility]. Goal O2 sats 88 to 92% with severe COPD by previous PFTs.   Completed doxy, transitioned to oral prednisone  Seen by pulmonary-and signed off  ENT consulted scope exam without signs of sinus infection though thrust present  Saline rinse followed by flonase/asteline. Continue nystatin  Formal testing for home oxygen today with plans to D/C 4/16    Alkalosis  As above. Diuresis stopped due to increasing bicarb  Final Active Diagnoses:    Diagnosis Date Noted POA    PRINCIPAL PROBLEM:  Acute exacerbation of COPD with asthma [J44.1] 04/08/2025 Yes    Alkalosis [E87.3] 04/13/2025 Yes    NSTEMI (non-ST elevated myocardial infarction) [I21.4] 04/05/2025 Yes    Carotid atherosclerosis, bilateral [I65.23] 04/05/2025 Yes    Dyslipidemia [E78.5] 04/05/2025 Yes    Tobacco dependency [F17.200] 04/04/2025 Yes    COPD exacerbation [J44.1] 04/04/2025 Yes    Acute respiratory failure with hypoxia [J96.01] 04/04/2025 Yes    Pulmonary HTN [I27.20] 12/14/2021 Yes    PAD (peripheral artery disease) [I73.9] 03/25/2021 Yes    CAD (coronary artery disease) [I25.10] 11/16/2020 Yes    Essential hypertension [I10] 02/04/2020 Yes      Problems Resolved During this Admission:       Discharged Condition: stable    Disposition: Home or Self Care    Follow Up:   Contact information for follow-up providers       Ariadne Smith MD Follow up.    Specialty: Internal Medicine  Contact information:  [address]  [place] [address]  [phone]               Janiya Joya MD Follow up.    Specialty: Otolaryngology  Contact information:  [address] [place] [id number] [phone]                       Contact information for after-discharge care       Home Medical Care       [facility],  "[facility] .    Services: Home Health Services, Home Medical , Home Nursing, Home Living Aide Services, Home Rehabilitation  Contact information:  [address]  [address] [place] [address]  [phone]                                 Patient Instructions:      OXYGEN FOR HOME USE     Order Specific Question Answer Comments   Liter Flow 2    Duration Continuous    Qualifying Test Performed at: Activity    Oxygen saturation at rest 98    Oxygen saturation with activity 83    Oxygen saturation with activity on oxygen 95    Portable mode: continuous    Route nasal cannula    Device: home concentrator with portable concentrator    Length of need (in months): 99 mos    Patient condition with qualifying saturation COPD    Height: 4' 8" (1.422 m)    Weight: 67.6 kg (149 lb 0.5 oz)    Alternative treatment measures have been tried or considered and deemed clinically ineffective. Yes      WALKER FOR HOME USE     Order Specific Question Answer Comments   Type of Walker: Jose (4'4"-5'6")    With wheels? Yes    Height: 4' 8" (1.422 m)    Weight: 67.6 kg (149 lb 0.5 oz)    Length of need (1-99 months): 99    Does patient have medical equipment at home? none    Please check all that apply: Patient's condition impairs ambulation.    Please check all that apply: Patient is unable to safely ambulate without equipment.    Please check all that apply: Patient needs help to get in and out of chair.      HOSPITAL BED FOR HOME USE     Order Specific Question Answer Comments   Type: Semi-electric    Length of need (1-99 months): 99    Does patient have medical equipment at home? none    Height: 4' 8" (1.422 m)    Weight: 67.6 kg (149 lb 0.5 oz)    Please check all that apply: Patient requires the head of bed to be elevated more than 30 degrees most of the time due to congestive heart failure, chronic pulmonary disease, or aspiration.  Pillows and wedges have been considered and ruled out.      Ambulatory " referral/consult to ENT   Standing Status: Future   Referral Priority: Urgent Referral Type: Consultation   Referral Reason: Specialty Services Required   Requested Specialty: Otolaryngology   Number of Visits Requested: 1     Ambulatory referral/consult to Ophthalmology   Standing Status: Future   Referral Priority: Routine Referral Type: Consultation   Referral Reason: Specialty Services Required   Requested Specialty: Ophthalmology   Number of Visits Requested: 1     Ambulatory referral/consult to Smoking Cessation Program   Standing Status: Future   Referral Priority: Routine Referral Type: Consultation   Referral Reason: Specialty Services Required   Requested Specialty: CTTS   Number of Visits Requested: 1     Diet Cardiac     Notify your health care provider if you experience any of the following:  temperature >100.4     Notify your health care provider if you experience any of the following:  persistent nausea and vomiting or diarrhea     Notify your health care provider if you experience any of the following:  increased confusion or weakness     Notify your health care provider if you experience any of the following:  persistent dizziness, light-headedness, or visual disturbances     Reason for not Prescribing Nicotine Replacement     Order Specific Question Answer Comments   Reason for not Prescribing: Not medically appropriate at this time      Activity as tolerated       Significant Diagnostic Studies: Labs: CMP   Recent Labs   Lab 04/15/25  0536 04/16/25  0518    137   K 4.1 4.2   CL 94* 97   CO2 37* 33*   BUN 19 16   CREATININE 0.7 0.7   CALCIUM 9.0 9.3   ANIONGAP 6* 7*    and CBC   Recent Labs   Lab 04/15/25  0536 04/16/25  0518   WBC 11.43 11.89   HGB 13.3 12.5   HCT 42.0 40.1    260       Pending Diagnostic Studies:       Procedure Component Value Units Date/Time    CT Soft Tissue Neck WO Contrast [[id number]] Resulted: 04/14/25 1353    Order Status: Sent Lab Status: In process Updated:  04/14/25 1401           Medications:  Reconciled Home Medications:      Medication List        START taking these medications      azelastine 137 mcg (0.1 %) nasal spray  Commonly known as: ASTELIN  Use 1 spray (137 mcg total) in each nostril 2 (two) times daily.     DEEP SEA NASAL 0.65 % nasal spray  Generic drug: sodium chloride  Use 1 spray in each nostril as needed for Congestion.     hydrOXYzine HCL 25 MG tablet  Commonly known as: ATARAX  Take 1 tablet (25 mg total) by mouth daily as needed for Anxiety.     nystatin 100,000 unit/mL suspension  Commonly known as: MYCOSTATIN  Take 5 mL by mouth 4 (four) times daily for 5 days     predniSONE 20 MG tablet  Commonly known as: DELTASONE  Take 2 tablets by mouth once daily for 2 days, THEN 1 & ½ tablets once daily for 2 days, THEN 1 tablet daily for 2 days, THEN ½ tablet daily for 2 days.  Start taking on: April 17, 2025            CHANGE how you take these medications      rosuvastatin 20 MG tablet  Commonly known as: CRESTOR  Take 2 tablets (40 mg total) by mouth every evening.  What changed: how much to take            CONTINUE taking these medications      470V TWO WAY VALVE Misc  Generic drug: miscellaneous medical supply  Disp nebulizer and tubing ,medicine reservoir,and mask  So as to use  Every 4-6 hrs for sob/wheeze     * fluticasone-salmeterol 100-50 mcg/dose 100-50 mcg/dose diskus inhaler  Commonly known as: ADVAIR  Inhale 1 puff into the lungs every 12 (twelve) hours.     * ADVAIR -21 mcg/actuation Hfaa inhaler  Generic drug: fluticasone-salmeterol 230-21 mcg/dose  INHALE 2 PUFFS INTO THE LUNGS TWICE DAILY     * albuterol sulfate 2.5 mg/0.5 mL Nebu  Inhale into the lungs.     * albuterol 90 mcg/actuation inhaler  Commonly known as: PROVENTIL/VENTOLIN HFA  Inhale 2 puffs into the lungs every 6 (six) hours as needed for Wheezing or Shortness of Breath. Rescue     albuterol-ipratropium 2.5 mg-0.5 mg/3 mL nebulizer solution  Commonly known as:  DUO-NEB  Take 3 mLs by nebulization every 4 (four) hours as needed for Wheezing.     aspirin 81 MG EC tablet  Commonly known as: ECOTRIN  Take 81 mg by mouth.     carvediloL 3.125 MG tablet  Commonly known as: COREG  Take 1 tablet (3.125 mg total) by mouth 2 (two) times daily. TAKE 1 TABLET(3.125 MG) BY MOUTH TWICE DAILY WITH MEALS     fluticasone propionate 50 mcg/actuation nasal spray  Commonly known as: FLONASE  SHAKE LIQUID AND USE 2 SPRAYS(100 MCG) IN EACH NOSTRIL EVERY DAY     hydroCHLOROthiazide 25 MG tablet  Commonly known as: HYDRODIURIL  Take 1 tablet (25 mg total) by mouth once daily.     lisinopriL 40 MG tablet  Commonly known as: PRINIVIL,ZESTRIL  Take 1 tablet (40 mg total) by mouth once daily.     montelukast 10 mg tablet  Commonly known as: SINGULAIR  Take 1 tablet (10 mg total) by mouth every evening.     nicotine polacrilex 2 MG Lozg  Take 1 lozenge (2 mg total) by mouth as needed. Use 1-2 per hour in place of a cigarette. Limit to 6 a day.     tiotropium 18 mcg inhalation capsule  Commonly known as: SPIRIVA WITH HANDIHALER  Inhale 1 capsule (18 mcg total) into the lungs Daily. INHALE THE CONTENTS OF 1 CAPSULE(18 MCG) INTO THE LUNGS EVERY DAY           * This list has 4 medication(s) that are the same as other medications prescribed for you. Read the directions carefully, and ask your doctor or other care provider to review them with you.                STOP taking these medications      clopidogreL 75 mg tablet  Commonly known as: PLAVIX     loratadine 10 mg tablet  Commonly known as: CLARITIN              Indwelling Lines/Drains at time of discharge:   Lines/Drains/Airways       Drain  Duration             Female External Urinary Catheter w/ Suction 04/06/25 1300 9 days                    Time spent on the discharge of patient: 37 minutes         Emmanuel Caldwell PA-C  Department of Hospital Medicine  [facility]

## 2025-04-16 NOTE — ASSESSMENT & PLAN NOTE
Patient's blood pressure range in the last 24 hours was: BP  Min: 122/63  Max: 174/92.The patient's inpatient anti-hypertensive regimen is listed below:  Current Antihypertensives  carvediloL tablet 3.125 mg, 2 times daily, Oral  lisinopriL tablet 40 mg, Daily, Oral  cloNIDine tablet 0.1 mg, Every 6 hours PRN, Oral  hydrALAZINE injection 5 mg, Every 6 hours PRN, Intravenous    Plan  - BP is controlled, no changes needed to their regimen

## 2025-04-16 NOTE — ASSESSMENT & PLAN NOTE
Patient with Hypoxic Respiratory failure which is Acute.  she is not on home oxygen. Supplemental oxygen was provided and noted-      .   Signs/symptoms of respiratory failure include- increased work of breathing and respiratory distress. Contributing diagnoses includes - COPD Labs and images were reviewed. Patient Has not had a recent ABG. Will treat underlying causes and adjust management of respiratory failure as follows- Wean O2 as tolerated   Testing for home oxygen today

## 2025-04-16 NOTE — NURSING
[facility]  Nurses Note -- 4 Eyes      4/16/2025       Skin assessed on: Q Shift      [x] No Pressure Injuries Present    [x]Prevention Measures Documented    [] Yes LDA  for Pressure Injury Previously documented     [] Yes New Pressure Injury Discovered   [] LDA for New Pressure Injury Added      Attending RN:  Marry Elliott, ZARI     Second RN:  Jennifer LYN

## 2025-04-21 ENCOUNTER — TELEPHONE (OUTPATIENT)
Dept: FAMILY MEDICINE | Facility: CLINIC | Age: 70
End: 2025-04-21
Payer: MEDICARE

## 2025-04-21 NOTE — TELEPHONE ENCOUNTER
Patient requesting to have a letter sent to [employer] advising them of the need/importance for her to have electricity supplied to her house at all times due to her needing to use an oxygen tank.    Please advise.

## 2025-04-21 NOTE — TELEPHONE ENCOUNTER
----- Message from Dianna sent at 4/21/2025  4:26 PM CDT -----  Regarding: Luis Daughter  Type: Patient Call Back What is the request in detail:  Pt has been recently placed on O2 therapy and she needs a letter sent to [employer] of [facility] dept of her O2 needs and the need for power. Please fax to [phone]  ( [employer] Acct # [id number] ) Hollie Ponce is the name on acct st address [address] [place] [address] Can the clinic reply by [facility]? No Would the patient rather a call back or a response via [facility]? Call [facility] call back number: .[phone]  or [phone] ( daughter ) Additional Information: pt has a portable tank Thank you.

## 2025-04-21 NOTE — LETTER
April 22, 2025      [facility]  [address]  [facility] [address]  Phone: [phone]  Fax: [phone]       Patient: Hollie Ponce   YOB: 1955  Date of Visit: 04/22/2025    To Whom It May Concern:    Ms Hollie Ponce needs to have electricity supplied to her house at all times due to her needing to use an oxygen tank. Don't hesitate to reach out with further questions or concerns.    Sincerely,        Ariadne Smith MD

## 2025-04-22 ENCOUNTER — OFFICE VISIT (OUTPATIENT)
Dept: FAMILY MEDICINE | Facility: CLINIC | Age: 70
End: 2025-04-22
Payer: MEDICARE

## 2025-04-22 DIAGNOSIS — J44.1 ACUTE EXACERBATION OF COPD WITH ASTHMA: Primary | ICD-10-CM

## 2025-04-22 DIAGNOSIS — I25.10 CORONARY ARTERY DISEASE, UNSPECIFIED VESSEL OR LESION TYPE, UNSPECIFIED WHETHER ANGINA PRESENT, UNSPECIFIED WHETHER NATIVE OR TRANSPLANTED HEART: ICD-10-CM

## 2025-04-22 DIAGNOSIS — J96.01 ACUTE RESPIRATORY FAILURE WITH HYPOXIA: ICD-10-CM

## 2025-04-22 PROCEDURE — 98007 SYNCH AUDIO-VIDEO EST HI 40: CPT | Mod: 95,,, | Performed by: FAMILY MEDICINE

## 2025-04-22 PROCEDURE — 4010F ACE/ARB THERAPY RXD/TAKEN: CPT | Mod: CPTII,95,, | Performed by: FAMILY MEDICINE

## 2025-04-22 NOTE — LETTER
April 22, 2025      [facility]  [address] [facility] [place] [address]  Phone: [phone]  Fax: [phone]       Patient: Hollie Ponce   YOB: 1955  Date of Visit: 04/22/2025    To Whom It May Concern:    Christie Acct # [id number]   Hollie Ponce  [address] [facility] [address]    Julia Ponce  was at [facility] on 04/22/2025. Pt has been recently placed on O2 therapy for chronic respiratory conditions and it is important for her to have power at her house.  If you have any questions or concerns, or if I can be of further assistance, please do not hesitate to contact me.    Sincerely,        Navdeep Marquez MD

## 2025-04-22 NOTE — PROGRESS NOTES
The patient location is: [place]  The chief complaint leading to consultation is: No chief complaint on file.    Total time: 40 minutes  Visit type: audiovisual    Each patient to whom he or she provides medical services by telemedicine is:  (1) informed of the relationship between the physician and patient and the respective role of any other health care provider with respect to management of the patient; and (2) notified that he or she may decline to receive medical services by telemedicine and may withdraw from such care at any time.      Subjective:       Patient ID: Hollie Ponce is a 69 y.o. female.    Chief Complaint: No chief complaint on file.      HPI  69-year-old female presents for hospital follow-up.  Went to hospital for COPD exacerbation.  Patient required home oxygen.  States she is on 2 L currently.  Uses oxygen consistently.  Feels she is improving.  Is still on a steroid taper.  States she is taking her inhalers and nebs.  Feels his shortness of breath and wheezing has improved.    Review of Systems   Constitutional: Negative.    HENT: Negative.     Respiratory: Negative.     Cardiovascular: Negative.    Gastrointestinal: Negative.    Endocrine: Negative.    Genitourinary: Negative.    Musculoskeletal: Negative.    Neurological: Negative.    Psychiatric/Behavioral: Negative.            Past Medical History:   Diagnosis Date    Asthma     COPD (chronic obstructive pulmonary disease)     Hypertension      Past Surgical History:   Procedure Laterality Date    ANGIOGRAM, CORONARY, WITH LEFT HEART CATHETERIZATION  2025    Procedure: Angiogram, Coronary, with Left Heart Cath;  Surgeon: Jhonny Schofield MD;  Location: [facility] CATH LAB;  Service: Cardiology;;    CARDIAC CATHETERIZATION N/A 2025    Procedure: Cardiac catheterization;  Surgeon: Jhonny Schofield MD;  Location: [facility] CATH LAB;  Service: Cardiology;  Laterality: N/A;     SECTION      HERNIA REPAIR      LEFT HEART  CATHETERIZATION Left 11/16/2020    Procedure: Left heart cath;  Surgeon: Shabnam Vernon MD;  Location: [facility] CATH LAB;  Service: Cardiology;  Laterality: Left;     Family History   Problem Relation Name Age of Onset    Cancer Mother          Bladder    Liver disease Father       Social History     Socioeconomic History    Marital status: Single   Tobacco Use    Smoking status: Every Day     Current packs/day: 0.25     Average packs/day: 0.5 packs/day for 51.1 years (25.3 ttl pk-yrs)     Types: Cigarettes     Start date: 12/13/1970     Last attempt to quit: 12/13/2020    Smokeless tobacco: Never   Substance and Sexual Activity    Alcohol use: Never    Drug use: Never    Sexual activity: Not Currently     Social Drivers of Health     Financial Resource Strain: High Risk (4/22/2025)    Overall Financial Resource Strain (CARDIA)     Difficulty of Paying Living Expenses: Hard   Food Insecurity: Food Insecurity Present (4/22/2025)    Hunger Vital Sign     Worried About Running Out of Food in the Last Year: Often true     Ran Out of Food in the Last Year: Sometimes true   Transportation Needs: No Transportation Needs (4/22/2025)    PRAPARE - Transportation     Lack of Transportation (Medical): No     Lack of Transportation (Non-Medical): No   Physical Activity: Unknown (4/22/2025)    Exercise Vital Sign     Minutes of Exercise per Session: 0 min   Stress: Stress Concern Present (4/22/2025)    [employer] - Occupational Stress Questionnaire     Feeling of Stress : To some extent   Housing Stability: Low Risk  (4/22/2025)    Housing Stability Vital Sign     Unable to Pay for Housing in the Last Year: No     Number of Times Moved in the Last Year: 0     Homeless in the Last Year: No     Current Medications[1]   Objective:      There were no vitals filed for this visit.    Physical Exam  Constitutional:       General: She is not in acute distress.     Appearance: She is not diaphoretic.   HENT:       Head: Normocephalic and atraumatic.   Eyes:      Conjunctiva/sclera: Conjunctivae normal.   Pulmonary:      Effort: Pulmonary effort is normal.   Musculoskeletal:      Cervical back: Neck supple.   Skin:     Findings: No rash.   Neurological:      Mental Status: She is alert and oriented to person, place, and time.   Psychiatric:         Behavior: Behavior normal.         Thought Content: Thought content normal.         Judgment: Judgment normal.            Assessment:       1. Acute exacerbation of COPD with asthma    2. Acute respiratory failure with hypoxia    3. Coronary artery disease, unspecified vessel or lesion type, unspecified whether angina present, unspecified whether native or transplanted heart        Plan:       Acute exacerbation of COPD with asthma  -     Ambulatory referral/consult to Pulmonology; Future; Expected date: 04/29/2025  -     Ambulatory referral/consult to [facility]; Future; Expected date: 04/29/2025    Acute respiratory failure with hypoxia  -     Ambulatory referral/consult to Pulmonology; Future; Expected date: 04/29/2025  -     Ambulatory referral/consult to [facility]; Future; Expected date: 04/29/2025    Coronary artery disease, unspecified vessel or lesion type, unspecified whether angina present, unspecified whether native or transplanted heart  -     Ambulatory referral/consult to [facility]; Future; Expected date: 04/29/2025    Patient has difficulty leaving home since she is on oxygen.  Place referral to [facility].  Place referral to pulmonology as well.  Continue treatment and finish steroid taper.  Patient has home health for physical therapy as well.  Future Appointments   Date Time Provider Department Center   4/28/2025  9:45 AM Janiya Joya MD [facility] Cli   5/28/2025  9:00 AM Jhonny Schofield MD [facility] CARDIO [place]   10/3/2025  9:00 AM LAB, [facility] DRAW STATION [facility] LAB [facility]  - B   10/7/2025 11:40 AM Ariadne Smith MD [facility] DENISA [place]                   Patient note was created using Dydra.  Any errors in syntax or even information may not have been identified and edited on initial review prior to signing this note.         [1]   Current Outpatient Medications:     albuterol (PROVENTIL/VENTOLIN HFA) 90 mcg/actuation inhaler, Inhale 2 puffs into the lungs every 6 (six) hours as needed for Wheezing or Shortness of Breath. Rescue, Disp: 20.1 g, Rfl: 2    albuterol sulfate 2.5 mg/0.5 mL Nebu, Inhale into the lungs., Disp: , Rfl:     albuterol-ipratropium (DUO-NEB) 2.5 mg-0.5 mg/3 mL nebulizer solution, Take 3 mLs by nebulization every 4 (four) hours as needed for Wheezing., Disp: 1080 mL, Rfl: 3    aspirin (ECOTRIN) 81 MG EC tablet, Take 81 mg by mouth., Disp: , Rfl:     azelastine (ASTELIN) 137 mcg (0.1 %) nasal spray, Use 1 spray (137 mcg total) in each nostril 2 (two) times daily., Disp: 30 mL, Rfl: 0    carvediloL (COREG) 3.125 MG tablet, Take 1 tablet (3.125 mg total) by mouth 2 (two) times daily. TAKE 1 TABLET(3.125 MG) BY MOUTH TWICE DAILY WITH MEALS, Disp: 180 tablet, Rfl: 3    fluticasone propionate (FLONASE) 50 mcg/actuation nasal spray, SHAKE LIQUID AND USE 2 SPRAYS(100 MCG) IN EACH NOSTRIL EVERY DAY, Disp: 48 g, Rfl: 3    fluticasone-salmeterol 230-21 mcg/dose (ADVAIR HFA) 230-21 mcg/actuation HFAA inhaler, INHALE 2 PUFFS INTO THE LUNGS TWICE DAILY, Disp: 36 g, Rfl: 0    fluticasone-salmeterol diskus inhaler 100-50 mcg, Inhale 1 puff into the lungs every 12 (twelve) hours., Disp: , Rfl:     hydroCHLOROthiazide (HYDRODIURIL) 25 MG tablet, Take 1 tablet (25 mg total) by mouth once daily., Disp: 90 tablet, Rfl: 3    hydrOXYzine HCL (ATARAX) 25 MG tablet, Take 1 tablet (25 mg total) by mouth daily as needed for Anxiety., Disp: 20 tablet, Rfl: 0    lisinopriL (PRINIVIL,ZESTRIL) 40 MG tablet, Take 1 tablet (40 mg total) by mouth once daily., Disp: 90 tablet, Rfl: 3    miscellaneous  medical supply (470V TWO WAY VALVE) Misc, Disp nebulizer and tubing ,medicine reservoir,and mask  So as to use  Every 4-6 hrs for sob/wheeze, Disp: , Rfl:     montelukast (SINGULAIR) 10 mg tablet, Take 1 tablet (10 mg total) by mouth every evening., Disp: 90 tablet, Rfl: 3    nicotine polacrilex 2 MG Lozg, Take 1 lozenge (2 mg total) by mouth as needed. Use 1-2 per hour in place of a cigarette. Limit to 6 a day., Disp: 108 lozenge, Rfl: 0    predniSONE (DELTASONE) 20 MG tablet, Take 2 tablets by mouth once daily for 2 days, THEN 1 & ½ tablets once daily for 2 days, THEN 1 tablet daily for 2 days, THEN ½ tablet daily for 2 days., Disp: 10 tablet, Rfl: 0    rosuvastatin (CRESTOR) 20 MG tablet, Take 2 tablets (40 mg total) by mouth every evening., Disp: 180 tablet, Rfl: 3    sodium chloride (SALINE NASAL) 0.65 % nasal spray, Use 1 spray in each nostril as needed for Congestion., Disp: 44 mL, Rfl: 1    tiotropium (SPIRIVA WITH HANDIHALER) 18 mcg inhalation capsule, Inhale 1 capsule (18 mcg total) into the lungs Daily. INHALE THE CONTENTS OF 1 CAPSULE(18 MCG) INTO THE LUNGS EVERY DAY, Disp: 90 capsule, Rfl: 3

## 2025-04-25 ENCOUNTER — TELEPHONE (OUTPATIENT)
Dept: HOME HEALTH SERVICES | Facility: CLINIC | Age: 70
End: 2025-04-25
Payer: MEDICARE

## 2025-04-28 ENCOUNTER — OFFICE VISIT (OUTPATIENT)
Dept: OTOLARYNGOLOGY | Facility: CLINIC | Age: 70
End: 2025-04-28
Payer: MEDICARE

## 2025-04-28 VITALS
HEIGHT: 56 IN | WEIGHT: 142.63 LBS | BODY MASS INDEX: 32.09 KG/M2 | SYSTOLIC BLOOD PRESSURE: 131 MMHG | DIASTOLIC BLOOD PRESSURE: 82 MMHG | OXYGEN SATURATION: 100 %

## 2025-04-28 DIAGNOSIS — R49.0 DYSPHONIA: Primary | ICD-10-CM

## 2025-04-28 DIAGNOSIS — Z86.19 HISTORY OF THRUSH: ICD-10-CM

## 2025-04-28 DIAGNOSIS — J30.2 SEASONAL ALLERGIC RHINITIS, UNSPECIFIED TRIGGER: ICD-10-CM

## 2025-04-28 DIAGNOSIS — J34.3 HYPERTROPHY OF INFERIOR NASAL TURBINATE: ICD-10-CM

## 2025-04-28 DIAGNOSIS — F17.200 TOBACCO DEPENDENCY: ICD-10-CM

## 2025-04-28 PROCEDURE — 31575 DIAGNOSTIC LARYNGOSCOPY: CPT | Mod: S$GLB,,, | Performed by: OTOLARYNGOLOGY

## 2025-04-28 PROCEDURE — 1126F AMNT PAIN NOTED NONE PRSNT: CPT | Mod: CPTII,S$GLB,, | Performed by: OTOLARYNGOLOGY

## 2025-04-28 PROCEDURE — 1159F MED LIST DOCD IN RCRD: CPT | Mod: CPTII,S$GLB,, | Performed by: OTOLARYNGOLOGY

## 2025-04-28 PROCEDURE — 99214 OFFICE O/P EST MOD 30 MIN: CPT | Mod: 25,S$GLB,, | Performed by: OTOLARYNGOLOGY

## 2025-04-28 PROCEDURE — 1111F DSCHRG MED/CURRENT MED MERGE: CPT | Mod: CPTII,S$GLB,, | Performed by: OTOLARYNGOLOGY

## 2025-04-28 PROCEDURE — 3008F BODY MASS INDEX DOCD: CPT | Mod: CPTII,S$GLB,, | Performed by: OTOLARYNGOLOGY

## 2025-04-28 PROCEDURE — 4010F ACE/ARB THERAPY RXD/TAKEN: CPT | Mod: CPTII,S$GLB,, | Performed by: OTOLARYNGOLOGY

## 2025-04-28 PROCEDURE — 3288F FALL RISK ASSESSMENT DOCD: CPT | Mod: CPTII,S$GLB,, | Performed by: OTOLARYNGOLOGY

## 2025-04-28 PROCEDURE — 3079F DIAST BP 80-89 MM HG: CPT | Mod: CPTII,S$GLB,, | Performed by: OTOLARYNGOLOGY

## 2025-04-28 PROCEDURE — 3075F SYST BP GE 130 - 139MM HG: CPT | Mod: CPTII,S$GLB,, | Performed by: OTOLARYNGOLOGY

## 2025-04-28 PROCEDURE — 1101F PT FALLS ASSESS-DOCD LE1/YR: CPT | Mod: CPTII,S$GLB,, | Performed by: OTOLARYNGOLOGY

## 2025-04-28 RX ORDER — AZELASTINE 1 MG/ML
1 SPRAY, METERED NASAL 2 TIMES DAILY
Qty: 30 ML | Refills: 3 | Status: SHIPPED | OUTPATIENT
Start: 2025-04-28

## 2025-04-28 NOTE — PROGRESS NOTES
OTOLARYNGOLOGY CLINIC NOTE  Date:  2025     Chief complaint:  Chief Complaint   Patient presents with    Hospital Follow Up     Vocal cord check. Also having some left facial/ eye congestion       History of Present Illness  Hollie Ponce is a 69 y.o. female  presenting today for a followup.  Still has some congestion on the left side. Gets dry eye and itchy eye ; was taken off of claritin unclear reason why   Has not had eyes checked recently ; no facial pressure or stuffy nose   Sometimes gets some postnasal drip and it comes out and is thick and green     I last saw the patient in the hospital for sinus infection and dysphonia   Doing flonase once a day and astelin bid with saline before bid    Finished up the thrush medication      Past Medical History  Past Medical History:   Diagnosis Date    Asthma     COPD (chronic obstructive pulmonary disease)     Hypertension         Past Surgical History  Past Surgical History:   Procedure Laterality Date    ANGIOGRAM, CORONARY, WITH LEFT HEART CATHETERIZATION  2025    Procedure: Angiogram, Coronary, with Left Heart Cath;  Surgeon: Jhonny Schofield MD;  Location: [facility] CATH LAB;  Service: Cardiology;;    CARDIAC CATHETERIZATION N/A 2025    Procedure: Cardiac catheterization;  Surgeon: Jhonny Schofield MD;  Location: [facility] CATH LAB;  Service: Cardiology;  Laterality: N/A;     SECTION      HERNIA REPAIR      LEFT HEART CATHETERIZATION Left 2020    Procedure: Left heart cath;  Surgeon: Shabnam Vernon MD;  Location: [facility] CATH LAB;  Service: Cardiology;  Laterality: Left;        Medications  Medications Ordered Prior to Encounter[1]    Review of Systems  Review of Systems   Constitutional: Negative.    Respiratory:  Positive for cough, shortness of breath and wheezing.    Cardiovascular: Negative.    Gastrointestinal: Negative.    Genitourinary: Negative.    Musculoskeletal: Negative.    Skin: Negative.    Neurological: Negative.   "  Psychiatric/Behavioral: Negative.          Social History   reports that she quit smoking about 4 years ago. Her smoking use included cigarettes. She started smoking about 54 years ago. She has a 25.3 pack-year smoking history. She has never used smokeless tobacco. She reports that she does not drink alcohol and does not use drugs.     Family History  Family History   Problem Relation Name Age of Onset    Cancer Mother          Bladder    Liver disease Father          Physical Exam   There were no vitals filed for this visit. Body mass index is 31.98 kg/m².  Weight: 64.7 kg (142 lb 10.2 oz)   Height: 4' 8" (142.2 cm)     GENERAL: no acute distress.  HEAD: normocephalic.   EYES: No scleral icterus  EARS: external ear without lesion, normal pinna shape and position.    NOSE: external nose without significant bony abnormality; on oxygen by nasal cannula  ORAL CAVITY/OROPHARYNX: tongue mobile.   NECK: trachea midline.   LYMPH NODES:No cervical lymphadenopathy.  RESPIRATORY: no stridor, no stertor. Voice slight rasp but better Respirations nonlabored.  NEURO: alert, responds to questions appropriately.    PSYCH:mood appropriate    PROCEDURE NOTE  NAME OF PROCEDURE: Flexible Laryngoscopy, diagnostic  INDICATIONS: gag reflex precludes mirror exam,  dysphonia f/u vocal fold lesion    FINDINGS: thrush resolverd , no lesion or mass of vocal fold.     Consent: After procedure was explained in detail and all questions answered, verbal consent was obtained for performing flexible laryngoscopy.  Anesthesia: topical 4% lidocaine and neosynephrine  Procedure: With patient in seated position, the scope was inserted into the bilateral nasal passageway and advanced atraumatically into the nasopharynx to examine the following structures:  Nasal cavity: Turbinates with moderate  hypertrophy. moderate middle meatal edema.ethmoid bulla enlarged bilaterally.  No purulent drainage.   Nasopharynx: no mass or lesion noted in nasopharynx. "   Oropharynx: base of tongue without  mass or ulceration. Lingual tonsils normal in appearance  Hypopharynx: posterior pharyngeal wall without mass or lesion. No pooling of secretions. Pyriform sinuses visible without mass or lesion  Larynx: epiglottis normal without lesion. False vocal folds without edema/erythema/lesion. True vocal folds mobile and without lesion. No interarytenoid edema no erythema . Postcricoid region withoutedema no lesion   Subglottis: visualized portion of subglottis normal in appearance    After examination performed, the scope was removed atraumatically . The patient tolerated the procedure well. Photodocumentation obtained with representative images below, all images and/or videos uploaded in media section of epic.                          Imaging:  The patient does not have any new imaging of the head and neck since last visit. Ct neck from hospital stay with sinus changes for documentation purposes  Ehtmoid sinuses with mild mopacifiaction     Labs:  CBC  Recent Labs   Lab 04/14/25  0418 04/15/25  0536 04/16/25  0518   WBC 9.77 11.43 11.89   HGB 13.3 13.3 12.5   HCT 42.2 42.0 40.1   MCV 92 92 92   Platelet Count 244 262 260     BMP  Recent Labs   Lab 04/13/23  1044 10/13/23  1030 04/16/24  1006 04/04/25  1929 04/09/25  0447 04/10/25  0523 04/11/25  0536 04/13/25  0256 04/14/25  0418 04/15/25  0536 04/16/25  0518   Glucose 95 80 66 L  --   --   --   --   --   --   --   --    Sodium 141 137 141   < > 132 L 134 L 135 L   < > 135 L 137 137   Potassium 4.2 4.4 4.2   < > 4.1 4.5 4.2   < > 4.2 4.1 4.2   Chloride 103 100 104   < > 92 L 92 L 94 L   < > 94 L 94 L 97   CO2 29 27 28   < > 34 H 34 H 35 H   < > 36 H 37 H 33 H   BUN 10 13 12   < > 15 14 13   < > 16 19 16   Creatinine 0.9 1.0 0.9   < > 0.7 0.8 0.7   < > 0.7 0.7 0.7   Calcium 10.1 10.4 9.6   < > 9.2 9.5 9.2   < > 8.5 L 9.0 9.3   Phosphorus Level  --   --   --    < > 3.9 3.5 3.6  --   --   --   --    Magnesium   --   --   --    < > 1.9  1.9 2.0  --   --   --   --     < > = values in this interval not displayed.     COAGS        Assessment  1. Tobacco dependency  - Ambulatory referral/consult to ENT    2. Dysphonia    3. History of thrush    4. Hypertrophy of inferior nasal turbinate    5. Seasonal allergic rhinitis, unspecified trigger       Plan:  Discussed plan of care with patient in detail and all questions answered. Patient reported understanding of plan of care. I gave the patient the opportunity to ask questions and patient confirmed all questions answered to satisfaction.     No vocal fold lesions, thrush resolved  Can do flonase bid if desires  Could try claritin and see if helps with eyes but if not recommend ophtho visit    F/u 3-4 months for allergies and dysphonia             [1]   Current Outpatient Medications on File Prior to Visit   Medication Sig Dispense Refill    albuterol (PROVENTIL/VENTOLIN HFA) 90 mcg/actuation inhaler Inhale 2 puffs into the lungs every 6 (six) hours as needed for Wheezing or Shortness of Breath. Rescue 20.1 g 2    albuterol sulfate 2.5 mg/0.5 mL Nebu Inhale into the lungs.      albuterol-ipratropium (DUO-NEB) 2.5 mg-0.5 mg/3 mL nebulizer solution Take 3 mLs by nebulization every 4 (four) hours as needed for Wheezing. 1080 mL 3    aspirin (ECOTRIN) 81 MG EC tablet Take 81 mg by mouth.      azelastine (ASTELIN) 137 mcg (0.1 %) nasal spray Use 1 spray (137 mcg total) in each nostril 2 (two) times daily. 30 mL 0    carvediloL (COREG) 3.125 MG tablet Take 1 tablet (3.125 mg total) by mouth 2 (two) times daily. TAKE 1 TABLET(3.125 MG) BY MOUTH TWICE DAILY WITH MEALS 180 tablet 3    fluticasone propionate (FLONASE) 50 mcg/actuation nasal spray SHAKE LIQUID AND USE 2 SPRAYS(100 MCG) IN EACH NOSTRIL EVERY DAY 48 g 3    fluticasone-salmeterol 230-21 mcg/dose (ADVAIR HFA) 230-21 mcg/actuation HFAA inhaler INHALE 2 PUFFS INTO THE LUNGS TWICE DAILY 36 g 0    fluticasone-salmeterol diskus inhaler 100-50 mcg Inhale 1 puff  into the lungs every 12 (twelve) hours.      hydroCHLOROthiazide (HYDRODIURIL) 25 MG tablet Take 1 tablet (25 mg total) by mouth once daily. 90 tablet 3    hydrOXYzine HCL (ATARAX) 25 MG tablet Take 1 tablet (25 mg total) by mouth daily as needed for Anxiety. 20 tablet 0    lisinopriL (PRINIVIL,ZESTRIL) 40 MG tablet Take 1 tablet (40 mg total) by mouth once daily. 90 tablet 3    miscellaneous medical supply (470V TWO WAY VALVE) Misc Disp nebulizer and tubing ,medicine reservoir,and mask  So as to use  Every 4-6 hrs for sob/wheeze      montelukast (SINGULAIR) 10 mg tablet Take 1 tablet (10 mg total) by mouth every evening. 90 tablet 3    nicotine polacrilex 2 MG Lozg Take 1 lozenge (2 mg total) by mouth as needed. Use 1-2 per hour in place of a cigarette. Limit to 6 a day. 108 lozenge 0    rosuvastatin (CRESTOR) 20 MG tablet Take 2 tablets (40 mg total) by mouth every evening. 180 tablet 3    sodium chloride (SALINE NASAL) 0.65 % nasal spray Use 1 spray in each nostril as needed for Congestion. 44 mL 1    tiotropium (SPIRIVA WITH HANDIHALER) 18 mcg inhalation capsule Inhale 1 capsule (18 mcg total) into the lungs Daily. INHALE THE CONTENTS OF 1 CAPSULE(18 MCG) INTO THE LUNGS EVERY DAY 90 capsule 3     No current facility-administered medications on file prior to visit.

## 2025-04-28 NOTE — PATIENT INSTRUCTIONS
Information and instructions from your visit with me today:  Always use saline every time before a medication spray. You can also use saline on its own. If you are using saline and/or the medication sprays on an as needed basis and you have symptoms use the regimen daily for at least 2 weeks. You can use the flonase and astelin together, or if you prefer to start with just one medication spray, the flonase works better by itself compared to astelin by itself. You can try doing the saline and flonase and if still congested, add on the astelin again doing this regimen daily for up to two weeks when congestion. There may be times of the year that you only need saline and there may be times of the year that you need saline, flonase and astelin to control symptoms.     Start using the following medication nasal sprays:   Fluticasone spray:    This medication is a steroid spray. It stays within the nose and does not have absorption into the body that leads to side effects that one has with oral steroid medication. Fluticasone nasal spray is the same as the Flonase brand nasal spray. Discuss with your pharmacist if the price is lower over the counter or with a prescription ( this varies depending on insurance). The medication that is over the counter is the same as the prescription medication. Use this medication as instructed on the prescription, 1-2 sprays on each side of your nose twice daily.     Azelastine  spray:  This medication is an antihistamine used to treat nasal symptoms of allergy, which works specifically in the nose unlike antihistamine pills which have more of an effect on the whole body. Use this medication as instructed on the prescription, 1 spray on each side of your nose twice daily.     Additional instructions for medication sprays  Place the tip of the medication bottle in your nose and aim slightly up and out on each side to get medication high and deep into your nose and sinuses, and not have it  "all deposit in the very front of your nose. Aim the tip of the nozzle towards the outer corner of your eye . You can imagine aiming towards the back of your eyeball on each side for this, as opposed to straight back to the center of your nose and head.     You need to use this medication every day regardless of symptoms, as it takes time ( a few weeks) to work and get the benefits. It does not work on an "as needed" basis like taking a decongestant. If your symptoms only occur in a particular season, then the medication can be used seasonally instead of year long. For seasonal symptoms, you should start using the spray twice daily a month before when you normally have symptoms ( for example, if symptoms start in August, should start at the end of June).     Start nasal irrigations with saline solution- you can either use a rinse or a mist spray:    NASAL SALINE SPRAY ( simply saline and arm and hammer are examples) There are several different brands found in the cold and flu aisle of the pharmacy. You can use any brand of saline spray - this will deliver the saline by a gentle mist ( if you have difficulty or discomfort with nasal rinse/ a lot of fluid in the nose, this will be more comfortable).       Always rinse your nose with saline prior to using medication sprays and wait a couple of hours before using again. You can use the saline throughout the day to help with stuffy nose or dry nose.    Do not use nasal decongestant sprays such as Afrin or similar products long term ( over 3 days) .  This can cause long term physical nasal addiction. Afrin should only be used if having nose bleeds, severe nasal congestion , or severe ear pain/fullness and should not be used for more than 2-3 days in a row . It is a not a medication that should be used for a long period of time.     It was nice meeting you today, and I look forward to helping you feel better soon. Please don't hesitate to call if you have any other " questions or concerns, or if I can be of any assistance in the meantime.      Janiya Joya MD    [facility]     Phone  [phone]    Fax      [phone]        Janiya Joya MD  Otorhinolaryngology

## 2025-04-29 ENCOUNTER — CARE AT HOME (OUTPATIENT)
Dept: HOME HEALTH SERVICES | Facility: CLINIC | Age: 70
End: 2025-04-29
Payer: MEDICARE

## 2025-04-29 DIAGNOSIS — J44.1 ACUTE EXACERBATION OF COPD WITH ASTHMA: ICD-10-CM

## 2025-04-29 DIAGNOSIS — I10 ESSENTIAL HYPERTENSION: Primary | ICD-10-CM

## 2025-04-29 DIAGNOSIS — R53.81 DEBILITY: ICD-10-CM

## 2025-04-29 DIAGNOSIS — J96.01 ACUTE RESPIRATORY FAILURE WITH HYPOXIA: ICD-10-CM

## 2025-04-29 DIAGNOSIS — I25.10 CORONARY ARTERY DISEASE, UNSPECIFIED VESSEL OR LESION TYPE, UNSPECIFIED WHETHER ANGINA PRESENT, UNSPECIFIED WHETHER NATIVE OR TRANSPLANTED HEART: ICD-10-CM

## 2025-04-29 PROCEDURE — 3078F DIAST BP <80 MM HG: CPT | Mod: CPTII,S$GLB,, | Performed by: NURSE PRACTITIONER

## 2025-04-29 PROCEDURE — 3074F SYST BP LT 130 MM HG: CPT | Mod: CPTII,S$GLB,, | Performed by: NURSE PRACTITIONER

## 2025-04-29 PROCEDURE — 4010F ACE/ARB THERAPY RXD/TAKEN: CPT | Mod: CPTII,S$GLB,, | Performed by: NURSE PRACTITIONER

## 2025-04-29 PROCEDURE — 1160F RVW MEDS BY RX/DR IN RCRD: CPT | Mod: CPTII,S$GLB,, | Performed by: NURSE PRACTITIONER

## 2025-04-29 PROCEDURE — 1111F DSCHRG MED/CURRENT MED MERGE: CPT | Mod: CPTII,S$GLB,, | Performed by: NURSE PRACTITIONER

## 2025-04-29 PROCEDURE — 1159F MED LIST DOCD IN RCRD: CPT | Mod: CPTII,S$GLB,, | Performed by: NURSE PRACTITIONER

## 2025-04-29 PROCEDURE — 99350 HOME/RES VST EST HIGH MDM 60: CPT | Mod: S$GLB,,, | Performed by: NURSE PRACTITIONER

## 2025-04-30 VITALS
TEMPERATURE: 98 F | OXYGEN SATURATION: 97 % | DIASTOLIC BLOOD PRESSURE: 60 MMHG | HEART RATE: 72 BPM | SYSTOLIC BLOOD PRESSURE: 124 MMHG | RESPIRATION RATE: 18 BRPM

## 2025-04-30 PROBLEM — R53.81 DEBILITY: Status: ACTIVE | Noted: 2025-04-30

## 2025-04-30 NOTE — ASSESSMENT & PLAN NOTE
Recent admit with resp failure failed walk test and was discharged with home oxygen  Sat 99 on 2L.  Removed oxygen, pt walked to bathroom, sat remained 97-98  Discussed using oxygen while sleeping, and as needed prn  Keep upcoming FU with pulmonology  Continue albuterol use

## 2025-04-30 NOTE — PROGRESS NOTES
[facility]  Medical Chronic Care Home Visit    Encounter Provider: Haley Lawson   PCP: Ariadne Smith MD  Consult Requested By: Dr. Navdeep Marquez    HISTORY OF PRESENT ILLNESS      Patient ID: Hollie Ponce is a 69 y.o. female is being seen in the home due to physical debility that presents a taxing effort to leave the home, to mitigate high risk of hospital readmission and/or due to the limited availability of reliable or safe options for transportation to the point of access to the provider. Prior to treatment on this visit the chart was reviewed and patient verbal consent was obtained.    Chronic medical conditions synopsis:  Pt is being seen in her home today after referral by her PCP. She is AAOx3, pleasant and agreeable to this visit. VSS, NAD. She was recently in the hospital and was discharged with cont oxygen. She is hopeful she does not need to continue with oxygen. She has a pulse ox in the home and is monitoring. She is always 97 or higher per her monitoring. She was 99 on 2L for this provider. Turned off oxygen and sat only dropped to 97. Discussed extensively normal O2 readings, pulse ox monitoring and when to wear oxygen. She is using her nebulizer      DECISION MAKING TODAY       Assessment & Plan:  1. Essential hypertension  Assessment & Plan:  Normotensive today  Continue current regimen  Followed by PCP      2. Acute exacerbation of COPD with asthma  Assessment & Plan:  See resp failure    Orders:  -     Ambulatory referral/consult to [facility]    3. Acute respiratory failure with hypoxia  Assessment & Plan:  Recent admit with resp failure failed walk test and was discharged with home oxygen  Sat 99 on 2L.  Removed oxygen, pt walked to bathroom, sat remained 97-98  Discussed using oxygen while sleeping, and as needed prn  Keep upcoming FU with pulmonology  Continue albuterol use    Orders:  -     Ambulatory referral/consult to [facility] -  [facility]    4. Coronary artery disease, unspecified vessel or lesion type, unspecified whether angina present, unspecified whether native or transplanted heart  Assessment & Plan:  Chronic  Followed by cardiology  ASA, statin in place  Continue and monitor    Orders:  -     Ambulatory referral/consult to [facility]    5. Debility  Assessment & Plan:  Related to recent hospitalization  PT in place             ENVIRONMENT OF CARE      Family and/or Caregiver present at visit?  No  Name of Caregiver:   History provided by: patient    4Ms for Medical Decision-Making in Older Adults    Last Completed EAWV:  None    MEDICATIONS:  High Risk Medications:  Total Active Medications: 0  This patient does not have an active medication from one of the medication groupers.    MOBILITY:  Activities of Daily Living:       No data to display              Fall Risk:      4/28/2025     9:45 AM 2/7/2025    11:20 AM 10/22/2024    11:40 AM   Fall Risk Assessment - Outpatient   Mobility Status Ambulatory Ambulatory Ambulatory   Number of falls 0 0 0   Identified as fall risk False False False     Disability Status:       No data to display              Nutrition Screening:       No data to display             Screening Score: 0-7 Malnourished, 8-11 At Risk, 12-14 Normal  Get Up and Go:       No data to display              Whisper Test:       No data to display                    MENTATION:   Has Dementia Dx: No  Has Anxiety Dx: No    Depression Patient Health Questionnaire:      2/7/2025    11:24 AM   Depression Patient Health Questionnaire   Over the last two weeks how often have you been bothered by little interest or pleasure in doing things Not at all   Over the last two weeks how often have you been bothered by feeling down, depressed or hopeless Not at all   PHQ-2 Total Score 0     Cognitive Function Screening:       No data to display              Cognitive Function Screening Total - Less than 4 = Abnormal,   Greater than or equal to 4 = Normal        WHAT MATTERS MOST:  Advance Care Planning   ACP Status:   Patient has had an ACP conversation  Living Will: No  Power of : No  [place]: No    What is most important right now is to focus on breathing better    Accordingly, we have decided that the best plan to meet the patient's goals includes continuing with treatment      What matters most to patient today is: not needing oxygen all the time          Is patient hospice appropriate: No  (If needed, use PPS <30 or FAST score >7)  Was referral to hospice placed: No    Impression upon entering the home:  Physical Dwelling: single family home   Appearance of home environment: cleaniness: clean  Functional Status: independent  Mobility: ambulatory  Nutritional access: adequate intake and access  Home Health: Yes,  [facility]    DME/Supplies: rolling walker and oxygen     Diagnostic tests reviewed/disposition: No diagnosic tests pending after this hospitalization.  Disease/illness education:  COPD  Establishment or re-establishment of referral orders for community resources: No other necessary community resources.   Discussion with other health care providers: No discussion with other health care providers necessary.   Does patient have a PCP at [facility]? Yes   Repatriation plan with PCP? follow-up with PCP within 90d   Does patient have an ostomy (ileostomy, colostomy, suprapubic catheter, nephrostomy tube, tracheostomy, PEG tube, pleurex catheter, cholecystostomy, etc)? No  Were BPAs reviewed? Yes    Social History     Socioeconomic History    Marital status: Single   Tobacco Use    Smoking status: Former     Current packs/day: 0.25     Average packs/day: 0.5 packs/day for 51.1 years (25.3 ttl pk-yrs)     Types: Cigarettes     Start date: 12/13/1970     Quit date: 12/13/2020    Smokeless tobacco: Never   Substance and Sexual Activity    Alcohol use: Never    Drug use: Never    Sexual activity: Not Currently      Social Drivers of Health     Financial Resource Strain: High Risk (4/22/2025)    Overall Financial Resource Strain (CARDIA)     Difficulty of Paying Living Expenses: Hard   Food Insecurity: Food Insecurity Present (4/22/2025)    Hunger Vital Sign     Worried About Running Out of Food in the Last Year: Often true     Ran Out of Food in the Last Year: Sometimes true   Transportation Needs: No Transportation Needs (4/22/2025)    PRAPARE - Transportation     Lack of Transportation (Medical): No     Lack of Transportation (Non-Medical): No   Physical Activity: Unknown (4/22/2025)    Exercise Vital Sign     Minutes of Exercise per Session: 0 min   Stress: Stress Concern Present (4/22/2025)    Citizen of [place] [employer] Occupational [employer] - Occupational Stress Questionnaire     Feeling of Stress : To some extent   Housing Stability: Low Risk  (4/22/2025)    Housing Stability Vital Sign     Unable to Pay for Housing in the Last Year: No     Number of Times Moved in the Last Year: 0     Homeless in the Last Year: No         OBJECTIVE:     Vital Signs:  Vitals:    04/29/25 1225   BP: 124/60   Pulse: 72   Resp: 18   Temp:        Review of Systems   Constitutional:  Positive for fatigue. Negative for activity change and fever.   HENT: Negative.     Eyes: Negative.    Respiratory:  Positive for shortness of breath. Negative for chest tightness.    Cardiovascular: Negative.  Negative for leg swelling.   Gastrointestinal: Negative.    Endocrine: Negative.    Genitourinary: Negative.    Musculoskeletal: Negative.    Skin: Negative.    Allergic/Immunologic: Negative.    Neurological: Negative.    Hematological: Negative.    Psychiatric/Behavioral: Negative.  Negative for agitation.    All other systems reviewed and are negative.      Physical Exam:  Physical Exam  Vitals reviewed.   Constitutional:       General: She is not in acute distress.     Appearance: She is well-developed.   HENT:      Head: Normocephalic and atraumatic.       Nose: Nose normal.      Mouth/Throat:      Mouth: Mucous membranes are dry.   Eyes:      Pupils: Pupils are equal, round, and reactive to light.   Cardiovascular:      Rate and Rhythm: Normal rate and regular rhythm.      Heart sounds: Normal heart sounds.   Pulmonary:      Effort: Pulmonary effort is normal.      Breath sounds: Normal breath sounds.   Abdominal:      General: Bowel sounds are normal.      Palpations: Abdomen is soft.   Musculoskeletal:         General: Normal range of motion.      Cervical back: Normal range of motion and neck supple.   Skin:     General: Skin is warm and dry.   Neurological:      Mental Status: She is alert and oriented to person, place, and time.      Cranial Nerves: No cranial nerve deficit.   Psychiatric:         Behavior: Behavior normal.         Thought Content: Thought content normal.         Judgment: Judgment normal.         INSTRUCTIONS FOR PATIENT:     Scheduled Follow-up, Appts Reviewed with Modifications if Needed: Yes  Future Appointments   Date Time Provider Department Center   5/6/2025 12:00 PM [facility] [address] [facility]   5/28/2025  9:00 AM [facility] [place]   6/17/2025  3:00 PM Sarah Wolf MD [facility]   8/11/2025  1:45 PM Janiya Joya MD [facility]   10/3/2025  9:00 AM LAB, [facility] DRAW STATION [facility] LAB [facility]   10/7/2025 11:40 AM Ariadne Smith MD [facility] [place]       Current Medications[1]    Medication Reconciliation:  Were medications changed during this appointment? No  Were medications in the home? Yes  Is the patient taking the medications as directed? Yes  Does the patient/caregiver understand the medications and changes? Yes  Does updated med list accurately reflects meds patient is currently taking? Yes      Signature: Haley Lawson NP    I spent a total of 65 minutes on the day of the visit.This includes face to face time and non-face to face time preparing to  see the patient (eg, review of tests), obtaining and/or reviewing separately obtained history, documenting clinical information in the electronic or other health record, independently interpreting results and communicating results to the patient/family/caregiver, or care coordinator.           [1]   Current Outpatient Medications:     albuterol (PROVENTIL/VENTOLIN HFA) 90 mcg/actuation inhaler, Inhale 2 puffs into the lungs every 6 (six) hours as needed for Wheezing or Shortness of Breath. Rescue, Disp: 20.1 g, Rfl: 2    albuterol sulfate 2.5 mg/0.5 mL Nebu, Inhale into the lungs., Disp: , Rfl:     albuterol-ipratropium (DUO-NEB) 2.5 mg-0.5 mg/3 mL nebulizer solution, Take 3 mLs by nebulization every 4 (four) hours as needed for Wheezing., Disp: 1080 mL, Rfl: 3    aspirin (ECOTRIN) 81 MG EC tablet, Take 81 mg by mouth., Disp: , Rfl:     azelastine (ASTELIN) 137 mcg (0.1 %) nasal spray, 1 spray (137 mcg total) by Nasal route 2 (two) times daily., Disp: 30 mL, Rfl: 3    carvediloL (COREG) 3.125 MG tablet, Take 1 tablet (3.125 mg total) by mouth 2 (two) times daily. TAKE 1 TABLET(3.125 MG) BY MOUTH TWICE DAILY WITH MEALS, Disp: 180 tablet, Rfl: 3    fluticasone propionate (FLONASE) 50 mcg/actuation nasal spray, SHAKE LIQUID AND USE 2 SPRAYS(100 MCG) IN EACH NOSTRIL EVERY DAY, Disp: 48 g, Rfl: 3    fluticasone-salmeterol 230-21 mcg/dose (ADVAIR HFA) 230-21 mcg/actuation HFAA inhaler, INHALE 2 PUFFS INTO THE LUNGS TWICE DAILY, Disp: 36 g, Rfl: 0    fluticasone-salmeterol diskus inhaler 100-50 mcg, Inhale 1 puff into the lungs every 12 (twelve) hours., Disp: , Rfl:     hydroCHLOROthiazide (HYDRODIURIL) 25 MG tablet, Take 1 tablet (25 mg total) by mouth once daily., Disp: 90 tablet, Rfl: 3    hydrOXYzine HCL (ATARAX) 25 MG tablet, Take 1 tablet (25 mg total) by mouth daily as needed for Anxiety., Disp: 20 tablet, Rfl: 0    lisinopriL (PRINIVIL,ZESTRIL) 40 MG tablet, Take 1 tablet (40 mg total) by mouth once daily., Disp:  90 tablet, Rfl: 3    miscellaneous medical supply (470V TWO WAY VALVE) Misc, Disp nebulizer and tubing ,medicine reservoir,and mask  So as to use  Every 4-6 hrs for sob/wheeze, Disp: , Rfl:     montelukast (SINGULAIR) 10 mg tablet, Take 1 tablet (10 mg total) by mouth every evening., Disp: 90 tablet, Rfl: 3    nicotine polacrilex 2 MG Lozg, Take 1 lozenge (2 mg total) by mouth as needed. Use 1-2 per hour in place of a cigarette. Limit to 6 a day., Disp: 108 lozenge, Rfl: 0    rosuvastatin (CRESTOR) 20 MG tablet, Take 2 tablets (40 mg total) by mouth every evening., Disp: 180 tablet, Rfl: 3    sodium chloride (SALINE NASAL) 0.65 % nasal spray, Use 1 spray in each nostril as needed for Congestion., Disp: 44 mL, Rfl: 1    tiotropium (SPIRIVA WITH HANDIHALER) 18 mcg inhalation capsule, Inhale 1 capsule (18 mcg total) into the lungs Daily. INHALE THE CONTENTS OF 1 CAPSULE(18 MCG) INTO THE LUNGS EVERY DAY, Disp: 90 capsule, Rfl: 3

## 2025-05-06 ENCOUNTER — CLINICAL SUPPORT (OUTPATIENT)
Dept: SMOKING CESSATION | Facility: CLINIC | Age: 70
End: 2025-05-06
Payer: COMMERCIAL

## 2025-05-06 ENCOUNTER — TELEPHONE (OUTPATIENT)
Dept: SMOKING CESSATION | Facility: CLINIC | Age: 70
End: 2025-05-06
Payer: MEDICARE

## 2025-05-06 DIAGNOSIS — F17.200 NICOTINE DEPENDENCE: Primary | ICD-10-CM

## 2025-05-06 PROCEDURE — 99404 PREV MED CNSL INDIV APPRX 60: CPT | Mod: 95,,,

## 2025-05-06 RX ORDER — DM/P-EPHED/ACETAMINOPH/DOXYLAM 30-7.5/3
2 LIQUID (ML) ORAL
Qty: 144 LOZENGE | Refills: 0 | Status: SHIPPED | OUTPATIENT
Start: 2025-05-06

## 2025-05-06 NOTE — PROGRESS NOTES
FTND score of 1 indicates a mild dependence to Nicotine. Patient has been tobacco free for one month since her hospital admission for COPD exacerbation.  BUSTRE-D score of 6 is perceived as no mental distress or depression at this time.   NRT lozenges ordered and instructions given for use.  Healthy distraction activities discussed for urges.  Patient states she is having strong cravings to smoke.

## 2025-05-06 NOTE — TELEPHONE ENCOUNTER
1st attempt, patient called for an appointment reminder.  Patient stated she would check in for her virtual visit at scheduled time.  E-pre check reviewed and my contact information was given to patient.

## 2025-05-13 ENCOUNTER — OFFICE VISIT (OUTPATIENT)
Dept: FAMILY MEDICINE | Facility: CLINIC | Age: 70
End: 2025-05-13
Payer: MEDICARE

## 2025-05-13 ENCOUNTER — NURSE TRIAGE (OUTPATIENT)
Dept: ADMINISTRATIVE | Facility: CLINIC | Age: 70
End: 2025-05-13
Payer: MEDICARE

## 2025-05-13 ENCOUNTER — TELEPHONE (OUTPATIENT)
Dept: SMOKING CESSATION | Facility: CLINIC | Age: 70
End: 2025-05-13
Payer: MEDICARE

## 2025-05-13 DIAGNOSIS — J44.1 ACUTE EXACERBATION OF COPD WITH ASTHMA: Primary | ICD-10-CM

## 2025-05-13 PROCEDURE — 98006 SYNCH AUDIO-VIDEO EST MOD 30: CPT | Mod: 95,,, | Performed by: FAMILY MEDICINE

## 2025-05-13 PROCEDURE — 1126F AMNT PAIN NOTED NONE PRSNT: CPT | Mod: CPTII,95,, | Performed by: FAMILY MEDICINE

## 2025-05-13 PROCEDURE — 4010F ACE/ARB THERAPY RXD/TAKEN: CPT | Mod: CPTII,95,, | Performed by: FAMILY MEDICINE

## 2025-05-13 RX ORDER — METHYLPREDNISOLONE 4 MG/1
TABLET ORAL
Qty: 1 EACH | Refills: 0 | Status: SHIPPED | OUTPATIENT
Start: 2025-05-13 | End: 2025-06-03

## 2025-05-13 RX ORDER — AZITHROMYCIN 250 MG/1
TABLET, FILM COATED ORAL
Qty: 6 TABLET | Refills: 0 | Status: SHIPPED | OUTPATIENT
Start: 2025-05-13 | End: 2025-05-18

## 2025-05-13 NOTE — TELEPHONE ENCOUNTER
1st attempt, patient called after not checking in for her virtual visit.  Patient stated that she forgot and she rescheduled for tomorrow at 08:00.

## 2025-05-13 NOTE — TELEPHONE ENCOUNTER
Pt states she just got of the hospital 4/14/2025 for asthma. She saw the ENT 4/28/2025 and is still taking all the medications they prescribed. Pt states she is coughing, nose running, nasal drip, chest congestion, and has watery eyes. Pt states she started wheezing 2 days ago. Pt has nebulizer, nasal spray, other meds still in use. Pt not coughing all day but more than usual. Pt feels more tired than usual. Pt asked for a visit with Dr Marquez today. Care advice recommends pt is seen today or tomorrow in office. Virtual appointment given.  Reason for Disposition   Patient wants to be seen    Additional Information   Negative: SEVERE difficulty breathing (e.g., struggling for each breath, speaks in single words)   Negative: Very weak (can't stand)   Negative: Sounds like a life-threatening emergency to the triager   Negative: Patient sounds very sick or weak to the triager   Negative: Fever > 103 F (39.4 C)   Negative: Fever > 101 F (38.3 C) and over 60 years of age   Negative: Fever > 100 F (37.8 C) and has diabetes mellitus or a weak immune system (e.g., HIV positive, cancer chemotherapy, organ transplant, splenectomy, chronic steroids)   Negative: Fever > 100 F (37.8 C) and bedridden (e.g., CVA, chronic illness, recovering from surgery)   Negative: Fever present > 3 days (72 hours)   Negative: Fever returns after gone for over 24 hours and symptoms worse or not improved   Negative: Sinus pain (not just congestion) and fever   Negative: Earache   Negative: Sinus congestion (pressure, fullness) present > 10 days   Negative: Nasal discharge present > 10 days   Negative: Using nasal washes and pain medicine > 24 hours and sinus pain (lower forehead, cheekbone, or eye) persists    Protocols used: Common Cold-A-[place]

## 2025-05-14 ENCOUNTER — CLINICAL SUPPORT (OUTPATIENT)
Dept: SMOKING CESSATION | Facility: CLINIC | Age: 70
End: 2025-05-14
Payer: COMMERCIAL

## 2025-05-14 DIAGNOSIS — R09.82 POST-NASAL DRIP: ICD-10-CM

## 2025-05-14 DIAGNOSIS — F17.200 NICOTINE DEPENDENCE: Primary | ICD-10-CM

## 2025-05-14 PROCEDURE — 99402 PREV MED CNSL INDIV APPRX 30: CPT | Mod: 95,,,

## 2025-05-14 RX ORDER — FLUTICASONE PROPIONATE 50 MCG
SPRAY, SUSPENSION (ML) NASAL
Qty: 48 G | Refills: 3 | Status: SHIPPED | OUTPATIENT
Start: 2025-05-14

## 2025-05-14 NOTE — TELEPHONE ENCOUNTER
No care due was identified.  [facility] Embedded Care Due Messages. Reference number: [id number].   5/14/2025 5:52:16 AM CDT

## 2025-05-14 NOTE — PROGRESS NOTES
"Individual Follow-Up Form    5/14/2025    Quit Date: 4/4/25    Clinical Status of Patient: Outpatient    Length of Service: 30 minutes    Continuing Medication: yes  Nicotine Lozenges    Other Medications:      Target Symptoms: Withdrawal and medication side effects. The following were  rated moderate (3) to severe (4) on TCRS:  Moderate (3): urges with coffee and randomly  Severe (4): none    Comments: completion of TCRS (Tobacco Cessation Rating Scale) reviewed strategies, habitual behavior, high risks situations, understanding urges and cravings, stress and relaxation with open discussion and additional interventions, Introduced lapses, relapses, understanding them and analyzing the situation of a lapse, conflict issues that may be linked to a lapse.  Patient states that she remains tobacco free.  I congratulated her for the continued success.  Patient states that she continues to have cravings with morning coffee and randomly during the day.  Patient states she is using about 2 NRT lozenges a day.  Patient states benefit from the NRT, but understands that maintaining her quit is "up to me".  Patient is confident she will remain quit.  The patient will continue with virtual therapy sessions and medication monitoring by CTTS. Prescribed medication management will be by physician.     Diagnosis: F17.200    Next Visit: 3 week    "

## 2025-05-14 NOTE — PROGRESS NOTES
The patient location is: [place]  The chief complaint leading to consultation is: Nasal Congestion and Cough (Productive red phlegm )    Total time: 30 minutes  Visit type: audiovisual    Each patient to whom he or she provides medical services by telemedicine is:  (1) informed of the relationship between the physician and patient and the respective role of any other health care provider with respect to management of the patient; and (2) notified that he or she may decline to receive medical services by telemedicine and may withdraw from such care at any time.      Subjective:       Patient ID: Hollie Ponce is a 69 y.o. female.    Chief Complaint: Nasal Congestion and Cough (Productive red phlegm )      Cough  This is a new problem. The current episode started in the past 7 days. The cough is Productive of sputum. Associated symptoms include shortness of breath and wheezing.       Review of Systems   Constitutional: Negative.    HENT:  Positive for congestion.    Respiratory:  Positive for cough, shortness of breath and wheezing.    Cardiovascular: Negative.    Gastrointestinal: Negative.    Endocrine: Negative.    Genitourinary: Negative.    Musculoskeletal: Negative.    Neurological: Negative.    Psychiatric/Behavioral: Negative.            Past Medical History:   Diagnosis Date    Asthma     COPD (chronic obstructive pulmonary disease)     Hypertension      Past Surgical History:   Procedure Laterality Date    ANGIOGRAM, CORONARY, WITH LEFT HEART CATHETERIZATION  2025    Procedure: Angiogram, Coronary, with Left Heart Cath;  Surgeon: Jhonny Schofield MD;  Location: [facility] CATH LAB;  Service: Cardiology;;    CARDIAC CATHETERIZATION N/A 2025    Procedure: Cardiac catheterization;  Surgeon: Jhonny Schofield MD;  Location: [facility] CATH LAB;  Service: Cardiology;  Laterality: N/A;     SECTION      HERNIA REPAIR      LEFT HEART CATHETERIZATION Left 2020    Procedure: Left heart cath;  Surgeon:  Shabnam Vernon MD;  Location: [facility] CATH LAB;  Service: Cardiology;  Laterality: Left;     Family History   Problem Relation Name Age of Onset    Cancer Mother          Bladder    Liver disease Father       Social History     Socioeconomic History    Marital status: Single   Tobacco Use    Smoking status: Former     Current packs/day: 0.00     Average packs/day: 0.5 packs/day for 53.3 years (25.8 ttl pk-yrs)     Types: Cigarettes     Start date: 1970     Quit date: 2025     Years since quittin.1    Smokeless tobacco: Never   Substance and Sexual Activity    Alcohol use: Never    Drug use: Never    Sexual activity: Not Currently     Social Drivers of Health     Financial Resource Strain: High Risk (2025)    Overall Financial Resource Strain (CARDIA)     Difficulty of Paying Living Expenses: Hard   Food Insecurity: Food Insecurity Present (2025)    Hunger Vital Sign     Worried About Running Out of Food in the Last Year: Often true     Ran Out of Food in the Last Year: Sometimes true   Transportation Needs: No Transportation Needs (2025)    PRAPARE - Transportation     Lack of Transportation (Medical): No     Lack of Transportation (Non-Medical): No   Physical Activity: Unknown (2025)    Exercise Vital Sign     Minutes of Exercise per Session: 0 min   Stress: Stress Concern Present (2025)    [employer] [place] [employer] - Occupational Stress Questionnaire     Feeling of Stress : To some extent   Housing Stability: Low Risk  (2025)    Housing Stability Vital Sign     Unable to Pay for Housing in the Last Year: No     Number of Times Moved in the Last Year: 0     Homeless in the Last Year: No     Current Medications[1]   Objective:      There were no vitals filed for this visit.    Physical Exam  Constitutional:       General: She is not in acute distress.     Appearance: She is not diaphoretic.   HENT:      Head: Normocephalic and atraumatic.   Eyes:       Conjunctiva/sclera: Conjunctivae normal.   Pulmonary:      Effort: Pulmonary effort is normal.   Musculoskeletal:         General: Normal range of motion.      Cervical back: Neck supple.   Skin:     Findings: No rash.   Neurological:      Mental Status: She is alert and oriented to person, place, and time.   Psychiatric:         Behavior: Behavior normal.         Thought Content: Thought content normal.         Judgment: Judgment normal.          Assessment:       1. Acute exacerbation of COPD with asthma        Plan:       Acute exacerbation of COPD with asthma  -     azithromycin (Z-MEENA) 250 MG tablet; Take 2 tablets by mouth on day 1; Take 1 tablet by mouth on days 2-5  Dispense: 6 tablet; Refill: 0  -     methylPREDNISolone (MEDROL DOSEPACK) 4 mg tablet; use as directed  Dispense: 1 each; Refill: 0    Cont w/ inhalers. Advised pt to go to the ED if symptoms worsen  Future Appointments   Date Time Provider Department Center   5/28/2025  9:00 AM Jhonny Schofield MD [facility] CARDIO [place]   6/17/2025  3:00 PM Sarah Wolf MD [facility] PULSM [facility]   8/11/2025  1:45 PM Janiya Joya MD [facility] ENT [facility]   10/3/2025  9:00 AM LAB, [facility] DRAW STATION [facility] LAB [facility]   10/7/2025 11:40 AM Ariadne Smith MD [facility]                   Patient note was created using Smart [employer].  Any errors in syntax or even information may not have been identified and edited on initial review prior to signing this note.         [1]   Current Outpatient Medications:     albuterol (PROVENTIL/VENTOLIN HFA) 90 mcg/actuation inhaler, Inhale 2 puffs into the lungs every 6 (six) hours as needed for Wheezing or Shortness of Breath. Rescue, Disp: 20.1 g, Rfl: 2    albuterol sulfate 2.5 mg/0.5 mL Nebu, Inhale into the lungs., Disp: , Rfl:     albuterol-ipratropium (DUO-NEB) 2.5 mg-0.5 mg/3 mL nebulizer solution, Take 3 mLs by nebulization every 4 (four) hours as needed for Wheezing., Disp: 1080 mL, Rfl: 3    aspirin  (ECOTRIN) 81 MG EC tablet, Take 81 mg by mouth., Disp: , Rfl:     azelastine (ASTELIN) 137 mcg (0.1 %) nasal spray, 1 spray (137 mcg total) by Nasal route 2 (two) times daily., Disp: 30 mL, Rfl: 3    carvediloL (COREG) 3.125 MG tablet, Take 1 tablet (3.125 mg total) by mouth 2 (two) times daily. TAKE 1 TABLET(3.125 MG) BY MOUTH TWICE DAILY WITH MEALS, Disp: 180 tablet, Rfl: 3    fluticasone propionate (FLONASE) 50 mcg/actuation nasal spray, SHAKE LIQUID AND USE 2 SPRAYS(100 MCG) IN EACH NOSTRIL EVERY DAY, Disp: 48 g, Rfl: 3    fluticasone-salmeterol 230-21 mcg/dose (ADVAIR HFA) 230-21 mcg/actuation HFAA inhaler, INHALE 2 PUFFS INTO THE LUNGS TWICE DAILY, Disp: 36 g, Rfl: 0    fluticasone-salmeterol diskus inhaler 100-50 mcg, Inhale 1 puff into the lungs every 12 (twelve) hours., Disp: , Rfl:     hydroCHLOROthiazide (HYDRODIURIL) 25 MG tablet, Take 1 tablet (25 mg total) by mouth once daily., Disp: 90 tablet, Rfl: 3    hydrOXYzine HCL (ATARAX) 25 MG tablet, Take 1 tablet (25 mg total) by mouth daily as needed for Anxiety., Disp: 20 tablet, Rfl: 0    lisinopriL (PRINIVIL,ZESTRIL) 40 MG tablet, Take 1 tablet (40 mg total) by mouth once daily., Disp: 90 tablet, Rfl: 3    miscellaneous medical supply (470V TWO WAY VALVE) Misc, Disp nebulizer and tubing ,medicine reservoir,and mask  So as to use  Every 4-6 hrs for sob/wheeze, Disp: , Rfl:     montelukast (SINGULAIR) 10 mg tablet, Take 1 tablet (10 mg total) by mouth every evening., Disp: 90 tablet, Rfl: 3    nicotine polacrilex 2 MG Lozg, Take 1 lozenge (2 mg total) by mouth as needed (Use up to 20 lozenges a day for cravings)., Disp: 144 lozenge, Rfl: 0    rosuvastatin (CRESTOR) 20 MG tablet, Take 2 tablets (40 mg total) by mouth every evening., Disp: 180 tablet, Rfl: 3    sodium chloride (SALINE NASAL) 0.65 % nasal spray, Use 1 spray in each nostril as needed for Congestion., Disp: 44 mL, Rfl: 1    tiotropium (SPIRIVA WITH HANDIHALER) 18 mcg inhalation capsule, Inhale 1  capsule (18 mcg total) into the lungs Daily. INHALE THE CONTENTS OF 1 CAPSULE(18 MCG) INTO THE LUNGS EVERY DAY, Disp: 90 capsule, Rfl: 3    azithromycin (Z-MEENA) 250 MG tablet, Take 2 tablets by mouth on day 1; Take 1 tablet by mouth on days 2-5, Disp: 6 tablet, Rfl: 0    methylPREDNISolone (MEDROL DOSEPACK) 4 mg tablet, use as directed, Disp: 1 each, Rfl: 0

## 2025-05-15 ENCOUNTER — PATIENT MESSAGE (OUTPATIENT)
Dept: ADMINISTRATIVE | Facility: OTHER | Age: 70
End: 2025-05-15
Payer: MEDICARE

## 2025-05-21 ENCOUNTER — TELEPHONE (OUTPATIENT)
Dept: FAMILY MEDICINE | Facility: CLINIC | Age: 70
End: 2025-05-21
Payer: MEDICARE

## 2025-05-21 DIAGNOSIS — J43.2 CENTRILOBULAR EMPHYSEMA: ICD-10-CM

## 2025-05-21 RX ORDER — TIOTROPIUM BROMIDE 18 UG/1
18 CAPSULE ORAL; RESPIRATORY (INHALATION) DAILY
Qty: 90 CAPSULE | Refills: 3 | Status: SHIPPED | OUTPATIENT
Start: 2025-05-21 | End: 2026-05-21

## 2025-05-21 NOTE — TELEPHONE ENCOUNTER
No care due was identified.  [facility] Embedded Care Due Messages. Reference number: [id number].   5/21/2025 12:01:22 PM CDT

## 2025-06-03 ENCOUNTER — CLINICAL SUPPORT (OUTPATIENT)
Dept: SMOKING CESSATION | Facility: CLINIC | Age: 70
End: 2025-06-03
Payer: COMMERCIAL

## 2025-06-03 DIAGNOSIS — F17.200 NICOTINE DEPENDENCE: ICD-10-CM

## 2025-06-03 RX ORDER — DM/P-EPHED/ACETAMINOPH/DOXYLAM 30-7.5/3
2 LIQUID (ML) ORAL
Qty: 144 LOZENGE | Refills: 0 | Status: SHIPPED | OUTPATIENT
Start: 2025-06-09

## 2025-06-11 ENCOUNTER — TELEPHONE (OUTPATIENT)
Dept: CARDIOLOGY | Facility: CLINIC | Age: 70
End: 2025-06-11
Payer: MEDICARE

## 2025-06-17 ENCOUNTER — OFFICE VISIT (OUTPATIENT)
Dept: PULMONOLOGY | Facility: CLINIC | Age: 70
End: 2025-06-17
Payer: MEDICARE

## 2025-06-17 ENCOUNTER — TELEPHONE (OUTPATIENT)
Dept: TRANSPLANT | Facility: CLINIC | Age: 70
End: 2025-06-17
Payer: MEDICARE

## 2025-06-17 VITALS
HEIGHT: 56 IN | OXYGEN SATURATION: 96 % | SYSTOLIC BLOOD PRESSURE: 154 MMHG | BODY MASS INDEX: 31.32 KG/M2 | HEART RATE: 82 BPM | DIASTOLIC BLOOD PRESSURE: 72 MMHG | WEIGHT: 139.25 LBS

## 2025-06-17 DIAGNOSIS — G47.39 OTHER SLEEP APNEA: ICD-10-CM

## 2025-06-17 DIAGNOSIS — J96.01 ACUTE RESPIRATORY FAILURE WITH HYPOXIA: ICD-10-CM

## 2025-06-17 DIAGNOSIS — R06.82 TACHYPNEA: ICD-10-CM

## 2025-06-17 DIAGNOSIS — J96.11 CHRONIC HYPOXEMIC RESPIRATORY FAILURE: Primary | ICD-10-CM

## 2025-06-17 DIAGNOSIS — J43.2 CENTRILOBULAR EMPHYSEMA: ICD-10-CM

## 2025-06-17 DIAGNOSIS — I27.9 CHRONIC PULMONARY HEART DISEASE: ICD-10-CM

## 2025-06-17 DIAGNOSIS — J41.0 SIMPLE CHRONIC BRONCHITIS: ICD-10-CM

## 2025-06-17 DIAGNOSIS — Z79.899 POLYPHARMACY: Primary | ICD-10-CM

## 2025-06-17 DIAGNOSIS — F17.211 CIGARETTE NICOTINE DEPENDENCE IN REMISSION: ICD-10-CM

## 2025-06-17 DIAGNOSIS — J44.1 ACUTE EXACERBATION OF COPD WITH ASTHMA: ICD-10-CM

## 2025-06-17 DIAGNOSIS — I27.20 PULMONARY HYPERTENSION: ICD-10-CM

## 2025-06-17 PROCEDURE — 3077F SYST BP >= 140 MM HG: CPT | Mod: CPTII,S$GLB,, | Performed by: INTERNAL MEDICINE

## 2025-06-17 PROCEDURE — 99999 PR PBB SHADOW E&M-EST. PATIENT-LVL V: CPT | Mod: PBBFAC,,, | Performed by: INTERNAL MEDICINE

## 2025-06-17 PROCEDURE — 1126F AMNT PAIN NOTED NONE PRSNT: CPT | Mod: CPTII,S$GLB,, | Performed by: INTERNAL MEDICINE

## 2025-06-17 PROCEDURE — 3078F DIAST BP <80 MM HG: CPT | Mod: CPTII,S$GLB,, | Performed by: INTERNAL MEDICINE

## 2025-06-17 PROCEDURE — 99215 OFFICE O/P EST HI 40 MIN: CPT | Mod: S$GLB,,, | Performed by: INTERNAL MEDICINE

## 2025-06-17 PROCEDURE — 3008F BODY MASS INDEX DOCD: CPT | Mod: CPTII,S$GLB,, | Performed by: INTERNAL MEDICINE

## 2025-06-17 PROCEDURE — 1101F PT FALLS ASSESS-DOCD LE1/YR: CPT | Mod: CPTII,S$GLB,, | Performed by: INTERNAL MEDICINE

## 2025-06-17 PROCEDURE — 3288F FALL RISK ASSESSMENT DOCD: CPT | Mod: CPTII,S$GLB,, | Performed by: INTERNAL MEDICINE

## 2025-06-17 PROCEDURE — 1159F MED LIST DOCD IN RCRD: CPT | Mod: CPTII,S$GLB,, | Performed by: INTERNAL MEDICINE

## 2025-06-17 PROCEDURE — 4010F ACE/ARB THERAPY RXD/TAKEN: CPT | Mod: CPTII,S$GLB,, | Performed by: INTERNAL MEDICINE

## 2025-06-17 RX ORDER — ALBUTEROL SULFATE 90 UG/1
2 INHALANT RESPIRATORY (INHALATION) EVERY 6 HOURS PRN
Qty: 18 G | Refills: 11 | Status: SHIPPED | OUTPATIENT
Start: 2025-06-17 | End: 2026-06-17

## 2025-06-17 RX ORDER — FLUTICASONE FUROATE, UMECLIDINIUM BROMIDE AND VILANTEROL TRIFENATATE 200; 62.5; 25 UG/1; UG/1; UG/1
1 POWDER RESPIRATORY (INHALATION) DAILY
Qty: 60 EACH | Refills: 11 | Status: SHIPPED | OUTPATIENT
Start: 2025-06-17

## 2025-06-17 NOTE — PROGRESS NOTES
General Pulmonary Clinic  New Patient Visit    Chief Complaint: COPD     HPI     Hollie Ponce is a 70 y.o. female with COPD w PH, non-obstructive CAD,presenting with chief complaint of COPD and recent hospitalization     recent hospitalization for COPD exacerbation  Discharge summary reviewed  Presented w/ wheezing and dyspnea and requiring O2.   She was tx for COPD exacerbation. Some concerns for stridor but no clear source but some mild narrowing of her proximal trachea  TTE 4/5/25 w/ PH PASP 55  Discharged on O2      She reports her breathing has not returned to baseline since hospitalization. Prior to hospitalization, she could walk several blocks, climb stairs, and shop at [employer] without significant difficulty. Currently, she has difficulty with distances even with a roller walker, which she finds heavy and challenging to lift into the car. Some days, she has difficulty performing tasks around the home. She is unable to handle distances or drive to places like [employer] as before. SpO2 is 96% at rest but drops to 89-90% with ambulation. She is only using O2 at night   She reports occasional wheezing, experiencing it once or twice recently. She currently has a raspy, hoarse voice but denies sore throat. ENT evaluation showed no visible throat abnormalities.    MEDICATIONS:  She takes Spiriva regularly and uses albuterol as needed for wheezing, reporting that two puffs typically resolves symptoms.      REMY: she does snore, she does have frequent night awakenings,  restorative sleep, she has daytime sleepiness          Exposures  Quit April 2025,  1-2 ppd x 50 years  no marijuana  no vaping  no mold or water damage in home  no pets  Occupation: worked at [employer]    Records reviewed with the following findings ---        Objective   Past History     Past Medical History:  Past Medical History:   Diagnosis Date    Asthma     COPD (chronic obstructive pulmonary disease)     Hypertension          Past  Surgical History:  Past Surgical History:   Procedure Laterality Date    ANGIOGRAM, CORONARY, WITH LEFT HEART CATHETERIZATION  2025    Procedure: Angiogram, Coronary, with Left Heart Cath;  Surgeon: Jhonny Schofield MD;  Location: [facility] CATH LAB;  Service: Cardiology;;    CARDIAC CATHETERIZATION N/A 2025    Procedure: Cardiac catheterization;  Surgeon: Jhonny Schofield MD;  Location: [facility] CATH LAB;  Service: Cardiology;  Laterality: N/A;     SECTION      HERNIA REPAIR      LEFT HEART CATHETERIZATION Left 2020    Procedure: Left heart cath;  Surgeon: Shabnam Vernon MD;  Location: [facility] CATH LAB;  Service: Cardiology;  Laterality: Left;         Social History:   Social History     Socioeconomic History    Marital status: Single   Tobacco Use    Smoking status: Former     Current packs/day: 0.00     Average packs/day: 0.5 packs/day for 53.3 years (25.8 ttl pk-yrs)     Types: Cigarettes     Start date: 1970     Quit date: 2025     Years since quittin.2    Smokeless tobacco: Never   Substance and Sexual Activity    Alcohol use: Never    Drug use: Never    Sexual activity: Not Currently     Social Drivers of Health     Financial Resource Strain: High Risk (2025)    Overall Financial Resource Strain (CARDIA)     Difficulty of Paying Living Expenses: Hard   Food Insecurity: Food Insecurity Present (2025)    Hunger Vital Sign     Worried About Running Out of Food in the Last Year: Often true     Ran Out of Food in the Last Year: Sometimes true   Transportation Needs: No Transportation Needs (2025)    PRAPARE - Transportation     Lack of Transportation (Medical): No     Lack of Transportation (Non-Medical): No   Physical Activity: Unknown (2025)    Exercise Vital Sign     Minutes of Exercise per Session: 0 min   Stress: Stress Concern Present (2025)    [employer] [place] [employer] - Occupational Stress Questionnaire     Feeling of Stress : To some  "extent   Housing Stability: Low Risk  (4/22/2025)    Housing Stability Vital Sign     Unable to Pay for Housing in the Last Year: No     Number of Times Moved in the Last Year: 0     Homeless in the Last Year: No         Family Hx:  No family history of pulmonary fibrosis, pre-mature graying of hair, cirrhosis, or hematologic malignancies  No family history of emphysema or spontaneous PTX  no family history of lung cancer or lymphoma    Allergies and Pertinent Medications   Allergies and Medications Reviewed    Flagyl [metronidazole hcl], Metronidazole, Sulfamethoxazole-trimethoprim, Aspirin, and Lipitor [atorvastatin]  [unfilled]             Physical Exam   BP (!) 154/72   Pulse 82   Ht 4' 8" (1.422 m)   Wt 63.2 kg (139 lb 3.5 oz)   SpO2 96%   BMI 31.21 kg/m²       Constitutional: No acute distress, Atraumatic   HEENT: moist mucus membranes, extraocular movements intact  Cardiovascular: regular rate and rhythm, no murmurs, rubs or gallops  Pulmonary: normal respiratory rate and chest rise, no chest wall deformity, normal breath sounds with no wheezing or crackles  Abdominal: non-distended, bowel sounds present  Musculoskeletal: No lower extremity edema, no clubbing  Neurological: normal speech/maximiliano, moves all extremities against gravity  Skin: no finger cyanosis, no rashes on exposed body parts  Psych: Appropriate affect, normal mood       Diagnostic Studies      All diagnostic studies relevant to chief complaint reviewed personally    Labs:  Lab Results   Component Value Date    WBC 11.89 04/16/2025    HGB 12.5 04/16/2025    HGB 13.4 04/16/2024     04/16/2025     04/16/2024    MCV 92 04/16/2025    MCV 95 04/16/2024     Lab Results   Component Value Date     04/16/2025     04/16/2024    K 4.2 04/16/2025    K 4.2 04/16/2024    CO2 33 (H) 04/16/2025    CO2 28 04/16/2024    BUN 16 04/16/2025    CREATININE 0.7 04/16/2025    MG 2.0 04/11/2025     Lab Results   Component Value Date    " "AST 19 04/04/2025    AST 15 04/16/2024    ALT 17 04/04/2025    ALT 13 04/16/2024    ALBUMIN 4.1 04/04/2025    ALBUMIN 3.8 04/16/2024    PROT 7.4 04/04/2025    PROT 6.3 04/16/2024    BILITOT 0.4 04/04/2025    BILITOT 0.3 04/16/2024         PFTs:  Pulmonary Functions Testing Results:  No results found for: "FEV1", "FVC", "GCC7ESR", "TLC", "DLCO"         TTE:  Results for orders placed during the hospital encounter of 04/04/25    Echo    Interpretation Summary    Left Ventricle: The left ventricle is normal in size. Normal wall thickness. There is normal systolic function with a visually estimated ejection fraction of 65 - 70%. Grade I diastolic dysfunction.    Right Ventricle: The right ventricle is normal in size. Systolic function is normal.    Mitral Valve: There is mild regurgitation.    Tricuspid Valve: There is mild regurgitation.    Pulmonary Artery: The estimated pulmonary artery systolic pressure is 55 mmHg.            Assessment and Plan   Hollie Ponce is a 70 y.o. female  presenting with chief complaint of chronic hypoxemic resp failure 2/2 COPD and PH    Assessment & Plan        # chronic hypoxemic resp failure req 2L/min NC 2/2 COPD and Pulmonary hypertension - chronic, worsening   continue w/ 0 L at rest, 2-3 L/min w/ exertion told to titrate to main o2 > 90%   Started Trelegy inhaler: 1 puff daily, hold breath for at least 10 seconds after use, albuterol PRN   likely hypercapnia - send for sleep study   refer to PH consider PE eval   check AEC to determine if nucala/dupixent an option   PFT   Referred to pulmonary rehabilitation program at [facility]    HYPOXEMIA AND OXYGEN DEPENDENCE:   Explained importance of using oxygen during exertion to prevent cardiac events and strokes.   Discussed need for increased oxygen use during air travel due to lower oxygen levels at higher altitudes.   Hollie to use oxygen when walking or during physical activity and check oxygen levels after walking; use oxygen if " levels drop below 90%.   Hollie to notify a few weeks in advance of any planned air travel for oxygen management advice.    CIRCULATORY AND RESPIRATORY SYMPTOMS:   Referred to pulmonary HTN specialist.    SLEEP APNEA:   Ordered sleep study to assess for sleep apnea.    CANCER SCREENING:   Ordered lung cancer screening CT Chest for October. Counseled on benefits and potential additional testing associate with it    FOLLOW-UP:   Follow up on August 5th (Tuesday) as scheduled.   Contact the office if unable to obtain Trelegy, and previous medications will be refilled.       Explained to the patient I work Monday-Thursdays, so any results or messages received after working hours Thursday through Monday AM would not be addressed until I was back in the office. If he/she experienced any acute symptoms he/she should go the emergency room for immediate help.    Differential, diagnoses, diagnostic work up, and possible treatments were discussed with the patient. Questions were answered.    Sarah Wolf MD  Pulmonary-Critical Care Medicine              For this visit, the following time was spent  preparing to see the patient (e.g., review of tests) 20 minutes  obtaining and/or reviewing separately obtained history 0 minutes  Performing a medically necessary appropriate examination and/or evaluation 15 minutes  Counseling and educating the patient/family/caregiver 15 minutes  Ordering medications, tests, or procedures 2 minutes  Referring and communicating with other health care professionals (when not reported separately) 0minutes  Documenting clinical information in the electronic or other health record 8 minutes  Care coordination (not reported separately) 0minutes    Total time = 60  minutes

## 2025-06-17 NOTE — PATIENT INSTRUCTIONS
DO NOT USE INHALERS FOR AT LEAST 24 HOURS BEFORE LUNG FUNCTION TESTING UNLESS SHORT OF BREATH    I would like to prescribe you an inhaler. If the inhaler is expensive, please ask the pharmacy if it is because of your deductible or if your insurance prefers a different inhaler -- please get the names of those inhalers and message me back if so.      I am prescribing you trelegy to help with your COPD. Please take 1 puff in the morning every day. Hold your breath 10 minutes  Please ensure your gargle after each use. There are little to no immediate side effects with this medication as there is minimal absorption to the blood. A low grade yeast infection in the mouth called thrush can develop if you do not rinse/gargle your mouth after use    Common side effects include dry mouth, palpitations, headache    I am also prescribing an albuterol inhaler to take AS NEEDED for shortness of breath above your baseline. This medication can increase your heart rate. If using for more than 2 times per day, please let me know.    Please watch a video on appropriate use of your inhaler as it is crucial that is delivered to your airways (and not the back of your mouth to be effective.)    Videos:    How to use a meter-dosed inhaler: https://www.[employer].com/watch?v=9ipqxF-4p5g    How to use a spacer (Aerochamber) with meter-dosed inhaler: https://www.[employer].com/watch?v=ltB-ZOjlkgY    How to use a diskus inhaler: https://youexoro system.be/PU4qmd4GxeF    How to use an ellipta inhaler: https://youtu.be/Nx_JDTcmSeo            For General information on Inhalers:    https://www.nationalj[employer].org/conditions/medications/asthma-medications/devices

## 2025-06-18 ENCOUNTER — TELEPHONE (OUTPATIENT)
Dept: PULMONOLOGY | Facility: CLINIC | Age: 70
End: 2025-06-18
Payer: MEDICARE

## 2025-06-18 NOTE — TELEPHONE ENCOUNTER
Sent [facility] staff a message to get pt set up to be seen.

## 2025-06-20 ENCOUNTER — TELEPHONE (OUTPATIENT)
Dept: PULMONOLOGY | Facility: CLINIC | Age: 70
End: 2025-06-20
Payer: MEDICARE

## 2025-06-24 ENCOUNTER — CLINICAL SUPPORT (OUTPATIENT)
Dept: SMOKING CESSATION | Facility: CLINIC | Age: 70
End: 2025-06-24
Payer: COMMERCIAL

## 2025-06-24 DIAGNOSIS — F17.200 NICOTINE DEPENDENCE: ICD-10-CM

## 2025-06-24 PROCEDURE — 99402 PREV MED CNSL INDIV APPRX 30: CPT | Mod: 95,,,

## 2025-06-24 RX ORDER — DM/P-EPHED/ACETAMINOPH/DOXYLAM 30-7.5/3
2 LIQUID (ML) ORAL
Qty: 144 LOZENGE | Refills: 0 | Status: SHIPPED | OUTPATIENT
Start: 2025-06-24

## 2025-06-24 NOTE — PROGRESS NOTES
Individual Follow-Up Form    6/24/2025    Quit Date: 4/4/25    Clinical Status of Patient: Outpatient    Length of Service: 30 minutes    Continuing Medication: yes  Nicotine Lozenges    Other Medications:      Target Symptoms: Withdrawal and medication side effects. The following were  rated moderate (3) to severe (4) on TCRS:  Moderate (3): urges and cravings through the day  Severe (4): none    Comments: completion of TCRS (Tobacco Cessation Rating Scale) reviewed strategies, habitual behavior, high risks situations, understanding urges and cravings, stress and relaxation with open discussion and additional interventions, Introduced lapses, relapses, understanding them and analyzing the situation of a lapse, conflict issues that may be linked to a lapse.  Patient states that her cravings to smoke have increased this past week.  Patient states that she has been using 1-2 NRT lozenges a day for cravings, and they are helpful in maintaining her quit.  We discussed not being angry at herself for having urges, but having a plan in place to help distract herself when an urge arises.  Patient states she gets frustrated she can not leave the house without oxygen and that triggers her urges.  We discussed improved respiratory health as a motivation to stay quit.  Patient states that she will not leave the house to buy cigarettes.  We discussed options to acquire cinnamon toothpicks.  The patient will continue with virtual therapy sessions and medication monitoring by CTTS. Prescribed medication management will be by physician.      Diagnosis: F17.200    Next Visit: 4 weeks

## 2025-06-25 ENCOUNTER — PATIENT MESSAGE (OUTPATIENT)
Dept: TRANSPLANT | Facility: CLINIC | Age: 70
End: 2025-06-25
Payer: MEDICARE

## 2025-06-27 DIAGNOSIS — J44.1 COPD EXACERBATION: Primary | ICD-10-CM

## 2025-07-01 ENCOUNTER — PATIENT MESSAGE (OUTPATIENT)
Dept: OTOLARYNGOLOGY | Facility: CLINIC | Age: 70
End: 2025-07-01
Payer: MEDICARE

## 2025-07-02 ENCOUNTER — TELEPHONE (OUTPATIENT)
Dept: SLEEP MEDICINE | Facility: HOSPITAL | Age: 70
End: 2025-07-02
Payer: MEDICARE

## 2025-07-10 ENCOUNTER — HOSPITAL ENCOUNTER (OUTPATIENT)
Dept: RESPIRATORY THERAPY | Facility: HOSPITAL | Age: 70
Discharge: HOME | End: 2025-07-10
Attending: INTERNAL MEDICINE
Payer: MEDICARE

## 2025-07-10 ENCOUNTER — LAB VISIT (OUTPATIENT)
Dept: LAB | Facility: HOSPITAL | Age: 70
End: 2025-07-10
Attending: INTERNAL MEDICINE
Payer: MEDICARE

## 2025-07-10 VITALS — RESPIRATION RATE: 17 BRPM | OXYGEN SATURATION: 99 % | HEART RATE: 67 BPM

## 2025-07-10 DIAGNOSIS — J44.1 COPD EXACERBATION: ICD-10-CM

## 2025-07-10 DIAGNOSIS — J41.0 SIMPLE CHRONIC BRONCHITIS: ICD-10-CM

## 2025-07-10 LAB
ABSOLUTE EOSINOPHIL (OHS): 0.01 K/UL
ABSOLUTE MONOCYTE (OHS): 0.31 K/UL (ref 0.3–1)
ABSOLUTE NEUTROPHIL COUNT (OHS): 1.47 K/UL (ref 1.8–7.7)
BASOPHILS # BLD AUTO: 0.01 K/UL
BASOPHILS NFR BLD AUTO: 0.3 %
DLCO ADJ PRE: 8.92 ML/(MIN*MMHG) (ref 10.38–21.85)
DLCO SINGLE BREATH LLN: 10.38
DLCO SINGLE BREATH PRE REF: 55.4 %
DLCO SINGLE BREATH REF: 16.11
DLCOC SBVA LLN: 2.47
DLCOC SBVA PRE REF: 59.9 %
DLCOC SBVA REF: 4.5
DLCOC SINGLE BREATH LLN: 10.38
DLCOC SINGLE BREATH PRE REF: 55.4 %
DLCOC SINGLE BREATH REF: 16.11
DLCOVA LLN: 2.47
DLCOVA PRE REF: 59.9 %
DLCOVA PRE: 2.69 ML/(MIN*MMHG*L) (ref 2.47–6.53)
DLCOVA REF: 4.5
DLVAADJ PRE: 2.69 ML/(MIN*MMHG*L) (ref 2.47–6.53)
ERVN2 LLN: -16449.45
ERVN2 PRE REF: 60.5 %
ERVN2 PRE: 0.33 L (ref -16449.45–16450.55)
ERVN2 REF: 0.55
ERYTHROCYTE [DISTWIDTH] IN BLOOD BY AUTOMATED COUNT: 13.6 % (ref 11.5–14.5)
FEF 25 75 CHG: -8.6 %
FEF 25 75 LLN: 0.5
FEF 25 75 POST REF: 12.7 %
FEF 25 75 PRE REF: 13.9 %
FEF 25 75 REF: 1.34
FET100 CHG: 19.5 %
FEV1 CHG: 3.1 %
FEV1 FVC CHG: 2.9 %
FEV1 FVC LLN: 66
FEV1 FVC POST REF: 55.1 %
FEV1 FVC PRE REF: 53.6 %
FEV1 FVC REF: 80
FEV1 LLN: 0.98
FEV1 POST REF: 37.7 %
FEV1 PRE REF: 36.6 %
FEV1 REF: 1.41
FRCN2 LLN: 1.43
FRCN2 PRE REF: 109.2 %
FRCN2 REF: 2.25
FVC CHG: 0.2 %
FVC LLN: 1.25
FVC POST REF: 68.2 %
FVC PRE REF: 68.1 %
FVC REF: 1.77
HCT VFR BLD AUTO: 38.8 % (ref 37–48.5)
HGB BLD-MCNC: 12 GM/DL (ref 12–16)
IMM GRANULOCYTES # BLD AUTO: 0.01 K/UL (ref 0–0.04)
IMM GRANULOCYTES NFR BLD AUTO: 0.3 % (ref 0–0.5)
IVC PRE: 1.51 L (ref 1.25–2.3)
IVC SINGLE BREATH LLN: 1.25
IVC SINGLE BREATH PRE REF: 85.1 %
IVC SINGLE BREATH REF: 1.77
LYMPHOCYTES # BLD AUTO: 1.95 K/UL (ref 1–4.8)
MCH RBC QN AUTO: 29.4 PG (ref 27–31)
MCHC RBC AUTO-ENTMCNC: 30.9 G/DL (ref 32–36)
MCV RBC AUTO: 95 FL (ref 82–98)
NUCLEATED RBC (/100WBC) (OHS): 0 /100 WBC
PEF CHG: -3.7 %
PEF LLN: 2
PEF POST REF: 55.6 %
PEF PRE REF: 57.8 %
PEF REF: 3.53
PLATELET # BLD AUTO: 217 K/UL (ref 150–450)
PMV BLD AUTO: 10.4 FL (ref 9.2–12.9)
POST FEF 25 75: 0.17 L/S (ref 0.5–2.18)
POST FET 100: 11.64 SEC
POST FEV1 FVC: 43.88 % (ref 66.33–92.82)
POST FEV1: 0.53 L (ref 0.98–1.84)
POST FVC: 1.21 L (ref 1.25–2.3)
POST PEF: 1.96 L/S (ref 2–5.06)
PRE DLCO: 8.92 ML/(MIN*MMHG) (ref 10.38–21.85)
PRE FEF 25 75: 0.19 L/S (ref 0.5–2.18)
PRE FET 100: 9.74 SEC
PRE FEV1 FVC: 42.63 % (ref 66.33–92.82)
PRE FEV1: 0.52 L (ref 0.98–1.84)
PRE FRC N2: 2.46 L
PRE FVC: 1.21 L (ref 1.25–2.3)
PRE PEF: 2.04 L/S (ref 2–5.06)
RBC # BLD AUTO: 4.08 M/UL (ref 4–5.4)
RELATIVE EOSINOPHIL (OHS): 0.3 %
RELATIVE LYMPHOCYTE (OHS): 51.9 % (ref 18–48)
RELATIVE MONOCYTE (OHS): 8.2 % (ref 4–15)
RELATIVE NEUTROPHIL (OHS): 39 % (ref 38–73)
RVN2 LLN: 1.13
RVN2 PRE REF: 127.2 %
RVN2 PRE: 2.17 L (ref 1.13–2.28)
RVN2 REF: 1.71
RVN2TLCN2 LLN: 33.51
RVN2TLCN2 PRE REF: 155.9 %
RVN2TLCN2 PRE: 67.18 % (ref 33.51–52.69)
RVN2TLCN2 REF: 43.1
TLCN2 LLN: 2.59
TLCN2 PRE REF: 90.2 %
TLCN2 PRE: 3.23 L (ref 2.6–4.57)
TLCN2 REF: 3.58
VA PRE: 3.32 L (ref 3.43–3.43)
VA SINGLE BREATH LLN: 3.43
VA SINGLE BREATH PRE REF: 96.6 %
VA SINGLE BREATH REF: 3.43
VCMAXN2 LLN: 1.25
VCMAXN2 PRE REF: 59.8 %
VCMAXN2 PRE: 1.06 L (ref 1.25–2.3)
VCMAXN2 REF: 1.77
WBC # BLD AUTO: 3.76 K/UL (ref 3.9–12.7)

## 2025-07-10 PROCEDURE — 94200 LUNG FUNCTION TEST (MBC/MVV): CPT

## 2025-07-10 PROCEDURE — 85025 COMPLETE CBC W/AUTO DIFF WBC: CPT

## 2025-07-10 PROCEDURE — 25000242 PHARM REV CODE 250 ALT 637 W/ HCPCS: Performed by: INTERNAL MEDICINE

## 2025-07-10 PROCEDURE — 36415 COLL VENOUS BLD VENIPUNCTURE: CPT

## 2025-07-10 PROCEDURE — 94729 DIFFUSING CAPACITY: CPT

## 2025-07-10 RX ORDER — ALBUTEROL SULFATE 2.5 MG/.5ML
2.5 SOLUTION RESPIRATORY (INHALATION) ONCE
Status: COMPLETED | OUTPATIENT
Start: 2025-07-10 | End: 2025-07-10

## 2025-07-10 RX ADMIN — ALBUTEROL SULFATE 2.5 MG: 2.5 SOLUTION RESPIRATORY (INHALATION) at 01:07

## 2025-07-14 ENCOUNTER — EXTERNAL HOME HEALTH (OUTPATIENT)
Dept: HOME HEALTH SERVICES | Facility: HOSPITAL | Age: 70
End: 2025-07-14
Payer: MEDICARE

## 2025-07-14 ENCOUNTER — PATIENT MESSAGE (OUTPATIENT)
Dept: FAMILY MEDICINE | Facility: CLINIC | Age: 70
End: 2025-07-14
Payer: MEDICARE

## 2025-07-14 DIAGNOSIS — D70.9 NEUTROPENIA, UNSPECIFIED TYPE: Primary | ICD-10-CM

## 2025-07-15 DIAGNOSIS — J43.2 CENTRILOBULAR EMPHYSEMA: ICD-10-CM

## 2025-07-15 DIAGNOSIS — I25.2 HISTORY OF NON-ST ELEVATION MYOCARDIAL INFARCTION (NSTEMI): ICD-10-CM

## 2025-07-15 DIAGNOSIS — I10 ESSENTIAL HYPERTENSION: ICD-10-CM

## 2025-07-15 RX ORDER — MONTELUKAST SODIUM 10 MG/1
10 TABLET ORAL NIGHTLY
Qty: 90 TABLET | Refills: 3 | Status: SHIPPED | OUTPATIENT
Start: 2025-07-15

## 2025-07-15 RX ORDER — CARVEDILOL 3.12 MG/1
3.12 TABLET ORAL
Qty: 180 TABLET | Refills: 3 | Status: SHIPPED | OUTPATIENT
Start: 2025-07-15

## 2025-07-15 NOTE — TELEPHONE ENCOUNTER
No care due was identified.  [facility] Embedded Care Due Messages. Reference number: [id number].   7/15/2025 3:10:12 AM CDT

## 2025-07-17 ENCOUNTER — OFFICE VISIT (OUTPATIENT)
Dept: CARDIOLOGY | Facility: CLINIC | Age: 70
End: 2025-07-17
Payer: MEDICARE

## 2025-07-17 ENCOUNTER — TELEPHONE (OUTPATIENT)
Dept: CARDIOLOGY | Facility: CLINIC | Age: 70
End: 2025-07-17

## 2025-07-17 VITALS
WEIGHT: 141.19 LBS | HEIGHT: 56 IN | SYSTOLIC BLOOD PRESSURE: 138 MMHG | OXYGEN SATURATION: 94 % | RESPIRATION RATE: 15 BRPM | DIASTOLIC BLOOD PRESSURE: 90 MMHG | HEART RATE: 77 BPM | BODY MASS INDEX: 31.76 KG/M2

## 2025-07-17 DIAGNOSIS — I10 HYPERTENSION, UNSPECIFIED TYPE: ICD-10-CM

## 2025-07-17 DIAGNOSIS — I10 ESSENTIAL HYPERTENSION: ICD-10-CM

## 2025-07-17 DIAGNOSIS — I25.10 CORONARY ARTERY DISEASE, UNSPECIFIED VESSEL OR LESION TYPE, UNSPECIFIED WHETHER ANGINA PRESENT, UNSPECIFIED WHETHER NATIVE OR TRANSPLANTED HEART: ICD-10-CM

## 2025-07-17 DIAGNOSIS — J44.1 COPD EXACERBATION: Primary | ICD-10-CM

## 2025-07-17 PROCEDURE — G2211 COMPLEX E/M VISIT ADD ON: HCPCS | Mod: S$GLB,,, | Performed by: INTERNAL MEDICINE

## 2025-07-17 PROCEDURE — 99999 PR PBB SHADOW E&M-EST. PATIENT-LVL V: CPT | Mod: PBBFAC,,, | Performed by: INTERNAL MEDICINE

## 2025-07-17 PROCEDURE — 1101F PT FALLS ASSESS-DOCD LE1/YR: CPT | Mod: CPTII,S$GLB,, | Performed by: INTERNAL MEDICINE

## 2025-07-17 PROCEDURE — 1126F AMNT PAIN NOTED NONE PRSNT: CPT | Mod: CPTII,S$GLB,, | Performed by: INTERNAL MEDICINE

## 2025-07-17 PROCEDURE — 1159F MED LIST DOCD IN RCRD: CPT | Mod: CPTII,S$GLB,, | Performed by: INTERNAL MEDICINE

## 2025-07-17 PROCEDURE — 3075F SYST BP GE 130 - 139MM HG: CPT | Mod: CPTII,S$GLB,, | Performed by: INTERNAL MEDICINE

## 2025-07-17 PROCEDURE — 4010F ACE/ARB THERAPY RXD/TAKEN: CPT | Mod: CPTII,S$GLB,, | Performed by: INTERNAL MEDICINE

## 2025-07-17 PROCEDURE — 3288F FALL RISK ASSESSMENT DOCD: CPT | Mod: CPTII,S$GLB,, | Performed by: INTERNAL MEDICINE

## 2025-07-17 PROCEDURE — 99214 OFFICE O/P EST MOD 30 MIN: CPT | Mod: S$GLB,,, | Performed by: INTERNAL MEDICINE

## 2025-07-17 PROCEDURE — 3080F DIAST BP >= 90 MM HG: CPT | Mod: CPTII,S$GLB,, | Performed by: INTERNAL MEDICINE

## 2025-07-17 PROCEDURE — 3008F BODY MASS INDEX DOCD: CPT | Mod: CPTII,S$GLB,, | Performed by: INTERNAL MEDICINE

## 2025-07-17 RX ORDER — SPIRONOLACTONE 25 MG/1
25 TABLET ORAL DAILY
Qty: 90 TABLET | Refills: 3 | Status: SHIPPED | OUTPATIENT
Start: 2025-07-17

## 2025-07-17 RX ORDER — EZETIMIBE 10 MG/1
10 TABLET ORAL DAILY
Qty: 90 TABLET | Refills: 3 | Status: SHIPPED | OUTPATIENT
Start: 2025-07-17

## 2025-07-17 NOTE — PROGRESS NOTES
CARDIOVASCULAR CONSULTATION    REASON FOR CONSULT:   Hollie Ponce is a 70 y.o. female who presents for follow-up after recent hospitalization.      HISTORY OF PRESENT ILLNESS:     Patient is a pleasant 65-year-old lady with a past medical history significant for hypertension, COPD.  Here for follow-up after recent hospitalization for non-STEMI.  Coronary angiogram had revealed nonobstructive coronary artery disease.  Patient states that since her heart attack she has stopped smoking.  Complains of right leg claudication.  Dyspnea on exertion.  Blood pressure elevated in clinic today, but has not been taking hydrochlorothiazide.  States at home the blood pressure stays around 130-140 mm of mercury.    The left ventricle is normal in size with normal systolic function. The estimated ejection fraction is 55%.  There is mild left ventricular concentric hypertrophy.  Normal left ventricular diastolic function.  Normal right ventricular size with normal right ventricular systolic function.  Mild left atrial enlargement.  Normal central venous pressure (3 mmHg).  The estimated PA systolic pressure is 49 mmHg.  There is pulmonary hypertension.      The pre-procedure left ventricular end diastolic pressure was 11.  The estimated blood loss was <50 mL.  There was non-obstructive coronary artery disease..  No significant culprit lesion detected to explain her non-STEMI.     Left main:  No significant stenosis     Lad:  Luminal irregularities.  10-20% midstenosis     Circumflex:  OM 30-40% stenosis.  Luminal irregularities     RCA:  Mid 30 to 40% stenosis.     Access:  Right radial artery access     Assessment plan     Maximize medical management     Risk factor reductions     Notes from January 2020:  Patient here for follow-up.  Occasional bilateral and claudication.  Ultrasound was abnormal.  I discussed various options with the patient including peripheral angiogram versus continuing medical management.  At the current  time patient would like to continue medical management.    Diffuse SFA PAD bilaterally.  >50% mid R SFA stenosis.  >50% ost L SFA stenosis.  Mildly decreased BRITTNI bilaterally.    There is 20-39% right Internal Carotid Stenosis. Vessel tortuosity may lead to overestimate of stenosis.  There is 20-39% left Internal Carotid Stenosis. Vessel tortuosity may lead to overestimate of stenosis.    Mildly decreased resting BRITTNI bilaterally.  Moderately decreased exercise BRITTNI bilaterally.  Moderately dampened PVR waveforms bilaterally    No evidence of AAA.  Aortic atherosclerosis.  Increased flow velocity across bilateral iliac arteries suggesting >50% bilateral KATIA stenosis.        Notes from March 2021:  Patient here for follow-up.  Denies any chest pains at rest on exertion, orthopnea, PND.  No claudication.  Could not tolerate Pletal.      Notes from June 2021:  Patient here for follow-up.  Denies any chest pains at rest on exertion, orthopnea, PND.  No claudication.    Notes from July 25    History of Present Illness    CHIEF COMPLAINT:  Hollie presents today for follow up after recent angiogram.    POST-ANGIOGRAM SYMPTOMS:  She reports RUE pain and soreness at the access site, describing the area as feeling warm compared to the other arm. She confirms pain on palpation. She denies chest pain, tightness, dyspnea, or palpitations.    CARDIOVASCULAR:  She has known cardiac history with 30-40% stable coronary artery stenosis. Recent angiogram demonstrated nonobstructive CAD, with no significant stenosis requiring intervention. Blockages have remained stable over multiple years, with no progression noted between current and previous angiographic studies.    PULMONARY:  She has pulmonary HTN with pulmonary pressures of 55, significantly elevated from the normal range of 30-35. She requires supplemental oxygen and is under care of a pulmonary specialist. She reports recent smoking cessation. Her condition is likely related to  long-term smoking history and COPD.    MEDICATIONS:  Current medications include ASA, Carvedilol, HCTZ, Lisinopril 40mg, and Crestor. Clopidogrel was discontinued after angiogram showed no significant stenosis. She reports feeling cold with Carvedilol and declines dose increase due to this side effect.       Results for orders placed or performed during the hospital encounter of 04/04/25   EKG 12-lead    Collection Time: 04/04/25  5:50 PM   Result Value Ref Range    QRS Duration 70 ms    OHS QTC Calculation 447 ms    Narrative    Test Reason : R06.02,    Vent. Rate :  87 BPM     Atrial Rate :  87 BPM     P-R Int : 138 ms          QRS Dur :  70 ms      QT Int : 372 ms       P-R-T Axes :  87  70  76 degrees    QTcB Int : 447 ms    Normal sinus rhythm  inflat ST abnl ?isch  Abnormal ECG  When compared with ECG of 17-Jun-2021 14:15,  Significant changes have occurred  Confirmed by Jhonny Schofield ([id number]) on 4/5/2025 10:26:38 AM    Referred By: AAAREFERRAL SELF           Confirmed By: Jhonny Schofield       Results for orders placed during the hospital encounter of 04/04/25    Echo    Interpretation Summary    Left Ventricle: The left ventricle is normal in size. Normal wall thickness. There is normal systolic function with a visually estimated ejection fraction of 65 - 70%. Grade I diastolic dysfunction.    Right Ventricle: The right ventricle is normal in size. Systolic function is normal.    Mitral Valve: There is mild regurgitation.    Tricuspid Valve: There is mild regurgitation.    Pulmonary Artery: The estimated pulmonary artery systolic pressure is 55 mmHg.      No results found for this or any previous visit.      Results for orders placed during the hospital encounter of 04/04/25    Cardiac catheterization    Conclusion  Description of the findings of the procedure: uneventful LHC/cor angio via R radial.  Nonobst CAD.    Findings/Key Components:  LVEDP: 24mmHg  LVEF: 65% by echo    Dominance: Right  LM:  normal  LAD: MLI  LCx: MLI  OM1 inf branch 40%  RCA: MLI, prox 20-30%    Hemostasis:  R Radial band      No cardiac monitor results found for the past 12 months            PAST MEDICAL HISTORY:     Past Medical History:   Diagnosis Date    Asthma     COPD (chronic obstructive pulmonary disease)     Hypertension        PAST SURGICAL HISTORY:     Past Surgical History:   Procedure Laterality Date    ANGIOGRAM, CORONARY, WITH LEFT HEART CATHETERIZATION  2025    Procedure: Angiogram, Coronary, with Left Heart Cath;  Surgeon: Jhonny Schofield MD;  Location: [facility] CATH LAB;  Service: Cardiology;;    CARDIAC CATHETERIZATION N/A 2025    Procedure: Cardiac catheterization;  Surgeon: Jhonny Schofield MD;  Location: [facility] CATH LAB;  Service: Cardiology;  Laterality: N/A;     SECTION      HERNIA REPAIR      LEFT HEART CATHETERIZATION Left 2020    Procedure: Left heart cath;  Surgeon: Shabnam Vernon MD;  Location: [facility] CATH LAB;  Service: Cardiology;  Laterality: Left;       ALLERGIES AND MEDICATION:     Review of patient's allergies indicates:   Allergen Reactions    Flagyl [metronidazole hcl]      Hives      Metronidazole Hives    Sulfamethoxazole-trimethoprim Itching    Aspirin Nausea Only    Lipitor [atorvastatin]      Myalgia         Medication List            Accurate as of 2025  4:23 PM. If you have any questions, ask your nurse or doctor.                START taking these medications      spironolactone 25 MG tablet  Commonly known as: ALDACTONE  Take 1 tablet (25 mg total) by mouth once daily.  Started by: Shabnam Vernon MD            CONTINUE taking these medications      [address] TWO WAY VALVE Misc  Generic drug: miscellaneous medical supply     * albuterol sulfate 2.5 mg/0.5 mL Nebu     * albuterol 90 mcg/actuation inhaler  Commonly known as: PROVENTIL/VENTOLIN HFA  Inhale 2 puffs into the lungs every 6 (six) hours as needed for Wheezing or Shortness of Breath. Rescue     * albuterol  90 mcg/actuation inhaler  Commonly known as: PROVENTIL/VENTOLIN HFA  Inhale 2 puffs into the lungs every 6 (six) hours as needed for Wheezing or Shortness of Breath.     albuterol-ipratropium 2.5 mg-0.5 mg/3 mL nebulizer solution  Commonly known as: DUO-NEB  Take 3 mLs by nebulization every 4 (four) hours as needed for Wheezing.     aspirin 81 MG EC tablet  Commonly known as: ECOTRIN     azelastine 137 mcg (0.1 %) nasal spray  Commonly known as: ASTELIN  1 spray (137 mcg total) by Nasal route 2 (two) times daily.     carvediloL 3.125 MG tablet  Commonly known as: COREG  TAKE 1 TABLET(3.125 MG) BY MOUTH TWICE DAILY WITH MEALS     DEEP SEA NASAL 0.65 % nasal spray  Generic drug: sodium chloride  Use 1 spray in each nostril as needed for Congestion.     fluticasone propionate 50 mcg/actuation nasal spray  Commonly known as: FLONASE  SHAKE LIQUID AND USE 2 SPRAYS(100 MCG) IN EACH NOSTRIL EVERY DAY     hydrOXYzine HCL 25 MG tablet  Commonly known as: ATARAX  Take 1 tablet (25 mg total) by mouth daily as needed for Anxiety.     lisinopriL 40 MG tablet  Commonly known as: PRINIVIL,ZESTRIL  Take 1 tablet (40 mg total) by mouth once daily.     montelukast 10 mg tablet  Commonly known as: SINGULAIR  TAKE 1 TABLET(10 MG) BY MOUTH EVERY EVENING     nicotine polacrilex 2 MG Lozg  Take 1 lozenge (2 mg total) by mouth as needed (Use up to 10 lozenges a day for cravings).     rosuvastatin 20 MG tablet  Commonly known as: CRESTOR  Take 2 tablets (40 mg total) by mouth every evening.     TRELEGY ELLIPTA 200-62.5-25 mcg inhaler  Generic drug: fluticasone-umeclidin-vilanter  Inhale 1 puff into the lungs once daily.           * This list has 3 medication(s) that are the same as other medications prescribed for you. Read the directions carefully, and ask your doctor or other care provider to review them with you.                STOP taking these medications      hydroCHLOROthiazide 25 MG tablet  Commonly known as: HYDRODIURIL  Stopped  by: Shabnam Vernon MD               Where to Get Your Medications        These medications were sent to [id number] - [place] - [address] [facility] [address] [facility]  [address] [place] [id number]      Phone: [phone]   spironolactone 25 MG tablet         SOCIAL HISTORY:     Social History     Socioeconomic History    Marital status: Single   Tobacco Use    Smoking status: Former     Current packs/day: 0.00     Average packs/day: 0.5 packs/day for 53.3 years (25.8 ttl pk-yrs)     Types: Cigarettes     Start date: 1970     Quit date: 2025     Years since quittin.2    Smokeless tobacco: Never   Substance and Sexual Activity    Alcohol use: Never    Drug use: Never    Sexual activity: Not Currently     Social Drivers of Health     Financial Resource Strain: High Risk (2025)    Overall Financial Resource Strain (CARDIA)     Difficulty of Paying Living Expenses: Hard   Food Insecurity: Food Insecurity Present (2025)    Hunger Vital Sign     Worried About Running Out of Food in the Last Year: Often true     Ran Out of Food in the Last Year: Sometimes true   Transportation Needs: No Transportation Needs (2025)    PRAPARE - Transportation     Lack of Transportation (Medical): No     Lack of Transportation (Non-Medical): No   Physical Activity: Unknown (2025)    Exercise Vital Sign     Minutes of Exercise per Session: 0 min   Stress: Stress Concern Present (2025)    [employer] - Occupational Stress Questionnaire     Feeling of Stress : To some extent   Housing Stability: Low Risk  (2025)    Housing Stability Vital Sign     Unable to Pay for Housing in the Last Year: No     Number of Times Moved in the Last Year: 0     Homeless in the Last Year: No       FAMILY HISTORY:     Family History   Problem Relation Name Age of Onset    Cancer Mother          Bladder    Liver disease Father         REVIEW OF SYSTEMS:   Review of  "Systems   Constitutional: Negative.   HENT: Negative.     Eyes: Negative.    Cardiovascular: Negative.    Respiratory: Negative.     Endocrine: Negative.    Hematologic/Lymphatic: Negative.    Skin: Negative.    Musculoskeletal: Negative.    Gastrointestinal: Negative.    Genitourinary: Negative.    Neurological: Negative.    Psychiatric/Behavioral: Negative.     Allergic/Immunologic: Negative.        A 10 point review of systems was performed and all the pertinent positives have been mentioned. Rest of review of systems was negative.        PHYSICAL EXAM:     Vitals:    07/17/25 1515   BP: (!) 138/90   Pulse:    Resp:     Body mass index is 31.66 kg/m².  Weight: 64 kg (141 lb 3.3 oz)   Height: 4' 8" (142.2 cm)     Physical Exam  Constitutional:       Appearance: Normal appearance. She is well-developed.   HENT:      Head: Normocephalic.   Eyes:      Pupils: Pupils are equal, round, and reactive to light.   Neck:      Vascular: Carotid bruit present.   Cardiovascular:      Rate and Rhythm: Normal rate and regular rhythm.   Pulmonary:      Effort: Pulmonary effort is normal.      Breath sounds: Normal breath sounds.   Abdominal:      General: Bowel sounds are normal.      Palpations: Abdomen is soft.      Tenderness: There is no abdominal tenderness.   Musculoskeletal:         General: Normal range of motion.      Cervical back: Normal range of motion and neck supple.   Skin:     General: Skin is warm.   Neurological:      Mental Status: She is alert and oriented to person, place, and time.           DATA:     Laboratory:  CBC:  Recent Labs   Lab 04/15/25  0536 04/16/25  0518 07/10/25  1352   WBC 11.43 11.89 3.76 L   HGB 13.3 12.5 12.0   HCT 42.0 40.1 38.8   Platelet Count 262 260 217       CHEMISTRIES:  Recent Labs   Lab 04/09/25  0447 04/10/25  0523 04/11/25  0536 04/13/25  0256 04/14/25  0418 04/15/25  0536 04/16/25  0518   Glucose 127 H 113 H 111 H   < > 112 H 107 117 H   Sodium 132 L 134 L 135 L   < > 135 L 137 " 137   Potassium 4.1 4.5 4.2   < > 4.2 4.1 4.2   BUN 15 14 13   < > 16 19 16   Creatinine 0.7 0.8 0.7   < > 0.7 0.7 0.7   Calcium 9.2 9.5 9.2   < > 8.5 L 9.0 9.3   Magnesium  1.9 1.9 2.0  --   --   --   --     < > = values in this interval not displayed.       CARDIAC BIOMARKERS:  Recent Labs   Lab 04/04/25 1929 04/05/25  0341   Troponin-I <0.006 0.327 H       COAGS:        LIPIDS/LFTS:  Recent Labs   Lab 10/13/23  1030 04/16/24  1006 04/04/25 1929 04/05/25  0341   Cholesterol Total  --   --   --  139   Cholesterol 156 146  --   --    Triglycerides 43 53  --   --    Triglyceride  --   --   --  32   HDL 57 50  --   --    HDL Cholesterol  --   --   --  60   LDL Cholesterol 90.4 85.4  --  72.6   Non-HDL Cholesterol 99 96  --   --    Non HDL Cholesterol  --   --   --  79   AST 22 15 19  --    ALT 20 13 17  --        Hemoglobin A1C   Date Value Ref Range Status   04/16/2024 5.8 (H) 4.0 - 5.6 % Final     Comment:     ADA Screening Guidelines:  5.7-6.4%  Consistent with prediabetes  >or=6.5%  Consistent with diabetes    High levels of fetal hemoglobin interfere with the HbA1C  assay. Heterozygous hemoglobin variants (HbS, HgC, etc)do  not significantly interfere with this assay.   However, presence of multiple variants may affect accuracy.     10/13/2023 5.5 4.0 - 5.6 % Final     Comment:     ADA Screening Guidelines:  5.7-6.4%  Consistent with prediabetes  >or=6.5%  Consistent with diabetes    High levels of fetal hemoglobin interfere with the HbA1C  assay. Heterozygous hemoglobin variants (HbS, HgC, etc)do  not significantly interfere with this assay.   However, presence of multiple variants may affect accuracy.     04/13/2023 5.6 4.0 - 5.6 % Final     Comment:     ADA Screening Guidelines:  5.7-6.4%  Consistent with prediabetes  >or=6.5%  Consistent with diabetes    High levels of fetal hemoglobin interfere with the HbA1C  assay. Heterozygous hemoglobin variants (HbS, HgC, etc)do  not significantly interfere with this  assay.   However, presence of multiple variants may affect accuracy.         TSH  Recent Labs   Lab 08/12/22  1413 10/13/23  1030 04/16/24  1006   TSH 0.926 1.890 1.888       The ASCVD Risk score (Dior LEIGH, et al., 2019) failed to calculate for the following reasons:    Risk score cannot be calculated because patient has a medical history suggesting prior/existing ASCVD             ASSESSMENT AND PLAN     Patient Active Problem List   Diagnosis    Essential hypertension    Centrilobular emphysema    CAD (coronary artery disease)    Smoking greater than 25 pack years    PAD (peripheral artery disease)    Pulmonary HTN    Prediabetes    Ventral hernia without obstruction or gangrene    Osteopenia    Tobacco dependency    COPD exacerbation    Acute respiratory failure with hypoxia    NSTEMI (non-ST elevated myocardial infarction)    Carotid atherosclerosis, bilateral    Dyslipidemia    Acute exacerbation of COPD with asthma    Alkalosis    Debility     Assessment & Plan    IMPRESSION:  Arm pain post-angiogram likely due to procedural positioning.  Stable CAD with 30-40% stenosis  Pulmonary HTN (pulmonary pressure 55 mmHg) likely secondary to smoking history and COPD.    PLAN SUMMARY:  - Continue Carvedilol at current dose  - Continue Lisinopril 40 mg  - Continue Crestor (rosuvastatin)  - Continue aspirin 81 mg  - Start Tylenol as needed for arm pain  - Apply heat to sore arm  - Avoid smoking  - Ordered repeat blood pressure measurement  - Follow up after medication changes    CORONARY ARTERY DISEASE:  - Discussed the nature of stable CAD and its implications.  - Continued aspirin 81 mg.  - Continued Crestor (rosuvastatin).    HYPERTENSION:  - Ordered repeat blood pressure measurement after patient relaxes.  - Continued Carvedilol at current dose.  - Continued Lisinopril 40 mg.  - Hollie to relax for 10 minutes before checking blood pressure at home.  - switch from hydrochlorothiazide to spironolactone    ARM PAIN:  -  Hollie to apply heat to sore arm.  - Started Tylenol as needed for arm pain.    SMOKING CESSATION:  - Hollie to continue to avoid smoking.    Pulmonary HTN (pulmonary pressure 55 mmHg) likely secondary to smoking history and COPD.    Dyslipidemia: Add ezetimibe to current therapy.  Last LDL 72.  Goal LDL less than 55    FOLLOW-UP:  - Follow up after medication changes.         Claudication:  Check ABIs, vascular ultrasound, carotid, screening abdominal ultrasound.  Bilateral common iliac artery stenosis as well as bilateral greater than 50% stenosis in SFA is noted.  Peripheral ultrasound.  I discussed various options with the patient including peripheral angiogram versus continuing medical management.  At the current time patient would like to try medical management.  Could not tolerate Pletal as it causes headaches.      Thank you very much for involving me in the care of your patient.  Please do not hesitate to contact me if there are any questions.      Shabnam Vernon MD, [facility], [facility]  Interventional Cardiologist, [facility].     This note was generated with the assistance of ambient listening technology. Verbal consent was obtained by the patient and accompanying visitor(s) for the recording of patient appointment to facilitate this note. I attest to having reviewed and edited the generated note for accuracy, though some syntax or spelling errors may persist. Please contact the author of this note for any clarification.    Visit today included increased complexity associated with the care of the episodic problem coronary artery disease, pulmonary hypertension, dyslipidemia addressed and managing the longitudinal care of the patient due to the serious and/or complex managed problem(s) Problem List[1]  .    This note was dictated with the help of speech recognition software.  There might be un-intended errors and/or substitutions.                           [1]   Patient Active Problem List  Diagnosis     Essential hypertension    Centrilobular emphysema    CAD (coronary artery disease)    Smoking greater than 25 pack years    PAD (peripheral artery disease)    Pulmonary HTN    Prediabetes    Ventral hernia without obstruction or gangrene    Osteopenia    Tobacco dependency    COPD exacerbation    Acute respiratory failure with hypoxia    NSTEMI (non-ST elevated myocardial infarction)    Carotid atherosclerosis, bilateral    Dyslipidemia    Acute exacerbation of COPD with asthma    Alkalosis    Debility

## 2025-07-22 ENCOUNTER — CLINICAL SUPPORT (OUTPATIENT)
Dept: SMOKING CESSATION | Facility: CLINIC | Age: 70
End: 2025-07-22
Payer: COMMERCIAL

## 2025-07-22 DIAGNOSIS — N18.31 CHRONIC KIDNEY DISEASE, STAGE 3A: Primary | ICD-10-CM

## 2025-07-22 DIAGNOSIS — F17.200 NICOTINE DEPENDENCE: Primary | ICD-10-CM

## 2025-07-22 PROCEDURE — 99402 PREV MED CNSL INDIV APPRX 30: CPT | Mod: 95,,,

## 2025-07-22 NOTE — PROGRESS NOTES
Individual Follow-Up Form    7/22/2025    Quit Date: 4/4/25    Clinical Status of Patient: Outpatient    Length of Service: 30 minutes    Continuing Medication: no    Other Medications: NRT lozenges available     Target Symptoms: Withdrawal and medication side effects. The following were  rated moderate (3) to severe (4) on TCRS:  Moderate (3): urges and cravings with certain activities.  Severe (4): none    Comments: completion of TCRS (Tobacco Cessation Rating Scale) reviewed strategies, cues, triggers, high risk situations, lapses, relapses, diet, exercise, stress, relaxation, sleep, habitual behavior, and life style changes.  Patient has completed out smoking cessation program.  Patient states that she is tobacco and nicotine free.  Patient states that she quit using NRT lozenges over 2 weeks ago though they are available if needed.  Patient states that she still has cravings with drinking coffee and other activities.  Patient states that she is able to distract herself and the urge passes.  Patient states that she feels her breathing is slowly improving.  Patient feels confident she will remain tobacco free.  Patient has my contact information if further support is needed.       Diagnosis: F17.200    Next Visit:   Program complete

## 2025-07-22 NOTE — Clinical Note
Patient has completed out smoking cessation program. Quit date was 4/4/25.  Patient states that she is tobacco and nicotine free.  Patient states that she quit using NRT lozenges over 2 weeks ago, though they are available if needed.  Patient states that she still has cravings with drinking coffee and other activities.  Patient states that she is able to distract herself and the urge passes.  Patient states that she feels her breathing is slowly improving.  Patient feels confident she will remain tobacco free.  Patient has my contact information if further support is needed.

## 2025-07-23 ENCOUNTER — PATIENT OUTREACH (OUTPATIENT)
Dept: ADMINISTRATIVE | Facility: HOSPITAL | Age: 70
End: 2025-07-23
Payer: MEDICARE

## 2025-07-28 LAB
OHS QRS DURATION: 72 MS
OHS QTC CALCULATION: 431 MS

## 2025-08-05 ENCOUNTER — OFFICE VISIT (OUTPATIENT)
Dept: PULMONOLOGY | Facility: CLINIC | Age: 70
End: 2025-08-05
Payer: MEDICARE

## 2025-08-05 VITALS — HEART RATE: 68 BPM | SYSTOLIC BLOOD PRESSURE: 150 MMHG | DIASTOLIC BLOOD PRESSURE: 82 MMHG | OXYGEN SATURATION: 96 %

## 2025-08-05 DIAGNOSIS — J43.2 CENTRILOBULAR EMPHYSEMA: ICD-10-CM

## 2025-08-05 DIAGNOSIS — J41.0 SIMPLE CHRONIC BRONCHITIS: ICD-10-CM

## 2025-08-05 DIAGNOSIS — G47.39 OTHER SLEEP APNEA: ICD-10-CM

## 2025-08-05 DIAGNOSIS — J96.11 CHRONIC HYPOXEMIC RESPIRATORY FAILURE: Primary | ICD-10-CM

## 2025-08-05 DIAGNOSIS — I27.20 PULMONARY HYPERTENSION: ICD-10-CM

## 2025-08-05 PROCEDURE — 3077F SYST BP >= 140 MM HG: CPT | Mod: CPTII,S$GLB,, | Performed by: INTERNAL MEDICINE

## 2025-08-05 PROCEDURE — 4010F ACE/ARB THERAPY RXD/TAKEN: CPT | Mod: CPTII,S$GLB,, | Performed by: INTERNAL MEDICINE

## 2025-08-05 PROCEDURE — 1101F PT FALLS ASSESS-DOCD LE1/YR: CPT | Mod: CPTII,S$GLB,, | Performed by: INTERNAL MEDICINE

## 2025-08-05 PROCEDURE — 99214 OFFICE O/P EST MOD 30 MIN: CPT | Mod: S$GLB,,, | Performed by: INTERNAL MEDICINE

## 2025-08-05 PROCEDURE — 1126F AMNT PAIN NOTED NONE PRSNT: CPT | Mod: CPTII,S$GLB,, | Performed by: INTERNAL MEDICINE

## 2025-08-05 PROCEDURE — 3288F FALL RISK ASSESSMENT DOCD: CPT | Mod: CPTII,S$GLB,, | Performed by: INTERNAL MEDICINE

## 2025-08-05 PROCEDURE — 1159F MED LIST DOCD IN RCRD: CPT | Mod: CPTII,S$GLB,, | Performed by: INTERNAL MEDICINE

## 2025-08-05 PROCEDURE — 99999 PR PBB SHADOW E&M-EST. PATIENT-LVL III: CPT | Mod: PBBFAC,,, | Performed by: INTERNAL MEDICINE

## 2025-08-05 PROCEDURE — 3079F DIAST BP 80-89 MM HG: CPT | Mod: CPTII,S$GLB,, | Performed by: INTERNAL MEDICINE

## 2025-08-05 RX ORDER — IPRATROPIUM BROMIDE AND ALBUTEROL SULFATE 2.5; .5 MG/3ML; MG/3ML
3 SOLUTION RESPIRATORY (INHALATION) EVERY 4 HOURS PRN
Qty: 1080 ML | Refills: 3 | Status: SHIPPED | OUTPATIENT
Start: 2025-08-05

## 2025-08-05 RX ORDER — FLUTICASONE FUROATE, UMECLIDINIUM BROMIDE AND VILANTEROL TRIFENATATE 200; 62.5; 25 UG/1; UG/1; UG/1
1 POWDER RESPIRATORY (INHALATION) DAILY
Qty: 60 EACH | Refills: 11 | Status: SHIPPED | OUTPATIENT
Start: 2025-08-05

## 2025-08-05 RX ORDER — ALBUTEROL SULFATE 90 UG/1
2 INHALANT RESPIRATORY (INHALATION) EVERY 6 HOURS PRN
Qty: 18 G | Refills: 11 | Status: SHIPPED | OUTPATIENT
Start: 2025-08-05 | End: 2026-08-05

## 2025-08-05 NOTE — PROGRESS NOTES
General Pulmonary Clinic  Follow Up Patient Visit    Chief Complaint: COPD     HPI     Hollie Ponce is a 70 y.o. female with COPD w PH, non-obstructive CAD,presenting with chief complaint of chronic hypoxemic respiratory failure 2/2 COPD    Interval hx  AEC 10-20, repeating labs w/ dr keith for leukopenia in 8/2025  Did not schedule sleep study  Planning for PH eval  She has  been using albuterol x 2 per week but recently with increase us in setting of worsening nasal congestion and allergy symptoms  Not using oxygen consistently    Presenting HPI  # recent hospitalization for COPD exacerbation  Discharge summary reviewed  Presented w/ wheezing and dyspnea and requiring O2.   She was tx for COPD exacerbation. Some concerns for stridor but no clear source but some mild narrowing of her proximal trachea  TTE 4/5/25 w/ PH PASP 55  Discharged on O2      She reports her breathing has not returned to baseline since hospitalization. Prior to hospitalization, she could walk several blocks, climb stairs, and shop at [employer] without significant difficulty. Currently, she has difficulty with distances even with a roller walker, which she finds heavy and challenging to lift into the car. Some days, she has difficulty performing tasks around the home. She is unable to handle distances or drive to places like [employer] as before. SpO2 is 96% at rest but drops to 89-90% with ambulation. She is only using O2 at night   She reports occasional wheezing, experiencing it once or twice recently. She currently has a raspy, hoarse voice but denies sore throat. ENT evaluation showed no visible throat abnormalities.    MEDICATIONS:  She takes Spiriva regularly and uses albuterol as needed for wheezing, reporting that two puffs typically resolves symptoms.      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    Records reviewed with the following findings ---        Objective   Past History     Past Medical History:  Past Medical History:   Diagnosis Date    Asthma     COPD (chronic obstructive pulmonary disease)     Hypertension          Past Surgical History:  Past Surgical History:   Procedure Laterality Date    ANGIOGRAM, CORONARY, WITH LEFT HEART CATHETERIZATION  2025    Procedure: Angiogram, Coronary, with Left Heart Cath;  Surgeon: Jhonny Schofield MD;  Location: [facility] CATH LAB;  Service: Cardiology;;    CARDIAC CATHETERIZATION N/A 2025    Procedure: Cardiac catheterization;  Surgeon: Jhonny Schofield MD;  Location: [facility] CATH LAB;  Service: Cardiology;  Laterality: N/A;     SECTION      HERNIA REPAIR      LEFT HEART CATHETERIZATION Left 2020    Procedure: Left heart cath;  Surgeon: Shabnam Vernon MD;  Location: [facility] CATH LAB;  Service: Cardiology;  Laterality: Left;         Social History:   Social History     Socioeconomic History    Marital status: Single   Tobacco Use    Smoking status: Former     Current packs/day: 0.00     Average packs/day: 0.5 packs/day for 53.3 years (25.8 ttl pk-yrs)     Types: Cigarettes     Start date: 1970     Quit date: 2025     Years since quittin.3    Smokeless tobacco: Never   Substance and Sexual Activity    Alcohol use: Never    Drug use: Never    Sexual activity: Not Currently     Social Drivers of Health     Financial Resource Strain: High Risk (2025)    Overall Financial Resource Strain (CARDIA)     Difficulty of Paying Living Expenses: Hard   Food Insecurity: Food Insecurity Present (2025)    Hunger Vital Sign     Worried About Running Out of Food in the Last Year: Often true     Ran Out of Food in the Last Year: Sometimes true   Transportation Needs: No Transportation Needs (2025)    PRAPARE - Transportation      Lack of Transportation (Medical): No     Lack of Transportation (Non-Medical): No   Physical Activity: Unknown (4/22/2025)    Exercise Vital Sign     Minutes of Exercise per Session: 0 min   Stress: Stress Concern Present (4/22/2025)    [employer] - Occupational Stress Questionnaire     Feeling of Stress : To some extent   Housing Stability: Low Risk  (4/22/2025)    Housing Stability Vital Sign     Unable to Pay for Housing in the Last Year: No     Number of Times Moved in the Last Year: 0     Homeless in the Last Year: No         Family Hx:  No family history of pulmonary fibrosis, pre-mature graying of hair, cirrhosis, or hematologic malignancies  No family history of emphysema or spontaneous PTX  no family history of lung cancer or lymphoma    Allergies and Pertinent Medications   Allergies and Medications Reviewed    Flagyl [metronidazole hcl], Metronidazole, Sulfamethoxazole-trimethoprim, Aspirin, and Lipitor [atorvastatin]  [unfilled]             Physical Exam   There were no vitals taken for this visit.      Constitutional: No acute distress, Atraumatic   HEENT: moist mucus membranes, extraocular movements intact  Cardiovascular: regular rate and rhythm, no murmurs, rubs or gallops  Pulmonary: normal respiratory rate and chest rise, no chest wall deformity, normal breath sounds with no wheezing or crackles  Abdominal: non-distended, bowel sounds present  Musculoskeletal: No lower extremity edema, no clubbing  Neurological: normal speech/maximiliano, moves all extremities against gravity  Skin: no finger cyanosis, no rashes on exposed body parts  Psych: Appropriate affect, normal mood       Diagnostic Studies      All diagnostic studies relevant to chief complaint reviewed personally    Labs:  Lab Results   Component Value Date    WBC 3.76 (L) 07/10/2025    HGB 12.0 07/10/2025    HGB 13.4 04/16/2024     07/10/2025     04/16/2024    MCV 95 07/10/2025    MCV 95  "04/16/2024     Lab Results   Component Value Date     04/16/2025     04/16/2024    K 4.2 04/16/2025    K 4.2 04/16/2024    CO2 33 (H) 04/16/2025    CO2 28 04/16/2024    BUN 16 04/16/2025    CREATININE 0.7 04/16/2025    MG 2.0 04/11/2025     Lab Results   Component Value Date    AST 19 04/04/2025    AST 15 04/16/2024    ALT 17 04/04/2025    ALT 13 04/16/2024    ALBUMIN 4.1 04/04/2025    ALBUMIN 3.8 04/16/2024    PROT 7.4 04/04/2025    PROT 6.3 04/16/2024    BILITOT 0.4 04/04/2025    BILITOT 0.3 04/16/2024         PFTs:  Pulmonary Functions Testing Results:  No results found for: "FEV1", "FVC", "[id number]", "TLC", "DLCO"         TTE:  Results for orders placed during the hospital encounter of 04/04/25    Echo    Interpretation Summary    Left Ventricle: The left ventricle is normal in size. Normal wall thickness. There is normal systolic function with a visually estimated ejection fraction of 65 - 70%. Grade I diastolic dysfunction.    Right Ventricle: The right ventricle is normal in size. Systolic function is normal.    Mitral Valve: There is mild regurgitation.    Tricuspid Valve: There is mild regurgitation.    Pulmonary Artery: The estimated pulmonary artery systolic pressure is 55 mmHg.            Assessment and Plan   Hollie Ponce is a 70 y.o. female  presenting with chief complaint of chronic hypoxemic resp failure 2/2 COPD and PH    Assessment & Plan        # chronic hypoxemic resp failure req 2L/min NC 2/2 COPD and Pulmonary hypertension - chronic, worsening   continue w/ 0 L at rest, 2-3 L/min w/ exertion told to titrate to main o2 > 90% -- will follow up on repeat walk test   Started Trelegy inhaler: 1 puff daily, hold breath for at least 10 seconds after use, albuterol PRN   likely hypercapnia - send for sleep study   refer to PH consider PE eval   AEC 10-20, consider daliresp or azithromycin if recurrent exacebraitions   Referred to pulmonary rehabilitation program at [facility]" AND OXYGEN DEPENDENCE:   again reiterated importance of wearing O2    SLEEP APNEA:   Ordered sleep study to assess for sleep apnea.    CANCER SCREENING:   Ordered lung cancer screening CT Chest for October. Counseled on benefits and potential additional testing associate with it    FOLLOW-UP:   Follow up on October 20      Explained to the patient I work Monday-Thursdays, so any results or messages received after working hours Thursday through Monday AM would not be addressed until I was back in the office. If he/she experienced any acute symptoms he/she should go the emergency room for immediate help.    Differential, diagnoses, diagnostic work up, and possible treatments were discussed with the patient. Questions were answered.    Sarah Wolf MD  Pulmonary-Critical Care Medicine              For this visit, the following time was spent  preparing to see the patient (e.g., review of tests) 10 minutes  obtaining and/or reviewing separately obtained history 0 minutes  Performing a medically necessary appropriate examination and/or evaluation 12 minutes  Counseling and educating the patient/family/caregiver 10 minutes  Ordering medications, tests, or procedures 1minutes  Referring and communicating with other health care professionals (when not reported separately) 0minutes  Documenting clinical information in the electronic or other health record 5  minutes  Care coordination (not reported separately) 0minutes    Total time = 37  minutes

## 2025-08-07 ENCOUNTER — OFFICE VISIT (OUTPATIENT)
Dept: OTOLARYNGOLOGY | Facility: CLINIC | Age: 70
End: 2025-08-07
Payer: MEDICARE

## 2025-08-07 VITALS
WEIGHT: 141.13 LBS | DIASTOLIC BLOOD PRESSURE: 88 MMHG | HEIGHT: 56 IN | BODY MASS INDEX: 31.75 KG/M2 | SYSTOLIC BLOOD PRESSURE: 158 MMHG

## 2025-08-07 DIAGNOSIS — J30.2 SEASONAL ALLERGIC RHINITIS, UNSPECIFIED TRIGGER: ICD-10-CM

## 2025-08-07 DIAGNOSIS — H69.93 DYSFUNCTION OF BOTH EUSTACHIAN TUBES: ICD-10-CM

## 2025-08-07 DIAGNOSIS — J32.2 CHRONIC ETHMOIDAL SINUSITIS: ICD-10-CM

## 2025-08-07 DIAGNOSIS — R49.0 DYSPHONIA: ICD-10-CM

## 2025-08-07 DIAGNOSIS — J34.3 HYPERTROPHY OF INFERIOR NASAL TURBINATE: ICD-10-CM

## 2025-08-07 DIAGNOSIS — H61.22 IMPACTED CERUMEN OF LEFT EAR: Primary | ICD-10-CM

## 2025-08-07 PROCEDURE — 4010F ACE/ARB THERAPY RXD/TAKEN: CPT | Mod: CPTII,S$GLB,, | Performed by: OTOLARYNGOLOGY

## 2025-08-07 PROCEDURE — 3060F POS MICROALBUMINURIA REV: CPT | Mod: CPTII,S$GLB,, | Performed by: OTOLARYNGOLOGY

## 2025-08-07 PROCEDURE — 99214 OFFICE O/P EST MOD 30 MIN: CPT | Mod: 25,S$GLB,, | Performed by: OTOLARYNGOLOGY

## 2025-08-07 PROCEDURE — 3066F NEPHROPATHY DOC TX: CPT | Mod: CPTII,S$GLB,, | Performed by: OTOLARYNGOLOGY

## 2025-08-07 PROCEDURE — 1101F PT FALLS ASSESS-DOCD LE1/YR: CPT | Mod: CPTII,S$GLB,, | Performed by: OTOLARYNGOLOGY

## 2025-08-07 PROCEDURE — 3077F SYST BP >= 140 MM HG: CPT | Mod: CPTII,S$GLB,, | Performed by: OTOLARYNGOLOGY

## 2025-08-07 PROCEDURE — 3288F FALL RISK ASSESSMENT DOCD: CPT | Mod: CPTII,S$GLB,, | Performed by: OTOLARYNGOLOGY

## 2025-08-07 PROCEDURE — 69210 REMOVE IMPACTED EAR WAX UNI: CPT | Mod: S$GLB,,, | Performed by: OTOLARYNGOLOGY

## 2025-08-07 PROCEDURE — 3008F BODY MASS INDEX DOCD: CPT | Mod: CPTII,S$GLB,, | Performed by: OTOLARYNGOLOGY

## 2025-08-07 PROCEDURE — 1126F AMNT PAIN NOTED NONE PRSNT: CPT | Mod: CPTII,S$GLB,, | Performed by: OTOLARYNGOLOGY

## 2025-08-07 PROCEDURE — 3079F DIAST BP 80-89 MM HG: CPT | Mod: CPTII,S$GLB,, | Performed by: OTOLARYNGOLOGY

## 2025-08-07 RX ORDER — BUDESONIDE 0.5 MG/2ML
INHALANT ORAL
Qty: 120 ML | Refills: 1 | Status: SHIPPED | OUTPATIENT
Start: 2025-08-07

## 2025-08-14 ENCOUNTER — CLINICAL SUPPORT (OUTPATIENT)
Dept: SMOKING CESSATION | Facility: CLINIC | Age: 70
End: 2025-08-14
Payer: COMMERCIAL

## 2025-08-14 ENCOUNTER — TELEPHONE (OUTPATIENT)
Dept: TRANSPLANT | Facility: CLINIC | Age: 70
End: 2025-08-14
Payer: MEDICARE

## 2025-08-14 DIAGNOSIS — F17.200 NICOTINE DEPENDENCE: Primary | ICD-10-CM

## 2025-08-14 PROCEDURE — 99407 BEHAV CHNG SMOKING > 10 MIN: CPT | Mod: S$GLB,,,

## 2025-08-18 ENCOUNTER — LAB VISIT (OUTPATIENT)
Dept: LAB | Facility: HOSPITAL | Age: 70
End: 2025-08-18
Attending: INTERNAL MEDICINE
Payer: MEDICARE

## 2025-08-18 ENCOUNTER — HOSPITAL ENCOUNTER (OUTPATIENT)
Dept: PULMONOLOGY | Facility: CLINIC | Age: 70
Discharge: HOME | End: 2025-08-18
Attending: INTERNAL MEDICINE
Payer: MEDICARE

## 2025-08-18 ENCOUNTER — OFFICE VISIT (OUTPATIENT)
Dept: TRANSPLANT | Facility: CLINIC | Age: 70
End: 2025-08-18
Attending: INTERNAL MEDICINE
Payer: MEDICARE

## 2025-08-18 VITALS — BODY MASS INDEX: 31.72 KG/M2 | WEIGHT: 141 LBS | HEIGHT: 56 IN

## 2025-08-18 VITALS
OXYGEN SATURATION: 98 % | HEART RATE: 67 BPM | SYSTOLIC BLOOD PRESSURE: 144 MMHG | HEIGHT: 56 IN | WEIGHT: 139.13 LBS | DIASTOLIC BLOOD PRESSURE: 94 MMHG | BODY MASS INDEX: 31.3 KG/M2

## 2025-08-18 DIAGNOSIS — I51.89 DIASTOLIC DYSFUNCTION: ICD-10-CM

## 2025-08-18 DIAGNOSIS — J96.11 CHRONIC RESPIRATORY FAILURE WITH HYPOXIA: ICD-10-CM

## 2025-08-18 DIAGNOSIS — N18.31 CHRONIC KIDNEY DISEASE, STAGE 3A: ICD-10-CM

## 2025-08-18 DIAGNOSIS — I27.20 PULMONARY HTN: Primary | ICD-10-CM

## 2025-08-18 DIAGNOSIS — J43.2 CENTRILOBULAR EMPHYSEMA: ICD-10-CM

## 2025-08-18 DIAGNOSIS — I27.9 CHRONIC PULMONARY HEART DISEASE: ICD-10-CM

## 2025-08-18 DIAGNOSIS — Z79.899 POLYPHARMACY: ICD-10-CM

## 2025-08-18 DIAGNOSIS — R06.82 TACHYPNEA: ICD-10-CM

## 2025-08-18 LAB
ABSOLUTE EOSINOPHIL (OHS): 0.02 K/UL
ABSOLUTE MONOCYTE (OHS): 0.51 K/UL (ref 0.3–1)
ABSOLUTE NEUTROPHIL COUNT (OHS): 1.72 K/UL (ref 1.8–7.7)
ALBUMIN SERPL BCP-MCNC: 4.3 G/DL (ref 3.5–5.2)
ALBUMIN/CREAT UR: 68.2 UG/MG
ALP SERPL-CCNC: 47 UNIT/L (ref 40–150)
ALT SERPL W/O P-5'-P-CCNC: 17 UNIT/L (ref 0–55)
ANION GAP (OHS): 8 MMOL/L (ref 8–16)
AST SERPL-CCNC: 19 UNIT/L (ref 0–50)
BASOPHILS # BLD AUTO: 0.03 K/UL
BASOPHILS NFR BLD AUTO: 0.7 %
BILIRUB SERPL-MCNC: 0.4 MG/DL (ref 0.1–1)
BUN SERPL-MCNC: 11 MG/DL (ref 8–23)
CALCIUM SERPL-MCNC: 9.6 MG/DL (ref 8.7–10.5)
CHLORIDE SERPL-SCNC: 107 MMOL/L (ref 95–110)
CO2 SERPL-SCNC: 27 MMOL/L (ref 23–29)
CREAT SERPL-MCNC: 1 MG/DL (ref 0.5–1.4)
CREAT UR-MCNC: 22 MG/DL (ref 15–325)
ERYTHROCYTE [DISTWIDTH] IN BLOOD BY AUTOMATED COUNT: 12.7 % (ref 11.5–14.5)
GFR SERPLBLD CREATININE-BSD FMLA CKD-EPI: 60 ML/MIN/1.73/M2
GLUCOSE SERPL-MCNC: 76 MG/DL (ref 70–110)
HCT VFR BLD AUTO: 40.7 % (ref 37–48.5)
HGB BLD-MCNC: 12.8 GM/DL (ref 12–16)
IMM GRANULOCYTES # BLD AUTO: 0.01 K/UL (ref 0–0.04)
IMM GRANULOCYTES NFR BLD AUTO: 0.2 % (ref 0–0.5)
LYMPHOCYTES # BLD AUTO: 1.96 K/UL (ref 1–4.8)
MAGNESIUM SERPL-MCNC: 1.9 MG/DL (ref 1.6–2.6)
MCH RBC QN AUTO: 29.6 PG (ref 27–31)
MCHC RBC AUTO-ENTMCNC: 31.4 G/DL (ref 32–36)
MCV RBC AUTO: 94 FL (ref 82–98)
MICROALBUMIN UR-MCNC: 15 UG/ML
NT-PROBNP SERPL-MCNC: 176 PG/ML
NUCLEATED RBC (/100WBC) (OHS): 0 /100 WBC
PLATELET # BLD AUTO: 225 K/UL (ref 150–450)
PMV BLD AUTO: 10.3 FL (ref 9.2–12.9)
POTASSIUM SERPL-SCNC: 4.4 MMOL/L (ref 3.5–5.1)
PROT SERPL-MCNC: 6.8 GM/DL (ref 6–8.4)
RBC # BLD AUTO: 4.33 M/UL (ref 4–5.4)
RELATIVE EOSINOPHIL (OHS): 0.5 %
RELATIVE LYMPHOCYTE (OHS): 46.1 % (ref 18–48)
RELATIVE MONOCYTE (OHS): 12 % (ref 4–15)
RELATIVE NEUTROPHIL (OHS): 40.5 % (ref 38–73)
SODIUM SERPL-SCNC: 142 MMOL/L (ref 136–145)
WBC # BLD AUTO: 4.25 K/UL (ref 3.9–12.7)

## 2025-08-18 PROCEDURE — 82570 ASSAY OF URINE CREATININE: CPT

## 2025-08-18 PROCEDURE — 3080F DIAST BP >= 90 MM HG: CPT | Mod: CPTII,S$GLB,, | Performed by: INTERNAL MEDICINE

## 2025-08-18 PROCEDURE — 1159F MED LIST DOCD IN RCRD: CPT | Mod: CPTII,S$GLB,, | Performed by: INTERNAL MEDICINE

## 2025-08-18 PROCEDURE — 1160F RVW MEDS BY RX/DR IN RCRD: CPT | Mod: CPTII,S$GLB,, | Performed by: INTERNAL MEDICINE

## 2025-08-18 PROCEDURE — 4010F ACE/ARB THERAPY RXD/TAKEN: CPT | Mod: CPTII,S$GLB,, | Performed by: INTERNAL MEDICINE

## 2025-08-18 PROCEDURE — 84460 ALANINE AMINO (ALT) (SGPT): CPT

## 2025-08-18 PROCEDURE — 36415 COLL VENOUS BLD VENIPUNCTURE: CPT

## 2025-08-18 PROCEDURE — 3288F FALL RISK ASSESSMENT DOCD: CPT | Mod: CPTII,S$GLB,, | Performed by: INTERNAL MEDICINE

## 2025-08-18 PROCEDURE — 99999 PR PBB SHADOW E&M-EST. PATIENT-LVL IV: CPT | Mod: PBBFAC,,, | Performed by: INTERNAL MEDICINE

## 2025-08-18 PROCEDURE — 3060F POS MICROALBUMINURIA REV: CPT | Mod: CPTII,S$GLB,, | Performed by: INTERNAL MEDICINE

## 2025-08-18 PROCEDURE — 83735 ASSAY OF MAGNESIUM: CPT

## 2025-08-18 PROCEDURE — 83880 ASSAY OF NATRIURETIC PEPTIDE: CPT

## 2025-08-18 PROCEDURE — 85025 COMPLETE CBC W/AUTO DIFF WBC: CPT

## 2025-08-18 PROCEDURE — 99203 OFFICE O/P NEW LOW 30 MIN: CPT | Mod: S$GLB,,, | Performed by: INTERNAL MEDICINE

## 2025-08-18 PROCEDURE — 3008F BODY MASS INDEX DOCD: CPT | Mod: CPTII,S$GLB,, | Performed by: INTERNAL MEDICINE

## 2025-08-18 PROCEDURE — 3066F NEPHROPATHY DOC TX: CPT | Mod: CPTII,S$GLB,, | Performed by: INTERNAL MEDICINE

## 2025-08-18 PROCEDURE — 1101F PT FALLS ASSESS-DOCD LE1/YR: CPT | Mod: CPTII,S$GLB,, | Performed by: INTERNAL MEDICINE

## 2025-08-18 PROCEDURE — 3077F SYST BP >= 140 MM HG: CPT | Mod: CPTII,S$GLB,, | Performed by: INTERNAL MEDICINE

## 2025-08-18 PROCEDURE — 1126F AMNT PAIN NOTED NONE PRSNT: CPT | Mod: CPTII,S$GLB,, | Performed by: INTERNAL MEDICINE

## 2025-08-23 ENCOUNTER — HOSPITAL ENCOUNTER (OUTPATIENT)
Dept: SLEEP MEDICINE | Facility: HOSPITAL | Age: 70
Discharge: HOME | End: 2025-08-23
Attending: INTERNAL MEDICINE
Payer: MEDICARE

## 2025-08-23 DIAGNOSIS — G47.39 OTHER SLEEP APNEA: ICD-10-CM

## 2025-08-23 PROCEDURE — 95810 POLYSOM 6/> YRS 4/> PARAM: CPT | Mod: 26,,, | Performed by: INTERNAL MEDICINE

## 2025-08-23 PROCEDURE — 95810 POLYSOM 6/> YRS 4/> PARAM: CPT

## 2025-08-24 PROBLEM — G47.39 OTHER SLEEP APNEA: Status: ACTIVE | Noted: 2025-08-24

## (undated) DEVICE — KIT HAND CONTROL HIGH PRESSUR

## (undated) DEVICE — PAD RADI FEMORAL

## (undated) DEVICE — KIT MANIFOLD LOW PRESS TUBING

## (undated) DEVICE — KIT SYR REUSABLE

## (undated) DEVICE — CATH DXTERITY JL35 100CM 5FR

## (undated) DEVICE — PAD DEFIB CADENCE ADULT R2

## (undated) DEVICE — KIT GLIDESHEATH SLEND 6FR 10CM

## (undated) DEVICE — OMNIPAQUE CONTRAST 350MG/100ML

## (undated) DEVICE — WIRE WHOLEY 260CM

## (undated) DEVICE — WIRE GUIDE SAFE-T-J .035 260CM

## (undated) DEVICE — OMNIPAQUE 300MG 150ML VIAL

## (undated) DEVICE — CATH EMPULSE ANGLED 5FR PIGTAI

## (undated) DEVICE — PACK CATH LAB

## (undated) DEVICE — CATH DXTERITY JR40 100CM 5FR

## (undated) DEVICE — HEMOSTAT VASC BAND REG 24CM

## (undated) DEVICE — BAND TR COMP DEVICE REG 24CM

## (undated) DEVICE — CATH JACKY RADIAL 3.5 110CM

## (undated) DEVICE — ANGIOTOUCH KIT